# Patient Record
Sex: MALE | Race: ASIAN | NOT HISPANIC OR LATINO | Employment: OTHER | URBAN - METROPOLITAN AREA
[De-identification: names, ages, dates, MRNs, and addresses within clinical notes are randomized per-mention and may not be internally consistent; named-entity substitution may affect disease eponyms.]

---

## 2017-01-26 DIAGNOSIS — E11.319 CONTROLLED TYPE 2 DIABETES MELLITUS WITH RETINOPATHY, WITHOUT LONG-TERM CURRENT USE OF INSULIN, MACULAR EDEMA PRESENCE UNSPECIFIED, UNSPECIFIED RETINOPATHY SEVERITY: Primary | ICD-10-CM

## 2017-01-26 NOTE — TELEPHONE ENCOUNTER
glimepiride (AMARYL) 2 MG tablet         Last Written Prescription Date: 12/22/16  Last Fill Quantity: 30, # refills: 0  Last Office Visit with G, P or [facility] prescribing provider:  10/26/16        BP Readings from Last 3 Encounters:   10/26/16 114/84   07/07/16 114/80   02/11/16 150/84     MICROL       21   10/26/2016  No results found for this basename: microalbumin  CREATININE   Date Value Ref Range Status   07/07/2016 0.78 0.66 - 1.25 mg/dL Final   ]  GFR ESTIMATE   Date Value Ref Range Status   07/07/2016 >90  Non  GFR Calc   >60 mL/min/1.7m2 Final   11/11/2015 >90  Non  GFR Calc   >60 mL/min/1.7m2 Final   05/15/2015 89 >60 mL/min/1.7m2 Final     Comment:     Non  GFR Calc     GFR ESTIMATE IF BLACK   Date Value Ref Range Status   07/07/2016 >90   GFR Calc   >60 mL/min/1.7m2 Final   11/11/2015 >90   GFR Calc   >60 mL/min/1.7m2 Final   05/15/2015 >90   GFR Calc   >60 mL/min/1.7m2 Final     CHOL      200   10/26/2016  HDL       48   10/26/2016  LDL      112   10/26/2016  TRIG      199   10/26/2016  CHOLHDLRATIO      3.6   11/11/2015  AST       35   5/15/2015  ALT       49   11/11/2015  A1C      9.9   10/26/2016  A1C      9.3   7/8/2016  A1C      7.4   2/11/2016  A1C      8.0   11/11/2015  A1C      7.0   5/15/2015  POTASSIUM   Date Value Ref Range Status   07/07/2016 4.3 3.4 - 5.3 mmol/L Final       [facility]y [facility]

## 2017-01-30 RX ORDER — GLIMEPIRIDE 2 MG/1
TABLET ORAL
Qty: 30 TABLET | Refills: 0 | Status: SHIPPED | OUTPATIENT
Start: 2017-01-30 | End: 2017-03-27

## 2017-01-30 NOTE — TELEPHONE ENCOUNTER
FOLLOWING UP ON THIS MEDICATION REQUEST   OUR RECORD'S SHOW WE ARE STILL AWAITING A REPLY ON THIS MEDICATION                                                        THANK YOU

## 2017-01-30 NOTE — TELEPHONE ENCOUNTER
Kimberlyn Dumont MA at 12/22/2016  3:51 PM      Status: Signed         Expand All Collapse All    Patient notified.  Nighat Gao MA, APRN CNP at 12/22/2016  3:49 PM      Status: Signed         Expand All Collapse All    Refilled- patient is due for labs in January- orders placed and he is to follow back after he's had labs done (due 1/26/16).  Nighat WEISS CNP               Medication is being filled for 1 time refill only due to:  Patient due for fasting labs and follow up office visit per above notes.    Chandan Mejia, RN

## 2017-03-16 ENCOUNTER — TELEPHONE (OUTPATIENT)
Dept: FAMILY MEDICINE | Facility: CLINIC | Age: 62
End: 2017-03-16

## 2017-03-16 NOTE — TELEPHONE ENCOUNTER
Panel Management Review      Patient has the following on his problem list:     Diabetes    ASA: Passed    Last A1C  Lab Results   Component Value Date    A1C 9.9 10/26/2016    A1C 9.3 07/08/2016    A1C 7.4 02/11/2016    A1C 8.0 11/11/2015    A1C 7.0 05/15/2015     A1C tested: FAILED    Last LDL:    Lab Results   Component Value Date    CHOL 200 10/26/2016     Lab Results   Component Value Date    HDL 48 10/26/2016     Lab Results   Component Value Date     10/26/2016     Lab Results   Component Value Date    TRIG 199 10/26/2016     Lab Results   Component Value Date    CHOLHDLRATIO 3.6 11/11/2015     Lab Results   Component Value Date    NHDL 152 10/26/2016       Is the patient on a Statin? YES             Is the patient on Aspirin? YES    Medications     HMG CoA Reductase Inhibitors    atorvastatin (LIPITOR) 20 MG tablet    Salicylates    aspirin 81 MG EC tablet          Last three blood pressure readings:  BP Readings from Last 3 Encounters:   10/26/16 114/84   07/07/16 114/80   02/11/16 150/84       Date of last diabetes office visit: 11/29/2016     Tobacco History:     History   Smoking Status     Never Smoker   Smokeless Tobacco     Never Used           Composite cancer screening  Chart review shows that this patient is due/due soon for the following None  Summary:    Patient is due/failing the following:   A1C    Action needed:   Patient needs office visit for DM ck .    Type of outreach:    Sent IngagePatient message.    Questions for provider review:    None                                                                                                                                    Ada Marcos CMA

## 2017-03-16 NOTE — LETTER
March 28, 2017          Kathrine Isaac  [address] [facility] [address]            Dear Kathrine Isaac,      At [facility] we care about your health and are committed to providing quality patient care. Regular appointments are a vital part of the care and management of your health and can help prevent many of the complications that can occur.      It has come to our attention that you are due for Diabetes check.  Please call [facility] at [phone] soon to schedule your follow up appointment.    If you have transferred care to another clinic please call to inform us so that we do not continue to send you reminder letters.      Sincerely,      [facility] Care Team/molly

## 2017-03-27 DIAGNOSIS — E11.319 CONTROLLED TYPE 2 DIABETES MELLITUS WITH RETINOPATHY, WITHOUT LONG-TERM CURRENT USE OF INSULIN, MACULAR EDEMA PRESENCE UNSPECIFIED, UNSPECIFIED RETINOPATHY SEVERITY: ICD-10-CM

## 2017-03-27 NOTE — TELEPHONE ENCOUNTER
glimepiride (AMARYL) 2 MG tablet         Last Written Prescription Date: 01/30/17  Last Fill Quantity: 30, # refills: 0  Last Office Visit with G, P or [facility] prescribing provider:  10/26/16        BP Readings from Last 3 Encounters:   10/26/16 114/84   07/07/16 114/80   02/11/16 150/84     Lab Results   Component Value Date    MICROL 21 10/26/2016     No results found for: MICROALBUMIN  Creatinine   Date Value Ref Range Status   07/07/2016 0.78 0.66 - 1.25 mg/dL Final   ]  GFR Estimate   Date Value Ref Range Status   07/07/2016 >90  Non  GFR Calc   >60 mL/min/1.7m2 Final   11/11/2015 >90  Non  GFR Calc   >60 mL/min/1.7m2 Final   05/15/2015 89 >60 mL/min/1.7m2 Final     Comment:     Non  GFR Calc     GFR Estimate If Black   Date Value Ref Range Status   07/07/2016 >90   GFR Calc   >60 mL/min/1.7m2 Final   11/11/2015 >90   GFR Calc   >60 mL/min/1.7m2 Final   05/15/2015 >90   GFR Calc   >60 mL/min/1.7m2 Final     Lab Results   Component Value Date    CHOL 200 10/26/2016     Lab Results   Component Value Date    HDL 48 10/26/2016     Lab Results   Component Value Date     10/26/2016     Lab Results   Component Value Date    TRIG 199 10/26/2016     Lab Results   Component Value Date    CHOLHDLRATIO 3.6 11/11/2015     Lab Results   Component Value Date    AST 35 05/15/2015     Lab Results   Component Value Date    ALT 49 11/11/2015     Lab Results   Component Value Date    A1C 9.9 10/26/2016    A1C 9.3 07/08/2016    A1C 7.4 02/11/2016    A1C 8.0 11/11/2015    A1C 7.0 05/15/2015     Potassium   Date Value Ref Range Status   07/07/2016 4.3 3.4 - 5.3 mmol/L Final         [facility]

## 2017-03-31 RX ORDER — GLIMEPIRIDE 2 MG/1
TABLET ORAL
Qty: 30 TABLET | Refills: 0 | Status: SHIPPED | OUTPATIENT
Start: 2017-03-31 | End: 2017-04-07

## 2017-03-31 NOTE — TELEPHONE ENCOUNTER
Routing refill request to provider for review/approval because:  Laura given x1 and patient did not follow up, please advise    Nancy Melendrez, RN, BSN

## 2017-04-03 NOTE — TELEPHONE ENCOUNTER
Spoke to patient and gave him Sushma's message.  Scheduled an appt with Sushma on 4/7/17 at 8:00 am.  Maria Alejandra Tam MA/  For Teams Spirit and Laura

## 2017-04-07 ENCOUNTER — OFFICE VISIT (OUTPATIENT)
Dept: FAMILY MEDICINE | Facility: CLINIC | Age: 62
End: 2017-04-07
Payer: COMMERCIAL

## 2017-04-07 VITALS
HEART RATE: 74 BPM | SYSTOLIC BLOOD PRESSURE: 138 MMHG | OXYGEN SATURATION: 96 % | HEIGHT: 67 IN | WEIGHT: 204.6 LBS | TEMPERATURE: 97.2 F | BODY MASS INDEX: 32.11 KG/M2 | DIASTOLIC BLOOD PRESSURE: 86 MMHG

## 2017-04-07 DIAGNOSIS — E11.319 TYPE 2 DIABETES MELLITUS WITH RETINOPATHY, WITHOUT LONG-TERM CURRENT USE OF INSULIN, MACULAR EDEMA PRESENCE UNSPECIFIED, UNSPECIFIED LATERALITY, UNSPECIFIED RETINOPATHY SEVERITY (H): ICD-10-CM

## 2017-04-07 DIAGNOSIS — E66.811 OBESITY, CLASS I, BMI 30.0-34.9 (SEE ACTUAL BMI): ICD-10-CM

## 2017-04-07 DIAGNOSIS — E78.5 HYPERLIPIDEMIA LDL GOAL <100: ICD-10-CM

## 2017-04-07 DIAGNOSIS — Z13.89 SCREENING FOR DIABETIC PERIPHERAL NEUROPATHY: Primary | ICD-10-CM

## 2017-04-07 DIAGNOSIS — G89.29 CHRONIC PAIN OF LEFT KNEE: ICD-10-CM

## 2017-04-07 DIAGNOSIS — I10 ESSENTIAL HYPERTENSION WITH GOAL BLOOD PRESSURE LESS THAN 140/90: ICD-10-CM

## 2017-04-07 DIAGNOSIS — M25.562 CHRONIC PAIN OF LEFT KNEE: ICD-10-CM

## 2017-04-07 LAB — HBA1C MFR BLD: 8.4 % (ref 4.3–6)

## 2017-04-07 PROCEDURE — 83036 HEMOGLOBIN GLYCOSYLATED A1C: CPT | Performed by: NURSE PRACTITIONER

## 2017-04-07 PROCEDURE — 36415 COLL VENOUS BLD VENIPUNCTURE: CPT | Performed by: NURSE PRACTITIONER

## 2017-04-07 PROCEDURE — 99207 C FOOT EXAM  NO CHARGE: CPT | Performed by: NURSE PRACTITIONER

## 2017-04-07 PROCEDURE — 99214 OFFICE O/P EST MOD 30 MIN: CPT | Performed by: NURSE PRACTITIONER

## 2017-04-07 RX ORDER — LISINOPRIL 40 MG/1
40 TABLET ORAL DAILY
Qty: 90 TABLET | Refills: 3 | Status: SHIPPED | OUTPATIENT
Start: 2017-04-07 | End: 2017-11-17

## 2017-04-07 RX ORDER — GLIMEPIRIDE 2 MG/1
TABLET ORAL
Qty: 90 TABLET | Refills: 3 | Status: SHIPPED | OUTPATIENT
Start: 2017-04-07 | End: 2017-11-17

## 2017-04-07 RX ORDER — ATORVASTATIN CALCIUM 20 MG/1
20 TABLET, FILM COATED ORAL DAILY
Qty: 90 TABLET | Refills: 1 | Status: SHIPPED | OUTPATIENT
Start: 2017-04-07 | End: 2017-09-20

## 2017-04-07 RX ORDER — NAPROXEN 500 MG/1
500 TABLET ORAL 2 TIMES DAILY PRN
Qty: 90 TABLET | Refills: 1 | Status: SHIPPED | OUTPATIENT
Start: 2017-04-07 | End: 2017-07-28

## 2017-04-07 RX ORDER — METFORMIN HCL 500 MG
2000 TABLET, EXTENDED RELEASE 24 HR ORAL
Qty: 360 TABLET | Refills: 3 | Status: SHIPPED | OUTPATIENT
Start: 2017-04-07 | End: 2017-11-17

## 2017-04-07 RX ORDER — HYDROCHLOROTHIAZIDE 12.5 MG/1
12.5 TABLET ORAL DAILY
Qty: 90 TABLET | Refills: 3 | Status: SHIPPED | OUTPATIENT
Start: 2017-04-07 | End: 2017-11-17

## 2017-04-07 RX ORDER — GLIMEPIRIDE 4 MG/1
4 TABLET ORAL
Qty: 90 TABLET | Refills: 1 | Status: SHIPPED | OUTPATIENT
Start: 2017-04-07 | End: 2018-03-28

## 2017-04-07 NOTE — MR AVS SNAPSHOT
After Visit Summary   4/7/2017    Kathrine Isaac    MRN: [id number]           Patient Information     Date Of Birth          1955        Visit Information        Provider Department      4/7/2017 8:00 AM Nighat Joe APRN CNP [facility]        Today's Diagnoses     Screening for diabetic peripheral neuropathy    -  1    Type 2 diabetes mellitus with retinopathy, without long-term current use of insulin, macular edema presence unspecified, unspecified laterality, unspecified retinopathy severity (H)        Hyperlipidemia LDL goal <100        Essential hypertension with goal blood pressure less than 140/90        Chronic pain of left knee        Obesity, Class I, BMI 30.0-34.9 (see actual BMI)          Care Instructions    Based on your medical history and these are the current health maintenance or preventive care services that you are due for (some may have been done at this visit)  Health Maintenance Due   Topic Date Due     FOOT EXAM Q1 YEAR( NO INBASKET)  01/11/1956     EYE EXAM Q1 YEAR( NO INBASKET)  05/04/2016     A1C Q6 MO( NO INBASKET)  04/26/2017         At [facility], we strive to deliver an exceptional experience to you, every time we see you.    If you receive a survey in the mail, please send us back your thoughts. We really do value your feedback.    Your care team's suggested websites for health information:  Www.RoleStar.org : Up to date and easily searchable information on multiple topics.  Www.medlineplus.gov : medication info, interactive tutorials, watch real surgeries online  Www.familydoctor.org : good info from the Academy of Family Physicians  Www.cdc.gov : public health info, travel advisories, epidemics (H1N1)  Www.aap.org : children's health info, normal development, vaccinations  Www.health.state.[place].us : [place] dept of health, public health issues in [place], N1N1    How to contact your care team:   Team Laura/Pio [phone]          Pharmacy [phone]    Dr. Villegas, Hue Dowd PA-C, Dr. Fajardo, Conchis WEISS CNP, Cynthia Yanez PA-C, Dr. Toney, and ABHISHEK Pardo CNP    Team RNs: Odilia & Alexa      Clinic hours  M-Th 7 am-7 pm   Fri 7 am-5 pm.   Urgent care M-F 11 am-9 pm,   Sat/Sun 9 am-5 pm.  Pharmacy M-Th 8 am-8 pm Fri 8 am-6 pm  Sat/Sun 9 am-5 pm.     All password changes, disabled accounts, or ID changes in P[employer]/MyHealth will be done by our Access Services Department.    If you need help with your account or password, call: [phone]. Clinic staff no longer has the ability to change passwords.     Your Diabetes Foot Care Program     Every day you depend on your feet to keep you moving. But when you have diabetes, your feet need special care. Even a small foot problem can become very serious. So don t take your feet for granted. By working with your diabetes health care team, you can learn how to protect your feet and keep them healthy.    Evaluating Your Feet  An evaluation helps your health care provider check the condition of your feet. The evaluation includes a review of your diabetes history and overall health. It may also include a foot exam, X-rays, or other tests. These can help show problems beneath the skin that you can t see or feel.  Medical History  You will be asked about your overall health and any history of foot problems. You ll also discuss your diabetes history, such as whether your blood sugar level has changed over time. It also includes questions about sensations of pain, tingling, pins and needles, or numbness. Doctors would also want to know if you have high blood pressure and heart disease, or if you smoke. Be sure to mention any medications, supplements, or herbal remedies you take.  Foot Exam  A foot exam checks the condition of different parts of your foot. First, your skin and nails are examined for any signs of infection. Blood flow is checked by feeling for the  pulses in each foot. You may also have tests to study the nerves in the foot. These include using a small filament (wire) to see how sensitive your feet are. In certain cases, you will be asked to walk a short distance to check for bone, joint, and muscle problems.  Diagnostic Tests  If needed, your health care provider will suggest certain tests to learn more about your feet. These include:    Doppler tests to measure blood flow in the feet and lower leg.    X-rays, which can show bone or joint problems.    Other imaging tests, such as an MRI (magnetic resonance imaging), bone scan, and CT (computed tomography) scan. These can help show bone infections.    Other tests, such as vascular tests, which study the blood flow in your feet and legs. You may also have nerve studies to learn how sensitive your feet are.  Creating a Foot Care Program  Based on the evaluation, your health care provider will create a foot care program for you. Your program may be as simple as starting a daily self-care routine and changing the types of shoes your wear. It may also involve treating minor foot problems, such as a corn or blister. In some cases, surgery will be needed to treat an infection.  Preventing Problems  When you have diabetes, it s easier to prevent problems than to treat them later on. So see your health care team for regular checkups and foot care. Your health care team can also help you learn more about caring for your feet at home. For example, you may be told to avoid walking barefoot. Or you may be told that special footwear is needed to protect your feet.  Have Regular Checkups  Foot problems can develop quickly. So be sure to follow your health care team s schedule for regular checkups. During office visits, take off your shoes and socks as soon as you get in the exam room. Ask your health care provider to examine your feet for problems. This will make it easier to find and treat small skin irritations before they  get worse. Regular checkups can also help keep track of the blood flow and feeling in your feet. If you have neuropathy (lack of feeling in your feet), you will need to have checkups more often.  Learn About Self-Care  The more you know about diabetes and your feet, the easier it will be to prevent problems. Members of your health care team can teach you how to inspect your feet and teach you to look for warning signs. They can also give you other foot care tips. During office visits, be sure to ask any questions you have.    3443-0927 [employer]. [address] [place] [address]. All rights reserved. This information is not intended as a substitute for professional medical care. Always follow your healthcare professional's instructions.        Long-Term Complications of Diabetes  Diabetes can cause health problems over time. These are called complications. They are more likely to occur if your blood sugar is often too high. Over time, high blood sugar can damage blood vessels in your body. It is important to keep your blood sugar in your target range. This can help prevent or delay complications from diabetes.    Possible complications  Complications of diabetes include:    Eye problems, including damage to the blood vessels in the eyes (retinopathy), pressure in the eye (glaucoma), and clouding of the eye s lens (a cataract). Eye problems can eventually lead to irreversible blindness.     Tooth and gum problems (periodontal disease), causing loss of teeth and bone    Blood vessel (vascular) disease leading to circulation problems, heart attack or stroke, or a need for amputation of a limb     Problems with sexual function leading to erectile dysfunction in men and sexual discomfort in women     Kidney disease (nephropathy) can eventually lead to kidney failure, which may require dialysis or kidney transplant     Nerve problems (neuropathy), causing pain or loss of feeling in your feet and  other parts of your body, potentially leading to an amputation of a limb     High blood pressure (hypertension), putting strain on your heart and blood vessels    Serious infections, possibly leading to loss of toes, feet, or limbs  How to avoid complications  The serious consequences of these complications are completely avoidable for most people with diabetes by managing your blood glucose, blood pressure, and cholesterol levels. This can help you feel better and stay healthy. You can manage diabetes by tracking your blood sugar. You can also eat healthy and exercise. And you should take medication if directed by your health care provider.    3976-2585 [employer]. [address] [place] [address]. All rights reserved. This information is not intended as a substitute for professional medical care. Always follow your healthcare professional's instructions.              Follow-ups after your visit        Additional Services     OPTOMETRY REFERRAL       Your provider has referred you to: PATEL: [facility] [phone]   [email or web address]    Please be aware that coverage of these services is subject to the terms and limitations of your health insurance plan.  Call member services at your health plan with any benefit or coverage questions.      Please bring the following with you to your appointment:    (1) Any X-Rays, CTs or MRIs which have been performed.  Contact the facility where they were done to arrange for  prior to your scheduled appointment.    (2) List of current medications  (3) This referral request   (4) Any documents/labs given to you for this referral                  Follow-up notes from your care team     Return in about 3 months (around 7/7/2017), or if symptoms worsen or fail to improve, for Lab Work, 6 months OV.      Who to contact     If you have questions or need follow up information about today's  "clinic visit or your schedule please contact [facility] directly at [phone].  Normal or non-critical lab and imaging results will be communicated to you by MyChart, letter or phone within 4 business days after the clinic has received the results. If you do not hear from us within 7 days, please contact the clinic through [employer] or phone. If you have a critical or abnormal lab result, we will notify you by phone as soon as possible.  Submit refill requests through [employer] or call your pharmacy and they will forward the refill request to us. Please allow 3 business days for your refill to be completed.          Additional Information About Your Visit        [employer] Information     [employer] gives you secure access to your electronic health record. If you see a primary care provider, you can also send messages to your care team and make appointments. If you have questions, please call your primary care clinic.  If you do not have a primary care provider, please call [phone] and they will assist you.        Care EveryWhere ID     This is your Care EveryWhere ID. This could be used by other organizations to access your [place] medical records  [id number]        Your Vitals Were     Pulse Temperature Height Pulse Oximetry BMI (Body Mass Index)       74 97.2  F (36.2  C) (Oral) 5' 6.93\" (1.7 m) 96% 32.11 kg/m2        Blood Pressure from Last 3 Encounters:   04/07/17 138/86   10/26/16 114/84   07/07/16 114/80    Weight from Last 3 Encounters:   04/07/17 204 lb 9.6 oz (92.8 kg)   10/26/16 198 lb (89.8 kg)   07/07/16 202 lb (91.6 kg)              We Performed the Following     FOOT EXAM  NO CHARGE [74584.114]     HEMOGLOBIN A1C     OPTOMETRY REFERRAL          Today's Medication Changes          These changes are accurate as of: 4/7/17  8:32 AM.  If you have any questions, ask your nurse or doctor.               These medicines have changed or have updated prescriptions.        Dose/Directions "    glimepiride 2 MG tablet   Commonly known as:  AMARYL   This may have changed:  See the new instructions.   Used for:  Type 2 diabetes mellitus with retinopathy, without long-term current use of insulin, macular edema presence unspecified, unspecified laterality, unspecified retinopathy severity (H)   Changed by:  Nighat Joe APRN CNP        TAKE ONE TABLET BY MOUTH EVERY MORNING BEFORE BREAKFAST   Quantity:  90 tablet   Refills:  3            Where to get your medicines      These medications were sent to [facility] [place], [place] - [address]  [address], [facility] [address]     Phone:  [phone]     aspirin 81 MG EC tablet    atorvastatin 20 MG tablet    glimepiride 2 MG tablet    hydrochlorothiazide 12.5 MG Tabs tablet    lisinopril 40 MG tablet    metFORMIN 500 MG 24 hr tablet    naproxen 500 MG tablet    sitagliptin 100 MG tablet                Primary Care Provider Office Phone # Fax #    ABHISHEK Beauchamp [phone]       [facility] [address]  [facility] [address]        Thank you!     Thank you for choosing [facility]  for your care. Our goal is always to provide you with excellent care. Hearing back from our patients is one way we can continue to improve our services. Please take a few minutes to complete the written survey that you may receive in the mail after your visit with us. Thank you!             Your Updated Medication List - Protect others around you: Learn how to safely use, store and throw away your medicines at www.disposemymeds.org.          This list is accurate as of: 4/7/17  8:32 AM.  Always use your most recent med list.                   Brand Name Dispense Instructions for use    aspirin 81 MG EC tablet     90 tablet    Take 1 tablet (81 mg) by mouth daily       atorvastatin 20 MG tablet    LIPITOR    90 tablet    Take 1 tablet (20 mg) by mouth daily       blood glucose  monitoring test strip    no brand specified    1 Box    Use to test blood sugars once times daily or as directed       glimepiride 2 MG tablet    AMARYL    90 tablet    TAKE ONE TABLET BY MOUTH EVERY MORNING BEFORE BREAKFAST       hydrochlorothiazide 12.5 MG Tabs tablet     90 tablet    Take 1 tablet (12.5 mg) by mouth daily       lisinopril 40 MG tablet    PRINIVIL/ZESTRIL    90 tablet    Take 1 tablet (40 mg) by mouth daily       metFORMIN 500 MG 24 hr tablet    GLUCOPHAGE-XR    360 tablet    Take 4 tablets (2,000 mg) by mouth daily (with dinner)       naproxen 500 MG tablet    NAPROSYN    90 tablet    Take 1 tablet (500 mg) by mouth 2 times daily as needed for moderate pain       * order for DME     1 each    Equipment being ordered: wrist quick fit [id number]       * order for DME     1 Box    Equipment being ordered: lancets for one touch glucometer for once daily testing       * order for DME     1 Device    Equipment being ordered: Glucometer Accucheck Sulema, lancets, test strips.  Patient to check sugars bid times daily, 90 day supply for test strips and lancets, refill 6 times       * order for DME     1 Device    Equipment being ordered: Glucometer per insurance preference, lancets, test strips.  Patient to check sugars two times daily, 90 day supply for test strips and lancets, refill 3 times       sitagliptin 100 MG tablet    JANUVIA    90 tablet    Take 1 tablet (100 mg) by mouth daily       * Notice:  This list has 4 medication(s) that are the same as other medications prescribed for you. Read the directions carefully, and ask your doctor or other care provider to review them with you.

## 2017-04-07 NOTE — NURSING NOTE
"Chief Complaint   Patient presents with     Hypertension     Diabetes     Fasting     Lipids     Refill Request     Allergies       Initial /86 (BP Location: Left arm, Patient Position: Chair, Cuff Size: Adult Regular)  Pulse 74  Temp 97.2  F (36.2  C) (Oral)  Ht 5' 6.93\" (1.7 m)  Wt 204 lb 9.6 oz (92.8 kg)  SpO2 96%  BMI 32.11 kg/m2 Estimated body mass index is 32.11 kg/(m^2) as calculated from the following:    Height as of this encounter: 5' 6.93\" (1.7 m).    Weight as of this encounter: 204 lb 9.6 oz (92.8 kg).  Medication Reconciliation: complete   Janeen Cordon MA      "

## 2017-04-07 NOTE — PROGRESS NOTES
SUBJECTIVE:                                                    Kathrine Isaac is a 62 year old male who presents to clinic today for the following health issues:        Diabetes Follow-up    Patient is checking blood sugars: twice daily.    Blood sugar testing frequency justification: Patient modifying lifestyle changes (diet, exercise) with blood sugars  Results are as follows:         am - 90         suppertime - 138-140          Bedtime less than 150    Diabetic concerns: None     Symptoms of hypoglycemia (low blood sugar): none     Paresthesias (numbness or burning in feet) or sores: No     Date of last diabetic eye exam: June 2016     Hyperlipidemia Follow-Up      Rate your low fat/cholesterol diet?: good    Taking statin?  Yes, no muscle aches from statin    Other lipid medications/supplements?:  none     Hypertension Follow-up      Outpatient blood pressures are not being checked.    Low Salt Diet: not monitoring salt         Amount of exercise or physical activity: Moderately     Problems taking medications regularly: No    Medication side effects: none    Diet: regular (no restrictions)    ALLERGIES     Onset: Couple of years     Description:   Nasal congestion: no  Sneezing: YES  Red, itchy eyes: no    Progression of Symptoms:  same    Accompanying Signs & Symptoms:  Cough: no  Wheezing: no  Rash: no  Sinus/facial pain: no   History:   Is it seasonal: in the spring and in the summer   History of Asthma: no  Has allergy testing been done: YES- couple of years ago     Precipitating factors:   None    Alleviating factors:  None       Therapies Tried and outcome: Claritin helps      Problem list and histories reviewed & adjusted, as indicated.  Additional history: as documented    Recent Labs   Lab Test  10/26/16   0940  07/08/16   0916  07/07/16   1107  02/11/16   0751  11/11/15   1040  05/15/15   0927   A1C  9.9*  9.3*   --   7.4*  8.0*  7.0*   LDL  112*   --    --    --   61  Cannot estimate LDL when  "triglyceride exceeds 400 mg/dL   HDL  48   --    --    --   46  44   TRIG  199*   --    --    --   291*  428*   ALT   --    --    --    --   49  49   CR   --    --   0.78   --   0.81  0.88   GFRESTIMATED   --    --   >90  Non  GFR Calc     --   >90  Non  GFR Calc    89   GFRESTBLACK   --    --   >90   GFR Calc     --   >90   GFR Calc    >90   GFR Calc     POTASSIUM   --    --   4.3   --   4.6  4.8   TSH   --    --    --    --    --   1.40      BP Readings from Last 3 Encounters:   04/07/17 138/86   10/26/16 114/84   07/07/16 114/80    Wt Readings from Last 3 Encounters:   04/07/17 204 lb 9.6 oz (92.8 kg)   10/26/16 198 lb (89.8 kg)   07/07/16 202 lb (91.6 kg)                  Labs reviewed in EPIC    Reviewed and updated as needed this visit by clinical staff       Reviewed and updated as needed this visit by Provider         ROS:  Constitutional, HEENT, cardiovascular, pulmonary, gi and gu systems are negative, except as otherwise noted.    OBJECTIVE:                                                    /86 (BP Location: Left arm, Patient Position: Chair, Cuff Size: Adult Regular)  Pulse 74  Temp 97.2  F (36.2  C) (Oral)  Ht 5' 6.93\" (1.7 m)  Wt 204 lb 9.6 oz (92.8 kg)  SpO2 96%  BMI 32.11 kg/m2  Body mass index is 32.11 kg/(m^2).  GENERAL: healthy, alert and no distress  EYES: Eyes grossly normal to inspection, PERRL, EOMI, conjunctivae and sclerae normal and fundi benign-no diabetic or hypertensive changes seen  HENT: ear canals and TM's normal, nose and mouth without ulcers or lesions  NECK: no adenopathy, no asymmetry, masses, or scars and thyroid normal to palpation  RESP: lungs clear to auscultation - no rales, rhonchi or wheezes  CV: regular rate and rhythm, normal S1 S2, no S3 or S4, no murmur, click or rub, no peripheral edema and peripheral pulses strong  ABDOMEN: soft, nontender, no hepatosplenomegaly, no masses and " "bowel sounds normal  MS: no gross musculoskeletal defects noted, no edema  SKIN: no suspicious lesions or rashes  NEURO: Normal strength and tone, mentation intact and speech normal  PSYCH: mentation appears normal, affect normal/bright  LYMPH: normal ant/post cervical, supraclavicular nodes  Diabetic foot exam: normal DP and PT pulses, no trophic changes or ulcerative lesions, normal sensory exam, normal monofilament exam and nail exam dystrophic great toenails    Diagnostic Test Results:  No results found for this or any previous visit (from the past 24 hour(s)).     ASSESSMENT/PLAN:                                                          BMI:   Estimated body mass index is 32.11 kg/(m^2) as calculated from the following:    Height as of this encounter: 5' 6.93\" (1.7 m).    Weight as of this encounter: 204 lb 9.6 oz (92.8 kg).   Weight management plan: Discussed healthy diet and exercise guidelines and patient will follow up in 6 months in clinic to re-evaluate.      1. Type 2 diabetes mellitus with retinopathy, without long-term current use of insulin, macular edema presence unspecified, unspecified laterality, unspecified retinopathy severity (H)  Sugars seem well controlled, checking A1c today. Due for eye exam in June.  - HEMOGLOBIN A1C  - glimepiride (AMARYL) 2 MG tablet; TAKE ONE TABLET BY MOUTH EVERY MORNING BEFORE BREAKFAST  Dispense: 90 tablet; Refill: 3  - aspirin 81 MG EC tablet; Take 1 tablet (81 mg) by mouth daily  Dispense: 90 tablet; Refill: 3  - sitagliptin (JANUVIA) 100 MG tablet; Take 1 tablet (100 mg) by mouth daily  Dispense: 90 tablet; Refill: 1  - metFORMIN (GLUCOPHAGE-XR) 500 MG 24 hr tablet; Take 4 tablets (2,000 mg) by mouth daily (with dinner)  Dispense: 360 tablet; Refill: 3  - OPTOMETRY REFERRAL    2. Hyperlipidemia LDL goal <100  Checking lipids today  - atorvastatin (LIPITOR) 20 MG tablet; Take 1 tablet (20 mg) by mouth daily  Dispense: 90 tablet; Refill: 1    3. Essential " hypertension with goal blood pressure less than 140/90  BP controlled, continue to cut back on the salt in the diet, regular exercise, work on weight loss.  - lisinopril (PRINIVIL/ZESTRIL) 40 MG tablet; Take 1 tablet (40 mg) by mouth daily  Dispense: 90 tablet; Refill: 3  - hydrochlorothiazide 12.5 MG TABS tablet; Take 1 tablet (12.5 mg) by mouth daily  Dispense: 90 tablet; Refill: 3    4. Chronic pain of left knee  Refilled naprosyn which works well for him.  - naproxen (NAPROSYN) 500 MG tablet; Take 1 tablet (500 mg) by mouth 2 times daily as needed for moderate pain  Dispense: 90 tablet; Refill: 1    5. Obesity, Class I, BMI 30.0-34.9 (see actual BMI)  Benefits of weight loss reviewed in detail, encouraged him to cut back on the carbohydrates in the diet, consume more fruits and vegetables, drink plenty of water, avoid fruit juices, sodas, get 150 min moderate exercise/week.  Recheck weight in 6 months.      6. Screening for diabetic peripheral neuropathy    - FOOT EXAM  NO CHARGE [39562.114]    See Patient Instructions    ABHISHEK Perry [facility]

## 2017-04-07 NOTE — PATIENT INSTRUCTIONS
Based on your medical history and these are the current health maintenance or preventive care services that you are due for (some may have been done at this visit)  Health Maintenance Due   Topic Date Due     FOOT EXAM Q1 YEAR( NO INBASKET)  01/11/1956     EYE EXAM Q1 YEAR( NO INBASKET)  05/04/2016     A1C Q6 MO( NO INBASKET)  04/26/2017         At [facility], we strive to deliver an exceptional experience to you, every time we see you.    If you receive a survey in the mail, please send us back your thoughts. We really do value your feedback.    Your care team's suggested websites for health information:  [email or web address] : Up to date and easily searchable information on multiple topics.  Www.medlineplus.gov : medication info, interactive tutorials, watch real surgeries online  Www.familydoctor.org : good info from the Academy of Family Physicians  Www.cdc.gov : public health info, travel advisories, epidemics (H1N1)  Www.aap.org : children's health info, normal development, vaccinations  Www.health.[facility].[place].us : [place] dept of health, public health issues in [place], N1N1    How to contact your care team:   Team Laura/Spirit [phone]         Pharmacy [phone]    Dr. Villegas, Hue Dowd PA-C, Dr. Fajardo, Conchis WEISS CNP, Cynthia Yanez PA-C, Dr. Toney, and ABHISHEK Pardo CNP    Team RNs: Odilia Liu      Clinic hours  M-Th 7 am-7 pm   Fri 7 am-5 pm.   Urgent care M-F 11 am-9 pm,   Sat/Sun 9 am-5 pm.  Pharmacy M-Th 8 am-8 pm Fri 8 am-6 pm  Sat/Sun 9 am-5 pm.     All password changes, disabled accounts, or ID changes in We[employer]/MyHealth will be done by our Access Services Department.    If you need help with your account or password, call: [phone]. Clinic staff no longer has the ability to change passwords.     Your Diabetes Foot Care Program     Every day you depend on your feet to keep you moving. But when you have diabetes, your feet need special care.  Even a small foot problem can become very serious. So don t take your feet for granted. By working with your diabetes health care team, you can learn how to protect your feet and keep them healthy.    Evaluating Your Feet  An evaluation helps your health care provider check the condition of your feet. The evaluation includes a review of your diabetes history and overall health. It may also include a foot exam, X-rays, or other tests. These can help show problems beneath the skin that you can t see or feel.  Medical History  You will be asked about your overall health and any history of foot problems. You ll also discuss your diabetes history, such as whether your blood sugar level has changed over time. It also includes questions about sensations of pain, tingling, pins and needles, or numbness. Doctors would also want to know if you have high blood pressure and heart disease, or if you smoke. Be sure to mention any medications, supplements, or herbal remedies you take.  Foot Exam  A foot exam checks the condition of different parts of your foot. First, your skin and nails are examined for any signs of infection. Blood flow is checked by feeling for the pulses in each foot. You may also have tests to study the nerves in the foot. These include using a small filament (wire) to see how sensitive your feet are. In certain cases, you will be asked to walk a short distance to check for bone, joint, and muscle problems.  Diagnostic Tests  If needed, your health care provider will suggest certain tests to learn more about your feet. These include:    Doppler tests to measure blood flow in the feet and lower leg.    X-rays, which can show bone or joint problems.    Other imaging tests, such as an MRI (magnetic resonance imaging), bone scan, and CT (computed tomography) scan. These can help show bone infections.    Other tests, such as vascular tests, which study the blood flow in your feet and legs. You may also have nerve  studies to learn how sensitive your feet are.  Creating a Foot Care Program  Based on the evaluation, your health care provider will create a foot care program for you. Your program may be as simple as starting a daily self-care routine and changing the types of shoes your wear. It may also involve treating minor foot problems, such as a corn or blister. In some cases, surgery will be needed to treat an infection.  Preventing Problems  When you have diabetes, it s easier to prevent problems than to treat them later on. So see your health care team for regular checkups and foot care. Your health care team can also help you learn more about caring for your feet at home. For example, you may be told to avoid walking barefoot. Or you may be told that special footwear is needed to protect your feet.  Have Regular Checkups  Foot problems can develop quickly. So be sure to follow your health care team s schedule for regular checkups. During office visits, take off your shoes and socks as soon as you get in the exam room. Ask your health care provider to examine your feet for problems. This will make it easier to find and treat small skin irritations before they get worse. Regular checkups can also help keep track of the blood flow and feeling in your feet. If you have neuropathy (lack of feeling in your feet), you will need to have checkups more often.  Learn About Self-Care  The more you know about diabetes and your feet, the easier it will be to prevent problems. Members of your health care team can teach you how to inspect your feet and teach you to look for warning signs. They can also give you other foot care tips. During office visits, be sure to ask any questions you have.    7684-6554 The [employer]. [address] [place] [address], [place] [address]. All rights reserved. This information is not intended as a substitute for professional medical care. Always follow your healthcare professional's  instructions.        Long-Term Complications of Diabetes  Diabetes can cause health problems over time. These are called complications. They are more likely to occur if your blood sugar is often too high. Over time, high blood sugar can damage blood vessels in your body. It is important to keep your blood sugar in your target range. This can help prevent or delay complications from diabetes.    Possible complications  Complications of diabetes include:    Eye problems, including damage to the blood vessels in the eyes (retinopathy), pressure in the eye (glaucoma), and clouding of the eye s lens (a cataract). Eye problems can eventually lead to irreversible blindness.     Tooth and gum problems (periodontal disease), causing loss of teeth and bone    Blood vessel (vascular) disease leading to circulation problems, heart attack or stroke, or a need for amputation of a limb     Problems with sexual function leading to erectile dysfunction in men and sexual discomfort in women     Kidney disease (nephropathy) can eventually lead to kidney failure, which may require dialysis or kidney transplant     Nerve problems (neuropathy), causing pain or loss of feeling in your feet and other parts of your body, potentially leading to an amputation of a limb     High blood pressure (hypertension), putting strain on your heart and blood vessels    Serious infections, possibly leading to loss of toes, feet, or limbs  How to avoid complications  The serious consequences of these complications are completely avoidable for most people with diabetes by managing your blood glucose, blood pressure, and cholesterol levels. This can help you feel better and stay healthy. You can manage diabetes by tracking your blood sugar. You can also eat healthy and exercise. And you should take medication if directed by your health care provider.    3240-5270 [employer]. [address] [place] [address], [place] [address]. All rights reserved. This  information is not intended as a substitute for professional medical care. Always follow your healthcare professional's instructions.

## 2017-07-28 DIAGNOSIS — G89.29 CHRONIC PAIN OF LEFT KNEE: ICD-10-CM

## 2017-07-28 DIAGNOSIS — M25.562 CHRONIC PAIN OF LEFT KNEE: ICD-10-CM

## 2017-07-28 NOTE — TELEPHONE ENCOUNTER
naproxen (NAPROSYN) 500 MG tablet      Last Written Prescription Date: 04/07/17  Last Quantity: 90, # refills: 1  Last Office Visit with G, [facility] or [facility] prescribing provider: 04/07/17       Creatinine   Date Value Ref Range Status   07/07/2016 0.78 0.66 - 1.25 mg/dL Final     Lab Results   Component Value Date    AST 35 05/15/2015     Lab Results   Component Value Date    ALT 49 11/11/2015     BP Readings from Last 3 Encounters:   04/07/17 138/86   10/26/16 114/84   07/07/16 114/80         Christy Linda  [facility]

## 2017-08-01 NOTE — TELEPHONE ENCOUNTER
Routing refill request to provider for review/approval because:  Labs not current:  Creatinine  Ann Marie Washington RN

## 2017-08-02 RX ORDER — NAPROXEN 500 MG/1
TABLET ORAL
Qty: 60 TABLET | Refills: 0 | Status: SHIPPED | OUTPATIENT
Start: 2017-08-02 | End: 2018-03-28

## 2017-08-02 NOTE — TELEPHONE ENCOUNTER
This writer 1st attempt to contact [facility] on 08/02/17      Reason for call Patient given a 30 day supply on medication and needs an office visit before next refill and left message to return call.      When patient calls back, please schedule Office Visit appointment within 2 weeks with PCP, document that pt called and close encounter .      Postponing message.    Maria Alejandra Tam MA

## 2017-08-03 NOTE — TELEPHONE ENCOUNTER
Spoke to patient and scheduled an appointment with Sushma Joe  On 8/9/17.  Maria Alejandra Tam MA/  For Teams Spirit and Laura

## 2017-08-24 DIAGNOSIS — G89.29 CHRONIC PAIN OF LEFT KNEE: ICD-10-CM

## 2017-08-24 DIAGNOSIS — E11.9 TYPE 2 DIABETES MELLITUS WITH HEMOGLOBIN A1C GOAL OF 7.0%-8.0% (H): Primary | ICD-10-CM

## 2017-08-24 DIAGNOSIS — E78.5 HYPERLIPIDEMIA LDL GOAL <100: ICD-10-CM

## 2017-08-24 DIAGNOSIS — M25.562 CHRONIC PAIN OF LEFT KNEE: ICD-10-CM

## 2017-08-24 NOTE — TELEPHONE ENCOUNTER
naproxen (NAPROSYN) 500 MG tablet      Last Written Prescription Date: 08/02/17  Last Quantity: 60, # refills: 0  Last Office Visit with G, P or [facility] prescribing provider: 04/07/17       Creatinine   Date Value Ref Range Status   07/07/2016 0.78 0.66 - 1.25 mg/dL Final     Lab Results   Component Value Date    AST 35 05/15/2015     Lab Results   Component Value Date    ALT 49 11/11/2015     BP Readings from Last 3 Encounters:   04/07/17 138/86   10/26/16 114/84   07/07/16 114/80         Christy Linda  [facility]

## 2017-08-28 NOTE — TELEPHONE ENCOUNTER
Routing refill request to provider for review/approval because:  Labs not current:  AST/ALT (5/2015)  Patt Cain RN.

## 2017-08-30 RX ORDER — NAPROXEN 500 MG/1
TABLET ORAL
Qty: 90 TABLET | Refills: 1 | Status: SHIPPED | OUTPATIENT
Start: 2017-08-30 | End: 2017-11-17

## 2017-08-30 NOTE — TELEPHONE ENCOUNTER
Patient needs labs prior to picking up his prescription.  Pls have him schedule fasting lab only appt for this. ( He is also due to follow up on his diabetes so we'll get all of his labs on the same stick).  Nighat WEISS, CNP

## 2017-08-30 NOTE — TELEPHONE ENCOUNTER
This writer attempted to contact [facility] on 08/30/17      Reason for call refill and left detailed message to schedule fasting lab appointment before refill is addressed.        Kimberlyn Dumont MA

## 2017-09-20 DIAGNOSIS — E78.5 HYPERLIPIDEMIA LDL GOAL <100: ICD-10-CM

## 2017-09-20 NOTE — TELEPHONE ENCOUNTER
atorvastatin (LIPITOR) 20 MG tablet         Last Written Prescription Date: 4/7/17  Last Fill Quantity: 90, # refills: 1    Last Office Visit with G, P or [facility] prescribing provider:  8/18/17   Future Office Visit:      BP Readings from Last 3 Encounters:   04/07/17 138/86   10/26/16 114/84   07/07/16 114/80     Lab Results   Component Value Date    ALT 49 11/11/2015     Lab Results   Component Value Date    CHOL 200 10/26/2016     Lab Results   Component Value Date    HDL 48 10/26/2016     Lab Results   Component Value Date     10/26/2016     Lab Results   Component Value Date    TRIG 199 10/26/2016     Lab Results   Component Value Date    CHOLHDLRATIO 3.6 11/11/2015

## 2017-09-22 RX ORDER — ATORVASTATIN CALCIUM 20 MG/1
TABLET, FILM COATED ORAL
Qty: 30 TABLET | Refills: 0 | Status: SHIPPED | OUTPATIENT
Start: 2017-09-22 | End: 2017-11-17

## 2017-09-22 NOTE — TELEPHONE ENCOUNTER
Medication is being filled for 1 time refill only due to:  Patient needs labs cholesterol recheck.     Patt Clinton RN

## 2017-11-15 DIAGNOSIS — E11.319 TYPE 2 DIABETES MELLITUS WITH RETINOPATHY, WITHOUT LONG-TERM CURRENT USE OF INSULIN, MACULAR EDEMA PRESENCE UNSPECIFIED, UNSPECIFIED LATERALITY, UNSPECIFIED RETINOPATHY SEVERITY (H): ICD-10-CM

## 2017-11-15 DIAGNOSIS — E78.5 HYPERLIPIDEMIA LDL GOAL <100: ICD-10-CM

## 2017-11-16 RX ORDER — ATORVASTATIN CALCIUM 20 MG/1
TABLET, FILM COATED ORAL
Qty: 30 TABLET | Refills: 0 | OUTPATIENT
Start: 2017-11-16

## 2017-11-16 RX ORDER — SITAGLIPTIN 100 MG/1
TABLET, FILM COATED ORAL
Qty: 30 TABLET | Refills: 0 | OUTPATIENT
Start: 2017-11-16

## 2017-11-16 RX ORDER — GLIMEPIRIDE 4 MG/1
TABLET ORAL
Qty: 90 TABLET | Refills: 1 | OUTPATIENT
Start: 2017-11-16

## 2017-11-16 NOTE — TELEPHONE ENCOUNTER
Patient is due for OV- I can see him tomorrow so he will not be out of his medication.  Nighat WEISS, CNP

## 2017-11-16 NOTE — TELEPHONE ENCOUNTER
Routing refill request to provider for review/approval because:  Laura given x1 and patient did not follow up, please advise  Cristina Dangelo RN

## 2017-11-16 NOTE — TELEPHONE ENCOUNTER
Scheduled patient for an office visit with Sushma Joe for tomorrow 11/17/17.  Maria Alejandra Tam MA/  For Teams Spirit and Laura

## 2017-11-17 ENCOUNTER — OFFICE VISIT (OUTPATIENT)
Dept: FAMILY MEDICINE | Facility: CLINIC | Age: 62
End: 2017-11-17
Payer: COMMERCIAL

## 2017-11-17 VITALS
WEIGHT: 208.2 LBS | SYSTOLIC BLOOD PRESSURE: 120 MMHG | DIASTOLIC BLOOD PRESSURE: 78 MMHG | OXYGEN SATURATION: 97 % | HEART RATE: 79 BPM | TEMPERATURE: 97 F | BODY MASS INDEX: 32.68 KG/M2 | HEIGHT: 67 IN

## 2017-11-17 DIAGNOSIS — E66.811 OBESITY, CLASS I, BMI 30.0-34.9 (SEE ACTUAL BMI): ICD-10-CM

## 2017-11-17 DIAGNOSIS — G89.29 CHRONIC PAIN OF LEFT KNEE: ICD-10-CM

## 2017-11-17 DIAGNOSIS — E78.5 HYPERLIPIDEMIA LDL GOAL <100: ICD-10-CM

## 2017-11-17 DIAGNOSIS — E11.319 TYPE 2 DIABETES MELLITUS WITH RETINOPATHY, WITHOUT LONG-TERM CURRENT USE OF INSULIN, MACULAR EDEMA PRESENCE UNSPECIFIED, UNSPECIFIED LATERALITY, UNSPECIFIED RETINOPATHY SEVERITY (H): ICD-10-CM

## 2017-11-17 DIAGNOSIS — M25.562 CHRONIC PAIN OF LEFT KNEE: ICD-10-CM

## 2017-11-17 DIAGNOSIS — E11.9 TYPE 2 DIABETES MELLITUS WITH HEMOGLOBIN A1C GOAL OF 7.0%-8.0% (H): Primary | ICD-10-CM

## 2017-11-17 DIAGNOSIS — I10 ESSENTIAL HYPERTENSION: ICD-10-CM

## 2017-11-17 DIAGNOSIS — H40.003 GLAUCOMA SUSPECT OF BOTH EYES: ICD-10-CM

## 2017-11-17 LAB
ALBUMIN SERPL-MCNC: 4.1 G/DL (ref 3.4–5)
ALP SERPL-CCNC: 69 U/L (ref 40–150)
ALT SERPL W P-5'-P-CCNC: 42 U/L (ref 0–70)
ANION GAP SERPL CALCULATED.3IONS-SCNC: 7 MMOL/L (ref 3–14)
AST SERPL W P-5'-P-CCNC: 19 U/L (ref 0–45)
BILIRUB SERPL-MCNC: 0.5 MG/DL (ref 0.2–1.3)
BUN SERPL-MCNC: 11 MG/DL (ref 7–30)
CALCIUM SERPL-MCNC: 8.6 MG/DL (ref 8.5–10.1)
CHLORIDE SERPL-SCNC: 104 MMOL/L (ref 94–109)
CHOLEST SERPL-MCNC: 98 MG/DL
CO2 SERPL-SCNC: 26 MMOL/L (ref 20–32)
CREAT SERPL-MCNC: 0.92 MG/DL (ref 0.66–1.25)
CREAT UR-MCNC: 32 MG/DL
GFR SERPL CREATININE-BSD FRML MDRD: 83 ML/MIN/1.7M2
GLUCOSE SERPL-MCNC: 94 MG/DL (ref 70–99)
HBA1C MFR BLD: 6.4 % (ref 4.3–6)
HDLC SERPL-MCNC: 44 MG/DL
LDLC SERPL CALC-MCNC: 21 MG/DL
MICROALBUMIN UR-MCNC: <5 MG/L
MICROALBUMIN/CREAT UR: NORMAL MG/G CR (ref 0–17)
NONHDLC SERPL-MCNC: 54 MG/DL
POTASSIUM SERPL-SCNC: 4.1 MMOL/L (ref 3.4–5.3)
PROT SERPL-MCNC: 7.7 G/DL (ref 6.8–8.8)
SODIUM SERPL-SCNC: 137 MMOL/L (ref 133–144)
TRIGL SERPL-MCNC: 166 MG/DL
TSH SERPL DL<=0.005 MIU/L-ACNC: 1.68 MU/L (ref 0.4–4)

## 2017-11-17 PROCEDURE — 80061 LIPID PANEL: CPT | Performed by: NURSE PRACTITIONER

## 2017-11-17 PROCEDURE — 84443 ASSAY THYROID STIM HORMONE: CPT | Performed by: NURSE PRACTITIONER

## 2017-11-17 PROCEDURE — 36415 COLL VENOUS BLD VENIPUNCTURE: CPT | Performed by: NURSE PRACTITIONER

## 2017-11-17 PROCEDURE — 80053 COMPREHEN METABOLIC PANEL: CPT | Performed by: NURSE PRACTITIONER

## 2017-11-17 PROCEDURE — 83036 HEMOGLOBIN GLYCOSYLATED A1C: CPT | Performed by: NURSE PRACTITIONER

## 2017-11-17 PROCEDURE — 82043 UR ALBUMIN QUANTITATIVE: CPT | Performed by: NURSE PRACTITIONER

## 2017-11-17 PROCEDURE — 99214 OFFICE O/P EST MOD 30 MIN: CPT | Performed by: NURSE PRACTITIONER

## 2017-11-17 RX ORDER — NAPROXEN 500 MG/1
TABLET ORAL
Qty: 90 TABLET | Refills: 1 | Status: SHIPPED | OUTPATIENT
Start: 2017-11-17 | End: 2018-03-23

## 2017-11-17 RX ORDER — HYDROCHLOROTHIAZIDE 12.5 MG/1
12.5 TABLET ORAL DAILY
Qty: 90 TABLET | Refills: 3 | Status: SHIPPED | OUTPATIENT
Start: 2017-11-17 | End: 2018-11-07

## 2017-11-17 RX ORDER — ATORVASTATIN CALCIUM 20 MG/1
20 TABLET, FILM COATED ORAL DAILY
Qty: 90 TABLET | Refills: 1 | Status: SHIPPED | OUTPATIENT
Start: 2017-11-17 | End: 2018-03-28

## 2017-11-17 RX ORDER — METFORMIN HCL 500 MG
2000 TABLET, EXTENDED RELEASE 24 HR ORAL
Qty: 360 TABLET | Refills: 3 | Status: SHIPPED | OUTPATIENT
Start: 2017-11-17 | End: 2018-11-07

## 2017-11-17 RX ORDER — LISINOPRIL 40 MG/1
40 TABLET ORAL DAILY
Qty: 90 TABLET | Refills: 3 | Status: SHIPPED | OUTPATIENT
Start: 2017-11-17 | End: 2018-11-07

## 2017-11-17 RX ORDER — GLIMEPIRIDE 2 MG/1
TABLET ORAL
Qty: 90 TABLET | Refills: 3 | Status: SHIPPED | OUTPATIENT
Start: 2017-11-17 | End: 2018-03-28 | Stop reason: DRUGHIGH

## 2017-11-17 ASSESSMENT — ANXIETY QUESTIONNAIRES
7. FEELING AFRAID AS IF SOMETHING AWFUL MIGHT HAPPEN: NOT AT ALL
IF YOU CHECKED OFF ANY PROBLEMS ON THIS QUESTIONNAIRE, HOW DIFFICULT HAVE THESE PROBLEMS MADE IT FOR YOU TO DO YOUR WORK, TAKE CARE OF THINGS AT HOME, OR GET ALONG WITH OTHER PEOPLE: NOT DIFFICULT AT ALL
5. BEING SO RESTLESS THAT IT IS HARD TO SIT STILL: NOT AT ALL
2. NOT BEING ABLE TO STOP OR CONTROL WORRYING: NOT AT ALL
1. FEELING NERVOUS, ANXIOUS, OR ON EDGE: NOT AT ALL
GAD7 TOTAL SCORE: 0
6. BECOMING EASILY ANNOYED OR IRRITABLE: NOT AT ALL
3. WORRYING TOO MUCH ABOUT DIFFERENT THINGS: NOT AT ALL

## 2017-11-17 ASSESSMENT — PATIENT HEALTH QUESTIONNAIRE - PHQ9
SUM OF ALL RESPONSES TO PHQ QUESTIONS 1-9: 3
5. POOR APPETITE OR OVEREATING: NOT AT ALL

## 2017-11-17 NOTE — PROGRESS NOTES
SUBJECTIVE:   Kathrine Isaac is a 62 year old male who presents to clinic today for the following health issues:      Diabetes Follow-up      Patient is checking blood sugars: rarely.  AM Results range from 100 to 120    Diabetic concerns: None     Symptoms of hypoglycemia (low blood sugar): none     Paresthesias (numbness or burning in feet) or sores: No     Date of last diabetic eye exam: 1 year     Hyperlipidemia Follow-Up      Rate your low fat/cholesterol diet?: good    Taking statin?  Yes, no muscle aches from statin    Other lipid medications/supplements?:  none    Hypertension Follow-up      Outpatient blood pressures are not being checked.    Low Salt Diet: Sea salt, low salt         Amount of exercise or physical activity: 4-5 days/week for an average of 45-60 minutes    Problems taking medications regularly: No    Medication side effects: none    Diet: regular (no restrictions)          Problem list and histories reviewed & adjusted, as indicated.  Additional history: as documented    Patient Active Problem List   Diagnosis     Glaucoma suspect     Type 2 diabetes mellitus with hemoglobin A1c goal of 7.0%-8.0% (H)     Hyperlipidemia LDL goal <100     Essential hypertension     Advance care planning     Diverticulosis of large intestine without hemorrhage     Obesity, Class I, BMI 30.0-34.9 (see actual BMI)     Past Surgical History:   Procedure Laterality Date     COLONOSCOPY N/A 1/14/2016    Procedure: COLONOSCOPY;  Surgeon: Ventura Monge MD;  Location: [facility]     LASIK BILATERAL       ROTATOR CUFF REPAIR RT/LT  1992     SURGICAL HISTORY OF -       uvula remove        Social History   Substance Use Topics     Smoking status: Never Smoker     Smokeless tobacco: Never Used     Alcohol use Yes     Family History   Problem Relation Age of Onset     DIABETES Mother      CANCER No family hx of      Hypertension No family hx of      CEREBROVASCULAR DISEASE No family hx of      Thyroid Disease No  family hx of      Glaucoma No family hx of      Macular Degeneration No family hx of          Current Outpatient Prescriptions   Medication Sig Dispense Refill     atorvastatin (LIPITOR) 20 MG tablet Take 1 tablet (20 mg) by mouth daily 90 tablet 1     naproxen (NAPROSYN) 500 MG tablet TAKE ONE TABLET BY MOUTH TWICE A DAY AS NEEDED FOR MODERATE PAIN 90 tablet 1     glimepiride (AMARYL) 2 MG tablet TAKE ONE TABLET BY MOUTH EVERY MORNING BEFORE BREAKFAST 90 tablet 3     sitagliptin (JANUVIA) 100 MG tablet Take 1 tablet (100 mg) by mouth daily 90 tablet 1     lisinopril (PRINIVIL/ZESTRIL) 40 MG tablet Take 1 tablet (40 mg) by mouth daily 90 tablet 3     metFORMIN (GLUCOPHAGE-XR) 500 MG 24 hr tablet Take 4 tablets (2,000 mg) by mouth daily (with dinner) 360 tablet 3     hydrochlorothiazide 12.5 MG TABS tablet Take 1 tablet (12.5 mg) by mouth daily 90 tablet 3     naproxen (NAPROSYN) 500 MG tablet TAKE ONE TABLET BY MOUTH TWICE A DAY AS NEEDED FOR MODERATE PAIN 60 tablet 0     aspirin 81 MG EC tablet Take 1 tablet (81 mg) by mouth daily 90 tablet 3     glimepiride (AMARYL) 4 MG tablet Take 1 tablet (4 mg) by mouth every morning (before breakfast) 90 tablet 1     order for DME Equipment being ordered: Glucometer per insurance preference, lancets, test strips.  Patient to check sugars two times daily, 90 day supply for test strips and lancets, refill 3 times 1 Device 11     blood glucose monitoring (NO BRAND SPECIFIED) test strip Use to test blood sugars once times daily or as directed 1 Box 11     order for DME Equipment being ordered: Glucometer Accucheck Sulema, lancets, test strips.  Patient to check sugars bid times daily, 90 day supply for test strips and lancets, refill 6 times 1 Device 0     order for DME Equipment being ordered: lancets for one touch glucometer for once daily testing 1 Box 6     ORDER FOR DME, SET TO LOCAL PRINT, Equipment being ordered: wrist quick fit [id number] 1 each 0     [DISCONTINUED]  atorvastatin (LIPITOR) 20 MG tablet TAKE ONE TABLET BY MOUTH EVERY DAY 30 tablet 0     [DISCONTINUED] glimepiride (AMARYL) 2 MG tablet TAKE ONE TABLET BY MOUTH EVERY MORNING BEFORE BREAKFAST 90 tablet 3     [DISCONTINUED] sitagliptin (JANUVIA) 100 MG tablet Take 1 tablet (100 mg) by mouth daily 90 tablet 1     [DISCONTINUED] lisinopril (PRINIVIL/ZESTRIL) 40 MG tablet Take 1 tablet (40 mg) by mouth daily 90 tablet 3     [DISCONTINUED] metFORMIN (GLUCOPHAGE-XR) 500 MG 24 hr tablet Take 4 tablets (2,000 mg) by mouth daily (with dinner) 360 tablet 3     [DISCONTINUED] hydrochlorothiazide 12.5 MG TABS tablet Take 1 tablet (12.5 mg) by mouth daily 90 tablet 3     Recent Labs   Lab Test  11/17/17   0944  04/07/17   0832  10/26/16   0940   07/07/16   1107   11/11/15   1040  05/15/15   0927   A1C  6.4*  8.4*  9.9*   < >   --    < >  8.0*  7.0*   LDL   --    --   112*   --    --    --   61  Cannot estimate LDL when triglyceride exceeds 400 mg/dL   HDL   --    --   48   --    --    --   46  44   TRIG   --    --   199*   --    --    --   291*  428*   ALT   --    --    --    --    --    --   49  49   CR   --    --    --    --   0.78   --   0.81  0.88   GFRESTIMATED   --    --    --    --   >90  Non  GFR Calc     --   >90  Non  GFR Calc    89   GFRESTBLACK   --    --    --    --   >90   GFR Calc     --   >90   GFR Calc    >90   GFR Calc     POTASSIUM   --    --    --    --   4.3   --   4.6  4.8   TSH   --    --    --    --    --    --    --   1.40    < > = values in this interval not displayed.      BP Readings from Last 3 Encounters:   11/17/17 120/78   04/07/17 138/86   10/26/16 114/84    Wt Readings from Last 3 Encounters:   11/17/17 208 lb 3.2 oz (94.4 kg)   04/07/17 204 lb 9.6 oz (92.8 kg)   10/26/16 198 lb (89.8 kg)                          Reviewed and updated as needed this visit by clinical staff       Reviewed and updated as needed this  "visit by Provider     ROS:  Constitutional, HEENT, cardiovascular, pulmonary, gi and gu systems are negative, except as otherwise noted.      OBJECTIVE:   /78 (BP Location: Left arm, Patient Position: Sitting, Cuff Size: Adult Regular)  Pulse 79  Temp 97  F (36.1  C) (Oral)  Ht 5' 6.93\" (1.7 m)  Wt 208 lb 3.2 oz (94.4 kg)  SpO2 97%  BMI 32.68 kg/m2  Body mass index is 32.68 kg/(m^2).  GENERAL: healthy, alert and no distress  EYES: Eyes grossly normal to inspection and fundi benign-no diabetic or hypertensive changes seen  HENT: ear canals and TM's normal, nose and mouth without ulcers or lesions  NECK: no adenopathy, no asymmetry, masses, or scars and thyroid normal to palpation  RESP: lungs clear to auscultation - no rales, rhonchi or wheezes  CV: regular rate and rhythm, normal S1 S2, no S3 or S4, no murmur, click or rub, no peripheral edema and peripheral pulses strong  ABDOMEN: soft, nontender, no hepatosplenomegaly, no masses and bowel sounds normal  MS: no gross musculoskeletal defects noted, no edema  SKIN: no suspicious lesions or rashes  NEURO: Normal strength and tone, mentation intact and speech normal  BACK: no CVA tenderness, no paralumbar tenderness  PSYCH: mentation appears normal, affect normal/bright  LYMPH: normal ant/post cervical, supraclavicular nodes    Diagnostic Test Results:  Results for orders placed or performed in visit on 11/17/17 (from the past 24 hour(s))   Hemoglobin A1c   Result Value Ref Range    Hemoglobin A1C 6.4 (H) 4.3 - 6.0 %       ASSESSMENT/PLAN:         BMI:   Estimated body mass index is 32.68 kg/(m^2) as calculated from the following:    Height as of this encounter: 5' 6.93\" (1.7 m).    Weight as of this encounter: 208 lb 3.2 oz (94.4 kg).   Weight management plan: Discussed healthy diet and exercise guidelines and patient will follow up in 6 months in clinic to re-evaluate.        1. Type 2 diabetes mellitus with hemoglobin A1c goal of 7.0%-8.0% (H)  A1c now " at goal- patient has been exercising and watching his diet more closely, continue present management, praised him for his efforts.  Due for eye exam.    - Comprehensive metabolic panel (BMP + Alb, Alk Phos, ALT, AST, Total. Bili, TP)  - Hemoglobin A1c  - TSH with free T4 reflex  - Albumin Random Urine Quantitative with Creat Ratio  - glimepiride (AMARYL) 2 MG tablet; TAKE ONE TABLET BY MOUTH EVERY MORNING BEFORE BREAKFAST  Dispense: 90 tablet; Refill: 3  - sitagliptin (JANUVIA) 100 MG tablet; Take 1 tablet (100 mg) by mouth daily  Dispense: 90 tablet; Refill: 1  - metFORMIN (GLUCOPHAGE-XR) 500 MG 24 hr tablet; Take 4 tablets (2,000 mg) by mouth daily (with dinner)  Dispense: 360 tablet; Refill: 3    2. Type 2 diabetes mellitus with retinopathy, without long-term current use of insulin, macular edema presence unspecified, unspecified laterality, unspecified retinopathy severity (H)  - OPHTHALMOLOGY ADULT REFERRAL    3. Hyperlipidemia LDL goal <100  Low chol diet reviewed, continue with regular exercise, weight loss efforts.  - Comprehensive metabolic panel (BMP + Alb, Alk Phos, ALT, AST, Total. Bili, TP)  - Lipid Profile (Chol, Trig, HDL, LDL calc)  - atorvastatin (LIPITOR) 20 MG tablet; Take 1 tablet (20 mg) by mouth daily  Dispense: 90 tablet; Refill: 1    4. Essential hypertension  BP well controlled, continue present management.  - lisinopril (PRINIVIL/ZESTRIL) 40 MG tablet; Take 1 tablet (40 mg) by mouth daily  Dispense: 90 tablet; Refill: 3  - hydrochlorothiazide 12.5 MG TABS tablet; Take 1 tablet (12.5 mg) by mouth daily  Dispense: 90 tablet; Refill: 3    5. Glaucoma suspect of both eyes  Due for eye exam- referral placed.    6. Obesity, Class I, BMI 30.0-34.9 (see actual BMI)  Benefits of weight loss reviewed in detail, encouraged him to cut back on the carbohydrates in the diet, consume more fruits and vegetables, drink plenty of water, avoid fruit juices, sodas, get 150 min moderate exercise/week.  Recheck  weight in 6 months.      7. Chronic pain of left knee  Ok to take prn for knee OA.  If pain persists/worsens, would refer back to sports medication for evaluation for injection.  - naproxen (NAPROSYN) 500 MG tablet; TAKE ONE TABLET BY MOUTH TWICE A DAY AS NEEDED FOR MODERATE PAIN  Dispense: 90 tablet; Refill: 1    See Patient Instructions    ABHISHEK Perry [facility]

## 2017-11-17 NOTE — NURSING NOTE
"Chief Complaint   Patient presents with     Lipids     Diabetes     Hypertension     Refill Request       Initial /78 (BP Location: Left arm, Patient Position: Sitting, Cuff Size: Adult Regular)  Pulse 79  Temp 97  F (36.1  C) (Oral)  Ht 5' 6.93\" (1.7 m)  Wt 208 lb 3.2 oz (94.4 kg)  SpO2 97%  BMI 32.68 kg/m2 Estimated body mass index is 32.68 kg/(m^2) as calculated from the following:    Height as of this encounter: 5' 6.93\" (1.7 m).    Weight as of this encounter: 208 lb 3.2 oz (94.4 kg).  Medication Reconciliation: complete   Janeen Cordon MA      "

## 2017-11-17 NOTE — Clinical Note
Please abstract the following data from this visit with this patient into the appropriate field in Epic:  Influenza vaccine at [facility] on 10/3/17

## 2017-11-17 NOTE — PATIENT INSTRUCTIONS
At [facility], we strive to deliver an exceptional experience to you, every time we see you.  If you receive a survey in the mail, please send us back your thoughts. We really do value your feedback.    Based on your medical history, these are the current health maintenance/preventive care services that you are due for (some may have been done at this visit.)  Health Maintenance Due   Topic Date Due     EYE EXAM Q1 YEAR  05/04/2016     TSH W/ FREE T4 REFLEX Q2 YEAR  05/15/2017     CREATININE Q1 YEAR  07/07/2017     BETSY QUESTIONNAIRE 1 YEAR  07/07/2017     PHQ-9 Q1YR  07/07/2017     INFLUENZA VACCINE (SYSTEM ASSIGNED)  09/01/2017     A1C Q6 MO  10/07/2017     LIPID MONITORING Q1 YEAR  10/26/2017     MICROALBUMIN Q1 YEAR  10/26/2017         Suggested websites for health information:  Www."[employer].easyfolio : Up to date and easily searchable information on multiple topics.  Www.CIDCO.gov : medication info, interactive tutorials, watch real surgeries online  Www.familydoctor.org : good info from the Academy of Family Physicians  Www.cdc.gov : public health info, travel advisories, epidemics (H1N1)  Www.aap.org : children's health info, normal development, vaccinations  Www.health.Replaced by [facility].[place].us : [place] dept of health, public health issues in [place], N1N1    Your care team:                            Family Medicine Internal Medicine   MD Tobias Lu MD Shantel Branch-Fleming, MD Katya Georgiev PA-C Nam Ho, MD Pediatrics   LAN Guillermo, NIGEL WEISS CNP   MD Casi Laird MD Deborah Mielke, MD Kim Thein, APRN CNP      Clinic hours: Monday - Thursday 7 am-7 pm; Fridays 7 am-5 pm.   Urgent care: Monday - Friday 11 am-9 pm; Saturday and Sunday 9 am-5 pm.  Pharmacy : Monday -Thursday 8 am-8 pm; Friday 8 am-6 pm; Saturday and Sunday 9 am-5 pm.     Clinic: [phone]   Pharmacy: [phone]    Managing Diabetes: The A1C  Test       Healthy red blood cells have some glucose stuck to them. A high A1C means that unhealthy amounts of glucose are stuck to the cells.   What is the A1C test?  Using your meter helps you track your blood sugar every day. But your glucose meter tells you the value at the time of testing only. You also need to know if your treatment plan is keeping you healthy over time. The hemoglobin A1C (or glycated hemoglobin) test can help. This test measures your average blood sugar level over a few months. A higher A1C result means that you have a higher risk of developing complications.  The A1C test  The A1C is a blood test done by your healthcare provider. You will likely have an A1C test every 3 to 6 months.  Your blood glucose goal  A1C has been shown as a percentage. But it can also be shown as a number representing the estimated Average Glucose (eAG). Unlike the A1C percentage, eAG is a number similar to the numbers listed on your daily glucose monitor. Both A1C and eAG measure the amount of glucose stuck to a protein called hemoglobin in red blood cells. Your healthcare provider will help you figure out what your ideal A1C or eAG should be. Your target number will depend on your age, general health, and other factors. If your current number is too high, your treatment plan may need changes, such as different medicines.  Sample results  Most people aim for an A1c lower than 7%. That s an eAG less than 154 mg/dL. Or, your healthcare provider may want you to aim for an A1C of 6%. That s an eAG of 126 mg/dL.     Glucose calculator  Visit http://professional.diabetes.org/diapro/glucose_calc for a chart that helps convert your A1C percentages into eAG numbers.   Date Last Reviewed: 6/1/2016 2000-2017 [employer]. [address] [place] [address], [place] [address]. All rights reserved. This information is not intended as a substitute for professional medical care. Always follow your healthcare professional's  instructions.        Diabetes: Sick-Day Plan  Infections, the flu, and even a cold, can cause your blood sugar to rise. And, eating less, nausea, and vomiting may cause your blood glucose to fall (hypoglycemia). Ask your healthcare provider to help you develop a sick-day plan. The following information can help.    Don ts  Don'ts include the following:    Diabetes medicines. Don t stop taking your diabetes medicine.    Other medicines. Don t take other medicines, such as those for colds or the flu, without checking with your healthcare provider.  Do s  Do's include the following:    Eating. Stick to your meal plan. If you can t eat, try fruit juice, regular gelatin, or frozen juice bars as directed by your healthcare provider.    Drinking. Drink at least 1 glass of liquid every hour. If you re eating, these liquids should be sugar-free.    Blood glucose. Check your blood sugar as often as directed by your healthcare provider. You may need to check it more often than usual.    Ketones. Check your blood or urine for ketones. Ketones are the waste from burning fat instead of glucose for energy. Ketones are a warning sign of ketoacidosis. Ketoacidosis is a medical emergency. Ketoacidosis can happen to anyone with diabetes, but it's very rare in type 2 diabetes. It is usually only an issue if you have type 1 diabetes.    Diabetes medicines.    Adjust your insulin according to your sick-day plan. Don't skip insulin. You need insulin even if you can't eat your normal meals.    If you take pills for diabetes (oral medicines), take your normal dose unless your healthcare provider tells you something different.    Sugar-free medicines. Look for sugar-free cough drops and other medicines. Ask your healthcare provider if it s OK for you to take these.    Getting help. If you're alone, ask someone to check on you several times a day.  Try to get all these supplies together before you need them.  Call your healthcare  provider  Call your healthcare provider if you have any of the following:    You vomit or have diarrhea for more than 6 hours.    Your blood glucose level is higher than usual or more than 250 mg/dL after you have taken extra insulin (if recommended in your sick-day plan).    You take oral medicine for diabetes, and your blood sugar is higher than usual or over 250 mg/dL, before a meal and stays that high for more than 24 hours.    Your blood glucose is lower than usual or less than 70 mg/dL    You have moderate to large amounts of ketones in your blood or urine.    You aren t better after 2 days.  Date Last Reviewed: 6/1/2016 2000-2017 [employer]. [address] [place] [address], [place] [address]. All rights reserved. This information is not intended as a substitute for professional medical care. Always follow your healthcare professional's instructions.

## 2017-11-17 NOTE — MR AVS SNAPSHOT
After Visit Summary   11/17/2017    Kathrine Isaac    MRN: [id number]           Patient Information     Date Of Birth          1955        Visit Information        Provider Department      11/17/2017 9:40 AM Nighat Joe APRN CNP [facility]        Today's Diagnoses     Type 2 diabetes mellitus with hemoglobin A1c goal of 7.0%-8.0% (H)    -  1    Type 2 diabetes mellitus with retinopathy, without long-term current use of insulin, macular edema presence unspecified, unspecified laterality, unspecified retinopathy severity (H)        Hyperlipidemia LDL goal <100        Essential hypertension        Glaucoma suspect of both eyes        Obesity, Class I, BMI 30.0-34.9 (see actual BMI)        Need for prophylactic vaccination and inoculation against influenza        Chronic pain of left knee          Care Instructions    At [facility], we strive to deliver an exceptional experience to you, every time we see you.  If you receive a survey in the mail, please send us back your thoughts. We really do value your feedback.    Based on your medical history, these are the current health maintenance/preventive care services that you are due for (some may have been done at this visit.)  Health Maintenance Due   Topic Date Due     EYE EXAM Q1 YEAR  05/04/2016     TSH W/ FREE T4 REFLEX Q2 YEAR  05/15/2017     CREATININE Q1 YEAR  07/07/2017     BETSY QUESTIONNAIRE 1 YEAR  07/07/2017     PHQ-9 Q1YR  07/07/2017     INFLUENZA VACCINE (SYSTEM ASSIGNED)  09/01/2017     A1C Q6 MO  10/07/2017     LIPID MONITORING Q1 YEAR  10/26/2017     MICROALBUMIN Q1 YEAR  10/26/2017         Suggested websites for health information:  Www.[employer]org : Up to date and easily searchable information on multiple topics.  Www.medlineplus.gov : medication info, interactive tutorials, watch real surgeries online  Www.familydoctor.org : good info from the Academy of Family Physicians  Www.cdc.gov :  public health info, travel advisories, epidemics (H1N1)  Www.aap.org : children's health info, normal development, vaccinations  Www.health.[facility].[place].us : [place] dept of health, public health issues in [place], N1N1    Your care team:                            Family Medicine Internal Medicine   MD Tobias Lu MD Shantel Branch-Fleming, MD Katya Georgiev PA-C Nam Ho, MD Pediatrics   LAN Guillermo, NIGEL Carbajal APRN MD Casi Ibarra MD Deborah Mielke, MD Kim Thein, APRN CNP      Clinic hours: Monday - Thursday 7 am-7 pm; Fridays 7 am-5 pm.   Urgent care: Monday - Friday 11 am-9 pm; Saturday and Sunday 9 am-5 pm.  Pharmacy : Monday -Thursday 8 am-8 pm; Friday 8 am-6 pm; Saturday and Sunday 9 am-5 pm.     Clinic: [phone]   Pharmacy: [phone]    Managing Diabetes: The A1C Test       Healthy red blood cells have some glucose stuck to them. A high A1C means that unhealthy amounts of glucose are stuck to the cells.   What is the A1C test?  Using your meter helps you track your blood sugar every day. But your glucose meter tells you the value at the time of testing only. You also need to know if your treatment plan is keeping you healthy over time. The hemoglobin A1C (or glycated hemoglobin) test can help. This test measures your average blood sugar level over a few months. A higher A1C result means that you have a higher risk of developing complications.  The A1C test  The A1C is a blood test done by your healthcare provider. You will likely have an A1C test every 3 to 6 months.  Your blood glucose goal  A1C has been shown as a percentage. But it can also be shown as a number representing the estimated Average Glucose (eAG). Unlike the A1C percentage, eAG is a number similar to the numbers listed on your daily glucose monitor. Both A1C and eAG measure the amount of glucose stuck to a protein called hemoglobin in red blood cells. Your  healthcare provider will help you figure out what your ideal A1C or eAG should be. Your target number will depend on your age, general health, and other factors. If your current number is too high, your treatment plan may need changes, such as different medicines.  Sample results  Most people aim for an A1c lower than 7%. That s an eAG less than 154 mg/dL. Or, your healthcare provider may want you to aim for an A1C of 6%. That s an eAG of 126 mg/dL.     Glucose calculator  Visit http://professional.diabetes.org/diapro/glucose_calc for a chart that helps convert your A1C percentages into eAG numbers.   Date Last Reviewed: 6/1/2016 2000-2017 [employer]. [address] [place] [address], [place] [address]. All rights reserved. This information is not intended as a substitute for professional medical care. Always follow your healthcare professional's instructions.        Diabetes: Sick-Day Plan  Infections, the flu, and even a cold, can cause your blood sugar to rise. And, eating less, nausea, and vomiting may cause your blood glucose to fall (hypoglycemia). Ask your healthcare provider to help you develop a sick-day plan. The following information can help.    Don ts  Don'ts include the following:    Diabetes medicines. Don t stop taking your diabetes medicine.    Other medicines. Don t take other medicines, such as those for colds or the flu, without checking with your healthcare provider.  Do s  Do's include the following:    Eating. Stick to your meal plan. If you can t eat, try fruit juice, regular gelatin, or frozen juice bars as directed by your healthcare provider.    Drinking. Drink at least 1 glass of liquid every hour. If you re eating, these liquids should be sugar-free.    Blood glucose. Check your blood sugar as often as directed by your healthcare provider. You may need to check it more often than usual.    Ketones. Check your blood or urine for ketones. Ketones are the waste from burning fat  instead of glucose for energy. Ketones are a warning sign of ketoacidosis. Ketoacidosis is a medical emergency. Ketoacidosis can happen to anyone with diabetes, but it's very rare in type 2 diabetes. It is usually only an issue if you have type 1 diabetes.    Diabetes medicines.    Adjust your insulin according to your sick-day plan. Don't skip insulin. You need insulin even if you can't eat your normal meals.    If you take pills for diabetes (oral medicines), take your normal dose unless your healthcare provider tells you something different.    Sugar-free medicines. Look for sugar-free cough drops and other medicines. Ask your healthcare provider if it s OK for you to take these.    Getting help. If you're alone, ask someone to check on you several times a day.  Try to get all these supplies together before you need them.  Call your healthcare provider  Call your healthcare provider if you have any of the following:    You vomit or have diarrhea for more than 6 hours.    Your blood glucose level is higher than usual or more than 250 mg/dL after you have taken extra insulin (if recommended in your sick-day plan).    You take oral medicine for diabetes, and your blood sugar is higher than usual or over 250 mg/dL, before a meal and stays that high for more than 24 hours.    Your blood glucose is lower than usual or less than 70 mg/dL    You have moderate to large amounts of ketones in your blood or urine.    You aren t better after 2 days.  Date Last Reviewed: 6/1/2016 2000-2017 [employer]. [address] [place] [address], [place] [address]. All rights reserved. This information is not intended as a substitute for professional medical care. Always follow your healthcare professional's instructions.                Follow-ups after your visit        Additional Services     OPHTHALMOLOGY ADULT REFERRAL       Your provider [place] you to: [facility] [facility] [phone]    [email or web address]    Please be aware that coverage of these services is subject to the terms and limitations of your health insurance plan.  Call member services at your health plan with any benefit or coverage questions.      Please bring the following with you to your appointment:    (1) Any X-Rays, CTs or MRIs which have been performed.  Contact the facility where they were done to arrange for  prior to your scheduled appointment.    (2) List of current medications  (3) This referral request   (4) Any documents/labs given to you for this referral                  Who to contact     If you have questions or need follow up information about today's clinic visit or your schedule please contact [facility] directly at [phone].  Normal or non-critical lab and imaging results will be communicated to you by MyChart, letter or phone within 4 business days after the clinic has received the results. If you do not hear from us within 7 days, please contact the clinic through MyChart or phone. If you have a critical or abnormal lab result, we will notify you by phone as soon as possible.  Submit refill requests through [employer] or call your pharmacy and they will forward the refill request to us. Please allow 3 business days for your refill to be completed.          Additional Information About Your Visit        [employer] Information     [employer] gives you secure access to your electronic health record. If you see a primary care provider, you can also send messages to your care team and make appointments. If you have questions, please call your primary care clinic.  If you do not have a primary care provider, please call [phone] and they will assist you.        Care EveryWhere ID     This is your Care EveryWhere ID. This could be used by other organizations to access your [place] medical records  [id number]        Your Vitals Were     Pulse Temperature Height Pulse  "Oximetry BMI (Body Mass Index)       79 97  F (36.1  C) (Oral) 5' 6.93\" (1.7 m) 97% 32.68 kg/m2        Blood Pressure from Last 3 Encounters:   11/17/17 120/78   04/07/17 138/86   10/26/16 114/84    Weight from Last 3 Encounters:   11/17/17 208 lb 3.2 oz (94.4 kg)   04/07/17 204 lb 9.6 oz (92.8 kg)   10/26/16 198 lb (89.8 kg)              We Performed the Following     Albumin Random Urine Quantitative with Creat Ratio     Comprehensive metabolic panel (BMP + Alb, Alk Phos, ALT, AST, Total. Bili, TP)     Hemoglobin A1c     Hemoglobin A1c     Lipid Profile (Chol, Trig, HDL, LDL calc)     Lipid Profile (Chol, Trig, HDL, LDL calc)     OPHTHALMOLOGY ADULT REFERRAL     TSH with free T4 reflex          Today's Medication Changes          These changes are accurate as of: 11/17/17 10:30 AM.  If you have any questions, ask your nurse or doctor.               These medicines have changed or have updated prescriptions.        Dose/Directions    atorvastatin 20 MG tablet   Commonly known as:  LIPITOR   This may have changed:  See the new instructions.   Used for:  Hyperlipidemia LDL goal <100   Changed by:  Nighat Joe APRN CNP        Dose:  20 mg   Take 1 tablet (20 mg) by mouth daily   Quantity:  90 tablet   Refills:  1       * naproxen 500 MG tablet   Commonly known as:  NAPROSYN   This may have changed:  Another medication with the same name was changed. Make sure you understand how and when to take each.   Used for:  Chronic pain of left knee   Changed by:  Nighat Joe APRN CNP        TAKE ONE TABLET BY MOUTH TWICE A DAY AS NEEDED FOR MODERATE PAIN   Quantity:  60 tablet   Refills:  0       * naproxen 500 MG tablet   Commonly known as:  NAPROSYN   This may have changed:  See the new instructions.   Used for:  Chronic pain of left knee   Changed by:  Nighat Joe APRN CNP        TAKE ONE TABLET BY MOUTH TWICE A DAY AS NEEDED FOR MODERATE PAIN   Quantity:  90 tablet   Refills:  1       * Notice:  This " list has 2 medication(s) that are the same as other medications prescribed for you. Read the directions carefully, and ask your doctor or other care provider to review them with you.         Where to get your medicines      These medications were sent to [facility] [place], [place] - [address]  [address], [place] [address]     Phone:  [phone]     atorvastatin 20 MG tablet    glimepiride 2 MG tablet    hydrochlorothiazide 12.5 MG Tabs tablet    lisinopril 40 MG tablet    metFORMIN 500 MG 24 hr tablet    naproxen 500 MG tablet         Call your pharmacy to confirm that your medication is ready for pickup. It may take up to 24 hours for them to receive the prescription. If the prescription is not ready within 3 business days, please contact your clinic or your provider.     We will let you know when these medications are ready. If you don't hear back within 3 business days, please contact us.     sitagliptin 100 MG tablet                Primary Care Provider Office Phone # Fax #    ABHISHEK Beauchamp [phone]       [facility] [address]  [place] [facility] [address]        Equal Access to Services     [facility]: Hadii aad ku hadasho Soomaali, waaxda luqadaha, qaybta kaalmada adeegyada, waxay idiin haynarnen arabella villarrealaradonaldo ritchie . So [facility] [phone].    ATENCIÓN: Si habla español, tiene a dan disposición servicios gratuitos de asistencia lingüística. Llame al [phone].    We comply with applicable federal civil rights laws and [place] laws. We do not discriminate on the basis of race, color, national origin, age, disability, sex, sexual orientation, or gender identity.            Thank you!     Thank you for choosing [facility]  for your care. Our goal is always to provide you with excellent care. Hearing back from our patients is one way we can continue to improve our services. Please take a few minutes to  complete the written survey that you may receive in the mail after your visit with us. Thank you!             Your Updated Medication List - Protect others around you: Learn how to safely use, store and throw away your medicines at www.disposemymeds.org.          This list is accurate as of: 11/17/17 10:30 AM.  Always use your most recent med list.                   Brand Name Dispense Instructions for use Diagnosis    aspirin 81 MG EC tablet     90 tablet    Take 1 tablet (81 mg) by mouth daily    Type 2 diabetes mellitus with retinopathy, without long-term current use of insulin, macular edema presence unspecified, unspecified laterality, unspecified retinopathy severity (H)       atorvastatin 20 MG tablet    LIPITOR    90 tablet    Take 1 tablet (20 mg) by mouth daily    Hyperlipidemia LDL goal <100       blood glucose monitoring test strip    no brand specified    1 Box    Use to test blood sugars once times daily or as directed    Type 2 diabetes mellitus without complication (H)       * glimepiride 4 MG tablet    AMARYL    90 tablet    Take 1 tablet (4 mg) by mouth every morning (before breakfast)    Type 2 diabetes mellitus with retinopathy, without long-term current use of insulin, macular edema presence unspecified, unspecified laterality, unspecified retinopathy severity (H)       * glimepiride 2 MG tablet    AMARYL    90 tablet    TAKE ONE TABLET BY MOUTH EVERY MORNING BEFORE BREAKFAST    Type 2 diabetes mellitus with retinopathy, without long-term current use of insulin, macular edema presence unspecified, unspecified laterality, unspecified retinopathy severity (H)       hydrochlorothiazide 12.5 MG Tabs tablet     90 tablet    Take 1 tablet (12.5 mg) by mouth daily    Essential hypertension       lisinopril 40 MG tablet    PRINIVIL/ZESTRIL    90 tablet    Take 1 tablet (40 mg) by mouth daily    Essential hypertension       metFORMIN 500 MG 24 hr tablet    GLUCOPHAGE-XR    360 tablet    Take 4 tablets  (2,000 mg) by mouth daily (with dinner)    Type 2 diabetes mellitus with retinopathy, without long-term current use of insulin, macular edema presence unspecified, unspecified laterality, unspecified retinopathy severity (H)       * naproxen 500 MG tablet    NAPROSYN    60 tablet    TAKE ONE TABLET BY MOUTH TWICE A DAY AS NEEDED FOR MODERATE PAIN    Chronic pain of left knee       * naproxen 500 MG tablet    NAPROSYN    90 tablet    TAKE ONE TABLET BY MOUTH TWICE A DAY AS NEEDED FOR MODERATE PAIN    Chronic pain of left knee       * order for DME     1 each    Equipment being ordered: wrist quick fit [id number]    Pain in right elbow       * order for DME     1 Box    Equipment being ordered: lancets for one touch glucometer for once daily testing    Type 2 diabetes mellitus without complication (H)       * order for DME     1 Device    Equipment being ordered: Glucometer Accucheck Sulema, lancets, test strips.  Patient to check sugars bid times daily, 90 day supply for test strips and lancets, refill 6 times    Type 2 diabetes mellitus without complication (H)       * order for DME     1 Device    Equipment being ordered: Glucometer per insurance preference, lancets, test strips.  Patient to check sugars two times daily, 90 day supply for test strips and lancets, refill 3 times    Type 2 diabetes mellitus with retinopathy, without long-term current use of insulin, macular edema presence unspecified, unspecified laterality, unspecified retinopathy severity (H)       sitagliptin 100 MG tablet    JANUVIA    90 tablet    Take 1 tablet (100 mg) by mouth daily    Type 2 diabetes mellitus with retinopathy, without long-term current use of insulin, macular edema presence unspecified, unspecified laterality, unspecified retinopathy severity (H)       * Notice:  This list has 8 medication(s) that are the same as other medications prescribed for you. Read the directions carefully, and ask your doctor or other care provider to  review them with you.

## 2017-11-18 ASSESSMENT — ANXIETY QUESTIONNAIRES: GAD7 TOTAL SCORE: 0

## 2017-12-27 ENCOUNTER — RADIANT APPOINTMENT (OUTPATIENT)
Dept: GENERAL RADIOLOGY | Facility: CLINIC | Age: 62
End: 2017-12-27
Payer: COMMERCIAL

## 2017-12-27 ENCOUNTER — OFFICE VISIT (OUTPATIENT)
Dept: FAMILY MEDICINE | Facility: CLINIC | Age: 62
End: 2017-12-27
Payer: COMMERCIAL

## 2017-12-27 VITALS
SYSTOLIC BLOOD PRESSURE: 132 MMHG | HEART RATE: 79 BPM | BODY MASS INDEX: 32.39 KG/M2 | WEIGHT: 206.4 LBS | HEIGHT: 67 IN | TEMPERATURE: 97 F | DIASTOLIC BLOOD PRESSURE: 85 MMHG | OXYGEN SATURATION: 94 %

## 2017-12-27 DIAGNOSIS — M62.830 BACK MUSCLE SPASM: ICD-10-CM

## 2017-12-27 DIAGNOSIS — E11.9 TYPE 2 DIABETES MELLITUS WITH HEMOGLOBIN A1C GOAL OF 7.0%-8.0% (H): ICD-10-CM

## 2017-12-27 DIAGNOSIS — I10 ESSENTIAL HYPERTENSION: ICD-10-CM

## 2017-12-27 DIAGNOSIS — M54.59 MECHANICAL LOW BACK PAIN: Primary | ICD-10-CM

## 2017-12-27 DIAGNOSIS — M54.59 MECHANICAL LOW BACK PAIN: ICD-10-CM

## 2017-12-27 PROCEDURE — 72100 X-RAY EXAM L-S SPINE 2/3 VWS: CPT

## 2017-12-27 PROCEDURE — 99214 OFFICE O/P EST MOD 30 MIN: CPT | Performed by: PREVENTIVE MEDICINE

## 2017-12-27 RX ORDER — METHOCARBAMOL 500 MG/1
1000 TABLET, FILM COATED ORAL 3 TIMES DAILY PRN
Qty: 30 TABLET | Refills: 1 | Status: SHIPPED | OUTPATIENT
Start: 2017-12-27 | End: 2018-11-07

## 2017-12-27 ASSESSMENT — PAIN SCALES - GENERAL: PAINLEVEL: EXTREME PAIN (8)

## 2017-12-27 NOTE — MR AVS SNAPSHOT
After Visit Summary   12/27/2017    Kathrine Isaac    MRN: [id number]           Patient Information     Date Of Birth          1955        Visit Information        Provider Department      12/27/2017 2:40 PM Jud Fajardo MD [facility]        Today's Diagnoses     Mechanical low back pain    -  1    Type 2 diabetes mellitus with hemoglobin A1c goal of 7.0%-8.0% (H)        Essential hypertension        Back muscle spasm          Care Instructions    At [facility], we strive to deliver an exceptional experience to you, every time we see you.  If you receive a survey in the mail, please send us back your thoughts. We really do value your feedback.    Based on your medical history, these are the current health maintenance/preventive care services that you are due for (some may have been done at this visit.)  Health Maintenance Due   Topic Date Due     EYE EXAM Q1 YEAR  05/04/2016         Suggested websites for health information:  Www.[facility] : Up to date and easily searchable information on multiple topics.  Www.medlineplus.gov : medication info, interactive tutorials, watch real surgeries online  Www.familydoctor.org : good info from the Academy of Family Physicians  Www.cdc.gov : public health info, travel advisories, epidemics (H1N1)  Www.aap.org : children's health info, normal development, vaccinations  Www.health.state[place]us : [place] dept of health, public health issues in [place], N1N1    Your care team:                            Family Medicine Internal Medicine   MD Tobias Lu MD Shantel Branch-Fleming, MD Katya Georgiev PA-C Nam Ho, MD Pediatrics   LAN Guillermo, NIGEL Carbajal APRN MD Casi Ibarra MD Deborah Mielke, MD Kim Thein, APRN CNP      Clinic hours: Monday - Thursday 7 am-7 pm; Fridays 7 am-5 pm.   Urgent care: Monday - Friday 11 am-9 pm; Saturday and Sunday  9 am-5 pm.  Pharmacy : Monday -Thursday 8 am-8 pm; Friday 8 am-6 pm; Saturday and Sunday 9 am-5 pm.     Clinic: [phone]   Pharmacy: [phone]            Follow-ups after your visit        Additional Services     [facility] PT, HAND, AND CHIROPRACTIC REFERRAL       **This order will print in the [facility] Scheduling Office**    Physical Therapy, Hand Therapy and Chiropractic Care are available through:    *[facility]  *[facility]  *[facility]    Call one number to schedule at any of the above locations: [phone].    Your provider has referred you to: As Indicated:     Indication/Reason for Referral: Low Back Pain  Onset of Illness: 12/23/17, has had self limiting episodes in the past  Therapy Orders: Evaluate and Treat  Special Programs: None  Special Request: None    Davion Ricardo      Additional Comments for the Therapist or Chiropractor:     Please be aware that coverage of these services is subject to the terms and limitations of your health insurance plan.  Call member services at your health plan with any benefit or coverage questions.      Please bring the following to your appointment:    *Your personal calendar for scheduling future appointments  *Comfortable clothing                  Future tests that were ordered for you today     Open Future Orders        Priority Expected Expires Ordered    XR Lumbar Spine 2/3 Views Routine 12/27/2017 12/27/2018 12/27/2017            Who to contact     If you have questions or need follow up information about today's clinic visit or your schedule please contact [facility] [place] directly at [phone].  Normal or non-critical lab and imaging results will be communicated to you by MyChart, letter or phone within 4 business days after the clinic has received the results. If you do not hear from us within 7 days, please contact the clinic through MyChart or phone. If you have a critical or abnormal  "lab result, we will notify you by phone as soon as possible.  Submit refill requests through [employer] or call your pharmacy and they will forward the refill request to us. Please allow 3 business days for your refill to be completed.          Additional Information About Your Visit        [employer] Information     [employer] gives you secure access to your electronic health record. If you see a primary care provider, you can also send messages to your care team and make appointments. If you have questions, please call your primary care clinic.  If you do not have a primary care provider, please call [phone] and they will assist you.        Care EveryWhere ID     This is your Care EveryWhere ID. This could be used by other organizations to access your [facility] medical records  [id number]        Your Vitals Were     Pulse Temperature Height Pulse Oximetry BMI (Body Mass Index)       79 97  F (36.1  C) (Oral) 5' 6.93\" (1.7 m) 94% 32.4 kg/m2        Blood Pressure from Last 3 Encounters:   12/27/17 132/85   11/17/17 120/78   04/07/17 138/86    Weight from Last 3 Encounters:   12/27/17 206 lb 6.4 oz (93.6 kg)   11/17/17 208 lb 3.2 oz (94.4 kg)   04/07/17 204 lb 9.6 oz (92.8 kg)              We Performed the Following     [facility] PT, HAND, AND CHIROPRACTIC REFERRAL          Today's Medication Changes          These changes are accurate as of: 12/27/17  2:50 PM.  If you have any questions, ask your nurse or doctor.               Start taking these medicines.        Dose/Directions    methocarbamol 500 MG tablet   Commonly known as:  ROBAXIN   Used for:  Mechanical low back pain, Back muscle spasm   Started by:  Jud Fajardo MD        Dose:  1000 mg   Take 2 tablets (1,000 mg) by mouth 3 times daily as needed for muscle spasms   Quantity:  30 tablet   Refills:  1            Where to get your medicines      These medications were sent to [facility] - [place], [place] - [address]  [address], " [facility] [address]     Phone:  [phone]     methocarbamol 500 MG tablet                Primary Care Provider Office Phone # Fax #    ABHISHEK Beauchamp [phone]       [facility] [address]  [facility] [address]        Equal Access to Services     MICHAEL BENNETT : Hadii aad ku hadasho Soomaali, waaxda luqadaha, qaybta kaalmada adeegyada, waxay idiin hayaan adeeg kharash la'aan . So [facility] [phone].    ATENCIÓN: Si habla español, tiene a dan disposición servicios gratuitos de asistencia lingüística. Llame al [phone].    We comply with applicable federal civil rights laws and [place] laws. We do not discriminate on the basis of race, color, national origin, age, disability, sex, sexual orientation, or gender identity.            Thank you!     Thank you for choosing [facility]  for your care. Our goal is always to provide you with excellent care. Hearing back from our patients is one way we can continue to improve our services. Please take a few minutes to complete the written survey that you may receive in the mail after your visit with us. Thank you!             Your Updated Medication List - Protect others around you: Learn how to safely use, store and throw away your medicines at www.disposemymeds.org.          This list is accurate as of: 12/27/17  2:50 PM.  Always use your most recent med list.                   Brand Name Dispense Instructions for use Diagnosis    aspirin 81 MG EC tablet     90 tablet    Take 1 tablet (81 mg) by mouth daily    Type 2 diabetes mellitus with retinopathy, without long-term current use of insulin, macular edema presence unspecified, unspecified laterality, unspecified retinopathy severity (H)       atorvastatin 20 MG tablet    LIPITOR    90 tablet    Take 1 tablet (20 mg) by mouth daily    Hyperlipidemia LDL goal <100       blood glucose monitoring test strip    no brand specified    1 Box    Use to test blood  sugars once times daily or as directed    Type 2 diabetes mellitus without complication (H)       * glimepiride 4 MG tablet    AMARYL    90 tablet    Take 1 tablet (4 mg) by mouth every morning (before breakfast)    Type 2 diabetes mellitus with retinopathy, without long-term current use of insulin, macular edema presence unspecified, unspecified laterality, unspecified retinopathy severity (H)       * glimepiride 2 MG tablet    AMARYL    90 tablet    TAKE ONE TABLET BY MOUTH EVERY MORNING BEFORE BREAKFAST    Type 2 diabetes mellitus with retinopathy, without long-term current use of insulin, macular edema presence unspecified, unspecified laterality, unspecified retinopathy severity (H)       hydrochlorothiazide 12.5 MG Tabs tablet     90 tablet    Take 1 tablet (12.5 mg) by mouth daily    Essential hypertension       lisinopril 40 MG tablet    PRINIVIL/ZESTRIL    90 tablet    Take 1 tablet (40 mg) by mouth daily    Essential hypertension       metFORMIN 500 MG 24 hr tablet    GLUCOPHAGE-XR    360 tablet    Take 4 tablets (2,000 mg) by mouth daily (with dinner)    Type 2 diabetes mellitus with retinopathy, without long-term current use of insulin, macular edema presence unspecified, unspecified laterality, unspecified retinopathy severity (H)       methocarbamol 500 MG tablet    ROBAXIN    30 tablet    Take 2 tablets (1,000 mg) by mouth 3 times daily as needed for muscle spasms    Mechanical low back pain, Back muscle spasm       * naproxen 500 MG tablet    NAPROSYN    60 tablet    TAKE ONE TABLET BY MOUTH TWICE A DAY AS NEEDED FOR MODERATE PAIN    Chronic pain of left knee       * naproxen 500 MG tablet    NAPROSYN    90 tablet    TAKE ONE TABLET BY MOUTH TWICE A DAY AS NEEDED FOR MODERATE PAIN    Chronic pain of left knee       * order for DME     1 each    Equipment being ordered: wrist quick fit [id number]    Pain in right elbow       * order for DME     1 Box    Equipment being ordered: lancets for one touch  glucometer for once daily testing    Type 2 diabetes mellitus without complication (H)       * order for DME     1 Device    Equipment being ordered: Glucometer Accucheck Sulema, lancets, test strips.  Patient to check sugars bid times daily, 90 day supply for test strips and lancets, refill 6 times    Type 2 diabetes mellitus without complication (H)       * order for DME     1 Device    Equipment being ordered: Glucometer per insurance preference, lancets, test strips.  Patient to check sugars two times daily, 90 day supply for test strips and lancets, refill 3 times    Type 2 diabetes mellitus with retinopathy, without long-term current use of insulin, macular edema presence unspecified, unspecified laterality, unspecified retinopathy severity (H)       sitagliptin 100 MG tablet    JANUVIA    90 tablet    Take 1 tablet (100 mg) by mouth daily    Type 2 diabetes mellitus with retinopathy, without long-term current use of insulin, macular edema presence unspecified, unspecified laterality, unspecified retinopathy severity (H)       * Notice:  This list has 8 medication(s) that are the same as other medications prescribed for you. Read the directions carefully, and ask your doctor or other care provider to review them with you.

## 2017-12-27 NOTE — PATIENT INSTRUCTIONS
At [facility], we strive to deliver an exceptional experience to you, every time we see you.  If you receive a survey in the mail, please send us back your thoughts. We really do value your feedback.    Based on your medical history, these are the current health maintenance/preventive care services that you are due for (some may have been done at this visit.)  Health Maintenance Due   Topic Date Due     EYE EXAM Q1 YEAR  05/04/2016         Suggested websites for health information:  Www.[facility].org : Up to date and easily searchable information on multiple topics.  Www.medlineplus.gov : medication info, interactive tutorials, watch real surgeries online  Www.familydoctor.org : good info from the Academy of Family Physicians  Www.cdc.gov : public health info, travel advisories, epidemics (H1N1)  Www.aap.org : children's health info, normal development, vaccinations  Www.health.[facility].[place].us : [place] dept of health, public health issues in [place], N1N1    Your care team:                            Family Medicine Internal Medicine   MD Tobias Lu MD Shantel Branch-Fleming, MD Katya Georgiev PA-C Nam Ho, MD Pediatrics   LAN Guillermo, CNP Conchis Carbajal APRN CNP   MD Casi Laird MD Deborah Mielke, MD Kim Thein, APRN CNP      Clinic hours: Monday - Thursday 7 am-7 pm; Fridays 7 am-5 pm.   Urgent care: Monday - Friday 11 am-9 pm; Saturday and Sunday 9 am-5 pm.  Pharmacy : Monday -Thursday 8 am-8 pm; Friday 8 am-6 pm; Saturday and Sunday 9 am-5 pm.     Clinic: [phone]   Pharmacy: [phone]

## 2017-12-27 NOTE — PROGRESS NOTES
SUBJECTIVE:   Kathrine Isaac is a 62 year old male who presents to clinic today for the following health issues:    Back Pain       Duration: 12/23/2017        Specific cause: turning/bending    Description:   Location of pain: low back both  Character of pain: sharp  Pain radiation:none  New numbness or weakness in legs, not attributed to pain no    Intensity: Currently 8/10    History:   Pain interferes with job: No,   History of back problems: Has happened four times in the past year  Any previous MRI or X-rays: None  Sees a specialist for back pain:  No  Therapies tried without relief: OTC muscle rub    Alleviating factors:   Improved by: none      Precipitating factors:  Worsened by: After sitting and then standing up    Functional and Psychosocial Screen (Davion STarT Back):      Not performed today    **Also says if he rubs his left calf it shoots pain to his back.      Accompanying Signs & Symptoms:  Risk of Fracture:  None  Risk of Cauda Equina:  None  Risk of Infection:  None  Risk of Cancer:  None  Risk of Ankylosing Spondylitis:  Onset at age <35, male, AND morning back stiffness. no       Started after bending down to  a pizza box  No falls  No fevers or chills, no rash  The patient does not have any focal weakness or numbness, no urine or stool incontinence, no hematuria, no saddle anesthesia and no gait abnormalities.   Self limiting after a few weeks   More with getting up from a sitting position  Some morning stiffness  No past surgeries   No past physical therapy     Hypertension Follow-up      Outpatient blood pressures are not being checked.    Low Salt Diet: low salt      Diabetes:  -Glucose readings are in the normal range, fasting 120 mg/dl    Problem list and histories reviewed & adjusted, as indicated.  Additional history: as documented    Patient Active Problem List   Diagnosis     Glaucoma suspect     Type 2 diabetes mellitus with hemoglobin A1c goal of 7.0%-8.0% (H)      Hyperlipidemia LDL goal <100     Essential hypertension     Advance care planning     Diverticulosis of large intestine without hemorrhage     Obesity, Class I, BMI 30.0-34.9 (see actual BMI)     Past Surgical History:   Procedure Laterality Date     COLONOSCOPY N/A 1/14/2016    Procedure: COLONOSCOPY;  Surgeon: Ventura Monge MD;  Location: [facility] OR     LASIK BILATERAL       ROTATOR CUFF REPAIR RT/LT  1992     SURGICAL HISTORY OF -       uvula remove        Social History   Substance Use Topics     Smoking status: Never Smoker     Smokeless tobacco: Never Used     Alcohol use Yes     Family History   Problem Relation Age of Onset     DIABETES Mother      CANCER No family hx of      Hypertension No family hx of      CEREBROVASCULAR DISEASE No family hx of      Thyroid Disease No family hx of      Glaucoma No family hx of      Macular Degeneration No family hx of          Current Outpatient Prescriptions   Medication Sig Dispense Refill     methocarbamol (ROBAXIN) 500 MG tablet Take 2 tablets (1,000 mg) by mouth 3 times daily as needed for muscle spasms 30 tablet 1     atorvastatin (LIPITOR) 20 MG tablet Take 1 tablet (20 mg) by mouth daily 90 tablet 1     naproxen (NAPROSYN) 500 MG tablet TAKE ONE TABLET BY MOUTH TWICE A DAY AS NEEDED FOR MODERATE PAIN 90 tablet 1     glimepiride (AMARYL) 2 MG tablet TAKE ONE TABLET BY MOUTH EVERY MORNING BEFORE BREAKFAST 90 tablet 3     sitagliptin (JANUVIA) 100 MG tablet Take 1 tablet (100 mg) by mouth daily 90 tablet 1     lisinopril (PRINIVIL/ZESTRIL) 40 MG tablet Take 1 tablet (40 mg) by mouth daily 90 tablet 3     metFORMIN (GLUCOPHAGE-XR) 500 MG 24 hr tablet Take 4 tablets (2,000 mg) by mouth daily (with dinner) 360 tablet 3     hydrochlorothiazide 12.5 MG TABS tablet Take 1 tablet (12.5 mg) by mouth daily 90 tablet 3     naproxen (NAPROSYN) 500 MG tablet TAKE ONE TABLET BY MOUTH TWICE A DAY AS NEEDED FOR MODERATE PAIN 60 tablet 0     aspirin 81 MG EC tablet Take 1  tablet (81 mg) by mouth daily 90 tablet 3     glimepiride (AMARYL) 4 MG tablet Take 1 tablet (4 mg) by mouth every morning (before breakfast) 90 tablet 1     order for DME Equipment being ordered: Glucometer per insurance preference, lancets, test strips.  Patient to check sugars two times daily, 90 day supply for test strips and lancets, refill 3 times 1 Device 11     blood glucose monitoring (NO BRAND SPECIFIED) test strip Use to test blood sugars once times daily or as directed 1 Box 11     order for DME Equipment being ordered: Glucometer Accucheck Sulema, lancets, test strips.  Patient to check sugars bid times daily, 90 day supply for test strips and lancets, refill 6 times 1 Device 0     order for DME Equipment being ordered: lancets for one touch glucometer for once daily testing 1 Box 6     ORDER FOR DME, SET TO LOCAL PRINT, Equipment being ordered: wrist quick fit [id number] 1 each 0     No Known Allergies  Recent Labs   Lab Test  11/17/17   0944  04/07/17   0832  10/26/16   0940   07/07/16   1107   11/11/15   1040  05/15/15   0927   A1C  6.4*  8.4*  9.9*   < >   --    < >  8.0*  7.0*   LDL  21   --   112*   --    --    --   61  Cannot estimate LDL when triglyceride exceeds 400 mg/dL   HDL  44   --   48   --    --    --   46  44   TRIG  166*   --   199*   --    --    --   291*  428*   ALT  42   --    --    --    --    --   49  49   CR  0.92   --    --    --   0.78   --   0.81  0.88   GFRESTIMATED  83   --    --    --   >90  Non  GFR Calc     --   >90  Non  GFR Calc    89   GFRESTBLACK  >90   --    --    --   >90   GFR Calc     --   >90   GFR Calc    >90   GFR Calc     POTASSIUM  4.1   --    --    --   4.3   --   4.6  4.8   TSH  1.68   --    --    --    --    --    --   1.40    < > = values in this interval not displayed.      BP Readings from Last 3 Encounters:   12/27/17 132/85   11/17/17 120/78   04/07/17 138/86    Wt Readings  "from Last 3 Encounters:   12/27/17 206 lb 6.4 oz (93.6 kg)   11/17/17 208 lb 3.2 oz (94.4 kg)   04/07/17 204 lb 9.6 oz (92.8 kg)                  Labs reviewed in EPIC          Reviewed and updated as needed this visit by clinical staffTobacco  Allergies  Meds  Med Hx  Surg Hx  Fam Hx  Soc Hx      Reviewed and updated as needed this visit by Provider         ROS:  Constitutional, HEENT, cardiovascular, pulmonary, gi and gu systems are negative, except as otherwise noted.      OBJECTIVE:                                                    /85 (BP Location: Left arm, Patient Position: Chair, Cuff Size: Adult Large)  Pulse 79  Temp 97  F (36.1  C) (Oral)  Ht 5' 6.93\" (1.7 m)  Wt 206 lb 6.4 oz (93.6 kg)  SpO2 94%  BMI 32.4 kg/m2  Body mass index is 32.4 kg/(m^2).  GENERAL APPEARANCE: healthy, alert and no distress  EYES: Eyes grossly normal to inspection and conjunctivae and sclerae normal  NECK: trachea midline and normal to palpation  RESP: lungs clear to auscultation - no rales, rhonchi or wheezes  CV: regular rates and rhythm, normal S1 S2, no S3 or S4, no murmur, click or rub, no irregular beats and peripheral pulses strong  ABDOMEN: soft, non-tender  MS: extremities normal- no gross deformities noted and peripheral pulses normal  SKIN: no suspicious lesions or rashes  NEURO: Normal strength and tone, mentation intact, speech normal, DTR symmetrically normal in lower extremities, gait normal including heel/toe/tandem walking and normal strength throughout  PSYCH: mentation appears normal    Lumbar spine: No swelling, bruising, erythema atrophy or deformity.  No tenderness noted on palpation of spinous processes.  Range of motion of the lumbar spine is decreased in flexion without focal deficit.  Strength is full.  SLR negative bilaterally.  Distal motor and sensory exam is intact.  Reflexes are 2+ and symmetrical.  Distal pulses intact. No sacroiliac tenderness bilaterally.  Great toe extension " normal.       Diagnostic test results:  Diagnostic Test Results:  No results found for this or any previous visit (from the past 24 hour(s)).       ASSESSMENT/PLAN:                                                    1. Mechanical low back pain  -Alternating heat and ice  -Continue with Naprosyn 500 mg up to twice a day   - XR Lumbar Spine 2/3 Views; Future  - [facility] PT, HAND, AND CHIROPRACTIC REFERRAL  - methocarbamol (ROBAXIN) 500 MG tablet; Take 2 tablets (1,000 mg) by mouth 3 times daily as needed for muscle spasms  Dispense: 30 tablet; Refill: 1  -Sedation side effect reviewed of muscle relaxer   -Await results of imaging     2. Type 2 diabetes mellitus with hemoglobin A1c goal of 7.0%-8.0% (H)  -Stable  -last HbA1C was 6.4  -Follow up with PCP as scheduled     3. Essential hypertension  -At goal     4. Back muscle spasm  - XR Lumbar Spine 2/3 Views; Future  - [facility] PT, HAND, AND CHIROPRACTIC REFERRAL  - methocarbamol (ROBAXIN) 500 MG tablet; Take 2 tablets (1,000 mg) by mouth 3 times daily as needed for muscle spasms  Dispense: 30 tablet; Refill: 1      Follow up with Provider - If not improving in 4-6 weeks then consider Spine Referral otherwise follow up with PCP as scheduled      Jud Fajardo MD MPH    [facility]

## 2017-12-28 NOTE — PROGRESS NOTES
Lochan,     X rays of the back did not show any acute fractures or bony abnormalities. Moderate arthritis was seen, this is likely causing some of the morning stiffness. Plan of care and follow up as discussed in clinic.     Please do not hesitate to call us at [phone] if you have any questions or concerns.    Thank you,    Jud Fajardo MD MPH

## 2018-01-09 ENCOUNTER — THERAPY VISIT (OUTPATIENT)
Dept: PHYSICAL THERAPY | Facility: CLINIC | Age: 63
End: 2018-01-09
Payer: COMMERCIAL

## 2018-01-09 DIAGNOSIS — M62.830 BACK MUSCLE SPASM: ICD-10-CM

## 2018-01-09 DIAGNOSIS — M54.59 MECHANICAL LOW BACK PAIN: Primary | ICD-10-CM

## 2018-01-09 PROCEDURE — 97112 NEUROMUSCULAR REEDUCATION: CPT | Mod: GP | Performed by: PHYSICAL THERAPIST

## 2018-01-09 PROCEDURE — 97161 PT EVAL LOW COMPLEX 20 MIN: CPT | Mod: GP | Performed by: PHYSICAL THERAPIST

## 2018-01-09 PROCEDURE — 97110 THERAPEUTIC EXERCISES: CPT | Mod: GP | Performed by: PHYSICAL THERAPIST

## 2018-01-09 NOTE — MR AVS SNAPSHOT
After Visit Summary   1/9/2018    Kathrine Isaac    MRN: [id number]           Patient Information     Date Of Birth          1955        Visit Information        Provider Department      1/9/2018 8:50 AM Liborio Chanel, PT [facility]        Today's Diagnoses     Mechanical low back pain    -  1    Back muscle spasm           Follow-ups after your visit        Your next 10 appointments already scheduled     Jan 16, 2018  7:30 AM CST   VLAD Spine with Liborio Chanel PT   [facility] (VLAD [place]  )    [address]  [facility] [address]   [phone]              Who to contact     If you have questions or need follow up information about today's clinic visit or your schedule please contact [facility] directly at [phone].  Normal or non-critical lab and imaging results will be communicated to you by ciValuehart, letter or phone within 4 business days after the clinic has received the results. If you do not hear from us within 7 days, please contact the clinic through ciValuehart or phone. If you have a critical or abnormal lab result, we will notify you by phone as soon as possible.  Submit refill requests through [employer] or call your pharmacy and they will forward the refill request to us. Please allow 3 business days for your refill to be completed.          Additional Information About Your Visit        MyChart Information     [employer] gives you secure access to your electronic health record. If you see a primary care provider, you can also send messages to your care team and make appointments. If you have questions, please call your primary care clinic.  If you do not have a primary care provider, please call [phone] and they will assist you.        Care EveryWhere ID     This is your Care EveryWhere ID. This could be used by other organizations to access your [place]  medical records  [id number]         Blood Pressure from Last 3 Encounters:   12/27/17 132/85   11/17/17 120/78   04/07/17 138/86    Weight from Last 3 Encounters:   12/27/17 93.6 kg (206 lb 6.4 oz)   11/17/17 94.4 kg (208 lb 3.2 oz)   04/07/17 92.8 kg (204 lb 9.6 oz)              We Performed the Following     VLAD Inital Eval Report     Neuromuscular Re-Education     PT Mignon, Low Complexity ([id number])     Therapeutic Exercises        Primary Care Provider Office Phone # Fax #    Nighat ABHISHEK Fong [phone]       [facility] [address]  [facility] [address]        Equal Access to Services     MICHAEL BENNETT : Hadii aad ku hadasho Soomaali, waaxda luqadaha, qaybta kaalmada adeegyada, waxay idiin haymadan ritchie . So [facility] [phone].    ATENCIÓN: Si habla español, tiene a dan disposición servicios gratuitos de asistencia lingüística. Llame al [phone].    We comply with applicable federal civil rights laws and [place] laws. We do not discriminate on the basis of race, color, national origin, age, disability, sex, sexual orientation, or gender identity.            Thank you!     Thank you for choosing [facility]  for your care. Our goal is always to provide you with excellent care. Hearing back from our patients is one way we can continue to improve our services. Please take a few minutes to complete the written survey that you may receive in the mail after your visit with us. Thank you!             Your Updated Medication List - Protect others around you: Learn how to safely use, store and throw away your medicines at www.disposemymeds.org.          This list is accurate as of: 1/9/18  6:48 PM.  Always use your most recent med list.                   Brand Name Dispense Instructions for use Diagnosis    aspirin 81 MG EC tablet     90 tablet    Take 1 tablet (81 mg) by mouth daily    Type 2 diabetes mellitus with retinopathy,  without long-term current use of insulin, macular edema presence unspecified, unspecified laterality, unspecified retinopathy severity (H)       atorvastatin 20 MG tablet    LIPITOR    90 tablet    Take 1 tablet (20 mg) by mouth daily    Hyperlipidemia LDL goal <100       blood glucose monitoring test strip    no brand specified    1 Box    Use to test blood sugars once times daily or as directed    Type 2 diabetes mellitus without complication (H)       * glimepiride 4 MG tablet    AMARYL    90 tablet    Take 1 tablet (4 mg) by mouth every morning (before breakfast)    Type 2 diabetes mellitus with retinopathy, without long-term current use of insulin, macular edema presence unspecified, unspecified laterality, unspecified retinopathy severity (H)       * glimepiride 2 MG tablet    AMARYL    90 tablet    TAKE ONE TABLET BY MOUTH EVERY MORNING BEFORE BREAKFAST    Type 2 diabetes mellitus with retinopathy, without long-term current use of insulin, macular edema presence unspecified, unspecified laterality, unspecified retinopathy severity (H)       hydrochlorothiazide 12.5 MG Tabs tablet     90 tablet    Take 1 tablet (12.5 mg) by mouth daily    Essential hypertension       lisinopril 40 MG tablet    PRINIVIL/ZESTRIL    90 tablet    Take 1 tablet (40 mg) by mouth daily    Essential hypertension       metFORMIN 500 MG 24 hr tablet    GLUCOPHAGE-XR    360 tablet    Take 4 tablets (2,000 mg) by mouth daily (with dinner)    Type 2 diabetes mellitus with retinopathy, without long-term current use of insulin, macular edema presence unspecified, unspecified laterality, unspecified retinopathy severity (H)       methocarbamol 500 MG tablet    ROBAXIN    30 tablet    Take 2 tablets (1,000 mg) by mouth 3 times daily as needed for muscle spasms    Mechanical low back pain, Back muscle spasm       * naproxen 500 MG tablet    NAPROSYN    60 tablet    TAKE ONE TABLET BY MOUTH TWICE A DAY AS NEEDED FOR MODERATE PAIN    Chronic pain  of left knee       * naproxen 500 MG tablet    NAPROSYN    90 tablet    TAKE ONE TABLET BY MOUTH TWICE A DAY AS NEEDED FOR MODERATE PAIN    Chronic pain of left knee       * order for DME     1 each    Equipment being ordered: wrist quick fit [id number]    Pain in right elbow       * order for DME     1 Box    Equipment being ordered: lancets for one touch glucometer for once daily testing    Type 2 diabetes mellitus without complication (H)       * order for DME     1 Device    Equipment being ordered: Glucometer Accucheck Sulema, lancets, test strips.  Patient to check sugars bid times daily, 90 day supply for test strips and lancets, refill 6 times    Type 2 diabetes mellitus without complication (H)       * order for DME     1 Device    Equipment being ordered: Glucometer per insurance preference, lancets, test strips.  Patient to check sugars two times daily, 90 day supply for test strips and lancets, refill 3 times    Type 2 diabetes mellitus with retinopathy, without long-term current use of insulin, macular edema presence unspecified, unspecified laterality, unspecified retinopathy severity (H)       sitagliptin 100 MG tablet    JANUVIA    90 tablet    Take 1 tablet (100 mg) by mouth daily    Type 2 diabetes mellitus with retinopathy, without long-term current use of insulin, macular edema presence unspecified, unspecified laterality, unspecified retinopathy severity (H)       * Notice:  This list has 8 medication(s) that are the same as other medications prescribed for you. Read the directions carefully, and ask your doctor or other care provider to review them with you.

## 2018-01-09 NOTE — PROGRESS NOTES
[place] [facility] Initial Evaluation  Subjective:  Patient is a 62 year old male presenting with rehab back hpi.   Kathrine Isaac is a 62 year old male with a lumbar condition.  Condition occurred with:  Bending.  Condition occurred: at home.  This is a recurrent condition  12/23/2017.    Patient reports pain:  Lumbar spine right, lumbar spine left and central lumbar spine.    Pain is described as sharp and is intermittent and reported as 9/10.  Associated symptoms:  Loss of motion. Pain is worse in the A.M..  Symptoms are exacerbated by bending (TRANSFERS. ) and relieved by analgesics.  Pain course: VARIES.  Special tests:  X-ray.  Previous treatment: NONE.      Pertinent medical history includes:  High blood pressure and diabetes.  Medical allergies: no.  Other surgeries include:  Orthopedic surgery.  Current medications:  Muscle relaxants and high blood pressure medication.  Current occupation is STUDENT.    Primary job tasks include:  Prolonged sitting.    Barriers include:  None as reported by the patient.    Red flags:  None as reported by the patient.                        Objective:  System    Physical Exam      [place] Lumbar Evaluation    Posture:  Sitting: fair        Correction of Posture: better    Movement Loss:  Flexion (Flex): major and pain    Side [place] R (SG R): nil and pain  Side [place] L (SG L): nil and pain  Test Movements:  FIS: During: increases      EIS: During: centralizing    Repeat EIS: During: centralizing      EIL: During: centralizing    Repeat EIL: During: decreases        Static Tests:          Lying Prone in Extension: BETTER WITH PA PRESSURE.     Conclusion: derangement  Principle of Treatment:  Posture Correction: IN SITTING WITH TOWEL ROLL    Extension: EIS/EIL TOMMY    Other: NEUTRAL SPINE, THER EX, THER ACT, NMR, MANUAL THERAPY                                       ROS    Assessment/Plan:    Patient is a 62 year old male with lumbar complaints.    Patient has the  following significant findings with corresponding treatment plan.                Diagnosis 1:  MECHANICAL LOW BACK PAIN, BACK MM SPASM.   Pain -  hot/cold therapy, US, electric stimulation, manual therapy, splint/taping/bracing/orthotics, self management, education, directional preference exercise and home program  Decreased ROM/flexibility - manual therapy, therapeutic exercise, therapeutic activity and home program  Decreased function - therapeutic activities and home program  Impaired posture - neuro re-education, therapeutic activities and home program    Therapy Evaluation Codes:   1) History comprised of:   Personal factors that impact the plan of care:      Past/current experiences.    Comorbidity factors that impact the plan of care are:      Diabetes.     Medications impacting care: None.  2) Examination of Body Systems comprised of:   Body structures and functions that impact the plan of care:      Lumbar spine.   Activity limitations that impact the plan of care are:      Bathing, Bending, Driving, Dressing, Lifting, Reading/Computer work, Sitting, Standing and Working.  3) Clinical presentation characteristics are:   Stable/Uncomplicated.  4) Decision-Making    Low complexity using standardized patient assessment instrument and/or measureable assessment of functional outcome.  Cumulative Therapy Evaluation is: Low complexity.    Previous and current functional limitations:  (See Goal Flow Sheet for this information)    Short term and Long term goals: (See Goal Flow Sheet for this information)     Communication ability:  Patient appears to be able to clearly communicate and understand verbal and written communication and follow directions correctly.  Treatment Explanation - The following has been discussed with the patient:   RX ordered/plan of care  Anticipated outcomes  Possible risks and side effects  This patient would benefit from PT intervention to resume normal activities.   Rehab potential is  good.    Frequency:  1 X week, once daily  Duration:  for 6 weeks  Discharge Plan:  Achieve all LTG.  Independent in home treatment program.  Reach maximal therapeutic benefit.    Please refer to the daily flowsheet for treatment today, total treatment time and time spent performing 1:1 timed codes.

## 2018-01-16 ENCOUNTER — THERAPY VISIT (OUTPATIENT)
Dept: PHYSICAL THERAPY | Facility: CLINIC | Age: 63
End: 2018-01-16
Payer: COMMERCIAL

## 2018-01-16 DIAGNOSIS — M54.59 MECHANICAL LOW BACK PAIN: ICD-10-CM

## 2018-01-16 DIAGNOSIS — M62.830 BACK MUSCLE SPASM: ICD-10-CM

## 2018-01-16 PROCEDURE — 97112 NEUROMUSCULAR REEDUCATION: CPT | Mod: GP | Performed by: PHYSICAL THERAPIST

## 2018-01-16 PROCEDURE — 97110 THERAPEUTIC EXERCISES: CPT | Mod: GP | Performed by: PHYSICAL THERAPIST

## 2018-01-23 ENCOUNTER — THERAPY VISIT (OUTPATIENT)
Dept: PHYSICAL THERAPY | Facility: CLINIC | Age: 63
End: 2018-01-23
Payer: COMMERCIAL

## 2018-01-23 DIAGNOSIS — M62.830 BACK MUSCLE SPASM: ICD-10-CM

## 2018-01-23 DIAGNOSIS — M54.59 MECHANICAL LOW BACK PAIN: ICD-10-CM

## 2018-01-23 PROCEDURE — 97112 NEUROMUSCULAR REEDUCATION: CPT | Mod: GP | Performed by: PHYSICAL THERAPIST

## 2018-01-23 PROCEDURE — 97140 MANUAL THERAPY 1/> REGIONS: CPT | Mod: GP | Performed by: PHYSICAL THERAPIST

## 2018-01-23 PROCEDURE — 97110 THERAPEUTIC EXERCISES: CPT | Mod: GP | Performed by: PHYSICAL THERAPIST

## 2018-01-30 ENCOUNTER — THERAPY VISIT (OUTPATIENT)
Dept: PHYSICAL THERAPY | Facility: CLINIC | Age: 63
End: 2018-01-30
Payer: COMMERCIAL

## 2018-01-30 DIAGNOSIS — M62.830 BACK MUSCLE SPASM: ICD-10-CM

## 2018-01-30 DIAGNOSIS — M54.59 MECHANICAL LOW BACK PAIN: ICD-10-CM

## 2018-01-30 PROCEDURE — 97110 THERAPEUTIC EXERCISES: CPT | Mod: GP | Performed by: PHYSICAL THERAPIST

## 2018-01-30 PROCEDURE — 97530 THERAPEUTIC ACTIVITIES: CPT | Mod: GP | Performed by: PHYSICAL THERAPIST

## 2018-01-30 PROCEDURE — 97140 MANUAL THERAPY 1/> REGIONS: CPT | Mod: GP | Performed by: PHYSICAL THERAPIST

## 2018-02-06 ENCOUNTER — THERAPY VISIT (OUTPATIENT)
Dept: PHYSICAL THERAPY | Facility: CLINIC | Age: 63
End: 2018-02-06
Payer: COMMERCIAL

## 2018-02-06 DIAGNOSIS — M62.830 BACK MUSCLE SPASM: ICD-10-CM

## 2018-02-06 DIAGNOSIS — M54.59 MECHANICAL LOW BACK PAIN: ICD-10-CM

## 2018-02-06 PROCEDURE — 97110 THERAPEUTIC EXERCISES: CPT | Mod: GP | Performed by: PHYSICAL THERAPIST

## 2018-02-06 PROCEDURE — 97140 MANUAL THERAPY 1/> REGIONS: CPT | Mod: GP | Performed by: PHYSICAL THERAPIST

## 2018-02-06 PROCEDURE — 97112 NEUROMUSCULAR REEDUCATION: CPT | Mod: GP | Performed by: PHYSICAL THERAPIST

## 2018-02-06 NOTE — MR AVS SNAPSHOT
After Visit Summary   2/6/2018    Kathrine Isaac    MRN: [id number]           Patient Information     Date Of Birth          1955        Visit Information        Provider Department      2/6/2018 7:30 AM Liborio Chanel PT [place] [facility]        Today's Diagnoses     Mechanical low back pain        Back muscle spasm           Follow-ups after your visit        Who to contact     If you have questions or need follow up information about today's clinic visit or your schedule please contact [facility] directly at [phone].  Normal or non-critical lab and imaging results will be communicated to you by MorphoSyshart, letter or phone within 4 business days after the clinic has received the results. If you do not hear from us within 7 days, please contact the clinic through MorphoSyshart or phone. If you have a critical or abnormal lab result, we will notify you by phone as soon as possible.  Submit refill requests through [employer] or call your pharmacy and they will forward the refill request to us. Please allow 3 business days for your refill to be completed.          Additional Information About Your Visit        MyChart Information     [employer] gives you secure access to your electronic health record. If you see a primary care provider, you can also send messages to your care team and make appointments. If you have questions, please call your primary care clinic.  If you do not have a primary care provider, please call [phone] and they will assist you.        Care EveryWhere ID     This is your Care EveryWhere ID. This could be used by other organizations to access your [place] medical records  [id number]         Blood Pressure from Last 3 Encounters:   12/27/17 132/85   11/17/17 120/78   04/07/17 138/86    Weight from Last 3 Encounters:   12/27/17 93.6 kg (206 lb 6.4 oz)   11/17/17 94.4 kg (208 lb 3.2 oz)   04/07/17 92.8 kg (204 lb  9.6 oz)              We Performed the Following     VLAD Progress Notes Report     Manual Ther Tech, 1+Regions, EA 15 min     Neuromuscular Re-Education     Therapeutic Exercises        Primary Care Provider Office Phone # Fax #    ABHISHEK Beauchamp [phone]       [facility] [address]  [facility] [address]        Equal Access to Services     [facility]: Hadii aad ku hadasho Soomaali, waaxda luqadaha, qaybta kaalmada adeegyada, waxay idiin hayaan adeeg kharash la'aan . So [facility] [phone].    ATENCIÓN: Si habla español, tiene a dan disposición servicios gratuitos de asistencia lingüística. Llame al [phone].    We comply with applicable federal civil rights laws and [place] laws. We do not discriminate on the basis of race, color, national origin, age, disability, sex, sexual orientation, or gender identity.            Thank you!     Thank you for choosing [place] [facility]  for your care. Our goal is always to provide you with excellent care. Hearing back from our patients is one way we can continue to improve our services. Please take a few minutes to complete the written survey that you may receive in the mail after your visit with us. Thank you!             Your Updated Medication List - Protect others around you: Learn how to safely use, store and throw away your medicines at www.disposemymeds.org.          This list is accurate as of 2/6/18  6:53 PM.  Always use your most recent med list.                   Brand Name Dispense Instructions for use Diagnosis    aspirin 81 MG EC tablet     90 tablet    Take 1 tablet (81 mg) by mouth daily    Type 2 diabetes mellitus with retinopathy, without long-term current use of insulin, macular edema presence unspecified, unspecified laterality, unspecified retinopathy severity (H)       atorvastatin 20 MG tablet    LIPITOR    90 tablet    Take 1 tablet (20 mg) by mouth daily    Hyperlipidemia LDL  goal <100       blood glucose monitoring test strip    no brand specified    1 Box    Use to test blood sugars once times daily or as directed    Type 2 diabetes mellitus without complication (H)       * glimepiride 4 MG tablet    AMARYL    90 tablet    Take 1 tablet (4 mg) by mouth every morning (before breakfast)    Type 2 diabetes mellitus with retinopathy, without long-term current use of insulin, macular edema presence unspecified, unspecified laterality, unspecified retinopathy severity (H)       * glimepiride 2 MG tablet    AMARYL    90 tablet    TAKE ONE TABLET BY MOUTH EVERY MORNING BEFORE BREAKFAST    Type 2 diabetes mellitus with retinopathy, without long-term current use of insulin, macular edema presence unspecified, unspecified laterality, unspecified retinopathy severity (H)       hydrochlorothiazide 12.5 MG Tabs tablet     90 tablet    Take 1 tablet (12.5 mg) by mouth daily    Essential hypertension       lisinopril 40 MG tablet    PRINIVIL/ZESTRIL    90 tablet    Take 1 tablet (40 mg) by mouth daily    Essential hypertension       metFORMIN 500 MG 24 hr tablet    GLUCOPHAGE-XR    360 tablet    Take 4 tablets (2,000 mg) by mouth daily (with dinner)    Type 2 diabetes mellitus with retinopathy, without long-term current use of insulin, macular edema presence unspecified, unspecified laterality, unspecified retinopathy severity (H)       methocarbamol 500 MG tablet    ROBAXIN    30 tablet    Take 2 tablets (1,000 mg) by mouth 3 times daily as needed for muscle spasms    Mechanical low back pain, Back muscle spasm       * naproxen 500 MG tablet    NAPROSYN    60 tablet    TAKE ONE TABLET BY MOUTH TWICE A DAY AS NEEDED FOR MODERATE PAIN    Chronic pain of left knee       * naproxen 500 MG tablet    NAPROSYN    90 tablet    TAKE ONE TABLET BY MOUTH TWICE A DAY AS NEEDED FOR MODERATE PAIN    Chronic pain of left knee       * order for DME     1 each    Equipment being ordered: wrist quick fit [id number]     Pain in right elbow       * order for DME     1 Box    Equipment being ordered: lancets for one touch glucometer for once daily testing    Type 2 diabetes mellitus without complication (H)       * order for DME     1 Device    Equipment being ordered: Glucometer Accucheck Sulema, lancets, test strips.  Patient to check sugars bid times daily, 90 day supply for test strips and lancets, refill 6 times    Type 2 diabetes mellitus without complication (H)       * order for DME     1 Device    Equipment being ordered: Glucometer per insurance preference, lancets, test strips.  Patient to check sugars two times daily, 90 day supply for test strips and lancets, refill 3 times    Type 2 diabetes mellitus with retinopathy, without long-term current use of insulin, macular edema presence unspecified, unspecified laterality, unspecified retinopathy severity (H)       sitagliptin 100 MG tablet    JANUVIA    90 tablet    Take 1 tablet (100 mg) by mouth daily    Type 2 diabetes mellitus with retinopathy, without long-term current use of insulin, macular edema presence unspecified, unspecified laterality, unspecified retinopathy severity (H)       * Notice:  This list has 8 medication(s) that are the same as other medications prescribed for you. Read the directions carefully, and ask your doctor or other care provider to review them with you.

## 2018-02-07 NOTE — PROGRESS NOTES
Subjective:  HPI  Oswestry Score: 0 %                 Objective:  System    Physical Exam    General     ROS    Assessment/Plan:    DISCHARGE REPORT    Progress reporting period is from 1/9/2018 to 2/6/2018.     SUBJECTIVE  Subjective: DOING GOOD. EASIER TO DO BASICALY EVERYTHING.  EASIER TO TRANSFER IN/OUT OF CAR.    Current Pain level: 0/10   Initial Pain level: 9/10   Changes in function: Yes, see goal flow sheet for change in function    ;   ,     The subjective and objective information are from the last SOAP note on this patient.    OBJECTIVE  Objective: FIS: TO TOES.  NO PAIN.        ASSESSMENT/PLAN  Updated problem list and treatment plan: Diagnosis 1:  MECHANICAL LOW BACK PAIN, BACK MUSCLE SPASM.   PROGRESSING WELL.   STG/LTGs have been met or progress has been made towards goals:  Yes (See Goal flow sheet completed today.)  Assessment of Progress: The patient's condition is improving.  Self Management Plans:  Patient has been instructed in a home treatment program.  I have re-evaluated this patient and find that the nature, scope, duration and intensity of the therapy is appropriate for the medical condition of the patient.  Kathrine continues to require the following intervention to meet STG and LTG's: PT intervention is no longer required to meet STG/LTG.  We will discharge this patient from PT.    Recommendations:  This patient is ready to be discharged from therapy and continue their home treatment program.    Please refer to the daily flowsheet for treatment today, total treatment time and time spent performing 1:1 timed codes.

## 2018-03-23 DIAGNOSIS — G89.29 CHRONIC PAIN OF LEFT KNEE: ICD-10-CM

## 2018-03-23 DIAGNOSIS — M25.562 CHRONIC PAIN OF LEFT KNEE: ICD-10-CM

## 2018-03-23 DIAGNOSIS — E11.9 TYPE 2 DIABETES MELLITUS WITH HEMOGLOBIN A1C GOAL OF 7.0%-8.0% (H): Primary | ICD-10-CM

## 2018-03-23 RX ORDER — NAPROXEN 500 MG/1
TABLET ORAL
Qty: 90 TABLET | Refills: 1 | Status: SHIPPED | OUTPATIENT
Start: 2018-03-23 | End: 2018-03-28

## 2018-03-23 NOTE — TELEPHONE ENCOUNTER
"Requested Prescriptions   Pending Prescriptions Disp Refills     naproxen (NAPROSYN) 500 MG tablet [Pharmacy Med Name: NAPROXEN 500MG TABS] 90 tablet 1     Sig: TAKE ONE TABLET BY MOUTH TWICE A DAY AS NEEDED FOR MODERATE PAIN    NSAID Medications Failed    3/23/2018 10:20 AM       Failed - Normal CBC on file in past 12 months    No lab results found.         Passed - Blood pressure under 140/90 in past 12 months    BP Readings from Last 3 Encounters:   12/27/17 132/85   11/17/17 120/78   04/07/17 138/86                Passed - Normal ALT on file in past 12 months    Recent Labs   Lab Test  11/17/17   0944   ALT  42            Passed - Normal AST on file in past 12 months    Recent Labs   Lab Test  11/17/17   0944   AST  19            Passed - Recent (12 mo) or future (30 days) visit within the authorizing provider's specialty    Patient had office visit in the last 12 months or has a visit in the next 30 days with authorizing provider or within the authorizing provider's specialty.  See \"Patient Info\" tab in inbasket, or \"Choose Columns\" in Meds & Orders section of the refill encounter.           Passed - Patient is age 6-64 years       Passed - Normal serum creatinine on file in past 12 months    Recent Labs   Lab Test  11/17/17   0944   CR  0.92             Routing refill request to provider for review/approval because:  Labs not current:  CBC    Josh Liu RN, BSN          "

## 2018-03-23 NOTE — LETTER
Kathrine Isaac  [address] [facility] [address]          03/26/18      Dear Kathrine Isaac        At [facility] we care about your health and are committed to providing quality patient care. Regular appointments are a vital part of the care and management of your health and can help prevent many of the complications that can occur.      It has come to our attention that you are due for an office visit. Please call [facility] at [phone] soon to schedule your appointment.    If you have transferred care to another clinic please call to inform us so that we do not continue to send you reminder letters.      Sincerely,      [facility] Care Team

## 2018-03-26 NOTE — TELEPHONE ENCOUNTER
Patient  returned call    Best number to reach caller: Home number on file [phone] (home)    Is it ok to leave a detailed message: Not Applicable as Patient is scheduled for this Wed 3-

## 2018-03-26 NOTE — TELEPHONE ENCOUNTER
This writer attempted to contact sejal on 03/26/18      Reason for call Patient to return call and schedule an office visit and left message and sent letter.    If patient calls back:   Schedule Office Visit appointment within 2 weeks with PCP, document that pt called and close encounter         Maria Alejandra Tam MA

## 2018-03-28 ENCOUNTER — OFFICE VISIT (OUTPATIENT)
Dept: FAMILY MEDICINE | Facility: CLINIC | Age: 63
End: 2018-03-28
Payer: COMMERCIAL

## 2018-03-28 VITALS
BODY MASS INDEX: 32.43 KG/M2 | TEMPERATURE: 97.4 F | OXYGEN SATURATION: 99 % | DIASTOLIC BLOOD PRESSURE: 80 MMHG | HEIGHT: 67 IN | SYSTOLIC BLOOD PRESSURE: 126 MMHG | WEIGHT: 206.6 LBS | HEART RATE: 79 BPM

## 2018-03-28 DIAGNOSIS — I10 ESSENTIAL HYPERTENSION: ICD-10-CM

## 2018-03-28 DIAGNOSIS — M25.562 CHRONIC PAIN OF LEFT KNEE: ICD-10-CM

## 2018-03-28 DIAGNOSIS — E66.811 OBESITY, CLASS I, BMI 30.0-34.9 (SEE ACTUAL BMI): ICD-10-CM

## 2018-03-28 DIAGNOSIS — Z13.89 SCREENING FOR DIABETIC PERIPHERAL NEUROPATHY: ICD-10-CM

## 2018-03-28 DIAGNOSIS — Z23 NEED FOR SHINGLES VACCINE: ICD-10-CM

## 2018-03-28 DIAGNOSIS — E78.5 HYPERLIPIDEMIA LDL GOAL <100: ICD-10-CM

## 2018-03-28 DIAGNOSIS — E11.9 TYPE 2 DIABETES MELLITUS WITH HEMOGLOBIN A1C GOAL OF 7.0%-8.0% (H): Primary | ICD-10-CM

## 2018-03-28 DIAGNOSIS — E11.319 TYPE 2 DIABETES MELLITUS WITH RETINOPATHY, WITHOUT LONG-TERM CURRENT USE OF INSULIN, MACULAR EDEMA PRESENCE UNSPECIFIED, UNSPECIFIED LATERALITY, UNSPECIFIED RETINOPATHY SEVERITY (H): ICD-10-CM

## 2018-03-28 DIAGNOSIS — G89.29 CHRONIC PAIN OF LEFT KNEE: ICD-10-CM

## 2018-03-28 LAB
ERYTHROCYTE [DISTWIDTH] IN BLOOD BY AUTOMATED COUNT: 13.2 % (ref 10–15)
HBA1C MFR BLD: 7.6 % (ref 0–5.7)
HCT VFR BLD AUTO: 42.3 % (ref 40–53)
HGB BLD-MCNC: 14.7 G/DL (ref 13.3–17.7)
MCH RBC QN AUTO: 29.8 PG (ref 26.5–33)
MCHC RBC AUTO-ENTMCNC: 34.8 G/DL (ref 31.5–36.5)
MCV RBC AUTO: 86 FL (ref 78–100)
PLATELET # BLD AUTO: 211 10E9/L (ref 150–450)
RBC # BLD AUTO: 4.93 10E12/L (ref 4.4–5.9)
WBC # BLD AUTO: 10.2 10E9/L (ref 4–11)

## 2018-03-28 PROCEDURE — 99207 C FOOT EXAM  NO CHARGE: CPT | Performed by: NURSE PRACTITIONER

## 2018-03-28 PROCEDURE — 90750 HZV VACC RECOMBINANT IM: CPT | Performed by: NURSE PRACTITIONER

## 2018-03-28 PROCEDURE — 85027 COMPLETE CBC AUTOMATED: CPT | Performed by: NURSE PRACTITIONER

## 2018-03-28 PROCEDURE — 99214 OFFICE O/P EST MOD 30 MIN: CPT | Mod: 25 | Performed by: NURSE PRACTITIONER

## 2018-03-28 PROCEDURE — 90471 IMMUNIZATION ADMIN: CPT | Performed by: NURSE PRACTITIONER

## 2018-03-28 PROCEDURE — 83036 HEMOGLOBIN GLYCOSYLATED A1C: CPT | Performed by: NURSE PRACTITIONER

## 2018-03-28 PROCEDURE — 36415 COLL VENOUS BLD VENIPUNCTURE: CPT | Performed by: NURSE PRACTITIONER

## 2018-03-28 RX ORDER — GLIMEPIRIDE 4 MG/1
4 TABLET ORAL
Qty: 90 TABLET | Refills: 3 | Status: SHIPPED | OUTPATIENT
Start: 2018-03-28 | End: 2019-04-06

## 2018-03-28 RX ORDER — ATORVASTATIN CALCIUM 20 MG/1
20 TABLET, FILM COATED ORAL DAILY
Qty: 90 TABLET | Refills: 1 | Status: SHIPPED | OUTPATIENT
Start: 2018-03-28 | End: 2018-12-31

## 2018-03-28 NOTE — PROGRESS NOTES
SUBJECTIVE:   Kathrine Isaac is a 63 year old male who presents to clinic today for the following health issues:    Medication Followup of All    Taking Medication as prescribed: yes    Side Effects:  None    Medication Helping Symptoms:  yes     Diabetes Follow-up      Patient is checking blood sugars: 2-3  times a week, usually under 100, checking at varied times    Diabetic concerns: None     Symptoms of hypoglycemia (low blood sugar): none     Paresthesias (numbness or burning in feet) or sores: No     Date of last diabetic eye exam: 2015    Hyperlipidemia Follow-Up      Rate your low fat/cholesterol diet?: good    Taking statin?  Yes, no muscle aches from statin    Other lipid medications/supplements?:  none    Hypertension Follow-up      Outpatient blood pressures are being checked at home.  Results are 120's/80-90.    Low Salt Diet: low salt    HPI: Kathrine presents today for follow-up of his diabetes, high cholesterol & hypertension.     He states he has been taking his metformin, glimepiride and sitagliptin as prescribed with no symptoms of hypoglycemia. He checks his sugars 2-3 times a week, with results <100. Denies any numbness or tingling in hands or feet.    Kathrine is also taking his lisinopril and hydrochlorithiazide as prescribed for hypertension - he does monitor the amount of salt in his diet and attempts to restrict it as much as possible. No symptoms of hypotension (i.e. Dizziness with position changes, feeling of heart racing) and denies chest pain, radiating arm pain or shortness of breath.    Kathrine is also taking his lipitor as prescribed for high cholesterol - attempting to eat a heart healthy diet and states he has lost some weight (still 206 lbs per EMR - same as when last seen in December).    BP Readings from Last 2 Encounters:   03/28/18 126/80   12/27/17 132/85     Hemoglobin A1C (%)   Date Value   03/28/2018 7.6   11/17/2017 6.4 (H)     LDL Cholesterol Calculated (mg/dL)   Date  Value   11/17/2017 21   10/26/2016 112 (H)       Problem list and histories reviewed & adjusted, as indicated.  Additional history: as documented    Patient Active Problem List   Diagnosis     Glaucoma suspect     Type 2 diabetes mellitus with hemoglobin A1c goal of 7.0%-8.0% (H)     Hyperlipidemia LDL goal <100     Essential hypertension     Advance care planning     Diverticulosis of large intestine without hemorrhage     Obesity, Class I, BMI 30.0-34.9 (see actual BMI)     Past Surgical History:   Procedure Laterality Date     COLONOSCOPY N/A 1/14/2016    Procedure: COLONOSCOPY;  Surgeon: Ventura Monge MD;  Location: [facility] OR     LASIK BILATERAL       ROTATOR CUFF REPAIR RT/LT  1992     SURGICAL HISTORY OF -       uvula remove        Social History   Substance Use Topics     Smoking status: Never Smoker     Smokeless tobacco: Never Used     Alcohol use Yes     Family History   Problem Relation Age of Onset     DIABETES Mother      CANCER No family hx of      Hypertension No family hx of      CEREBROVASCULAR DISEASE No family hx of      Thyroid Disease No family hx of      Glaucoma No family hx of      Macular Degeneration No family hx of          Current Outpatient Prescriptions   Medication Sig Dispense Refill     sitagliptin (JANUVIA) 100 MG tablet Take 1 tablet (100 mg) by mouth daily 90 tablet 1     atorvastatin (LIPITOR) 20 MG tablet Take 1 tablet (20 mg) by mouth daily 90 tablet 1     order for DME Equipment being ordered: One touch lancets, test strips.  Patient to check sugars once times daily, 90 day supply for test strips and lancets, refill 3 times 1 Device 0     methocarbamol (ROBAXIN) 500 MG tablet Take 2 tablets (1,000 mg) by mouth 3 times daily as needed for muscle spasms 30 tablet 1     glimepiride (AMARYL) 2 MG tablet TAKE ONE TABLET BY MOUTH EVERY MORNING BEFORE BREAKFAST 90 tablet 3     lisinopril (PRINIVIL/ZESTRIL) 40 MG tablet Take 1 tablet (40 mg) by mouth daily 90 tablet 3      metFORMIN (GLUCOPHAGE-XR) 500 MG 24 hr tablet Take 4 tablets (2,000 mg) by mouth daily (with dinner) 360 tablet 3     hydrochlorothiazide 12.5 MG TABS tablet Take 1 tablet (12.5 mg) by mouth daily 90 tablet 3     aspirin 81 MG EC tablet Take 1 tablet (81 mg) by mouth daily 90 tablet 3     order for DME Equipment being ordered: Glucometer per insurance preference, lancets, test strips.  Patient to check sugars two times daily, 90 day supply for test strips and lancets, refill 3 times 1 Device 11     blood glucose monitoring (NO BRAND SPECIFIED) test strip Use to test blood sugars once times daily or as directed 1 Box 11     order for DME Equipment being ordered: Glucometer Accucheck Sulema, lancets, test strips.  Patient to check sugars bid times daily, 90 day supply for test strips and lancets, refill 6 times 1 Device 0     order for DME Equipment being ordered: lancets for one touch glucometer for once daily testing 1 Box 6     ORDER FOR DME, SET TO LOCAL PRINT, Equipment being ordered: wrist quick fit [id number] 1 each 0     [DISCONTINUED] atorvastatin (LIPITOR) 20 MG tablet Take 1 tablet (20 mg) by mouth daily 90 tablet 1     [DISCONTINUED] sitagliptin (JANUVIA) 100 MG tablet Take 1 tablet (100 mg) by mouth daily 90 tablet 1     [DISCONTINUED] glimepiride (AMARYL) 4 MG tablet Take 1 tablet (4 mg) by mouth every morning (before breakfast) 90 tablet 1     BP Readings from Last 3 Encounters:   03/28/18 126/80   12/27/17 132/85   11/17/17 120/78    Wt Readings from Last 3 Encounters:   03/28/18 206 lb 9.6 oz (93.7 kg)   12/27/17 206 lb 6.4 oz (93.6 kg)   11/17/17 208 lb 3.2 oz (94.4 kg)                    Reviewed and updated as needed this visit by clinical staff  Tobacco  Allergies  Meds  Problems  Med Hx  Surg Hx  Fam Hx  Soc Hx        Reviewed and updated as needed this visit by Provider  Tobacco  Allergies  Meds  Problems  Med Hx  Surg Hx  Fam Hx  Soc Hx          ROS:  Constitutional, HEENT,  "cardiovascular, pulmonary, gi and gu systems are negative, except as otherwise noted.    OBJECTIVE:     /80 (BP Location: Left arm, Patient Position: Chair, Cuff Size: Adult Large)  Pulse 79  Temp 97.4  F (36.3  C) (Tympanic)  Ht 5' 6.83\" (1.698 m)  Wt 206 lb 9.6 oz (93.7 kg)  SpO2 99%  BMI 32.52 kg/m2  Body mass index is 32.52 kg/(m^2).  GENERAL: healthy, alert and no distress  NECK: no adenopathy, no asymmetry, masses, or scars and thyroid normal to palpation  RESP: lungs clear to auscultation - no rales, rhonchi or wheezes  CV: regular rate and rhythm, normal S1 S2, no S3 or S4, no murmur, click or rub, no peripheral edema and peripheral pulses strong, no JVD distention, no carotid bruits  MS: no gross musculoskeletal defects noted, no edema  NEURO: Normal strength and tone, mentation intact and speech normal  PSYCH: mentation appears normal, affect normal/bright  FOOT EXAM: negative for peripheral neuropathy- normal monofilament exam, some dry skin, no cracking or sores.     Diagnostic Test Results:  Results for orders placed or performed in visit on 03/28/18 (from the past 24 hour(s))   CBC with platelets   Result Value Ref Range    WBC 10.2 4.0 - 11.0 10e9/L    RBC Count 4.93 4.4 - 5.9 10e12/L    Hemoglobin 14.7 13.3 - 17.7 g/dL    Hematocrit 42.3 40.0 - 53.0 %    MCV 86 78 - 100 fl    MCH 29.8 26.5 - 33.0 pg    MCHC 34.8 31.5 - 36.5 g/dL    RDW 13.2 10.0 - 15.0 %    Platelet Count 211 150 - 450 10e9/L   **A1C FUTURE anytime   Result Value Ref Range    Hemoglobin A1C 7.6 %       ASSESSMENT/PLAN:     Diabetes Type II, A1c controlled, non-insulin dependent   Associated with the following complications    Renal Complications:  None    Ophthalmologic Complications: suspect glaucoma when seen in 2015    Neurologic Complications: None    Peripheral Vascular Complications:  None    Other: None   Plan:  Labs:  A1C      Hyperlipidemia; controlled   Plan:  No changes in the patient's current treatment " "plan    Hypertension; controlled   Associated with the following complications:    Heart Disease and Diabetes   Plan:  No changes in the patient's current treatment plan        BMI:   Estimated body mass index is 32.52 kg/(m^2) as calculated from the following:    Height as of this encounter: 5' 6.83\" (1.698 m).    Weight as of this encounter: 206 lb 9.6 oz (93.7 kg).   Weight management plan: Discussed healthy diet and exercise guidelines and patient will follow up in 6 months in clinic to re-evaluate.      1. Type 2 diabetes mellitus with hemoglobin A1c goal of 7.0%-8.0% (H) - chronic, stable  A1c today 7.6 - up from  6.4 in November despite patient report of good glucose control when checking at home. A1C elevated today- increased Amaryl to 4 mg daily, plan to re-check in 3 months and review education on diet and exercise recommendations for better control.     Optometry referral provided and strongly encouraged patient to follow-up at his earliest convenience considering his history of suspected glaucoma with concurrent diabetes diagnosis (last seen in 2015). Patient verbalized agreement with this plan.     - OPTOMETRY REFERRAL  - order for DME; Equipment being ordered: One touch lancets, test strips.  Patient to check sugars once times daily, 90 day supply for test strips and lancets, refill 3 times  Dispense: 1 Device; Refill: 0  - **A1C FUTURE anytime    2. Type 2 diabetes mellitus with retinopathy, without long-term current use of insulin, macular edema presence unspecified, unspecified laterality, unspecified retinopathy severity (H) - chronic, stable  Optometry referral provided - see above. No over acute vision changes - patient denies overt changes in vision.      - sitagliptin (JANUVIA) 100 MG tablet; Take 1 tablet (100 mg) by mouth daily  Dispense: 90 tablet; Refill: 1    3. Hyperlipidemia LDL goal <100 - chronic, stable  Continue current therapy as patient denies symptoms with current dosing and lipid " profile well-controlled in November; re-check in six months. Educated on the importance of continuing with a heart health diet.     - atorvastatin (LIPITOR) 20 MG tablet; Take 1 tablet (20 mg) by mouth daily  Dispense: 90 tablet; Refill: 1    4. Essential hypertension - chronic, stable  Well-controlled on Lisinopril & Hydrochlorothiazide; continue current regimen and heart healthy diet as above.     5. Obesity, Class I, BMI 30.0-34.9 (see actual BMI) - chronic, stable  Weight stable from visit in November despite patient report that he thinks he lost weight; educated on the importance of diet, exercise and maintaining good diabetic control to target weight loss. Re-check in 6 months.    6. Screening for diabetic peripheral neuropathy  Benign foot exam, educated on the importance of foot hygiene with concurrent diabetes, ensuring clean and moisturized to prevent cracking, etc.     - FOOT EXAM  NO CHARGE [36501.114]    7. Need for shingles vaccine  Administered due to patient history of chicken pox and shingles vaccination not yet received.     - ZOSTER VACCINE RECOMBINANT ADJUVANTED IM NJX    8. Chronic pain of left knee - chronic, stable  CBC WNL - advised to continue with activity as tolerated and weight loss.     - CBC with platelets    See Patient Instructions    The above evaluation was completed by ABHISHEK Morales CNP and transcribed by [employer] student.    ABHISHEK Perry CNP  [facility]

## 2018-03-28 NOTE — MR AVS SNAPSHOT
After Visit Summary   3/28/2018    Kathrine Isaac    MRN: [id number]           Patient Information     Date Of Birth          1955        Visit Information        Provider Department      3/28/2018 7:40 AM Nighat Joe APRN CNP [facility]        Today's Diagnoses     Type 2 diabetes mellitus with hemoglobin A1c goal of 7.0%-8.0% (H)    -  1    Type 2 diabetes mellitus with retinopathy, without long-term current use of insulin, macular edema presence unspecified, unspecified laterality, unspecified retinopathy severity (H)        Hyperlipidemia LDL goal <100        Essential hypertension        Obesity, Class I, BMI 30.0-34.9 (see actual BMI)        Screening for diabetic peripheral neuropathy        Need for shingles vaccine          Care Instructions    At [facility], we strive to deliver an exceptional experience to you, every time we see you.  If you receive a survey in the mail, please send us back your thoughts. We really do value your feedback.    Based on your medical history, these are the current health maintenance/preventive care services that you are due for (some may have been done at this visit.)  Health Maintenance Due   Topic Date Due     EYE EXAM Q1 YEAR  05/04/2016     FOOT EXAM Q1 YEAR  04/07/2018         Suggested websites for health information:  Www.[employer].org : Up to date and easily searchable information on multiple topics.  Www.medlineplus.gov : medication info, interactive tutorials, watch real surgeries online  Www.familydoctor.org : good info from the Academy of Family Physicians  Www.cdc.gov : public health info, travel advisories, epidemics (H1N1)  Www.aap.org : children's health info, normal development, vaccinations  Www.health.state[place]us : [place] dept of health, public health issues in [place], N1N1    Your care team:                            Family Medicine Internal Medicine   MD Tobias Lu MD Shantel  MD Hue Villegas PA-C, MD Pediatrics   LAN Guillermo, MD Casi Rutherford CNP, MD Deborah Mielke, MD Kim Thein, APRN [facility]      Clinic hours: Monday - Thursday 7 am-7 pm; Fridays 7 am-5 pm.   Urgent care: Monday - Friday 11 am-9 pm; Saturday and Sunday 9 am-5 pm.  Pharmacy : Monday -Thursday 8 am-8 pm; Friday 8 am-6 pm; Saturday and Sunday 9 am-5 pm.     Clinic: [phone]   Pharmacy: [phone]    Diabetes: Keeping Feet Healthy     Have your feet checked every time you see your healthcare provider, and at least once a year.     Diabetes can damage nerves in your feet and cause neuropathy. This condition makes it hard for you to feel injuries or sore spots. Diabetes can also change blood flow, making it harder for small problems, like a blister, to heal properly. In fact, minor injuries can quickly become serious infections that send you to the hospital. Practice self-care to protect your feet and keep them healthy.   Take special care  Here are tips for taking special care of your feet:    Inspect your feet daily for problems such as redness, blisters, cracks, dry skin, or numbness. Use a mirror to see the bottoms of your feet. Or, ask for help.    Manage your diabetes. Monitor and control your blood sugar. Take all your medicines as prescribed.    Avoid walking barefoot, even indoors. Always wear socks inside your shoes.     Wash your feet with warm water and mild soap. Dry well, especially between toes.    Don t treat corns or calluses yourself. Talk to your healthcare provider or podiatrist (a healthcare provider who specializes in foot care) if you need assistance trimming your toenails.    Use moisturizing cream or lotion if you have dry skin, but don t use it between toes.    Don t use heating pads on your feet. If you have neuropathy, you could get a burn and not feel it.    Stop smoking. Smoking restricts  blood flow and can make it harder for wounds to heal.    Don't use sharp blades to trim your nails. Use a nail clipper and file instead.   Have regular checkups  Foot problems can develop quickly. So be sure to follow your healthcare team s schedule for regular checkups. During office visits, take off your shoes and socks as soon as you get in the exam room. Ask your healthcare provider to examine your feet for problems. This will make it easier to find and treat small skin irritations before they get worse. Regular checkups can also help keep track of the blood flow and feeling in your feet. If you have neuropathy, you may need to have checkups more often.  Wear proper footwear  Wearing proper footwear is very important. If areas of your feet have been damaged by too much pressure, your healthcare provider may recommend changing your footwear. In some cases, avoiding high heels or tight work boots may be all that s needed. Or, your healthcare provider may recommend special shoes or custom inserts. These help protect your feet and keep existing irritations from getting worse. If you need special footwear, ask your healthcare provider if you qualify for Medicare's custom-molded and extra-depth diabetic shoe and insert program.   Make sure shoes and socks fit  Any pair of shoes--new or old--should feel comfortable as soon as you put them on. There shouldn t be any rubbing when you walk. Wear the right shoe for any activity. For instance, a running shoe is designed to keep your feet injury-free while jogging. Buy shoes at the end of the day, when your feet are larger. Make sure they provide support without feeling too loose. Make sure your socks fit, too. Wear soft, seamless, well-padded socks for activity. Cotton or microfiber socks are best to help to absorb sweat. To protect your feet, avoid shoes that are open-toed or open-heeled. If you have questions about what kinds of shoes and socks are best, talk to your  healthcare team.  Get regular exercise  Regular exercise improves blood flow in your feet. It also increases foot strength and flexibility. Gentle exercises, like walking or riding a stationary bicycle, are best. You can also do special foot exercises. Just be sure to talk with your healthcare provider before starting any exercise program. Also mention if any exercise causes pain, redness, or other signs of foot problems.     Note: If you have any kind of break in the skin of your foot or ankle, keep the area clean. Then call your healthcare provider--especially if the area doesn t appear to be healing.   Date Last Reviewed: 6/1/2016 2000-2017 [employer]. [address] [place] [address]. All rights reserved. This information is not intended as a substitute for professional medical care. Always follow your healthcare professional's instructions.                Follow-ups after your visit        Additional Services     OPTOMETRY REFERRAL       Your provider has referred you to: [facility]: [facility] [phone]    [email or web address]    Please be aware that coverage of these services is subject to the terms and limitations of your health insurance plan.  Call member services at your health plan with any benefit or coverage questions.      Please bring the following with you to your appointment:    (1) Any X-Rays, CTs or MRIs which have been performed.  Contact the facility where they were done to arrange for  prior to your scheduled appointment.    (2) List of current medications  (3) This referral request   (4) Any documents/labs given to you for this referral                  Who to contact     If you have questions or need follow up information about today's clinic visit or your schedule please contact [facility] directly at [phone].  Normal or non-critical lab and imaging results will be  "communicated to you by [employer], letter or phone within 4 business days after the clinic has received the results. If you do not hear from us within 7 days, please contact the clinic through [employer] or phone. If you have a critical or abnormal lab result, we will notify you by phone as soon as possible.  Submit refill requests through [employer] or call your pharmacy and they will forward the refill request to us. Please allow 3 business days for your refill to be completed.          Additional Information About Your Visit        [employer] Information     [employer] gives you secure access to your electronic health record. If you see a primary care provider, you can also send messages to your care team and make appointments. If you have questions, please call your primary care clinic.  If you do not have a primary care provider, please call [phone] and they will assist you.        Care EveryWhere ID     This is your Care EveryWhere ID. This could be used by other organizations to access your [facility] medical records  [id number]        Your Vitals Were     Pulse Temperature Height Pulse Oximetry BMI (Body Mass Index)       79 97.4  F (36.3  C) (Tympanic) 5' 6.83\" (1.698 m) 99% 32.52 kg/m2        Blood Pressure from Last 3 Encounters:   03/28/18 126/80   12/27/17 132/85   11/17/17 120/78    Weight from Last 3 Encounters:   03/28/18 206 lb 9.6 oz (93.7 kg)   12/27/17 206 lb 6.4 oz (93.6 kg)   11/17/17 208 lb 3.2 oz (94.4 kg)              We Performed the Following     FOOT EXAM  NO CHARGE [02497.114]     OPTOMETRY REFERRAL          Today's Medication Changes          These changes are accurate as of 3/28/18  8:05 AM.  If you have any questions, ask your nurse or doctor.               Start taking these medicines.        Dose/Directions    order for DME   Used for:  Type 2 diabetes mellitus with hemoglobin A1c goal of 7.0%-8.0% (H)   Started by:  Nighat Joe APRN CNP        Equipment being ordered: One touch " lancets, test strips.  Patient to check sugars once times daily, 90 day supply for test strips and lancets, refill 3 times   Quantity:  1 Device   Refills:  0         These medicines have changed or have updated prescriptions.        Dose/Directions    glimepiride 2 MG tablet   Commonly known as:  AMARYL   This may have changed:  Another medication with the same name was removed. Continue taking this medication, and follow the directions you see here.   Used for:  Type 2 diabetes mellitus with retinopathy, without long-term current use of insulin, macular edema presence unspecified, unspecified laterality, unspecified retinopathy severity (H)   Changed by:  Nighat Joe APRN CNP        TAKE ONE TABLET BY MOUTH EVERY MORNING BEFORE BREAKFAST   Quantity:  90 tablet   Refills:  3            Where to get your medicines      These medications were sent to [facility] - [place], [place] - [address]  [address], [facility] [address]     Phone:  [phone]     atorvastatin 20 MG tablet    sitagliptin 100 MG tablet         Some of these will need a paper prescription and others can be bought over the counter.  Ask your nurse if you have questions.     Bring a paper prescription for each of these medications     order for DME                Primary Care Provider Office Phone # Fax #    ABHISHEK Beauchamp [phone]       [facility] [address]  [facility] [address]        Equal Access to Services     MICHAEL BENNETT : Hadii cristian vo hadasho Soomaali, waaxda luqadaha, qaybta kaalmada adeegyada, waxloly miranda. So [facility] [phone].    ATENCIÓN: Si habla español, tiene a dan disposición servicios gratuitos de asistencia lingüística. David al [phone].    We comply with applicable federal civil rights laws and [place] laws. We do not discriminate on the basis of race, color, national origin, age, disability, sex, sexual  orientation, or gender identity.            Thank you!     Thank you for choosing [facility]  for your care. Our goal is always to provide you with excellent care. Hearing back from our patients is one way we can continue to improve our services. Please take a few minutes to complete the written survey that you may receive in the mail after your visit with us. Thank you!             Your Updated Medication List - Protect others around you: Learn how to safely use, store and throw away your medicines at www.disposemymeds.org.          This list is accurate as of 3/28/18  8:05 AM.  Always use your most recent med list.                   Brand Name Dispense Instructions for use Diagnosis    aspirin 81 MG EC tablet     90 tablet    Take 1 tablet (81 mg) by mouth daily    Type 2 diabetes mellitus with retinopathy, without long-term current use of insulin, macular edema presence unspecified, unspecified laterality, unspecified retinopathy severity (H)       atorvastatin 20 MG tablet    LIPITOR    90 tablet    Take 1 tablet (20 mg) by mouth daily    Hyperlipidemia LDL goal <100       blood glucose monitoring test strip    no brand specified    1 Box    Use to test blood sugars once times daily or as directed    Type 2 diabetes mellitus without complication (H)       glimepiride 2 MG tablet    AMARYL    90 tablet    TAKE ONE TABLET BY MOUTH EVERY MORNING BEFORE BREAKFAST    Type 2 diabetes mellitus with retinopathy, without long-term current use of insulin, macular edema presence unspecified, unspecified laterality, unspecified retinopathy severity (H)       hydrochlorothiazide 12.5 MG Tabs tablet     90 tablet    Take 1 tablet (12.5 mg) by mouth daily    Essential hypertension       lisinopril 40 MG tablet    PRINIVIL/ZESTRIL    90 tablet    Take 1 tablet (40 mg) by mouth daily    Essential hypertension       metFORMIN 500 MG 24 hr tablet    GLUCOPHAGE-XR    360 tablet    Take 4 tablets (2,000 mg) by  mouth daily (with dinner)    Type 2 diabetes mellitus with retinopathy, without long-term current use of insulin, macular edema presence unspecified, unspecified laterality, unspecified retinopathy severity (H)       methocarbamol 500 MG tablet    ROBAXIN    30 tablet    Take 2 tablets (1,000 mg) by mouth 3 times daily as needed for muscle spasms    Mechanical low back pain, Back muscle spasm       order for DME     1 each    Equipment being ordered: wrist quick fit [id number]    Pain in right elbow       order for DME     1 Box    Equipment being ordered: lancets for one touch glucometer for once daily testing    Type 2 diabetes mellitus without complication (H)       * order for DME     1 Device    Equipment being ordered: Glucometer Accucheck Sulema, lancets, test strips.  Patient to check sugars bid times daily, 90 day supply for test strips and lancets, refill 6 times    Type 2 diabetes mellitus without complication (H)       * order for DME     1 Device    Equipment being ordered: Glucometer per insurance preference, lancets, test strips.  Patient to check sugars two times daily, 90 day supply for test strips and lancets, refill 3 times    Type 2 diabetes mellitus with retinopathy, without long-term current use of insulin, macular edema presence unspecified, unspecified laterality, unspecified retinopathy severity (H)       order for DME     1 Device    Equipment being ordered: One touch lancets, test strips.  Patient to check sugars once times daily, 90 day supply for test strips and lancets, refill 3 times    Type 2 diabetes mellitus with hemoglobin A1c goal of 7.0%-8.0% (H)       sitagliptin 100 MG tablet    JANUVIA    90 tablet    Take 1 tablet (100 mg) by mouth daily    Type 2 diabetes mellitus with retinopathy, without long-term current use of insulin, macular edema presence unspecified, unspecified laterality, unspecified retinopathy severity (H)       * Notice:  This list has 2 medication(s) that are  the same as other medications prescribed for you. Read the directions carefully, and ask your doctor or other care provider to review them with you.

## 2018-03-28 NOTE — PATIENT INSTRUCTIONS
At [facility], we strive to deliver an exceptional experience to you, every time we see you.  If you receive a survey in the mail, please send us back your thoughts. We really do value your feedback.    Based on your medical history, these are the current health maintenance/preventive care services that you are due for (some may have been done at this visit.)  Health Maintenance Due   Topic Date Due     EYE EXAM Q1 YEAR  05/04/2016     FOOT EXAM Q1 YEAR  04/07/2018         Suggested websites for health information:  Www.[employer].org : Up to date and easily searchable information on multiple topics.  Www.[employer].gov : medication info, interactive tutorials, watch real surgeries online  Www.familydoctor.org : good info from the Academy of Family Physicians  Www.cdc.gov : public health info, travel advisories, epidemics (H1N1)  Www.aap.org : children's health info, normal development, vaccinations  Www.health.[facility].[place].us : [place] dept of health, public health issues in [place], N1N1    Your care team:                            Family Medicine Internal Medicine   MD Tobias Lu MD Shantel Branch-Fleming, MD Katya Georgiev PA-C Nam Ho, MD Pediatrics   LAN Guillermo, NIGEL WEISS CNP   MD Casi Laird MD Deborah Mielke, MD Kim Thein, APRN CNP      Clinic hours: Monday - Thursday 7 am-7 pm; Fridays 7 am-5 pm.   Urgent care: Monday - Friday 11 am-9 pm; Saturday and Sunday 9 am-5 pm.  Pharmacy : Monday -Thursday 8 am-8 pm; Friday 8 am-6 pm; Saturday and Sunday 9 am-5 pm.     Clinic: [phone]   Pharmacy: [phone]    Diabetes: Keeping Feet Healthy     Have your feet checked every time you see your healthcare provider, and at least once a year.     Diabetes can damage nerves in your feet and cause neuropathy. This condition makes it hard for you to feel injuries or sore spots. Diabetes can also change blood flow,  making it harder for small problems, like a blister, to heal properly. In fact, minor injuries can quickly become serious infections that send you to the hospital. Practice self-care to protect your feet and keep them healthy.   Take special care  Here are tips for taking special care of your feet:    Inspect your feet daily for problems such as redness, blisters, cracks, dry skin, or numbness. Use a mirror to see the bottoms of your feet. Or, ask for help.    Manage your diabetes. Monitor and control your blood sugar. Take all your medicines as prescribed.    Avoid walking barefoot, even indoors. Always wear socks inside your shoes.     Wash your feet with warm water and mild soap. Dry well, especially between toes.    Don t treat corns or calluses yourself. Talk to your healthcare provider or podiatrist (a healthcare provider who specializes in foot care) if you need assistance trimming your toenails.    Use moisturizing cream or lotion if you have dry skin, but don t use it between toes.    Don t use heating pads on your feet. If you have neuropathy, you could get a burn and not feel it.    Stop smoking. Smoking restricts blood flow and can make it harder for wounds to heal.    Don't use sharp blades to trim your nails. Use a nail clipper and file instead.   Have regular checkups  Foot problems can develop quickly. So be sure to follow your healthcare team s schedule for regular checkups. During office visits, take off your shoes and socks as soon as you get in the exam room. Ask your healthcare provider to examine your feet for problems. This will make it easier to find and treat small skin irritations before they get worse. Regular checkups can also help keep track of the blood flow and feeling in your feet. If you have neuropathy, you may need to have checkups more often.  Wear proper footwear  Wearing proper footwear is very important. If areas of your feet have been damaged by too much pressure, your  healthcare provider may recommend changing your footwear. In some cases, avoiding high heels or tight work boots may be all that s needed. Or, your healthcare provider may recommend special shoes or custom inserts. These help protect your feet and keep existing irritations from getting worse. If you need special footwear, ask your healthcare provider if you qualify for Medicare's custom-molded and extra-depth diabetic shoe and insert program.   Make sure shoes and socks fit  Any pair of shoes new or old should feel comfortable as soon as you put them on. There shouldn t be any rubbing when you walk. Wear the right shoe for any activity. For instance, a running shoe is designed to keep your feet injury-free while jogging. Buy shoes at the end of the day, when your feet are larger. Make sure they provide support without feeling too loose. Make sure your socks fit, too. Wear soft, seamless, well-padded socks for activity. Cotton or microfiber socks are best to help to absorb sweat. To protect your feet, avoid shoes that are open-toed or open-heeled. If you have questions about what kinds of shoes and socks are best, talk to your healthcare team.  Get regular exercise  Regular exercise improves blood flow in your feet. It also increases foot strength and flexibility. Gentle exercises, like walking or riding a stationary bicycle, are best. You can also do special foot exercises. Just be sure to talk with your healthcare provider before starting any exercise program. Also mention if any exercise causes pain, redness, or other signs of foot problems.     Note: If you have any kind of break in the skin of your foot or ankle, keep the area clean. Then call your healthcare provider especially if the area doesn t appear to be healing.   Date Last Reviewed: 6/1/2016 2000-2017 [employer]. [address] [place] [address], [place] [address]. All rights reserved. This information is not intended as a substitute for  professional medical care. Always follow your healthcare professional's instructions.

## 2018-03-28 NOTE — NURSING NOTE
Screening Questionnaire for Adult Immunization    Are you sick today?   No   Do you have allergies to medications, food, a vaccine component or latex?   No   Have you ever had a serious reaction after receiving a vaccination?   No   Do you have a long-term health problem with heart disease, lung disease, asthma, kidney disease, metabolic disease (e.g. diabetes), anemia, or other blood disorder?   Yes   Do you have cancer, leukemia, HIV/AIDS, or any other immune system problem?   No   In the past 3 months, have you taken medications that affect  your immune system, such as prednisone, other steroids, or anticancer drugs; drugs for the treatment of rheumatoid arthritis, Crohn s disease, or psoriasis; or have you had radiation treatments?   No   Have you had a seizure, or a brain or other nervous system problem?   No   During the past year, have you received a transfusion of blood or blood     products, or been given immune (gamma) globulin or antiviral drug?   No   For women: Are you pregnant or is there a chance you could become        pregnant during the next month?   No   Have you received any vaccinations in the past 4 weeks?   No     Immunization questionnaire was positive for at least one answer.  Notified Dr. Joe.        Per orders of Dr. Joe, injection of Shingrix given by Annabelle Horta. Patient instructed to remain in clinic for 15 minutes afterwards, and to report any adverse reaction to me immediately.       Screening performed by Annabelle Horta on 3/28/2018 at 8:20 AM.

## 2018-04-02 ENCOUNTER — TELEPHONE (OUTPATIENT)
Dept: FAMILY MEDICINE | Facility: CLINIC | Age: 63
End: 2018-04-02

## 2018-04-02 NOTE — TELEPHONE ENCOUNTER
I called and explained that a letter went out to the patient   That he was due for an a appointment and in the mean time of the  letter getting to the patient that he made an appointment and was seen  By the provider on the 28th,.  Maria Alejandra Tam MA/  For [employer] [facility]

## 2018-04-02 NOTE — TELEPHONE ENCOUNTER
.Reason for Call:  Other call back    Detailed comments: Patient received a letter from [place] stating that he missed his apt and patient is wondering which apt he missed becuase does not remember missing one. Please call to clarified     Phone Number Patient can be reached at: Cell number on file:    Telephone Information:   Mobile [phone]       Best Time: any    Can we leave a detailed message on this number? YES    Call taken on 4/2/2018 at 8:37 AM by Cecil Evans

## 2018-04-17 ENCOUNTER — TELEPHONE (OUTPATIENT)
Dept: OPHTHALMOLOGY | Facility: CLINIC | Age: 63
End: 2018-04-17

## 2018-04-17 ENCOUNTER — OFFICE VISIT (OUTPATIENT)
Dept: OPTOMETRY | Facility: CLINIC | Age: 63
End: 2018-04-17
Payer: COMMERCIAL

## 2018-04-17 ENCOUNTER — APPOINTMENT (OUTPATIENT)
Dept: OPTOMETRY | Facility: CLINIC | Age: 63
End: 2018-04-17
Payer: COMMERCIAL

## 2018-04-17 DIAGNOSIS — Z98.890 HX OF LASIK: ICD-10-CM

## 2018-04-17 DIAGNOSIS — E11.9 TYPE 2 DIABETES MELLITUS WITH HEMOGLOBIN A1C GOAL OF 7.0%-8.0% (H): Primary | ICD-10-CM

## 2018-04-17 DIAGNOSIS — H52.4 PRESBYOPIA: ICD-10-CM

## 2018-04-17 DIAGNOSIS — H52.03 HYPERMETROPIA OF BOTH EYES: ICD-10-CM

## 2018-04-17 DIAGNOSIS — H52.223 REGULAR ASTIGMATISM OF BOTH EYES: ICD-10-CM

## 2018-04-17 DIAGNOSIS — H40.003 GLAUCOMA SUSPECT OF BOTH EYES: ICD-10-CM

## 2018-04-17 PROCEDURE — 92004 COMPRE OPH EXAM NEW PT 1/>: CPT | Performed by: OPTOMETRIST

## 2018-04-17 PROCEDURE — 92340 FIT SPECTACLES MONOFOCAL: CPT | Performed by: OPTOMETRIST

## 2018-04-17 PROCEDURE — 92015 DETERMINE REFRACTIVE STATE: CPT | Performed by: OPTOMETRIST

## 2018-04-17 ASSESSMENT — VISUAL ACUITY
OD_CC: 20/80-1
OD_SC: 20/50
OS_SC+: -1
OS_SC: 20/40
OS_CC: 20/30
CORRECTION_TYPE: GLASSES
OD_CC: 20/40
OD_CC+: -1
OS_CC: 20/80-1
OD_SC+: -1
METHOD: SNELLEN - LINEAR

## 2018-04-17 ASSESSMENT — TONOMETRY
IOP_METHOD: TONOPEN
OD_IOP_MMHG: 23
OD_IOP_MMHG: 26
IOP_METHOD: TONOPEN
OS_IOP_MMHG: 17

## 2018-04-17 ASSESSMENT — REFRACTION_MANIFEST
OS_CYLINDER: +1.25
OS_AXIS: 135
OS_SPHERE: -0.25
OD_SPHERE: -0.50
OD_AXIS: 070
OD_CYLINDER: +1.00
OD_ADD: +2.25
OS_ADD: +2.25

## 2018-04-17 ASSESSMENT — CONF VISUAL FIELD
OD_NORMAL: 1
OS_NORMAL: 1

## 2018-04-17 ASSESSMENT — SLIT LAMP EXAM - LIDS
COMMENTS: UPPER LID DERMATOCHALASIS
COMMENTS: UPPER LID DERMATOCHALASIS

## 2018-04-17 ASSESSMENT — REFRACTION_WEARINGRX
OS_ADD: +2.00
OS_CYLINDER: +1.25
SPECS_TYPE: PAL
OD_ADD: +2.00
OS_AXIS: 147
OD_SPHERE: +1.00
OS_SPHERE: -0.50

## 2018-04-17 ASSESSMENT — CUP TO DISC RATIO
OS_RATIO: 0.6
OD_RATIO: 0.7

## 2018-04-17 ASSESSMENT — EXTERNAL EXAM - LEFT EYE: OS_EXAM: NORMAL

## 2018-04-17 ASSESSMENT — EXTERNAL EXAM - RIGHT EYE: OD_EXAM: NORMAL

## 2018-04-17 NOTE — TELEPHONE ENCOUNTER
Henrique Schwab,  Could you please call patient to schedule OCT/VF-IOP check with Al.  He was last seen there in 2015.  Thanks!  Dante

## 2018-04-17 NOTE — MR AVS SNAPSHOT
After Visit Summary   4/17/2018    Kathrine Isaac    MRN: [id number]           Patient Information     Date Of Birth          1955        Visit Information        Provider Department      4/17/2018 2:00 PM Dante Noriega, ALCIRA [facility]        Today's Diagnoses     Type 2 diabetes mellitus with hemoglobin A1c goal of 7.0%-8.0% (H)    -  1    Glaucoma suspect of both eyes        Hx of LASIK        Hypermetropia of both eyes        Regular astigmatism of both eyes        Presbyopia          Care Instructions    There are not any signs of the diabetes affecting the eyes today.  It is important that you get your eyes dilated once yearly and keep good control of your diabetes.    Diabetes weakens the blood vessels all over the body, including the eyes. Damage to the blood vessels in the eyes can cause swelling or bleeding into part of the eye (called the retina). This is called diabetic retinopathy (BERNARDO-tin-AH-pu-thee). If not treated, this disease can cause vision loss or blindness.   Symptoms may include blurred or distorted vision, but many people have no symptoms. It's important to see your eye doctor regularly to check for problems.   Early treatment and good control can help protect your vision. Here are the things you can do to help prevent vision loss:      1. Keep your blood sugar levels under tight control.      2. Bring high blood pressure under control.      3. No smoking.      4. Have yearly dilated eye exams.    Referral to Dr. Brunner at [facility] for glaucoma testing.    Recommend new glasses.    Return in 1 year for a complete eye exam or sooner if needed.    Dante Noriega, ALCIRA              Follow-ups after your visit        Additional Services     OPHTHALMOLOGY ADULT REFERRAL       Your provider has referred you to:  UM: [facility] [phone]    [email or web address]      Please be aware that coverage of these services is subject to the terms and limitations of your health insurance plan.  Call member services at your health plan with any benefit or coverage questions.      Please bring the following to your appointment:  >>   Any x-rays, CTs or MRIs which have been performed.  Contact the facility where they were done to arrange for  prior to your scheduled appointment.  Any new CT, MRI or other procedures ordered by your specialist must be performed at a [facility] or coordinated by your clinic's referral office.    >>   List of current medications   >>   This referral request   >>   Any documents/labs given to you for this referral                  Follow-up notes from your care team     Return in about 1 year (around 4/17/2019) for Annual Visit.      Who to contact     If you have questions or need follow up information about today's clinic visit or your schedule please contact [facility] directly at [phone].  Normal or non-critical lab and imaging results will be communicated to you by MyChart, letter or phone within 4 business days after the clinic has received the results. If you do not hear from us within 7 days, please contact the clinic through [employer] or phone. If you have a critical or abnormal lab result, we will notify you by phone as soon as possible.  Submit refill requests through [employer] or call your pharmacy and they will forward the refill request to us. Please allow 3 business days for your refill to be completed.          Additional Information About Your Visit        [employer] Information     [employer] gives you secure access to your electronic health record. If you see a primary care provider, you can also send messages to your care team and make appointments. If you have questions, please call your primary care clinic.  If you do not have a primary care  provider, please call [phone] and they will assist you.        Care EveryWhere ID     This is your Care EveryWhere ID. This could be used by other organizations to access your [facility] medical records  [id number]         Blood Pressure from Last 3 Encounters:   03/28/18 126/80   12/27/17 132/85   11/17/17 120/78    Weight from Last 3 Encounters:   03/28/18 93.7 kg (206 lb 9.6 oz)   12/27/17 93.6 kg (206 lb 6.4 oz)   11/17/17 94.4 kg (208 lb 3.2 oz)              We Performed the Following     EYE EXAM (SIMPLE-NONBILLABLE)     OPHTHALMOLOGY ADULT REFERRAL     REFRACTION        Primary Care Provider Office Phone # Fax #    ABHISHEK Beauchamp [phone]       [facility] [address]  [facility] [address]        Equal Access to Services     MICHAEL BENNETT : Hadii aad ku hadasho Soomaali, waaxda luqadaha, qaybta kaalmada adeegyada, waxay idiin hayaan arabella kharash alman . So [facility] [phone].    ATENCIÓN: Si habla español, tiene a dan disposición servicios gratuitos de asistencia lingüística. David al [phone].    We comply with applicable federal civil rights laws and [place] laws. We do not discriminate on the basis of race, color, national origin, age, disability, sex, sexual orientation, or gender identity.            Thank you!     Thank you for choosing [facility]  for your care. Our goal is always to provide you with excellent care. Hearing back from our patients is one way we can continue to improve our services. Please take a few minutes to complete the written survey that you may receive in the mail after your visit with us. Thank you!             Your Updated Medication List - Protect others around you: Learn how to safely use, store and throw away your medicines at www.disposemymeds.org.          This list is accurate as of 4/17/18  3:04 PM.  Always use your most recent med list.                   Brand Name Dispense Instructions for use  Diagnosis    aspirin 81 MG EC tablet     90 tablet    Take 1 tablet (81 mg) by mouth daily    Type 2 diabetes mellitus with retinopathy, without long-term current use of insulin, macular edema presence unspecified, unspecified laterality, unspecified retinopathy severity (H)       atorvastatin 20 MG tablet    LIPITOR    90 tablet    Take 1 tablet (20 mg) by mouth daily    Hyperlipidemia LDL goal <100       blood glucose monitoring test strip    no brand specified    1 Box    Use to test blood sugars once times daily or as directed    Type 2 diabetes mellitus without complication (H)       glimepiride 4 MG tablet    AMARYL    90 tablet    Take 1 tablet (4 mg) by mouth every morning (before breakfast)    Type 2 diabetes mellitus with retinopathy, without long-term current use of insulin, macular edema presence unspecified, unspecified laterality, unspecified retinopathy severity (H)       hydrochlorothiazide 12.5 MG Tabs tablet     90 tablet    Take 1 tablet (12.5 mg) by mouth daily    Essential hypertension       lisinopril 40 MG tablet    PRINIVIL/ZESTRIL    90 tablet    Take 1 tablet (40 mg) by mouth daily    Essential hypertension       metFORMIN 500 MG 24 hr tablet    GLUCOPHAGE-XR    360 tablet    Take 4 tablets (2,000 mg) by mouth daily (with dinner)    Type 2 diabetes mellitus with retinopathy, without long-term current use of insulin, macular edema presence unspecified, unspecified laterality, unspecified retinopathy severity (H)       methocarbamol 500 MG tablet    ROBAXIN    30 tablet    Take 2 tablets (1,000 mg) by mouth 3 times daily as needed for muscle spasms    Mechanical low back pain, Back muscle spasm       order for DME     1 each    Equipment being ordered: wrist quick fit [id number]    Pain in right elbow       order for DME     1 Box    Equipment being ordered: lancets for one touch glucometer for once daily testing    Type 2 diabetes mellitus without complication (H)       * order for DME     1  Device    Equipment being ordered: Glucometer Accucheck Sulema, lancets, test strips.  Patient to check sugars bid times daily, 90 day supply for test strips and lancets, refill 6 times    Type 2 diabetes mellitus without complication (H)       * order for DME     1 Device    Equipment being ordered: Glucometer per insurance preference, lancets, test strips.  Patient to check sugars two times daily, 90 day supply for test strips and lancets, refill 3 times    Type 2 diabetes mellitus with retinopathy, without long-term current use of insulin, macular edema presence unspecified, unspecified laterality, unspecified retinopathy severity (H)       order for DME     1 Device    Equipment being ordered: One touch lancets, test strips.  Patient to check sugars once times daily, 90 day supply for test strips and lancets, refill 3 times    Type 2 diabetes mellitus with hemoglobin A1c goal of 7.0%-8.0% (H)       sitagliptin 100 MG tablet    JANUVIA    90 tablet    Take 1 tablet (100 mg) by mouth daily    Type 2 diabetes mellitus with retinopathy, without long-term current use of insulin, macular edema presence unspecified, unspecified laterality, unspecified retinopathy severity (H)       * Notice:  This list has 2 medication(s) that are the same as other medications prescribed for you. Read the directions carefully, and ask your doctor or other care provider to review them with you.

## 2018-04-17 NOTE — PROGRESS NOTES
Chief Complaint   Patient presents with     Diabetic Eye Exam        Lab Results   Component Value Date    A1C 7.6 03/28/2018    A1C 6.4 11/17/2017    A1C 8.4 04/07/2017    A1C 9.9 10/26/2016    A1C 9.3 07/08/2016       Last Eye Exam: 5-4-2015  Dilated Previously: Yes    What are you currently using to see?  glasses    Distance Vision Acuity: Satisfied with vision    Near Vision Acuity: Satisfied with vision while reading  with glasses    Eye Comfort: good  Do you use eye drops? : No  Occupation or Hobbies: [employer]ilang[employer]e [employer], [employer]     Medical, surgical and family histories reviewed and updated 4/17/2018.       OBJECTIVE: See Ophthalmology exam    ASSESSMENT:    ICD-10-CM    1. Type 2 diabetes mellitus with hemoglobin A1c goal of 7.0%-8.0% (H) E11.9 EYE EXAM (SIMPLE-NONBILLABLE)    Negative diabetic retinopathy   2. Glaucoma suspect of both eyes H40.003 EYE EXAM (SIMPLE-NONBILLABLE)     OPHTHALMOLOGY ADULT REFERRAL   3. Hx of LASIK Z98.890 EYE EXAM (SIMPLE-NONBILLABLE)   4. Hypermetropia of both eyes H52.03 REFRACTION   5. Regular astigmatism of both eyes H52.223 REFRACTION   6. Presbyopia H52.4 REFRACTION      PLAN:    Kathrine medrano  eye exam results will be sent to [facility]  Patient Instructions   There are not any signs of the diabetes affecting the eyes today.  It is important that you get your eyes dilated once yearly and keep good control of your diabetes.    Diabetes weakens the blood vessels all over the body, including the eyes. Damage to the blood vessels in the eyes can cause swelling or bleeding into part of the eye (called the retina). This is called diabetic retinopathy (BERNARDO-tin-AH-puh-thee). If not treated, this disease can cause vision loss or blindness.   Symptoms may include blurred or distorted vision, but many people have no symptoms. It's important to see your eye doctor regularly to check for problems.   Early treatment and good control can  help protect your vision. Here are the things you can do to help prevent vision loss:      1. Keep your blood sugar levels under tight control.      2. Bring high blood pressure under control.      3. No smoking.      4. Have yearly dilated eye exams.    Referral to Dr. Brunner at [facility] for glaucoma testing.    Recommend new glasses.    Return in 1 year for a complete eye exam or sooner if needed.    Dante Noriega, OD

## 2018-04-17 NOTE — Clinical Note
Henrique Schwab,  Could you please call patient to schedule OCT/VF-IOP check with Al.  He was last seen there in 2015.  Thanks!

## 2018-04-17 NOTE — PATIENT INSTRUCTIONS
There are not any signs of the diabetes affecting the eyes today.  It is important that you get your eyes dilated once yearly and keep good control of your diabetes.    Diabetes weakens the blood vessels all over the body, including the eyes. Damage to the blood vessels in the eyes can cause swelling or bleeding into part of the eye (called the retina). This is called diabetic retinopathy (BERNARDO-tin-AH-puh-thee). If not treated, this disease can cause vision loss or blindness.   Symptoms may include blurred or distorted vision, but many people have no symptoms. It's important to see your eye doctor regularly to check for problems.   Early treatment and good control can help protect your vision. Here are the things you can do to help prevent vision loss:      1. Keep your blood sugar levels under tight control.      2. Bring high blood pressure under control.      3. No smoking.      4. Have yearly dilated eye exams.    Referral to Dr. Brunner at [facility] for glaucoma testing.    Recommend new glasses.    Return in 1 year for a complete eye exam or sooner if needed.    Dante Noriega, OD

## 2018-04-17 NOTE — LETTER
4/17/2018         RE: Kathrine Isaac  [address] [facility] [address]        Dear Colleague,    Thank you for referring your patient, Kathrine Isaac, to the [facility]. Please see a copy of my visit note below.    Chief Complaint   Patient presents with     Diabetic Eye Exam        Lab Results   Component Value Date    A1C 7.6 03/28/2018    A1C 6.4 11/17/2017    A1C 8.4 04/07/2017    A1C 9.9 10/26/2016    A1C 9.3 07/08/2016       Last Eye Exam: 5-4-2015  Dilated Previously: Yes    What are you currently using to see?  glasses    Distance Vision Acuity: Satisfied with vision    Near Vision Acuity: Satisfied with vision while reading  with glasses    Eye Comfort: good  Do you use eye drops? : No  Occupation or Hobbies: willian [employer], [employer]     Medical, surgical and family histories reviewed and updated 4/17/2018.       OBJECTIVE: See Ophthalmology exam    ASSESSMENT:    ICD-10-CM    1. Type 2 diabetes mellitus with hemoglobin A1c goal of 7.0%-8.0% (H) E11.9 EYE EXAM (SIMPLE-NONBILLABLE)    Negative diabetic retinopathy   2. Glaucoma suspect of both eyes H40.003 EYE EXAM (SIMPLE-NONBILLABLE)     OPHTHALMOLOGY ADULT REFERRAL   3. Hx of LASIK Z98.890 EYE EXAM (SIMPLE-NONBILLABLE)   4. Hypermetropia of both eyes H52.03 REFRACTION   5. Regular astigmatism of both eyes H52.223 REFRACTION   6. Presbyopia H52.4 REFRACTION      PLAN:    Kathrine Isaac aware  eye exam results will be sent to [facility]  Patient Instructions   There are not any signs of the diabetes affecting the eyes today.  It is important that you get your eyes dilated once yearly and keep good control of your diabetes.    Diabetes weakens the blood vessels all over the body, including the eyes. Damage to the blood vessels in the eyes can cause swelling or bleeding into part of the eye (called the retina). This is called diabetic retinopathy (BERNARDO-tin-AH-puh-thee). If not  treated, this disease can cause vision loss or blindness.   Symptoms may include blurred or distorted vision, but many people have no symptoms. It's important to see your eye doctor regularly to check for problems.   Early treatment and good control can help protect your vision. Here are the things you can do to help prevent vision loss:      1. Keep your blood sugar levels under tight control.      2. Bring high blood pressure under control.      3. No smoking.      4. Have yearly dilated eye exams.    Referral to Dr. Brunner at [facility] for glaucoma testing.    Recommend new glasses.    Return in 1 year for a complete eye exam or sooner if needed.    Dante Noriega, OD               Again, thank you for allowing me to participate in the care of your patient.        Sincerely,        Dante Noriega, OD

## 2018-04-17 NOTE — TELEPHONE ENCOUNTER
I have attempted to contact this patient by phone with the following results:   Chief Complaint   Patient presents with     Call To Schedule Appointment     glaucoma yessy COOPER. COA. [facility]

## 2018-04-18 DIAGNOSIS — E11.319 TYPE 2 DIABETES MELLITUS WITH RETINOPATHY, WITHOUT LONG-TERM CURRENT USE OF INSULIN, MACULAR EDEMA PRESENCE UNSPECIFIED, UNSPECIFIED LATERALITY, UNSPECIFIED RETINOPATHY SEVERITY (H): ICD-10-CM

## 2018-04-18 NOTE — TELEPHONE ENCOUNTER
"Requested Prescriptions   Pending Prescriptions Disp Refills     aspirin 81 MG EC tablet [Pharmacy Med Name: ASPIRIN 81MG TBEC]    Last Written Prescription Date:  4/7/17  Last Fill Quantity: 90,  # refills: 3   Last Office Visit with [facility], [facility] or [facility] prescribing provider:  3/28/18   Future Office Visit:      90 tablet 3     Sig: TAKE ONE TABLET BY MOUTH EVERY DAY    Analgesics (Non-Narcotic Tylenol and ASA Only) Passed    4/18/2018  9:02 AM       Passed - Recent (12 mo) or future (30 days) visit within the authorizing provider's specialty    Patient had office visit in the last 12 months or has a visit in the next 30 days with authorizing provider or within the authorizing provider's specialty.  See \"Patient Info\" tab in inbasket, or \"Choose Columns\" in Meds & Orders section of the refill encounter.           Passed - Patient is age 20 years or older    If ASA is flagged for ages under 20 years old. Forward to provider for confirmation Ryes Syndrome is not a concern.                    Noe Faarax  [facility]  "

## 2018-05-23 DIAGNOSIS — E78.5 HYPERLIPIDEMIA LDL GOAL <100: ICD-10-CM

## 2018-05-23 NOTE — TELEPHONE ENCOUNTER
"Requested Prescriptions   Pending Prescriptions Disp Refills     atorvastatin (LIPITOR) 20 MG tablet [Pharmacy Med Name: ATORVASTATIN CALCIUM 20MG TABS]    Last Written Prescription Date:  3/28/18  Last Fill Quantity: 90,  # refills: 1   Last Office Visit with [facility], [facility] or [facility] prescribing provider:  3/28/18   Future Office Visit:      90 tablet 1     Sig: TAKE ONE TABLET BY MOUTH EVERY DAY    Statins Protocol Passed    5/23/2018  9:30 AM       Passed - LDL on file in past 12 months    Recent Labs   Lab Test  11/17/17   0944   LDL  21            Passed - No abnormal creatine kinase in past 12 months    No lab results found.            Passed - Recent (12 mo) or future (30 days) visit within the authorizing provider's specialty    Patient had office visit in the last 12 months or has a visit in the next 30 days with authorizing provider or within the authorizing provider's specialty.  See \"Patient Info\" tab in inbasket, or \"Choose Columns\" in Meds & Orders section of the refill encounter.           Passed - Patient is age 18 or older              Noe Faarax  [facility]  "

## 2018-05-24 RX ORDER — ATORVASTATIN CALCIUM 20 MG/1
TABLET, FILM COATED ORAL
Qty: 90 TABLET | Refills: 1 | OUTPATIENT
Start: 2018-05-24

## 2018-05-24 NOTE — TELEPHONE ENCOUNTER
90 day supply with 1 refills sent on 3/28/18. Should have refills on file at pharmacy.    Josh Liu RN, BSN

## 2018-06-20 DIAGNOSIS — G89.29 CHRONIC PAIN OF LEFT KNEE: ICD-10-CM

## 2018-06-20 DIAGNOSIS — E78.5 HYPERLIPIDEMIA LDL GOAL <100: ICD-10-CM

## 2018-06-20 DIAGNOSIS — M25.562 CHRONIC PAIN OF LEFT KNEE: ICD-10-CM

## 2018-06-21 NOTE — TELEPHONE ENCOUNTER
"Requested Prescriptions   Pending Prescriptions Disp Refills     naproxen (NAPROSYN) 500 MG tablet [Pharmacy Med Name: NAPROXEN 500MG TABS]    Last Written Prescription Date:  3/23/18  Last Fill Quantity: 90,  # refills: 1   Last Office Visit with [facility] or [facility] prescribing provider:  3/28/18   Future Office Visit:      90 tablet 1     Sig: TAKE ONE TABLET BY MOUTH TWICE A DAY AS NEEDED FOR MODERATE PAIN    NSAID Medications Passed    6/20/2018  8:41 PM       Passed - Blood pressure under 140/90 in past 12 months    BP Readings from Last 3 Encounters:   03/28/18 126/80   12/27/17 132/85   11/17/17 120/78                Passed - Normal ALT on file in past 12 months    Recent Labs   Lab Test  11/17/17   0944   ALT  42            Passed - Normal AST on file in past 12 months    Recent Labs   Lab Test  11/17/17   0944   AST  19            Passed - Recent (12 mo) or future (30 days) visit within the authorizing provider's specialty    Patient had office visit in the last 12 months or has a visit in the next 30 days with authorizing provider or within the authorizing provider's specialty.  See \"Patient Info\" tab in inbasket, or \"Choose Columns\" in Meds & Orders section of the refill encounter.           Passed - Patient is age 6-64 years       Passed - Normal CBC on file in past 12 months    Recent Labs   Lab Test  03/28/18   0826   WBC  10.2   RBC  4.93   HGB  14.7   HCT  42.3   PLT  211       For [facility] ONLY: OEDS823 = WBC, QIEC600 = RBC         Passed - Normal serum creatinine on file in past 12 months    Recent Labs   Lab Test  11/17/17   0944   CR  0.92             atorvastatin (LIPITOR) 20 MG tablet [Pharmacy Med Name: ATORVASTATIN CALCIUM 20MG TABS]    Last Written Prescription Date:  3/28/18  Last Fill Quantity: 90,  # refills: 1   Last Office Visit with [facility] or [facility] prescribing provider:  3/28/18   Future Office Visit:      90 tablet 1     Sig: TAKE ONE TABLET BY MOUTH EVERY DAY    Statins Protocol " "Passed    6/20/2018  8:41 PM       Passed - LDL on file in past 12 months    Recent Labs   Lab Test  11/17/17   0944   LDL  21            Passed - No abnormal creatine kinase in past 12 months    No lab results found.            Passed - Recent (12 mo) or future (30 days) visit within the authorizing provider's specialty    Patient had office visit in the last 12 months or has a visit in the next 30 days with authorizing provider or within the authorizing provider's specialty.  See \"Patient Info\" tab in inbasket, or \"Choose Columns\" in Meds & Orders section of the refill encounter.           Passed - Patient is age 18 or older              Noe Faarax  [facility]  "

## 2018-06-22 RX ORDER — ATORVASTATIN CALCIUM 20 MG/1
TABLET, FILM COATED ORAL
Qty: 90 TABLET | Refills: 1 | OUTPATIENT
Start: 2018-06-22

## 2018-06-22 NOTE — TELEPHONE ENCOUNTER
Routing refill request to provider for review/approval because:  Drug not active on patient's medication list.    Alexa Neumann RN, [facility]

## 2018-06-25 RX ORDER — NAPROXEN 500 MG/1
TABLET ORAL
Qty: 90 TABLET | Refills: 1 | Status: SHIPPED | OUTPATIENT
Start: 2018-06-25 | End: 2018-12-31

## 2018-08-02 ENCOUNTER — OFFICE VISIT (OUTPATIENT)
Dept: FAMILY MEDICINE | Facility: CLINIC | Age: 63
End: 2018-08-02
Payer: COMMERCIAL

## 2018-08-02 VITALS
TEMPERATURE: 98.2 F | BODY MASS INDEX: 32.62 KG/M2 | SYSTOLIC BLOOD PRESSURE: 139 MMHG | DIASTOLIC BLOOD PRESSURE: 89 MMHG | OXYGEN SATURATION: 96 % | WEIGHT: 207.8 LBS | HEIGHT: 67 IN | HEART RATE: 96 BPM

## 2018-08-02 DIAGNOSIS — E66.811 OBESITY, CLASS I, BMI 30.0-34.9 (SEE ACTUAL BMI): ICD-10-CM

## 2018-08-02 DIAGNOSIS — E11.9 TYPE 2 DIABETES MELLITUS WITH HEMOGLOBIN A1C GOAL OF 7.0%-8.0% (H): ICD-10-CM

## 2018-08-02 DIAGNOSIS — I10 ESSENTIAL HYPERTENSION: ICD-10-CM

## 2018-08-02 DIAGNOSIS — M54.50 ACUTE BILATERAL LOW BACK PAIN WITHOUT SCIATICA: Primary | ICD-10-CM

## 2018-08-02 PROCEDURE — 99214 OFFICE O/P EST MOD 30 MIN: CPT | Performed by: NURSE PRACTITIONER

## 2018-08-02 RX ORDER — METHOCARBAMOL 500 MG/1
1000 TABLET, FILM COATED ORAL 3 TIMES DAILY PRN
Qty: 30 TABLET | Refills: 1 | Status: SHIPPED | OUTPATIENT
Start: 2018-08-02 | End: 2018-11-07

## 2018-08-02 NOTE — PROGRESS NOTES
SUBJECTIVE:   Kathrine Isaac is a 63 year old male who presents to clinic today for the following health issues:    Back Pain   Patient was getting out of his vehicle (Jeep)  and noted acute onset of bilateral low back pain. He has history of back pain and has seen hysical therapy which resolved his symptoms.    Duration: started4-5 days ago, worse in the last 2 days.        Specific cause: none    Description:   Location of pain: low back both  Character of pain: sharp and constant  Pain radiation:both legs  New numbness or weakness in legs, not attributed to pain:  Numbness in right thigh extending to calves bilaterally    Intensity: Currently 9/10    History:   Pain interferes with job: YES, cant sit for too long  History of back problems: no prior back problems  Any previous MRI or X-rays: None  Sees a specialist for back pain:  No  Therapies tried without relief: lidocaine patch, iodex, biofreeze, muscle rub, ibuprofen    Alleviating factors:   Improved by: Lidocaine patch 5% that he borrowed from a friend- pain goes away for a couple of hours      Precipitating factors:  Worsened by: Standing and Sitting    Functional and Psychosocial Screen (Dav[facility] STarT Back):      Not performed today  Patients pain is actually improved since yesterday,  He works in Real Estate/mortgages, does a lot of sitting.        Accompanying Signs & Symptoms:  Risk of Fracture:  None  Risk of Cauda Equina:  None  Risk of Infection:  None  Risk of Cancer:  None  Risk of Ankylosing Spondylitis:  Onset at age <35, male, AND morning back stiffness. no     Order   XR Lumbar Spine 2/3 Views [[id number]] (Order [id number])   Exam Information   Exam Date Exam Time Accession # Performing Department Results    12/27/17  3:07 PM [id number] [facility]    [employer] Interactive Report and InfoRx   View the interactive report   PACS Images   Show images for XR Lumbar Spine 2/3 Views   Study Result   XR LUMBAR SPINE 2-3 VIEWS  12/27/2017 3:07 PM     HISTORY: Low back pain.     COMPARISON: None.     FINDINGS: Lumbar alignment is anatomic. Vertebral body heights and  disc spaces are preserved. Small marginal osteophytes and facet joint  sclerosis in the low lumbar spine reflect moderate degenerative  change.         IMPRESSION: Moderate low lumbar degenerative change.               Diabetes Follow-up    Patient is checking blood sugars: once daily, checking 2-3 times/week.  Results are as follows:         checks at variable timesthroughout the day- sugars are in the 100-140 range.    Diabetic concerns: None     Symptoms of hypoglycemia (low blood sugar): none     Paresthesias (numbness or burning in feet) or sores: No     Date of last diabetic eye exam: 4/2018    BP Readings from Last 2 Encounters:   08/02/18 139/89   03/28/18 126/80     Hemoglobin A1C (%)   Date Value   03/28/2018 7.6   11/17/2017 6.4 (H)     LDL Cholesterol Calculated (mg/dL)   Date Value   11/17/2017 21   10/26/2016 112 (H)       Diabetes Management Resources    Problem list and histories reviewed & adjusted, as indicated.  Additional history: as documented    Patient Active Problem List   Diagnosis     Glaucoma suspect     Type 2 diabetes mellitus with hemoglobin A1c goal of 7.0%-8.0% (H)     Hyperlipidemia LDL goal <100     Essential hypertension     Advance care planning     Diverticulosis of large intestine without hemorrhage     Obesity, Class I, BMI 30.0-34.9 (see actual BMI)     Hx of LASIK     Past Surgical History:   Procedure Laterality Date     COLONOSCOPY N/A 1/14/2016    Procedure: COLONOSCOPY;  Surgeon: Ventura Monge MD;  Location: [facility] OR     LASIK BILATERAL       ROTATOR CUFF REPAIR RT/LT  1992     SURGICAL HISTORY OF -       uvula remove        Social History   Substance Use Topics     Smoking status: Never Smoker     Smokeless tobacco: Never Used     Alcohol use Yes     Family History   Problem Relation Age of Onset     Diabetes Mother           Current Outpatient Prescriptions   Medication Sig Dispense Refill     aspirin 81 MG EC tablet TAKE ONE TABLET BY MOUTH EVERY DAY 90 tablet 3     atorvastatin (LIPITOR) 20 MG tablet Take 1 tablet (20 mg) by mouth daily 90 tablet 1     blood glucose monitoring (NO BRAND SPECIFIED) test strip Use to test blood sugars once times daily or as directed 1 Box 11     glimepiride (AMARYL) 4 MG tablet Take 1 tablet (4 mg) by mouth every morning (before breakfast) 90 tablet 3     hydrochlorothiazide 12.5 MG TABS tablet Take 1 tablet (12.5 mg) by mouth daily 90 tablet 3     lisinopril (PRINIVIL/ZESTRIL) 40 MG tablet Take 1 tablet (40 mg) by mouth daily 90 tablet 3     metFORMIN (GLUCOPHAGE-XR) 500 MG 24 hr tablet Take 4 tablets (2,000 mg) by mouth daily (with dinner) 360 tablet 3     methocarbamol (ROBAXIN) 500 MG tablet Take 2 tablets (1,000 mg) by mouth 3 times daily as needed for muscle spasms 30 tablet 1     methocarbamol (ROBAXIN) 500 MG tablet Take 2 tablets (1,000 mg) by mouth 3 times daily as needed for muscle spasms 30 tablet 1     naproxen (NAPROSYN) 500 MG tablet TAKE ONE TABLET BY MOUTH TWICE A DAY AS NEEDED FOR MODERATE PAIN 90 tablet 1     ORDER FOR DME, SET TO LOCAL PRINT, Equipment being ordered: wrist quick fit [id number] 1 each 0     order for DME Equipment being ordered: One touch lancets, test strips.  Patient to check sugars once times daily, 90 day supply for test strips and lancets, refill 3 times 1 Device 0     order for DME Equipment being ordered: Glucometer per insurance preference, lancets, test strips.  Patient to check sugars two times daily, 90 day supply for test strips and lancets, refill 3 times 1 Device 11     order for DME Equipment being ordered: Glucometer Accucheck Sulema, lancets, test strips.  Patient to check sugars bid times daily, 90 day supply for test strips and lancets, refill 6 times 1 Device 0     order for DME Equipment being ordered: lancets for one touch glucometer for once  "daily testing 1 Box 6     sitagliptin (JANUVIA) 100 MG tablet Take 1 tablet (100 mg) by mouth daily 90 tablet 1     BP Readings from Last 3 Encounters:   08/02/18 139/89   03/28/18 126/80   12/27/17 132/85    Wt Readings from Last 3 Encounters:   08/02/18 207 lb 12.8 oz (94.3 kg)   03/28/18 206 lb 9.6 oz (93.7 kg)   12/27/17 206 lb 6.4 oz (93.6 kg)                    Reviewed and updated as needed this visit by clinical staff  Tobacco  Allergies  Meds  Med Hx  Surg Hx  Fam Hx  Soc Hx      Reviewed and updated as needed this visit by Provider         ROS:  Constitutional, HEENT, cardiovascular, pulmonary, gi and gu systems are negative, except as otherwise noted.    OBJECTIVE:     /89 (BP Location: Left arm, Patient Position: Chair, Cuff Size: Adult Large)  Pulse 96  Temp 98.2  F (36.8  C) (Oral)  Ht 5' 6.8\" (1.697 m)  Wt 207 lb 12.8 oz (94.3 kg)  SpO2 96%  BMI 32.74 kg/m2  Body mass index is 32.74 kg/(m^2).  GENERAL: healthy, alert and no distress  EYES: Eyes grossly normal to inspection, PERRL and conjunctivae and sclerae normal  HENT: ear canals and TM's normal, nose and mouth without ulcers or lesions  NECK: no adenopathy, no asymmetry, masses, or scars and thyroid normal to palpation  RESP: lungs clear to auscultation - no rales, rhonchi or wheezes  CV: regular rate and rhythm, normal S1 S2, no S3 or S4, no murmur, click or rub, no peripheral edema and peripheral pulses strong  ABDOMEN: soft, nontender, no hepatosplenomegaly, no masses and bowel sounds normal  MS: no gross musculoskeletal defects noted, no edema  SKIN: no suspicious lesions or rashes  Comprehensive back pain exam:  Tenderness of bilaeral SI region, no cnetral bony tenderness, Pain limits the following motions: fleion and extension, Lower extremity strength functional and equal on both sides, Lower extremity reflexes within normal limits bilaterally, Lower extremity sensation normal and equal on both sides and Straight leg raise " "negative bilaterally  PSYCH: mentation appears normal, affect normal/bright    Diagnostic Test Results:  No results found for this or any previous visit (from the past 24 hour(s)).    ASSESSMENT/PLAN:         BMI:   Estimated body mass index is 32.74 kg/(m^2) as calculated from the following:    Height as of this encounter: 5' 6.8\" (1.697 m).    Weight as of this encounter: 207 lb 12.8 oz (94.3 kg).   Weight management plan: Discussed healthy diet and exercise guidelines and patient will follow up in 4 months in clinic to re-evaluate.      1. Acute bilateral low back pain without sciatica    The patient was advised to apply heat intermittently (avoid sleeping on heating pad).  Lifting mechanics discussed  Analgesics such as Tylenol and/or ibuprofen, see epic for orders.  Back exercises, instruction sheet given  Aerobic exercise program, this was strongly encouraged as one of the best ways to manage chronic back pain  Physical therapy/imaging if symptoms not improving    Patient was instructed to f/u if symptoms worsen or fail to improve as anticipated.    - methocarbamol (ROBAXIN) 500 MG tablet; Take 2 tablets (1,000 mg) by mouth 3 times daily as needed for muscle spasms  Dispense: 30 tablet; Refill: 1    2. Type 2 diabetes mellitus with hemoglobin A1c goal of 7.0%-8.0% (H)  A1c due.  Discussed ow carb diet, benefits of regular exercise, weight loss in controlling sugars.  - Hemoglobin A1c; Future    3. Essential hypertension  Well controlled, continue current management.    4. Obesity, Class I, BMI 30.0-34.9 (see actual BMI)  Benefits of weight loss reviewed in detail, encouraged him to cut back on the carbohydrates in the diet, consume more fruits and vegetables, drink plenty of water, avoid fruit juices, sodas, get 150 min moderate exercise/week.  He declines Nutrition/CDE referral at this timeRecheck weight in 6 months.        See Patient Instructions    ABHISHEK [facility] " [place]

## 2018-08-02 NOTE — PATIENT INSTRUCTIONS
At [facility], we strive to deliver an exceptional experience to you, every time we see you.  If you receive a survey in the mail, please send us back your thoughts. We really do value your feedback.    Based on your medical history, these are the current health maintenance/preventive care services that you are due for (some may have been done at this visit.)  Health Maintenance Due   Topic Date Due     HIV SCREEN (SYSTEM ASSIGNED)  01[birth year]11[birth year]1975       Suggested websites for health information:  Www.BuzzVote.org : Up to date and easily searchable information on multiple topics.  Www.medlineplus.gov : medication info, interactive tutorials, watch real surgeries online  Www.familydoctor.org : good info from the Academy of Family Physicians  Www.cdc.gov : public health info, travel advisories, epidemics (H1N1)  Www.aap.org : children's health info, normal development, vaccinations  Www.health.[facility].[place].us : [place] dept of health, public health issues in [place], N1N1    Your care team:                            Family Medicine Internal Medicine   MD Tobias Lu MD Shantel Branch-Fleming, MD Katya Georgiev PA-C Megan Hill, APRN CNP    Tan Ghosh MD Pediatrics   Michi Bowman, PAAlvaroC  Dianna Polk, CNP MD Conchis Deras APRN CNP   MD Casi Laird MD Deborah Mielke, MD Kim Thein, APRN [facility]      Clinic hours: Monday - Thursday 7 am-7 pm; Fridays 7 am-5 pm.   Urgent care: Monday - Friday 11 am-9 pm; Saturday and Sunday 9 am-5 pm.  Pharmacy : Monday -Thursday 8 am-8 pm; Friday 8 am-6 pm; Saturday and Sunday 9 am-5 pm.     Clinic: [phone]   Pharmacy: [phone]      Back Pain (Acute or Chronic)    Back pain is one of the most common problems. The good news is that most people feel better in 1 to 2 weeks, and most of the rest in 1 to 2 months. Most people can remain active.  People experience and describe pain differently; not everyone is the  same.    The pain can be sharp, stabbing, shooting, aching, cramping or burning.    Movement, standing, bending, lifting, sitting, or walking may worsen pain.    It can be localized to one spot or area, or it can be more generalized.    It can spread or radiate upwards, to the front, or go down your arms or legs (sciatica).    It can cause muscle spasm.  Most of the time, mechanical problems with the muscles or spine cause the pain. Mechanical problems are usually caused by an injury to the muscles or ligaments. While illness can cause back pain, it is usually not caused by a serious illness. Mechanical problems include:     Physical activity such as sports, exercise, work, or normal activity    Overexertion, lifting, pushing, pulling incorrectly or too aggressively    Sudden twisting, bending, or stretching from an accident, or accidental movement    Poor posture    Stretching or moving wrong, without noticing pain at the time    Poor coordination, lack of regular exercise (check with your doctor about this)    Spinal disc disease or arthritis    Stress  Pain can also be related to pregnancy, or illness like appendicitis, bladder or kidney infections, pelvic infections, and many other things.  Acute back pain usually gets better in 1 to 2 weeks. Back pain related to disk disease, arthritis in the spinal joints or spinal stenosis (narrowing of the spinal canal) can become chronic and last for months or years.  Unless you had a physical injury (for example, a car accident or fall) X-rays are usually not needed for the initial evaluation of back pain. If pain continues and does not respond to medical treatment, X-rays and other tests may be needed.  Home care  Try these home care recommendations:    When in bed, try to find a position of comfort. A firm mattress is best. Try lying flat on your back with pillows under your knees. You can also try lying on your side with your knees bent up towards your chest and a  pillow between your knees.    At first, do not try to stretch out the sore spots. If there is a strain, it is not like the good soreness you get after exercising without an injury. In this case, stretching may make it worse.    Avoid prolong sitting, long car rides, or travel. This puts more stress on the lower back than standing or walking.    During the first 24 to 72 hours after an acute injury or flare up of chronic back pain, apply an ice pack to the painful area for 20 minutes and then remove it for 20 minutes. Do this over a period of 60 to 90 minutes or several times a day. This will reduce swelling and pain. Wrap the ice pack in a thin towel or plastic to protect your skin.    You can start with ice, then switch to heat. Heat (hot shower, hot bath, or heating pad) reduces pain and works well for muscle spasms. Heat can be applied to the painful area for 20 minutes then remove it for 20 minutes. Do this over a period of 60 to 90 minutes or several times a day. Do not sleep on a heating pad. It can lead to skin burns or tissue damage.    You can alternate ice and heat therapy. Talk with your doctor about the best treatment for your back pain.    Therapeutic massage can help relax the back muscles without stretching them.    Be aware of safe lifting methods and do not lift anything without stretching first.  Medicines  Talk to your doctor before using medicine, especially if you have other medical problems or are taking other medicines.    You may use over-the-counter medicine as directed on the bottle to control pain, unless another pain medicine was prescribed. If you have chronic conditions like diabetes, liver or kidney disease, stomach ulcers, or gastrointestinal bleeding, or are taking blood thinners, talk to your doctor before taking any medicine.    Be careful if you are given a prescription medicines, narcotics, or medicine for muscle spasms. They can cause drowsiness, affect your coordination,  reflexes, and judgement. Do not drive or operate heavy machinery.  Follow-up care  Follow up with your healthcare provider, or as advised.   A radiologist will review any X-rays that were taken. Your provide will notify you of any new findings that may affect your care.  Call 911  Call emergency services if any of the following occur:    Trouble breathing    Confusion    Very drowsy or trouble awakening    Fainting or loss of consciousness    Rapid or very slow heart rate    Loss of bowel or bladder control  When to seek medical advice  Call your healthcare provider right away if any of these occur:     Pain becomes worse or spreads to your legs    Weakness or numbness in one or both legs    Numbness in the groin or genital area  Date Last Reviewed: 7/1/2016 2000-2017 [employer]. [address] [place] [address], [place] [address]. All rights reserved. This information is not intended as a substitute for professional medical care. Always follow your healthcare professional's instructions.        Back Care Tips    Caring for your back  These are things you can do to prevent a recurrence of acute back pain and to reduce symptoms from chronic back pain:    Maintain a healthy weight. If you are overweight, losing weight will help most types of back pain.    Exercise is an important part of recovery from most types of back pain. The muscles behind and in front of the spine support the back. This means strengthening both the back muscles and the abdominal muscles will provide better support for your spine.     Swimming and brisk walking are good overall exercises to improve your fitness level.    Practice safe lifting methods (below).    Practice good posture when sitting, standing and walking. Avoid prolonged sitting. This puts more stress on the lower back than standing or walking.    Wear quality shoes with sufficient arch support. Foot and ankle alignment can affect back symptoms. Women should avoid wearing  high heels.    Therapeutic massage can help relax the back muscles without stretching them.    During the first 24 to 72 hours after an acute injury or flare-up of chronic back pain, apply an ice pack to the painful area for 20 minutes and then remove it for 20 minutes, over a period of 60 to 90 minutes, or several times a day. As a safety precaution, do not use a heating pad at bedtime. Sleeping on a heating pad can lead to skin burns or tissue damage.    You can alternate ice and heat therapies.  Medicines  Talk to your healthcare provider before using medicines, especially if you have other medical problems or are taking other medicines.    You may use acetaminophen or ibuprofen to control pain, unless your healthcare provider prescribed other pain medicine. If you have chronic conditions like diabetes, liver or kidney disease, stomach ulcers, or gastrointestinal bleeding, or are taking blood thinners, talk with your healthcare provider before taking any medicines.    Be careful if you are given prescription pain medicines, narcotics, or medicine for muscle spasm. They can cause drowsiness, affect your coordination, reflexes, and judgment. Do not drive or operate heavy machinery while taking these types of medicines. Take prescription pain medicine only as prescribed by your healthcare provider.  Lumbar stretch  Here is a simple stretching exercise that will help relax muscle spasm and keep your back more limber. If exercise makes your back pain worse, don t do it.    Lie on your back with your knees bent and both feet on the ground.    Slowly raise your left knee to your chest as you flatten your lower back against the floor. Hold for 5 seconds.    Relax and repeat the exercise with your right knee.    Do 10 of these exercises for each leg.  Safe lifting method    Don t bend over at the waist to lift an object off the floor.  Instead, bend your knees and hips in a squat.     Keep your back and head  upright    Hold the object close to your body, directly in front of you.    Straighten your legs to lift the object.     Lower the object to the floor in the reverse fashion.    If you must slide something across the floor, push it.  Posture tips  Sitting  Sit in chairs with straight backs or low-back support. Keep your knees lower than your hips, with your feet flat on the floor.  When driving, sit up straight. Adjust the seat forward so you are not leaning toward the steering wheel.  A small pillow or rolled towel behind your lower back may help if you are driving long distances.   Standing  When standing for long periods, shift most of your weight to one leg at a time. Alternate legs every few minutes.   Sleeping  The best way to sleep is on your side with your knees bent. Put a low pillow under your head to support your neck in a neutral spine position. Avoid thick pillows that bend your neck to one side. Put a pillow between your legs to further relax your lower back. If you sleep on your back, put pillows under your knees to support your legs in a slightly flexed position. Use a firm mattress. If your mattress sags, replace it, or use a 1/2-inch plywood board under the mattress to add support.  Follow-up care  Follow up with your healthcare provider, or as advised.  If X-rays, a CT scan or an MRI scan were taken, they will be reviewed by a radiologist. You will be notified of any new findings that may affect your care.  Call 911  Call 911 if any of the following occur:    Trouble breathing    Confusion    Very drowsy    Fainting or loss of consciousness    Rapid or very slow heart rate    Loss of  bowel or bladder control  When to seek medical advice  Call your healthcare provider right away if any of the following occur:    Pain becomes worse or spreads to your arms or legs    Weakness or numbness in one or both arms or legs    Numbness in the groin area  Date Last Reviewed: 6/1/2016 2000-2017 [employer]. [address] [place] [address], [place] [address]. All rights reserved. This information is not intended as a substitute for professional medical care. Always follow your healthcare professional's instructions.

## 2018-08-02 NOTE — PROGRESS NOTES
"  SUBJECTIVE:   Kathrine Isaac is a 63 year old male who presents to clinic today for the following health issues:    Musculoskeletal problem/pain      Duration: ***    Description  Location: ***    Intensity:  {mild,moderate,severe:690602}    Accompanying signs and symptoms: {OTHER MS SYMPTOMS:329745::\"none\"}    History  Previous similar problem: { :408385}  Previous evaluation:  {PREVIOUS ms evaluation:049513::\"none\"}    Precipitating or alleviating factors:  Trauma or overuse: { :266106}  Aggravating factors include: {AGGRAVATING MS FACTORS CHRONIC PROB:455078::\"none\"}    Therapies tried and outcome: {MS RELIEF ITEMS:015733::\"nothing\"}      {additional problems for provider to add:511184}    Problem list and histories reviewed & adjusted, as indicated.  Additional history: {NONE - AS DOCUMENTED:683556::\"as documented\"}    {HIST REVIEW/ LINKS 2:780458}    Reviewed and updated as needed this visit by clinical staff       Reviewed and updated as needed this visit by Provider         {PROVIDER CHARTING PREFERENCE:688172}  "

## 2018-08-02 NOTE — MR AVS SNAPSHOT
After Visit Summary   8/2/2018    Kathrine Isaac    MRN: [id number]           Patient Information     Date Of Birth          1955        Visit Information        Provider Department      8/2/2018 9:40 AM Nighat Joe, APRN CNP [facility]        Today's Diagnoses     Acute bilateral low back pain without sciatica    -  1    Type 2 diabetes mellitus with hemoglobin A1c goal of 7.0%-8.0% (H)        Essential hypertension        Obesity, Class I, BMI 30.0-34.9 (see actual BMI)          Care Instructions    At [facility], we strive to deliver an exceptional experience to you, every time we see you.  If you receive a survey in the mail, please send us back your thoughts. We really do value your feedback.    Based on your medical history, these are the current health maintenance/preventive care services that you are due for (some may have been done at this visit.)  Health Maintenance Due   Topic Date Due     HIV SCREEN (SYSTEM ASSIGNED)  01/11/1975       Suggested websites for health information:  Www.AbleSky.Mintera : Up to date and easily searchable information on multiple topics.  Www.medlineplus.gov : medication info, interactive tutorials, watch real surgeries online  Www.familydoctor.org : good info from the Academy of Family Physicians  Www.cdc.gov : public health info, travel advisories, epidemics (H1N1)  Www.aap.org : children's health info, normal development, vaccinations  Www.health.state.[place].us : [place] dept of health, public health issues in [place], N1N1    Your care team:                            Family Medicine Internal Medicine   MD Tobias Lu MD Shantel Branch-Fleming, MD Katya Georgiev PA-C Megan Hill, APRN CNP Nam Ho, MD Pediatrics   LAN Guillermo, MD Conchis Hansen CNP, MD Bethany Templen, MD Deborah Mielke, MD Kim Thein, APRN CNP      Clinic hours:  Monday - Thursday 7 am-7 pm; Fridays 7 am-5 pm.   Urgent care: Monday - Friday 11 am-9 pm; Saturday and Sunday 9 am-5 pm.  Pharmacy : Monday -Thursday 8 am-8 pm; Friday 8 am-6 pm; Saturday and Sunday 9 am-5 pm.     Clinic: [phone]   Pharmacy: [phone]      Back Pain (Acute or Chronic)    Back pain is one of the most common problems. The good news is that most people feel better in 1 to 2 weeks, and most of the rest in 1 to 2 months. Most people can remain active.  People experience and describe pain differently; not everyone is the same.    The pain can be sharp, stabbing, shooting, aching, cramping or burning.    Movement, standing, bending, lifting, sitting, or walking may worsen pain.    It can be localized to one spot or area, or it can be more generalized.    It can spread or radiate upwards, to the front, or go down your arms or legs (sciatica).    It can cause muscle spasm.  Most of the time, mechanical problems with the muscles or spine cause the pain. Mechanical problems are usually caused by an injury to the muscles or ligaments. While illness can cause back pain, it is usually not caused by a serious illness. Mechanical problems include:     Physical activity such as sports, exercise, work, or normal activity    Overexertion, lifting, pushing, pulling incorrectly or too aggressively    Sudden twisting, bending, or stretching from an accident, or accidental movement    Poor posture    Stretching or moving wrong, without noticing pain at the time    Poor coordination, lack of regular exercise (check with your doctor about this)    Spinal disc disease or arthritis    Stress  Pain can also be related to pregnancy, or illness like appendicitis, bladder or kidney infections, pelvic infections, and many other things.  Acute back pain usually gets better in 1 to 2 weeks. Back pain related to disk disease, arthritis in the spinal joints or spinal stenosis (narrowing of the spinal canal) can become  chronic and last for months or years.  Unless you had a physical injury (for example, a car accident or fall) X-rays are usually not needed for the initial evaluation of back pain. If pain continues and does not respond to medical treatment, X-rays and other tests may be needed.  Home care  Try these home care recommendations:    When in bed, try to find a position of comfort. A firm mattress is best. Try lying flat on your back with pillows under your knees. You can also try lying on your side with your knees bent up towards your chest and a pillow between your knees.    At first, do not try to stretch out the sore spots. If there is a strain, it is not like the good soreness you get after exercising without an injury. In this case, stretching may make it worse.    Avoid prolong sitting, long car rides, or travel. This puts more stress on the lower back than standing or walking.    During the first 24 to 72 hours after an acute injury or flare up of chronic back pain, apply an ice pack to the painful area for 20 minutes and then remove it for 20 minutes. Do this over a period of 60 to 90 minutes or several times a day. This will reduce swelling and pain. Wrap the ice pack in a thin towel or plastic to protect your skin.    You can start with ice, then switch to heat. Heat (hot shower, hot bath, or heating pad) reduces pain and works well for muscle spasms. Heat can be applied to the painful area for 20 minutes then remove it for 20 minutes. Do this over a period of 60 to 90 minutes or several times a day. Do not sleep on a heating pad. It can lead to skin burns or tissue damage.    You can alternate ice and heat therapy. Talk with your doctor about the best treatment for your back pain.    Therapeutic massage can help relax the back muscles without stretching them.    Be aware of safe lifting methods and do not lift anything without stretching first.  Medicines  Talk to your doctor before using medicine, especially  if you have other medical problems or are taking other medicines.    You may use over-the-counter medicine as directed on the bottle to control pain, unless another pain medicine was prescribed. If you have chronic conditions like diabetes, liver or kidney disease, stomach ulcers, or gastrointestinal bleeding, or are taking blood thinners, talk to your doctor before taking any medicine.    Be careful if you are given a prescription medicines, narcotics, or medicine for muscle spasms. They can cause drowsiness, affect your coordination, reflexes, and judgement. Do not drive or operate heavy machinery.  Follow-up care  Follow up with your healthcare provider, or as advised.   A radiologist will review any X-rays that were taken. Your provide will notify you of any new findings that may affect your care.  Call 911  Call emergency services if any of the following occur:    Trouble breathing    Confusion    Very drowsy or trouble awakening    Fainting or loss of consciousness    Rapid or very slow heart rate    Loss of bowel or bladder control  When to seek medical advice  Call your healthcare provider right away if any of these occur:     Pain becomes worse or spreads to your legs    Weakness or numbness in one or both legs    Numbness in the groin or genital area  Date Last Reviewed: 7/1/2016 2000-2017 [employer].. [address] [place] [address]. All rights reserved. This information is not intended as a substitute for professional medical care. Always follow your healthcare professional's instructions.        Back Care Tips    Caring for your back  These are things you can do to prevent a recurrence of acute back pain and to reduce symptoms from chronic back pain:    Maintain a healthy weight. If you are overweight, losing weight will help most types of back pain.    Exercise is an important part of recovery from most types of back pain. The muscles behind and in front of the spine support  the back. This means strengthening both the back muscles and the abdominal muscles will provide better support for your spine.     Swimming and brisk walking are good overall exercises to improve your fitness level.    Practice safe lifting methods (below).    Practice good posture when sitting, standing and walking. Avoid prolonged sitting. This puts more stress on the lower back than standing or walking.    Wear quality shoes with sufficient arch support. Foot and ankle alignment can affect back symptoms. Women should avoid wearing high heels.    Therapeutic massage can help relax the back muscles without stretching them.    During the first 24 to 72 hours after an acute injury or flare-up of chronic back pain, apply an ice pack to the painful area for 20 minutes and then remove it for 20 minutes, over a period of 60 to 90 minutes, or several times a day. As a safety precaution, do not use a heating pad at bedtime. Sleeping on a heating pad can lead to skin burns or tissue damage.    You can alternate ice and heat therapies.  Medicines  Talk to your healthcare provider before using medicines, especially if you have other medical problems or are taking other medicines.    You may use acetaminophen or ibuprofen to control pain, unless your healthcare provider prescribed other pain medicine. If you have chronic conditions like diabetes, liver or kidney disease, stomach ulcers, or gastrointestinal bleeding, or are taking blood thinners, talk with your healthcare provider before taking any medicines.    Be careful if you are given prescription pain medicines, narcotics, or medicine for muscle spasm. They can cause drowsiness, affect your coordination, reflexes, and judgment. Do not drive or operate heavy machinery while taking these types of medicines. Take prescription pain medicine only as prescribed by your healthcare provider.  Lumbar stretch  Here is a simple stretching exercise that will help relax muscle spasm  and keep your back more limber. If exercise makes your back pain worse, don t do it.    Lie on your back with your knees bent and both feet on the ground.    Slowly raise your left knee to your chest as you flatten your lower back against the floor. Hold for 5 seconds.    Relax and repeat the exercise with your right knee.    Do 10 of these exercises for each leg.  Safe lifting method    Don t bend over at the waist to lift an object off the floor.  Instead, bend your knees and hips in a squat.     Keep your back and head upright    Hold the object close to your body, directly in front of you.    Straighten your legs to lift the object.     Lower the object to the floor in the reverse fashion.    If you must slide something across the floor, push it.  Posture tips  Sitting  Sit in chairs with straight backs or low-back support. Keep your knees lower than your hips, with your feet flat on the floor.  When driving, sit up straight. Adjust the seat forward so you are not leaning toward the steering wheel.  A small pillow or rolled towel behind your lower back may help if you are driving long distances.   Standing  When standing for long periods, shift most of your weight to one leg at a time. Alternate legs every few minutes.   Sleeping  The best way to sleep is on your side with your knees bent. Put a low pillow under your head to support your neck in a neutral spine position. Avoid thick pillows that bend your neck to one side. Put a pillow between your legs to further relax your lower back. If you sleep on your back, put pillows under your knees to support your legs in a slightly flexed position. Use a firm mattress. If your mattress sags, replace it, or use a 1/2-inch plywood board under the mattress to add support.  Follow-up care  Follow up with your healthcare provider, or as advised.  If X-rays, a CT scan or an MRI scan were taken, they will be reviewed by a radiologist. You will be notified of any new findings  that may affect your care.  Call 911  Call 911 if any of the following occur:    Trouble breathing    Confusion    Very drowsy    Fainting or loss of consciousness    Rapid or very slow heart rate    Loss of  bowel or bladder control  When to seek medical advice  Call your healthcare provider right away if any of the following occur:    Pain becomes worse or spreads to your arms or legs    Weakness or numbness in one or both arms or legs    Numbness in the groin area  Date Last Reviewed: 6/1/2016 2000-2017 [employer]. [address] [place] [address]. All rights reserved. This information is not intended as a substitute for professional medical care. Always follow your healthcare professional's instructions.                Follow-ups after your visit        Future tests that were ordered for you today     Open Future Orders        Priority Expected Expires Ordered    Hemoglobin A1c Routine  8/2/2019 8/2/2018            Who to contact     If you have questions or need follow up information about today's clinic visit or your schedule please contact [facility] directly at [phone].  Normal or non-critical lab and imaging results will be communicated to you by mPortalhart, letter or phone within 4 business days after the clinic has received the results. If you do not hear from us within 7 days, please contact the clinic through mPortalhart or phone. If you have a critical or abnormal lab result, we will notify you by phone as soon as possible.  Submit refill requests through [email or web address] or call your pharmacy and they will forward the refill request to us. Please allow 3 business days for your refill to be completed.          Additional Information About Your Visit        [email or web address] Information     [email or web address] gives you secure access to your electronic health record. If you see a primary care provider, you can also send messages to your care team and make appointments. If you have  "questions, please call your primary care clinic.  If you do not have a primary care provider, please call [phone] and they will assist you.        Care EveryWhere ID     This is your Care EveryWhere ID. This could be used by other organizations to access your [facility] medical records  [id number]        Your Vitals Were     Pulse Temperature Height Pulse Oximetry BMI (Body Mass Index)       96 98.2  F (36.8  C) (Oral) 5' 6.8\" (1.697 m) 96% 32.74 kg/m2        Blood Pressure from Last 3 Encounters:   08/02/18 139/89   03/28/18 126/80   12/27/17 132/85    Weight from Last 3 Encounters:   08/02/18 207 lb 12.8 oz (94.3 kg)   03/28/18 206 lb 9.6 oz (93.7 kg)   12/27/17 206 lb 6.4 oz (93.6 kg)                 Today's Medication Changes          These changes are accurate as of 8/2/18 10:19 AM.  If you have any questions, ask your nurse or doctor.               These medicines have changed or have updated prescriptions.        Dose/Directions    * methocarbamol 500 MG tablet   Commonly known as:  ROBAXIN   This may have changed:  Another medication with the same name was added. Make sure you understand how and when to take each.   Used for:  Mechanical low back pain, Back muscle spasm   Changed by:  Nighat Joe APRN CNP        Dose:  1000 mg   Take 2 tablets (1,000 mg) by mouth 3 times daily as needed for muscle spasms   Quantity:  30 tablet   Refills:  1       * methocarbamol 500 MG tablet   Commonly known as:  ROBAXIN   This may have changed:  You were already taking a medication with the same name, and this prescription was added. Make sure you understand how and when to take each.   Used for:  Acute bilateral low back pain without sciatica   Changed by:  Nighat Joe APRN CNP        Dose:  1000 mg   Take 2 tablets (1,000 mg) by mouth 3 times daily as needed for muscle spasms   Quantity:  30 tablet   Refills:  1       * Notice:  This list has 2 medication(s) that are the same as other medications " prescribed for you. Read the directions carefully, and ask your doctor or other care provider to review them with you.         Where to get your medicines      These medications were sent to [facility] - [place], [place] - [address]  [address] [facility] [address]     Phone:  [phone]     methocarbamol 500 MG tablet                Primary Care Provider Office Phone # Fax #    ABHISHEK Beauchamp [phone]       [address] [facility] [address]        Equal Access to Services     [facility]: Hadii aad ku hadasho Soomaali, waaxda luqadaha, qaybta kaalmada adeegyada, waxay idiin haymadan adegary khleanna ritchie . So [facility] [phone].    ATENCIÓN: Si habla español, tiene a dan disposición servicios gratuitos de asistencia lingüística. [facility] [phone].    We comply with applicable federal civil rights laws and [place] laws. We do not discriminate on the basis of race, color, national origin, age, disability, sex, sexual orientation, or gender identity.            Thank you!     Thank you for choosing [facility]  for your care. Our goal is always to provide you with excellent care. Hearing back from our patients is one way we can continue to improve our services. Please take a few minutes to complete the written survey that you may receive in the mail after your visit with us. Thank you!             Your Updated Medication List - Protect others around you: Learn how to safely use, store and throw away your medicines at www.disposemymeds.org.          This list is accurate as of 8/2/18 10:19 AM.  Always use your most recent med list.                   Brand Name Dispense Instructions for use Diagnosis    aspirin 81 MG EC tablet     90 tablet    TAKE ONE TABLET BY MOUTH EVERY DAY    Type 2 diabetes mellitus with retinopathy, without long-term current use of insulin, macular edema presence unspecified, unspecified  laterality, unspecified retinopathy severity (H)       atorvastatin 20 MG tablet    LIPITOR    90 tablet    Take 1 tablet (20 mg) by mouth daily    Hyperlipidemia LDL goal <100       blood glucose monitoring test strip    no brand specified    1 Box    Use to test blood sugars once times daily or as directed    Type 2 diabetes mellitus without complication (H)       glimepiride 4 MG tablet    AMARYL    90 tablet    Take 1 tablet (4 mg) by mouth every morning (before breakfast)    Type 2 diabetes mellitus with retinopathy, without long-term current use of insulin, macular edema presence unspecified, unspecified laterality, unspecified retinopathy severity (H)       hydrochlorothiazide 12.5 MG Tabs tablet     90 tablet    Take 1 tablet (12.5 mg) by mouth daily    Essential hypertension       lisinopril 40 MG tablet    PRINIVIL/ZESTRIL    90 tablet    Take 1 tablet (40 mg) by mouth daily    Essential hypertension       metFORMIN 500 MG 24 hr tablet    GLUCOPHAGE-XR    360 tablet    Take 4 tablets (2,000 mg) by mouth daily (with dinner)    Type 2 diabetes mellitus with retinopathy, without long-term current use of insulin, macular edema presence unspecified, unspecified laterality, unspecified retinopathy severity (H)       * methocarbamol 500 MG tablet    ROBAXIN    30 tablet    Take 2 tablets (1,000 mg) by mouth 3 times daily as needed for muscle spasms    Mechanical low back pain, Back muscle spasm       * methocarbamol 500 MG tablet    ROBAXIN    30 tablet    Take 2 tablets (1,000 mg) by mouth 3 times daily as needed for muscle spasms    Acute bilateral low back pain without sciatica       naproxen 500 MG tablet    NAPROSYN    90 tablet    TAKE ONE TABLET BY MOUTH TWICE A DAY AS NEEDED FOR MODERATE PAIN    Chronic pain of left knee       order for DME     1 each    Equipment being ordered: wrist quick fit [id number]    Pain in right elbow       order for DME     1 Box    Equipment being ordered: lancets for one  touch glucometer for once daily testing    Type 2 diabetes mellitus without complication (H)       * order for DME     1 Device    Equipment being ordered: Glucometer Accucheck Sulema, lancets, test strips.  Patient to check sugars bid times daily, 90 day supply for test strips and lancets, refill 6 times    Type 2 diabetes mellitus without complication (H)       * order for DME     1 Device    Equipment being ordered: Glucometer per insurance preference, lancets, test strips.  Patient to check sugars two times daily, 90 day supply for test strips and lancets, refill 3 times    Type 2 diabetes mellitus with retinopathy, without long-term current use of insulin, macular edema presence unspecified, unspecified laterality, unspecified retinopathy severity (H)       order for DME     1 Device    Equipment being ordered: One touch lancets, test strips.  Patient to check sugars once times daily, 90 day supply for test strips and lancets, refill 3 times    Type 2 diabetes mellitus with hemoglobin A1c goal of 7.0%-8.0% (H)       sitagliptin 100 MG tablet    JANUVIA    90 tablet    Take 1 tablet (100 mg) by mouth daily    Type 2 diabetes mellitus with retinopathy, without long-term current use of insulin, macular edema presence unspecified, unspecified laterality, unspecified retinopathy severity (H)       * Notice:  This list has 4 medication(s) that are the same as other medications prescribed for you. Read the directions carefully, and ask your doctor or other care provider to review them with you.

## 2018-11-07 ENCOUNTER — OFFICE VISIT (OUTPATIENT)
Dept: FAMILY MEDICINE | Facility: CLINIC | Age: 63
End: 2018-11-07
Payer: COMMERCIAL

## 2018-11-07 VITALS
BODY MASS INDEX: 32.24 KG/M2 | OXYGEN SATURATION: 98 % | TEMPERATURE: 97.7 F | HEIGHT: 67 IN | DIASTOLIC BLOOD PRESSURE: 89 MMHG | WEIGHT: 205.4 LBS | HEART RATE: 78 BPM | SYSTOLIC BLOOD PRESSURE: 136 MMHG

## 2018-11-07 DIAGNOSIS — Z23 NEED FOR PROPHYLACTIC VACCINATION AND INOCULATION AGAINST INFLUENZA: ICD-10-CM

## 2018-11-07 DIAGNOSIS — M54.41 ACUTE BILATERAL LOW BACK PAIN WITH RIGHT-SIDED SCIATICA: Primary | ICD-10-CM

## 2018-11-07 DIAGNOSIS — E78.5 HYPERLIPIDEMIA LDL GOAL <100: ICD-10-CM

## 2018-11-07 DIAGNOSIS — E11.9 TYPE 2 DIABETES MELLITUS WITH HEMOGLOBIN A1C GOAL OF 7.0%-8.0% (H): ICD-10-CM

## 2018-11-07 DIAGNOSIS — Z11.4 SCREENING FOR HIV (HUMAN IMMUNODEFICIENCY VIRUS): ICD-10-CM

## 2018-11-07 DIAGNOSIS — I10 ESSENTIAL HYPERTENSION: ICD-10-CM

## 2018-11-07 LAB
ALT SERPL W P-5'-P-CCNC: 46 U/L (ref 0–70)
ANION GAP SERPL CALCULATED.3IONS-SCNC: 6 MMOL/L (ref 3–14)
AST SERPL W P-5'-P-CCNC: 24 U/L (ref 0–45)
BUN SERPL-MCNC: 11 MG/DL (ref 7–30)
CALCIUM SERPL-MCNC: 8.8 MG/DL (ref 8.5–10.1)
CHLORIDE SERPL-SCNC: 105 MMOL/L (ref 94–109)
CHOLEST SERPL-MCNC: 105 MG/DL
CO2 SERPL-SCNC: 27 MMOL/L (ref 20–32)
CREAT SERPL-MCNC: 0.82 MG/DL (ref 0.66–1.25)
CREAT UR-MCNC: 153 MG/DL
GFR SERPL CREATININE-BSD FRML MDRD: >90 ML/MIN/1.7M2
GLUCOSE SERPL-MCNC: 192 MG/DL (ref 70–99)
HBA1C MFR BLD: 8 % (ref 0–5.6)
HDLC SERPL-MCNC: 41 MG/DL
LDLC SERPL CALC-MCNC: 32 MG/DL
MICROALBUMIN UR-MCNC: 16 MG/L
MICROALBUMIN/CREAT UR: 10.26 MG/G CR (ref 0–17)
NONHDLC SERPL-MCNC: 64 MG/DL
POTASSIUM SERPL-SCNC: 4.3 MMOL/L (ref 3.4–5.3)
SODIUM SERPL-SCNC: 138 MMOL/L (ref 133–144)
TRIGL SERPL-MCNC: 160 MG/DL

## 2018-11-07 PROCEDURE — 36415 COLL VENOUS BLD VENIPUNCTURE: CPT | Performed by: NURSE PRACTITIONER

## 2018-11-07 PROCEDURE — 90686 IIV4 VACC NO PRSV 0.5 ML IM: CPT | Performed by: NURSE PRACTITIONER

## 2018-11-07 PROCEDURE — 84450 TRANSFERASE (AST) (SGOT): CPT | Performed by: NURSE PRACTITIONER

## 2018-11-07 PROCEDURE — 80061 LIPID PANEL: CPT | Performed by: NURSE PRACTITIONER

## 2018-11-07 PROCEDURE — 83036 HEMOGLOBIN GLYCOSYLATED A1C: CPT | Performed by: NURSE PRACTITIONER

## 2018-11-07 PROCEDURE — 99214 OFFICE O/P EST MOD 30 MIN: CPT | Mod: 25 | Performed by: NURSE PRACTITIONER

## 2018-11-07 PROCEDURE — 90471 IMMUNIZATION ADMIN: CPT | Performed by: NURSE PRACTITIONER

## 2018-11-07 PROCEDURE — 80048 BASIC METABOLIC PNL TOTAL CA: CPT | Performed by: NURSE PRACTITIONER

## 2018-11-07 PROCEDURE — 84460 ALANINE AMINO (ALT) (SGPT): CPT | Performed by: NURSE PRACTITIONER

## 2018-11-07 PROCEDURE — 82043 UR ALBUMIN QUANTITATIVE: CPT | Performed by: NURSE PRACTITIONER

## 2018-11-07 RX ORDER — GLIMEPIRIDE 2 MG/1
2 TABLET ORAL
Qty: 15 TABLET | Refills: 0 | Status: SHIPPED | OUTPATIENT
Start: 2018-11-07 | End: 2019-06-14

## 2018-11-07 RX ORDER — METHOCARBAMOL 500 MG/1
1000 TABLET, FILM COATED ORAL 3 TIMES DAILY PRN
Qty: 30 TABLET | Refills: 1 | Status: SHIPPED | OUTPATIENT
Start: 2018-11-07 | End: 2019-06-14

## 2018-11-07 RX ORDER — HYDROCHLOROTHIAZIDE 12.5 MG/1
12.5 TABLET ORAL DAILY
Qty: 90 TABLET | Refills: 3 | Status: SHIPPED | OUTPATIENT
Start: 2018-11-07 | End: 2019-06-14

## 2018-11-07 RX ORDER — METFORMIN HCL 500 MG
2000 TABLET, EXTENDED RELEASE 24 HR ORAL
Qty: 360 TABLET | Refills: 3 | Status: SHIPPED | OUTPATIENT
Start: 2018-11-07 | End: 2019-06-14

## 2018-11-07 RX ORDER — LISINOPRIL 40 MG/1
40 TABLET ORAL DAILY
Qty: 90 TABLET | Refills: 3 | Status: SHIPPED | OUTPATIENT
Start: 2018-11-07 | End: 2019-06-14

## 2018-11-07 ASSESSMENT — ANXIETY QUESTIONNAIRES
1. FEELING NERVOUS, ANXIOUS, OR ON EDGE: NOT AT ALL
6. BECOMING EASILY ANNOYED OR IRRITABLE: NOT AT ALL
IF YOU CHECKED OFF ANY PROBLEMS ON THIS QUESTIONNAIRE, HOW DIFFICULT HAVE THESE PROBLEMS MADE IT FOR YOU TO DO YOUR WORK, TAKE CARE OF THINGS AT HOME, OR GET ALONG WITH OTHER PEOPLE: NOT DIFFICULT AT ALL
7. FEELING AFRAID AS IF SOMETHING AWFUL MIGHT HAPPEN: NOT AT ALL
5. BEING SO RESTLESS THAT IT IS HARD TO SIT STILL: NOT AT ALL
3. WORRYING TOO MUCH ABOUT DIFFERENT THINGS: NOT AT ALL
2. NOT BEING ABLE TO STOP OR CONTROL WORRYING: NOT AT ALL
GAD7 TOTAL SCORE: 0

## 2018-11-07 ASSESSMENT — PATIENT HEALTH QUESTIONNAIRE - PHQ9
5. POOR APPETITE OR OVEREATING: NOT AT ALL
SUM OF ALL RESPONSES TO PHQ QUESTIONS 1-9: 0

## 2018-11-07 NOTE — MR AVS SNAPSHOT
After Visit Summary   11/7/2018    Kathrine Isaac    MRN: [id number]           Patient Information     Date Of Birth          1955        Visit Information        Provider Department      11/7/2018 7:40 AM Nighat Joe, APRN CNP [facility]        Today's Diagnoses     Acute bilateral low back pain with right-sided sciatica    -  1    Type 2 diabetes mellitus with hemoglobin A1c goal of 7.0%-8.0% (H)        Essential hypertension        Hyperlipidemia LDL goal <100        Screening for HIV (human immunodeficiency virus)        Need for prophylactic vaccination and inoculation against influenza          Care Instructions    At [facility], we strive to deliver an exceptional experience to you, every time we see you.  If you receive a survey in the mail, please send us back your thoughts. We really do value your feedback.    Your care team:                            Family Medicine Internal Medicine   MD Tobias Lu MD Shantel Branch-Fleming, MD Katya Georgiev PA-C Megan Hill, ABHISHEK Ghosh MD Pediatrics   Michi Bowman, LAN Polk, MD Conchis Hansen APRN CNP   MD Casi Laird MD Deborah Mielke, MD Kim Thein, ABHISHEK [facility]      Clinic hours: Monday - Thursday 7 am-7 pm; Fridays 7 am-5 pm.   Urgent care: Monday - Friday 11 am-9 pm; Saturday and Sunday 9 am-5 pm.  Pharmacy : Monday -Thursday 8 am-8 pm; Friday 8 am-6 pm; Saturday and Sunday 9 am-5 pm.     Clinic: [phone]   Pharmacy: [phone]        Relieving Back Pain  Back pain is a common problem. You can strain back muscles by lifting too much weight or just by moving the wrong way. Back strain can be uncomfortable, even painful. And it can take weeks or months to improve. To help yourself feel better and prevent future back strains, try these tips.  Important: Don't give aspirin to children or teens without first  discussing it with your child's healthcare provider.   Ice    Ice reduces muscle pain and swelling. It helps most during the first 24 to 48 hours after an injury.    Wrap an ice pack or a bag of frozen peas in a thin towel. Never put ice directly on your skin.    Place the ice where your back hurts the most.    Don t ice for more than 20 minutes at a time.    You can use ice several times a day.  Medicines  Over-the-counter pain relievers include acetaminophen and anti-inflammatory medicines, which includes aspirin, naproxen, or ibuprofen. They can help ease discomfort. Some also reduce swelling.    Tell your healthcare provider about any medicines you are already taking.    Take medicines only as directed.  Manipulation and massage  Having manipulation by an osteopathic doctor or chiropractor may be helpful. Getting a massage also may help.   Heat  After the first 48 hours, heat can relax sore muscles and improve blood flow.    Try a warm bath or shower. Or use a heating pad set on low. To prevent a burn, keep a cloth between you and the heating pad.    Don t use a heating pad for more than 15 minutes at a time. Never sleep on a heating pad.  Date Last Reviewed: 6/1/2018 2000-2018 [employer]. [address] [place] [address]. All rights reserved. This information is not intended as a substitute for professional medical care. Always follow your healthcare professional's instructions.        Exercises to Strengthen Your Lower Back  Strong lower back and abdominal muscles work together to support your spine. The exercises below will help strengthen the lower back. It is important that you begin exercising slowly and increase levels gradually.  Always begin any exercise program with stretching. If you feel pain while doing any of these exercises, stop and talk to your doctor about a more specific exercise program that better suits your condition.   Low back stretch  The point of stretching is  to make you more flexible and increase your range of motion. Stretch only as much as you are able. Stretch slowly. Do not push your stretch to the limit. If at any point you feel pain while stretching, this is your (temporary) limit.    Lie on your back with your knees bent and both feet on the ground.    Slowly raise your left knee to your chest as you flatten your lower back against the floor. Hold for 5 seconds.    Relax and repeat the exercise with your right knee.    Do 10 of these exercises for each leg.    Repeat hugging both knees to your chest at the same time.  Building lower back strength  Start your exercise routine with 10 to 30 minutes a day, 1 to 3 times a day.  Initial exercises  Lying on your back:  1. Ankle pumps: Move your foot up and down, towards your head, and then away. Repeat 10 times with each foot.  2. Heel slides: Slowly bend your knee, drawing the heel of your foot towards you. Then slide your heel/foot from you, straightening your knee. Do not lift your foot off the floor (this is not a leg lift).  3. Abdominal contraction: Bend your knees and put your hands on your stomach. Tighten your stomach muscles. Hold for 5 seconds, then relax. Repeat 10 times.  4. Straight leg raise: Bend one leg at the knee and keep the other leg straight. Tighten your stomach muscles. Slowly lift your straight leg 6 to 12 inches off the floor and hold for up to 5 seconds. Repeat 10 times on each side.  Standin. Wall squats: Stand with your back against the wall. Move your feet about 12 inches away from the wall. Tighten your stomach muscles, and slowly bend your knees until they are at about a 45 degree angle. Do not go down too far. Hold about 5 seconds. Then slowly return to your starting position. Repeat 10 times.  2. Heel raises: Stand facing the wall. Slowly raise the heels of your feet up and down, while keeping your toes on the floor. If you have trouble balancing, you can touch the wall with your  hands. Repeat 10 times.  More advanced exercises  When you feel comfortable enough, try these exercises.  1. Kneeling lumbar extension: Begin on your hands and knees. At the same time, raise and straighten your right arm and left leg until they are parallel to the ground. Hold for 2 seconds and come back slowly to a starting position. Repeat with left arm and right leg, alternating 10 times.  2. Prone lumbar extension: Lie face down, arms extended overhead, palms on the floor. At the same time, raise your right arm and left leg as high as comfortably possible. Hold for 10 seconds and slowly return to start. Repeat with left arm and right leg, alternating 10 times. Gradually build up to 20 times. (Advanced: Repeat this exercise raising both arms and both legs a few inches off the floor at the same time. Hold for 5 seconds and release.)  3. Pelvic tilt: Lie on the floor on your back with your knees bent at 90 degrees. Your feet should be flat on the floor. Inhale, exhale, then slowly contract your abdominal muscles bringing your navel toward your spine. Let your pelvis rock back until your lower back is flat on the floor. Hold for 10 seconds while breathing smoothly.  4. Abdominal crunch: Perform a pelvic tilt (above) flattening your lower back against the floor. Holding the tension in your abdominal muscles, take another breath and raise your shoulder blades off the ground (this is not a full sit-up). Keep your head in line with your body (don t bend your neck forward). Hold for 2 seconds, then slowly lower.  Date Last Reviewed: 6/1/2016 2000-2018 [employer]. [address] [place] [address]. All rights reserved. This information is not intended as a substitute for professional medical care. Always follow your healthcare professional's instructions.                Follow-ups after your visit        Additional Services     SPORTS MEDICINE REFERRAL       Your provider has referred you to:  TIMOTHY  Health: [facility] [place] [phone]   [email or web address]    Please be aware that coverage of these services is subject to the terms and limitations of your health insurance plan.  Call member services at your health plan with any benefit or coverage questions.      Please bring the following to your appointment:    >>   Any x-rays, CTs or MRIs which have been performed.  Contact the facility where they were done to arrange for  prior to your scheduled appointment.    >>   List of current medications   >>   This referral request   >>   Any documents/labs given to you for this referral                  Follow-up notes from your care team     Return in about 3 months (around 2/7/2019), or if symptoms worsen or fail to improve.      Future tests that were ordered for you today     Open Future Orders        Priority Expected Expires Ordered    ALT Routine 12/7/2018 12/12/2018 11/7/2018            Who to contact     If you have questions or need follow up information about today's clinic visit or your schedule please contact [facility] directly at [phone].  Normal or non-critical lab and imaging results will be communicated to you by MyChart, letter or phone within 4 business days after the clinic has received the results. If you do not hear from us within 7 days, please contact the clinic through [email or web address] or phone. If you have a critical or abnormal lab result, we will notify you by phone as soon as possible.  Submit refill requests through "[employer] or call your pharmacy and they will forward the refill request to us. Please allow 3 business days for your refill to be completed.          Additional Information About Your Visit        [email or web address] Information     "[employer] gives you secure access to your electronic health record. If you see a primary care provider, you can also send messages to your care team and make appointments. If you  "have questions, please call your primary care clinic.  If you do not have a primary care provider, please call [phone] and they will assist you.        Care EveryWhere ID     This is your Care EveryWhere ID. This could be used by other organizations to access your [facility] medical records  [id number]        Your Vitals Were     Pulse Temperature Height Pulse Oximetry BMI (Body Mass Index)       78 97.7  F (36.5  C) (Oral) 5' 6.8\" (1.697 m) 98% 32.36 kg/m2        Blood Pressure from Last 3 Encounters:   11/07/18 136/89   08/02/18 139/89   03/28/18 126/80    Weight from Last 3 Encounters:   11/07/18 205 lb 6.4 oz (93.2 kg)   08/02/18 207 lb 12.8 oz (94.3 kg)   03/28/18 206 lb 9.6 oz (93.7 kg)              We Performed the Following          ADMIN VACCINE, FIRST [11355]     Albumin Random Urine Quantitative with Creat Ratio     AST     Basic metabolic panel  (Ca, Cl, CO2, Creat, Gluc, K, Na, BUN)     HC FLU VAC PRESRV FREE QUAD SPLIT VIR 3+YRS IM  [13189]     Hemoglobin A1c     Lipid panel reflex to direct LDL Fasting     SPORTS MEDICINE REFERRAL          Today's Medication Changes          These changes are accurate as of 11/7/18  8:30 AM.  If you have any questions, ask your nurse or doctor.               These medicines have changed or have updated prescriptions.        Dose/Directions    methocarbamol 500 MG tablet   Commonly known as:  ROBAXIN   This may have changed:  Another medication with the same name was removed. Continue taking this medication, and follow the directions you see here.   Changed by:  Nighat Joe, APRN CNP        Dose:  1000 mg   Take 2 tablets (1,000 mg) by mouth 3 times daily as needed for muscle spasms   Quantity:  30 tablet   Refills:  1            Where to get your medicines      These medications were sent to [facility] - [place], [place] - [address]  [address], [place] [address]     Phone:  [phone]     hydrochlorothiazide 12.5 " MG Tabs tablet    lisinopril 40 MG tablet    metFORMIN 500 MG 24 hr tablet    methocarbamol 500 MG tablet    sitagliptin 100 MG tablet                Primary Care Provider Office Phone # Fax #    ABHISHEK Beauchamp [phone] [address] [facility] [address]        Equal Access to Services     MICHAEL BENNETT : Hadii aad ku hadasho Soomaali, waaxda luqadaha, qaybta kaalmada adeegyada, waxay idiin hayaan adeeg kharash lanasirn . So [facility] [phone].    ATENCIÓN: Si habla español, tiene a dan disposición servicios gratuitos de asistencia lingüística. David al [phone].    We comply with applicable federal civil rights laws and [place] laws. We do not discriminate on the basis of race, color, national origin, age, disability, sex, sexual orientation, or gender identity.            Thank you!     Thank you for choosing [facility]  for your care. Our goal is always to provide you with excellent care. Hearing back from our patients is one way we can continue to improve our services. Please take a few minutes to complete the written survey that you may receive in the mail after your visit with us. Thank you!             Your Updated Medication List - Protect others around you: Learn how to safely use, store and throw away your medicines at www.disposemymeds.org.          This list is accurate as of 11/7/18  8:30 AM.  Always use your most recent med list.                   Brand Name Dispense Instructions for use Diagnosis    aspirin 81 MG EC tablet     90 tablet    TAKE ONE TABLET BY MOUTH EVERY DAY    Type 2 diabetes mellitus with retinopathy, without long-term current use of insulin, macular edema presence unspecified, unspecified laterality, unspecified retinopathy severity (H)       atorvastatin 20 MG tablet    LIPITOR    90 tablet    Take 1 tablet (20 mg) by mouth daily    Hyperlipidemia LDL goal <100       blood glucose monitoring test strip    no  brand specified    1 Box    Use to test blood sugars once times daily or as directed    Type 2 diabetes mellitus without complication (H)       glimepiride 4 MG tablet    AMARYL    90 tablet    Take 1 tablet (4 mg) by mouth every morning (before breakfast)    Type 2 diabetes mellitus with retinopathy, without long-term current use of insulin, macular edema presence unspecified, unspecified laterality, unspecified retinopathy severity (H)       hydrochlorothiazide 12.5 MG Tabs tablet     90 tablet    Take 1 tablet (12.5 mg) by mouth daily    Essential hypertension       lisinopril 40 MG tablet    PRINIVIL/ZESTRIL    90 tablet    Take 1 tablet (40 mg) by mouth daily    Essential hypertension       metFORMIN 500 MG 24 hr tablet    GLUCOPHAGE-XR    360 tablet    Take 4 tablets (2,000 mg) by mouth daily (with dinner)    Type 2 diabetes mellitus with hemoglobin A1c goal of 7.0%-8.0% (H)       methocarbamol 500 MG tablet    ROBAXIN    30 tablet    Take 2 tablets (1,000 mg) by mouth 3 times daily as needed for muscle spasms        naproxen 500 MG tablet    NAPROSYN    90 tablet    TAKE ONE TABLET BY MOUTH TWICE A DAY AS NEEDED FOR MODERATE PAIN    Chronic pain of left knee       order for DME     1 each    Equipment being ordered: wrist quick fit [id number]    Pain in right elbow       order for DME     1 Box    Equipment being ordered: lancets for one touch glucometer for once daily testing    Type 2 diabetes mellitus without complication (H)       * order for DME     1 Device    Equipment being ordered: Glucometer Accucheck Sulema, lancets, test strips.  Patient to check sugars bid times daily, 90 day supply for test strips and lancets, refill 6 times    Type 2 diabetes mellitus without complication (H)       * order for DME     1 Device    Equipment being ordered: Glucometer per insurance preference, lancets, test strips.  Patient to check sugars two times daily, 90 day supply for test strips and lancets, refill 3 times     Type 2 diabetes mellitus with retinopathy, without long-term current use of insulin, macular edema presence unspecified, unspecified laterality, unspecified retinopathy severity (H)       order for DME     1 Device    Equipment being ordered: One touch lancets, test strips.  Patient to check sugars once times daily, 90 day supply for test strips and lancets, refill 3 times    Type 2 diabetes mellitus with hemoglobin A1c goal of 7.0%-8.0% (H)       sitagliptin 100 MG tablet    JANUVIA    90 tablet    Take 1 tablet (100 mg) by mouth daily    Type 2 diabetes mellitus with hemoglobin A1c goal of 7.0%-8.0% (H)       * Notice:  This list has 2 medication(s) that are the same as other medications prescribed for you. Read the directions carefully, and ask your doctor or other care provider to review them with you.

## 2018-11-07 NOTE — PATIENT INSTRUCTIONS
At [facility], we strive to deliver an exceptional experience to you, every time we see you.  If you receive a survey in the mail, please send us back your thoughts. We really do value your feedback.    Your care team:                            Family Medicine Internal Medicine   MD Tobias Lu MD Shantel Branch-Fleming, MD Katya Georgiev PA-C Megan Hill, APRN CNP    Tan Ghosh, MD Pediatrics   Michi Bowman, LAN Polk, MD Conchis Hansen APRN CNP   MD Casi Laird MD Deborah Mielke, MD Sushma Joe, APRN [facility]      Clinic hours: Monday - Thursday 7 am-7 pm; Fridays 7 am-5 pm.   Urgent care: Monday - Friday 11 am-9 pm; Saturday and Sunday 9 am-5 pm.  Pharmacy : Monday -Thursday 8 am-8 pm; Friday 8 am-6 pm; Saturday and Sunday 9 am-5 pm.     Clinic: [phone]   Pharmacy: [phone]        Relieving Back Pain  Back pain is a common problem. You can strain back muscles by lifting too much weight or just by moving the wrong way. Back strain can be uncomfortable, even painful. And it can take weeks or months to improve. To help yourself feel better and prevent future back strains, try these tips.  Important: Don't give aspirin to children or teens without first discussing it with your child's healthcare provider.   Ice    Ice reduces muscle pain and swelling. It helps most during the first 24 to 48 hours after an injury.    Wrap an ice pack or a bag of frozen peas in a thin towel. Never put ice directly on your skin.    Place the ice where your back hurts the most.    Don t ice for more than 20 minutes at a time.    You can use ice several times a day.  Medicines  Over-the-counter pain relievers include acetaminophen and anti-inflammatory medicines, which includes aspirin, naproxen, or ibuprofen. They can help ease discomfort. Some also reduce swelling.    Tell your healthcare provider about any medicines you are already  taking.    Take medicines only as directed.  Manipulation and massage  Having manipulation by an osteopathic doctor or chiropractor may be helpful. Getting a massage also may help.   Heat  After the first 48 hours, heat can relax sore muscles and improve blood flow.    Try a warm bath or shower. Or use a heating pad set on low. To prevent a burn, keep a cloth between you and the heating pad.    Don t use a heating pad for more than 15 minutes at a time. Never sleep on a heating pad.  Date Last Reviewed: 6/1/2018 2000-2018 [employer]. [address] [place] [address]. All rights reserved. This information is not intended as a substitute for professional medical care. Always follow your healthcare professional's instructions.        Exercises to Strengthen Your Lower Back  Strong lower back and abdominal muscles work together to support your spine. The exercises below will help strengthen the lower back. It is important that you begin exercising slowly and increase levels gradually.  Always begin any exercise program with stretching. If you feel pain while doing any of these exercises, stop and talk to your doctor about a more specific exercise program that better suits your condition.   Low back stretch  The point of stretching is to make you more flexible and increase your range of motion. Stretch only as much as you are able. Stretch slowly. Do not push your stretch to the limit. If at any point you feel pain while stretching, this is your (temporary) limit.    Lie on your back with your knees bent and both feet on the ground.    Slowly raise your left knee to your chest as you flatten your lower back against the floor. Hold for 5 seconds.    Relax and repeat the exercise with your right knee.    Do 10 of these exercises for each leg.    Repeat hugging both knees to your chest at the same time.  Building lower back strength  Start your exercise routine with 10 to 30 minutes a day, 1 to 3  times a day.  Initial exercises  Lying on your back:  1. Ankle pumps: Move your foot up and down, towards your head, and then away. Repeat 10 times with each foot.  2. Heel slides: Slowly bend your knee, drawing the heel of your foot towards you. Then slide your heel/foot from you, straightening your knee. Do not lift your foot off the floor (this is not a leg lift).  3. Abdominal contraction: Bend your knees and put your hands on your stomach. Tighten your stomach muscles. Hold for 5 seconds, then relax. Repeat 10 times.  4. Straight leg raise: Bend one leg at the knee and keep the other leg straight. Tighten your stomach muscles. Slowly lift your straight leg 6 to 12 inches off the floor and hold for up to 5 seconds. Repeat 10 times on each side.  Standin. Wall squats: Stand with your back against the wall. Move your feet about 12 inches away from the wall. Tighten your stomach muscles, and slowly bend your knees until they are at about a 45 degree angle. Do not go down too far. Hold about 5 seconds. Then slowly return to your starting position. Repeat 10 times.  2. Heel raises: Stand facing the wall. Slowly raise the heels of your feet up and down, while keeping your toes on the floor. If you have trouble balancing, you can touch the wall with your hands. Repeat 10 times.  More advanced exercises  When you feel comfortable enough, try these exercises.  1. Kneeling lumbar extension: Begin on your hands and knees. At the same time, raise and straighten your right arm and left leg until they are parallel to the ground. Hold for 2 seconds and come back slowly to a starting position. Repeat with left arm and right leg, alternating 10 times.  2. Prone lumbar extension: Lie face down, arms extended overhead, palms on the floor. At the same time, raise your right arm and left leg as high as comfortably possible. Hold for 10 seconds and slowly return to start. Repeat with left arm and right leg, alternating 10 times.  Gradually build up to 20 times. (Advanced: Repeat this exercise raising both arms and both legs a few inches off the floor at the same time. Hold for 5 seconds and release.)  3. Pelvic tilt: Lie on the floor on your back with your knees bent at 90 degrees. Your feet should be flat on the floor. Inhale, exhale, then slowly contract your abdominal muscles bringing your navel toward your spine. Let your pelvis rock back until your lower back is flat on the floor. Hold for 10 seconds while breathing smoothly.  4. Abdominal crunch: Perform a pelvic tilt (above) flattening your lower back against the floor. Holding the tension in your abdominal muscles, take another breath and raise your shoulder blades off the ground (this is not a full sit-up). Keep your head in line with your body (don t bend your neck forward). Hold for 2 seconds, then slowly lower.  Date Last Reviewed: 6/1/2016 2000-2018 [employer]. [address] [place] [address], [place] [address]. All rights reserved. This information is not intended as a substitute for professional medical care. Always follow your healthcare professional's instructions.

## 2018-11-07 NOTE — PROGRESS NOTES
SUBJECTIVE:   Kathrine Isaac is a 63 year old male who presents to clinic today for the following health issues:    Musculoskeletal problem/pain      Duration: three weeks- bilateral SI region, radiating to lateral thigh, great toe is numb.    Description  Location: right leg and both hips    Intensity:  severe    Accompanying signs and symptoms: numbness    History  Previous similar problem: YES- left leg  Previous evaluation:  none    Precipitating or alleviating factors:  Trauma or overuse: no   Aggravating factors include: sitting, standing, walking and climbing stairs    Therapies tried and outcome: Ibuprofen and acupuncture salonpas OTC pain therapies  Patient has long history of intermittent low back pain-treated with aleve and physical therapy which helps some but pain continues to return.  He denies change in activity, no triggering event.  Pain started abruptly in the middle of the night 3 weeks ago.  He's also been using Salonpas, acupuncture, theraworks (spray analgesic), Biofreeze without improvement in symptoms.    Exam Information   Exam Date Exam Time Accession # Performing Department Results    12/27/17  3:07 PM [id number] [facility]    [employer] Interactive Report and InfoRx   View the interactive report   PACS Images   Show images for XR Lumbar Spine 2/3 Views   Study Result   XR LUMBAR SPINE 2-3 VIEWS 12/27/2017 3:07 PM     HISTORY: Low back pain.     COMPARISON: None.     FINDINGS: Lumbar alignment is anatomic. Vertebral body heights and  disc spaces are preserved. Small marginal osteophytes and facet joint  sclerosis in the low lumbar spine reflect moderate degenerative  change.         IMPRESSION: Moderate low lumbar degenerative change.     GENIA MACIAS MD         Diabetes Follow-up    Patient is checking blood sugars: once daily.  Results are as follows:         am - less than 130    Diabetic concerns: None     Symptoms of hypoglycemia (low blood sugar):  none     Paresthesias (numbness or burning in feet) or sores: No     Date of last diabetic eye exam: 4/17/18    BP Readings from Last 2 Encounters:   11/07/18 136/89   08/02/18 139/89     Hemoglobin A1C (%)   Date Value   03/28/2018 7.6   11/17/2017 6.4 (H)     LDL Cholesterol Calculated (mg/dL)   Date Value   11/17/2017 21   10/26/2016 112 (H)       Diabetes Management Resources  Hypertension Follow-up      Outpatient blood pressures are not being checked.    Low Salt Diet: low salt      Problem list and histories reviewed & adjusted, as indicated.  Additional history: as documented    Patient Active Problem List   Diagnosis     Glaucoma suspect     Type 2 diabetes mellitus with hemoglobin A1c goal of 7.0%-8.0% (H)     Hyperlipidemia LDL goal <100     Essential hypertension     Advance care planning     Diverticulosis of large intestine without hemorrhage     Obesity, Class I, BMI 30.0-34.9 (see actual BMI)     Hx of LASIK     Past Surgical History:   Procedure Laterality Date     COLONOSCOPY N/A 1/14/2016    Procedure: COLONOSCOPY;  Surgeon: Ventura Monge MD;  Location: [facility] OR     LASIK BILATERAL       ROTATOR CUFF REPAIR RT/LT  1992     SURGICAL HISTORY OF -       uvula remove        Social History   Substance Use Topics     Smoking status: Never Smoker     Smokeless tobacco: Never Used     Alcohol use Yes     Family History   Problem Relation Age of Onset     Diabetes Mother          Current Outpatient Prescriptions   Medication Sig Dispense Refill     aspirin 81 MG EC tablet TAKE ONE TABLET BY MOUTH EVERY DAY 90 tablet 3     atorvastatin (LIPITOR) 20 MG tablet Take 1 tablet (20 mg) by mouth daily 90 tablet 1     blood glucose monitoring (NO BRAND SPECIFIED) test strip Use to test blood sugars once times daily or as directed 1 Box 11     glimepiride (AMARYL) 4 MG tablet Take 1 tablet (4 mg) by mouth every morning (before breakfast) 90 tablet 3     hydrochlorothiazide 12.5 MG TABS tablet Take 1 tablet  (12.5 mg) by mouth daily 90 tablet 3     lisinopril (PRINIVIL/ZESTRIL) 40 MG tablet Take 1 tablet (40 mg) by mouth daily 90 tablet 3     metFORMIN (GLUCOPHAGE-XR) 500 MG 24 hr tablet Take 4 tablets (2,000 mg) by mouth daily (with dinner) 360 tablet 3     methocarbamol (ROBAXIN) 500 MG tablet Take 2 tablets (1,000 mg) by mouth 3 times daily as needed for muscle spasms 30 tablet 1     methocarbamol (ROBAXIN) 500 MG tablet Take 2 tablets (1,000 mg) by mouth 3 times daily as needed for muscle spasms 30 tablet 1     naproxen (NAPROSYN) 500 MG tablet TAKE ONE TABLET BY MOUTH TWICE A DAY AS NEEDED FOR MODERATE PAIN 90 tablet 1     ORDER FOR DME, SET TO LOCAL PRINT, Equipment being ordered: wrist quick fit [id number] 1 each 0     order for DME Equipment being ordered: One touch lancets, test strips.  Patient to check sugars once times daily, 90 day supply for test strips and lancets, refill 3 times 1 Device 0     order for DME Equipment being ordered: Glucometer per insurance preference, lancets, test strips.  Patient to check sugars two times daily, 90 day supply for test strips and lancets, refill 3 times 1 Device 11     order for DME Equipment being ordered: Glucometer Accucheck Sulema, lancets, test strips.  Patient to check sugars bid times daily, 90 day supply for test strips and lancets, refill 6 times 1 Device 0     order for DME Equipment being ordered: lancets for one touch glucometer for once daily testing 1 Box 6     sitagliptin (JANUVIA) 100 MG tablet Take 1 tablet (100 mg) by mouth daily 90 tablet 1     Recent Labs   Lab Test  03/28/18   0826  11/17/17   0944  04/07/17   0832  10/26/16   0940   07/07/16   1107   11/11/15   1040  05/15/15   0927   A1C  7.6  6.4*  8.4*  9.9*   < >   --    < >  8.0*  7.0*   LDL   --   21   --   112*   --    --    --   61  Cannot estimate LDL when triglyceride exceeds 400 mg/dL   HDL   --   44   --   48   --    --    --   46  44   TRIG   --   166*   --   199*   --    --    --    "291*  428*   ALT   --   42   --    --    --    --    --   49  49   CR   --   0.92   --    --    --   0.78   --   0.81  0.88   GFRESTIMATED   --   83   --    --    --   >90  Non  GFR Calc     --   >90  Non  GFR Calc    89   GFRESTBLACK   --   >90   --    --    --   >90   GFR Calc     --   >90   GFR Calc    >90   GFR Calc     POTASSIUM   --   4.1   --    --    --   4.3   --   4.6  4.8   TSH   --   1.68   --    --    --    --    --    --   1.40    < > = values in this interval not displayed.      BP Readings from Last 3 Encounters:   11/07/18 136/89   08/02/18 139/89   03/28/18 126/80    Wt Readings from Last 3 Encounters:   11/07/18 205 lb 6.4 oz (93.2 kg)   08/02/18 207 lb 12.8 oz (94.3 kg)   03/28/18 206 lb 9.6 oz (93.7 kg)                    Reviewed and updated as needed this visit by clinical staff  Tobacco  Allergies  Meds  Med Hx  Surg Hx  Fam Hx  Soc Hx      Reviewed and updated as needed this visit by Provider         ROS:  Constitutional, HEENT, cardiovascular, pulmonary, gi and gu systems are negative, except as otherwise noted.    OBJECTIVE:     /89  Pulse 78  Temp 97.7  F (36.5  C) (Oral)  Ht 5' 6.8\" (1.697 m)  Wt 205 lb 6.4 oz (93.2 kg)  SpO2 98%  BMI 32.36 kg/m2  Body mass index is 32.36 kg/(m^2).  GENERAL: healthy, alert and no distress  EYES: Eyes grossly normal to inspection, PERRL and conjunctivae and sclerae normal  HENT: ear canals and TM's normal, nose and mouth without ulcers or lesions  NECK: no adenopathy, no asymmetry, masses, or scars and thyroid normal to palpation  RESP: lungs clear to auscultation - no rales, rhonchi or wheezes  CV: regular rate and rhythm, normal S1 S2, no S3 or S4, no murmur, click or rub, no peripheral edema and peripheral pulses strong  ABDOMEN: soft, nontender, no hepatosplenomegaly, no masses and bowel sounds normal  MS: no gross musculoskeletal defects noted, no " "edema  SKIN: no suspicious lesions or rashes  NEURO: Normal strength and tone, mentation intact and speech normal  Comprehensive back pain exam:  No tenderness, Pain limits the following motions: forward flexion, Lower extremity strength functional and equal on both sides, Lower extremity reflexes within normal limits bilaterally, Lower extremity sensation normal and equal on both sides and Straight leg positive on  right, indicating possible ipsilateral radiculopathy  PSYCH: mentation appears normal, affect normal/bright  LYMPH: normal ant/post cervical, supraclavicular nodes    Diagnostic Test Results:  No results found for this or any previous visit (from the past 24 hour(s)).    ASSESSMENT/PLAN:         BMI:   Estimated body mass index is 32.36 kg/(m^2) as calculated from the following:    Height as of this encounter: 5' 6.8\" (1.697 m).    Weight as of this encounter: 205 lb 6.4 oz (93.2 kg).   Weight management plan: Discussed healthy diet and exercise guidelines and patient will follow up in 6 months in clinic to re-evaluate.      1. Acute bilateral low back pain with right-sided sciatica  Referring to Sports Medication for possible steroid injection.  He has done physical therapy, doesn't feel that it helps much.    - SPORTS MEDICINE REFERRAL  - methocarbamol (ROBAXIN) 500 MG tablet; Take 2 tablets (1,000 mg) by mouth 3 times daily as needed for muscle spasms  Dispense: 30 tablet; Refill: 1  2. Type 2 diabetes mellitus with hemoglobin A1c goal of 7.0%-8.0% (H)  Increase Glimepiride to 6 mg daily for 1-2 weeks then increase to 8 mg (2 of the 4 mg tabs daily).  Repeat A1c in 2 months.  Referring to CDE for update.  - Albumin Random Urine Quantitative with Creat Ratio  - Hemoglobin A1c  - Basic metabolic panel  (Ca, Cl, CO2, Creat, Gluc, K, Na, BUN)  - metFORMIN (GLUCOPHAGE-XR) 500 MG 24 hr tablet; Take 4 tablets (2,000 mg) by mouth daily (with dinner)  Dispense: 360 tablet; Refill: 3  - sitagliptin (JANUVIA) " 100 MG tablet; Take 1 tablet (100 mg) by mouth daily  Dispense: 90 tablet; Refill: 1    3. Essential hypertension  Well controlled, continue present management.  - hydrochlorothiazide 12.5 MG TABS tablet; Take 1 tablet (12.5 mg) by mouth daily  Dispense: 90 tablet; Refill: 3  - lisinopril (PRINIVIL/ZESTRIL) 40 MG tablet; Take 1 tablet (40 mg) by mouth daily  Dispense: 90 tablet; Refill: 3    4. Hyperlipidemia LDL goal <100    - Lipid panel reflex to direct LDL Fasting  - AST  - ALT    5. Screening for HIV (human immunodeficiency virus)  Patient was scerened in the [past, declines screening.    6. Need for prophylactic vaccination and inoculation against influenza          Work on weight loss  Regular exercise  See Patient Instructions    ABHISHEK [facility]    Injectable Influenza Immunization Documentation    1.  Is the person to be vaccinated sick today?   No    2. Does the person to be vaccinated have an allergy to a component   of the vaccine?   No  Egg Allergy Algorithm Link    3. Has the person to be vaccinated ever had a serious reaction   to influenza vaccine in the past?   No    4. Has the person to be vaccinated ever had Guillain-Barré syndrome?   No    Form completed by Annabelle Horta MA

## 2018-11-08 ENCOUNTER — OFFICE VISIT (OUTPATIENT)
Dept: ORTHOPEDICS | Facility: CLINIC | Age: 63
End: 2018-11-08
Payer: COMMERCIAL

## 2018-11-08 VITALS
DIASTOLIC BLOOD PRESSURE: 84 MMHG | HEIGHT: 67 IN | BODY MASS INDEX: 32.18 KG/M2 | SYSTOLIC BLOOD PRESSURE: 136 MMHG | WEIGHT: 205 LBS

## 2018-11-08 DIAGNOSIS — M54.41 ACUTE RIGHT-SIDED LOW BACK PAIN WITH RIGHT-SIDED SCIATICA: Primary | ICD-10-CM

## 2018-11-08 PROCEDURE — 99243 OFF/OP CNSLTJ NEW/EST LOW 30: CPT | Performed by: FAMILY MEDICINE

## 2018-11-08 ASSESSMENT — ANXIETY QUESTIONNAIRES: GAD7 TOTAL SCORE: 0

## 2018-11-08 NOTE — LETTER
11/8/2018         RE: Kathrine Isaac  [id number] [facility] [id number]        Dear Colleague,    Thank you for referring your patient, Kathrine Isaac, to the [facility] [place]. Please see a copy of my visit note below.    ASSESSMENT & PLAN  Kathrine was seen today for pain.    Diagnoses and all orders for this visit:    Acute right-sided low back pain with right-sided sciatica  -     PAIN MANAGEMENT REFERRAL  -     [facility] PT, HAND, AND CHIROPRACTIC REFERRAL; Future  -     MRI Lumbar spine w/o contrast; Future    Patient is a 63-year-old male with no sig past medical history presenting for 3-week history of bilateral low back pain with symptoms radiating to his legs and toes.  Patient is notable to have positive slump and straight leg raise on the right side concerning for right-sided sciatica.  No red flag signs or symptoms on examination/history today.  Given this is likely having a flare of acute sciatica.  He has had some back pain in the past but nothing this severe.  Given this we will order MRI, referral to pain for possible steroid injection and refer to physical therapy.  Patient to follow-up if symptoms not improve or worsen over the next 2-3 months patient understands and agrees to plan.    -----    SUBJECTIVE  Kathrine Isaac is a/an 63 year old male who is seen in consultation at the request of  Nighat Joe C.N.P. for evaluation of low back pain. The patient is seen by themselves.    Onset: 3 week(s) ago. Reports insidious onset without acute precipitating event.  Pt has had episodes in the past, nothing this significant.  Pt does work, desk job, no heavy lifting.  Pain worse with bending.   Location of Pain: bilateral low back pain, pain radiating down right leg to toes  Rating of Pain at worst: 10/10  Rating of Pain Currently: 7/10  Worsened by: sleeping around 3am, prolonged sitting, hip flexion  Better with: hot tub  Treatments tried: heat, ibuprofen  "and physical therapy   Quality: sharp, shooting pricking  Red flags: Weakness: Yes, bowel/bladder loss: Yes, foot drop: No  Associated symptoms: numbness  Orthopedic history: YES - self limiting back pain  Relevant surgical history: NO  Past Medical History:   Diagnosis Date     Arthritis      Diabetes (H)      Essential hypertension 10/28/2015     Social History     Social History     Marital status: Single     Spouse name: N/A     Number of children: N/A     Years of education: N/A     Social History Main Topics     Smoking status: Never Smoker     Smokeless tobacco: Never Used     Alcohol use Yes     Drug use: No     Sexual activity: Not on file     Other Topics Concern     Not on file     Social History Narrative         Patient's past medical, surgical, social, and family histories were reviewed today and no changes are noted.    REVIEW OF SYSTEMS:  10 point ROS is negative other than symptoms noted above in HPI, Past Medical History or as stated below  Constitutional: NEGATIVE for fever, chills, change in weight  Skin: NEGATIVE for worrisome rashes, moles or lesions  GI/: NEGATIVE for bowel or bladder changes  Neuro: NEGATIVE for weakness, dizziness or paresthesias    OBJECTIVE:  /84  Ht 5' 6.8\" (1.697 m)  Wt 205 lb (93 kg)  BMI 32.3 kg/m2   General: healthy, alert and in no distress  HEENT: no scleral icterus or conjunctival erythema  Skin: no suspicious lesions or rash. No jaundice.  CV: no pedal edema  Resp: normal respiratory effort without conversational dyspnea   Psych: normal mood and affect  Gait: normal steady gait with appropriate coordination and balance  Neuro: normal light touch sensory exam of the bilateral lower extremities.  DTR's 2+ patella and achilles bilaterally.  MSK:  THORACIC/LUMBAR SPINE  Inspection:    No gross deformity/asymmetry  Palpation:    Tender about the right parathoracic muscles and right sciatic notch. Otherwise remainder of landmarks are nontender.  Range of " Motion:     Lumbar flexion limited slightly by pain    Lumbar extension show full range  Strength:    5/5 - quadriceps, hamstrings, tibialis anterior, gastrocsoleus, and extensor hallicus longus  Special Tests:    Positive: straight leg raise (right), slump test (right)    Negative: DAVION (bilateral), FADIR (bilateral)    BILATERAL HIP  Inspection:    No obvious deformity or asymmetry, pelvis level  Palpation:    Nontender.  Active Range of Motion:     Flexion show full range, IR show full range, ER  show full range  Strength:    Flexion 5/5, adduction 5/5, abduction 5/5  Special Tests:    Positive: None    Negative: Logroll, DAVION, anterior impingement (FADIR)    Independent visualization of the below image:  No results found for this or any previous visit (from the past 24 hour(s)).  XR LUMBAR SPINE 2-3 VIEWS 12/27/2017 3:07 PM     HISTORY: Low back pain.     COMPARISON: None.     FINDINGS: Lumbar alignment is anatomic. Vertebral body heights and  disc spaces are preserved. Small marginal osteophytes and facet joint  sclerosis in the low lumbar spine reflect moderate degenerative  change.         IMPRESSION: Moderate low lumbar degenerative change.     GENIA MACIAS MD    Patient's conditions were thoroughly discussed during today's visit with greater than 50% of the visit spent counseling the patient with total time spent face-to-face with the patient being 30 minutes.    Alexandr Ibarra MD [facility]      Again, thank you for allowing me to participate in the care of your patient.        Sincerely,        Alexandr Ibarra MD

## 2018-11-08 NOTE — MR AVS SNAPSHOT
"              After Visit Summary   11/8/2018    Kathrine Isaac    MRN: [id number]           Patient Information     Date Of Birth          1955        Visit Information        Provider Department      11/8/2018 5:20 PM Alexandr Ibarra MD [facility]        Today's Diagnoses     Acute right-sided low back pain with right-sided sciatica    -  1      Care Instructions    Yoga Strap hamstring and quadriceps stretching 3 sets each side for 30 sec holds then 10 sec further    * Sciatica    Sciatica (\"Lumbar Radiculopathy\") causes a pain that spreads from the lower back down into the buttock, hip and leg. Sometimes leg pain can occur without any back pain. Sciatica is due to irritation or pressure on a spinal nerve as it comes out of the spinal canal. This is most often due to pressure on the nerve from a nearby spinal disk (the cartilage cushion between each spinal bone). Other causes include spinal stenosis (narrowing of the spinal canal) and spasm of the piriformis muscle (a muscle in the buttocks that the sciatic nerve passes through).  Sciatica may begin after a sudden twisting/bending force (such as in a car accident), or sometimes after a simple awkward movement. In either case, muscle spasm is commonly present and can add to the pain.  The diagnosis of sciatica is made from the symptoms and physical exam. Unless you had a physical injury (such as a car accident or fall), X-rays are usually not ordered for the initial evaluation of sciatica because the nerves and disks cannot be seen on an X-ray. Most sciatica (80-90%) gets better with time.  What can I do about my low back pain?  There are three main things you can do to ease low back pain and help it go away.    Stay active! Use positions, movements and exercises. Too much rest can make your symptoms worse.    Use heat or cold packs.    Take medicine as directed.  Using positions, movements and exercises  Research tells us " that moving your joints and muscles can help you recover from back pain. Such activity should be simple and gentle.  Use walking to help relieve your discomfort. Try taking a short walk every 3 to 4 hours during the day. Walk for a few minutes inside your home or take longer walks outside, on a treadmill or at a mall. Slowly increase the amount of time you walk. Expect discomfort when you begin, but it should lessen as your back starts to recover.  Finding a position that is comfortable  When your back pain is new, you may find that certain positions will ease your pain. Gently try each of the following positions until you find one that eases your pain. Once you find a position of comfort, use it as often as you like while you recover. Return to your daily routine as soon as possible.     Lie on your back with your legs bent. You can do this by placing a pillow under your knees or lie on the floor and rest your lower legs on the seat of a chair.    Lie on your side with your knees bent and place a pillow between your knees.    Lie on your stomach over pillows.  Using heat or cold packs  Try cold packs or gentle heat to ease your pain. Use whichever gives you the most relief. Apply the cold pack or heat for 15 minutes at a time, as often as needed.  Taking medicine  If taking over-the-counter medicine:    Take ibuprofen (Advil, Motrin) 600 mg. three times a day as needed for pain.  OR    Take Aleve (naproxen sodium) 220 to 440 mg. two times a day as needed for pain.  If your doctor prescribed medicine, follow the instructions. Stop taking the medicine as soon as you can.  When should I call my doctor?  Your back pain should improve over the first couple of weeks. As it improves, you should be able to return to your normal activities. But call your doctor if:    You have a sudden change in your ability to control? your bladder or bowels.    You begin to feel tingling in your groin or legs.    The pain spreads down  your leg and into your foot.    Your toes, feet or leg muscles begin to feel weak.    You feel generally unwell or sick.    Your pain gets worse.    3705-2857 [employer]. [address] [place] [address], [place] [address]. All rights reserved. This information is not intended as a substitute for professional medical care. Always follow your healthcare professional's instructions.  This information has been modified by your health care provider with permission from the publisher.                Follow-ups after your visit        Additional Services     [facility] PT, HAND, AND CHIROPRACTIC REFERRAL       Physical Therapy, Hand Therapy and Chiropractic Care are available through:  *[facility]  *Hand Therapy (Occupational Therapy or Physical Therapy)  *[facility]    Call one number to schedule at any of the above locations: [phone].    Physical therapy, Hand therapy and/or Chiropractic care has been recommended by your physician as an excellent treatment option to reduce pain and help people return to normal activities, including sports.  Therapy and/or chiropractic care services are a great complement or alternative to expensive and invasive surgery, injections, or long-term use of prescription medications. The primary goal is to identify the underlying problem and provide you the tools to manage your condition on your own.     Please be aware that coverage of these services is subject to the terms and limitations of your health insurance plan.  Call member services at your health plan with any benefit or coverage questions.      Please bring the following to your appointment:  *Your personal calendar for scheduling future appointments  *Comfortable clothing            PAIN MANAGEMENT REFERRAL       Your provider has referred you to: [facility] [place]: [facility] -    Reason for Referral: Procedure Order Epidural:  Lumbar (Advanced imaging required in the last  3 years)      What is your diagnosis for the patient's pain? Right sided sciatica      For any questions, contact the [facility] at [phone].     **ANY DIAGNOSTIC TESTS THAT ARE NOT IN EPIC SHOULD BE SENT TO THE PAIN CENTER**    REGARDING OPIOID MEDICATIONS:  The discussion of opioids management, appropriateness of therapy, and dosing will be discussed in patients being seen for evaluation.  The pain management clinics are not long-term prescribing clinics, with transition of prescribing of medications ultimately going back to the referring provider/PCP.  If prescribing is taken over at the pain clinic, it is in actively involved patients whom are appropriate for opioids, urine drug screening is completed, and long-term prescribing plan has been determined.  Therefore, we will not be automatically taking over prescribing at the patient's first visit.  Is this agreeable to you? agrees.     Please be aware that coverage of these services is subject to the terms and limitations of your health insurance plan.  Call member services at your health plan with any benefit or coverage questions.      Please bring the following with you to your appointment:    (1) Any X-Rays, CTs or MRIs which have been performed.  Contact the facility where they were done to arrange for  prior to your scheduled appointment.    (2) List of current medications   (3) This referral request   (4) Any documents/labs given to you for this referral                  Follow-up notes from your care team     Return in about 1 month (around 12/8/2018).      Future tests that were ordered for you today     Open Future Orders        Priority Expected Expires Ordered    [facility] PT, HAND, AND CHIROPRACTIC REFERRAL Routine  11/8/2019 11/8/2018            Who to contact     If you have questions or need follow up information about today's clinic visit or your schedule please contact [facility]  "directly at [phone].  Normal or non-critical lab and imaging results will be communicated to you by MyChart, letter or phone within 4 business days after the clinic has received the results. If you do not hear from us within 7 days, please contact the clinic through [employer] or phone. If you have a critical or abnormal lab result, we will notify you by phone as soon as possible.  Submit refill requests through [employer] or call your pharmacy and they will forward the refill request to us. Please allow 3 business days for your refill to be completed.          Additional Information About Your Visit        [employer] Information     [employer] gives you secure access to your electronic health record. If you see a primary care provider, you can also send messages to your care team and make appointments. If you have questions, please call your primary care clinic.  If you do not have a primary care provider, please call [phone] and they will assist you.        Care EveryWhere ID     This is your Care EveryWhere ID. This could be used by other organizations to access your [facility] medical records  [id number]        Your Vitals Were     Height BMI (Body Mass Index)                5' 6.8\" (1.697 m) 32.3 kg/m2           Blood Pressure from Last 3 Encounters:   11/08/18 136/84   11/07/18 136/89   08/02/18 139/89    Weight from Last 3 Encounters:   11/08/18 205 lb (93 kg)   11/07/18 205 lb 6.4 oz (93.2 kg)   08/02/18 207 lb 12.8 oz (94.3 kg)              We Performed the Following     PAIN MANAGEMENT REFERRAL        Primary Care Provider Office Phone # Fax #    ABHISHEK Beauchamp [phone]       [address] [place] [address]        Equal Access to Services     MICHAEL BENNETT : Rashid Espinal, wagiuseppe vickers, qasusy kaalmacortney reynolds, alexis miranda. So [facility] [phone].    ATENCIÓN: Si habla español, tiene a dan disposición servicios " steven de asistencia lingüística. David yip [phone].    We comply with applicable federal civil rights laws and [place] laws. We do not discriminate on the basis of race, color, national origin, age, disability, sex, sexual orientation, or gender identity.            Thank you!     Thank you for choosing [facility]  for your care. Our goal is always to provide you with excellent care. Hearing back from our patients is one way we can continue to improve our services. Please take a few minutes to complete the written survey that you may receive in the mail after your visit with us. Thank you!             Your Updated Medication List - Protect others around you: Learn how to safely use, store and throw away your medicines at www.disposemymeds.org.          This list is accurate as of 11/8/18  5:44 PM.  Always use your most recent med list.                   Brand Name Dispense Instructions for use Diagnosis    aspirin 81 MG EC tablet     90 tablet    TAKE ONE TABLET BY MOUTH EVERY DAY    Type 2 diabetes mellitus with retinopathy, without long-term current use of insulin, macular edema presence unspecified, unspecified laterality, unspecified retinopathy severity (H)       atorvastatin 20 MG tablet    LIPITOR    90 tablet    Take 1 tablet (20 mg) by mouth daily    Hyperlipidemia LDL goal <100       blood glucose monitoring test strip    no brand specified    1 Box    Use to test blood sugars once times daily or as directed    Type 2 diabetes mellitus without complication (H)       * glimepiride 4 MG tablet    AMARYL    90 tablet    Take 1 tablet (4 mg) by mouth every morning (before breakfast)    Type 2 diabetes mellitus with retinopathy, without long-term current use of insulin, macular edema presence unspecified, unspecified laterality, unspecified retinopathy severity (H)       * glimepiride 2 MG tablet    AMARYL    15 tablet    Take 1 tablet (2 mg) by mouth every morning  (before breakfast) In addition to 1-4 mg tab for 6 mg daily dose.  Take this for 1-2 weeks.    Type 2 diabetes mellitus with hemoglobin A1c goal of 7.0%-8.0% (H)       hydrochlorothiazide 12.5 MG Tabs tablet     90 tablet    Take 1 tablet (12.5 mg) by mouth daily    Essential hypertension       lisinopril 40 MG tablet    PRINIVIL/ZESTRIL    90 tablet    Take 1 tablet (40 mg) by mouth daily    Essential hypertension       metFORMIN 500 MG 24 hr tablet    GLUCOPHAGE-XR    360 tablet    Take 4 tablets (2,000 mg) by mouth daily (with dinner)    Type 2 diabetes mellitus with hemoglobin A1c goal of 7.0%-8.0% (H)       methocarbamol 500 MG tablet    ROBAXIN    30 tablet    Take 2 tablets (1,000 mg) by mouth 3 times daily as needed for muscle spasms        naproxen 500 MG tablet    NAPROSYN    90 tablet    TAKE ONE TABLET BY MOUTH TWICE A DAY AS NEEDED FOR MODERATE PAIN    Chronic pain of left knee       order for DME     1 each    Equipment being ordered: wrist quick fit [id number]    Pain in right elbow       order for DME     1 Box    Equipment being ordered: lancets for one touch glucometer for once daily testing    Type 2 diabetes mellitus without complication (H)       * order for DME     1 Device    Equipment being ordered: Glucometer Accucheck Sulema, lancets, test strips.  Patient to check sugars bid times daily, 90 day supply for test strips and lancets, refill 6 times    Type 2 diabetes mellitus without complication (H)       * order for DME     1 Device    Equipment being ordered: Glucometer per insurance preference, lancets, test strips.  Patient to check sugars two times daily, 90 day supply for test strips and lancets, refill 3 times    Type 2 diabetes mellitus with retinopathy, without long-term current use of insulin, macular edema presence unspecified, unspecified laterality, unspecified retinopathy severity (H)       order for DME     1 Device    Equipment being ordered: One touch lancets, test strips.   Patient to check sugars once times daily, 90 day supply for test strips and lancets, refill 3 times    Type 2 diabetes mellitus with hemoglobin A1c goal of 7.0%-8.0% (H)       sitagliptin 100 MG tablet    JANUVIA    90 tablet    Take 1 tablet (100 mg) by mouth daily    Type 2 diabetes mellitus with hemoglobin A1c goal of 7.0%-8.0% (H)       * Notice:  This list has 4 medication(s) that are the same as other medications prescribed for you. Read the directions carefully, and ask your doctor or other care provider to review them with you.

## 2018-11-08 NOTE — PATIENT INSTRUCTIONS
"Yoga Strap hamstring and quadriceps stretching 3 sets each side for 30 sec holds then 10 sec further    * Sciatica    Sciatica (\"Lumbar Radiculopathy\") causes a pain that spreads from the lower back down into the buttock, hip and leg. Sometimes leg pain can occur without any back pain. Sciatica is due to irritation or pressure on a spinal nerve as it comes out of the spinal canal. This is most often due to pressure on the nerve from a nearby spinal disk (the cartilage cushion between each spinal bone). Other causes include spinal stenosis (narrowing of the spinal canal) and spasm of the piriformis muscle (a muscle in the buttocks that the sciatic nerve passes through).  Sciatica may begin after a sudden twisting/bending force (such as in a car accident), or sometimes after a simple awkward movement. In either case, muscle spasm is commonly present and can add to the pain.  The diagnosis of sciatica is made from the symptoms and physical exam. Unless you had a physical injury (such as a car accident or fall), X-rays are usually not ordered for the initial evaluation of sciatica because the nerves and disks cannot be seen on an X-ray. Most sciatica (80-90%) gets better with time.  What can I do about my low back pain?  There are three main things you can do to ease low back pain and help it go away.    Stay active! Use positions, movements and exercises. Too much rest can make your symptoms worse.    Use heat or cold packs.    Take medicine as directed.  Using positions, movements and exercises  Research tells us that moving your joints and muscles can help you recover from back pain. Such activity should be simple and gentle.  Use walking to help relieve your discomfort. Try taking a short walk every 3 to 4 hours during the day. Walk for a few minutes inside your home or take longer walks outside, on a treadmill or at a mall. Slowly increase the amount of time you walk. Expect discomfort when you begin, but it should " lessen as your back starts to recover.  Finding a position that is comfortable  When your back pain is new, you may find that certain positions will ease your pain. Gently try each of the following positions until you find one that eases your pain. Once you find a position of comfort, use it as often as you like while you recover. Return to your daily routine as soon as possible.     Lie on your back with your legs bent. You can do this by placing a pillow under your knees or lie on the floor and rest your lower legs on the seat of a chair.    Lie on your side with your knees bent and place a pillow between your knees.    Lie on your stomach over pillows.  Using heat or cold packs  Try cold packs or gentle heat to ease your pain. Use whichever gives you the most relief. Apply the cold pack or heat for 15 minutes at a time, as often as needed.  Taking medicine  If taking over-the-counter medicine:    Take ibuprofen (Advil, Motrin) 600 mg. three times a day as needed for pain.  OR    Take Aleve (naproxen sodium) 220 to 440 mg. two times a day as needed for pain.  If your doctor prescribed medicine, follow the instructions. Stop taking the medicine as soon as you can.  When should I call my doctor?  Your back pain should improve over the first couple of weeks. As it improves, you should be able to return to your normal activities. But call your doctor if:    You have a sudden change in your ability to control? your bladder or bowels.    You begin to feel tingling in your groin or legs.    The pain spreads down your leg and into your foot.    Your toes, feet or leg muscles begin to feel weak.    You feel generally unwell or sick.    Your pain gets worse.    6984-9600 [employer]. [address] [place] [address], [place] [address]. All rights reserved. This information is not intended as a substitute for professional medical care. Always follow your healthcare professional's instructions.  This information has been  modified by your health care provider with permission from the publisher.

## 2018-11-08 NOTE — PROGRESS NOTES
ASSESSMENT & PLAN  Kathrine was seen today for pain.    Diagnoses and all orders for this visit:    Acute right-sided low back pain with right-sided sciatica  -     PAIN MANAGEMENT REFERRAL  -     [facility] PT, HAND, AND CHIROPRACTIC REFERRAL; Future  -     MRI Lumbar spine w/o contrast; Future    Patient is a 63-year-old male with no sig past medical history presenting for 3-week history of bilateral low back pain with symptoms radiating to his legs and toes.  Patient is notable to have positive slump and straight leg raise on the right side concerning for right-sided sciatica.  No red flag signs or symptoms on examination/history today.  Given this is likely having a flare of acute sciatica.  He has had some back pain in the past but nothing this severe.  Given this we will order MRI, referral to pain for possible steroid injection and refer to physical therapy.  Patient to follow-up if symptoms not improve or worsen over the next 2-3 months patient understands and agrees to plan.    -----    SUBJECTIVE  Kathrine Isaac is a/an 63 year old male who is seen in consultation at the request of  Nighat Joe C.N.P. for evaluation of low back pain. The patient is seen by themselves.    Onset: 3 week(s) ago. Reports insidious onset without acute precipitating event.  Pt has had episodes in the past, nothing this significant.  Pt does work, desk job, no heavy lifting.  Pain worse with bending.   Location of Pain: bilateral low back pain, pain radiating down right leg to toes  Rating of Pain at worst: 10/10  Rating of Pain Currently: 7/10  Worsened by: sleeping around 3am, prolonged sitting, hip flexion  Better with: hot tub  Treatments tried: heat, ibuprofen and physical therapy   Quality: sharp, shooting pricking  Red flags: Weakness: Yes, bowel/bladder loss: Yes, foot drop: No  Associated symptoms: numbness  Orthopedic history: YES - self limiting back pain  Relevant surgical history: NO  Past Medical History:   Diagnosis  "Date     Arthritis      Diabetes (H)      Essential hypertension 10/28/2015     Social History     Social History     Marital status: Single     Spouse name: N/A     Number of children: N/A     Years of education: N/A     Social History Main Topics     Smoking status: Never Smoker     Smokeless tobacco: Never Used     Alcohol use Yes     Drug use: No     Sexual activity: Not on file     Other Topics Concern     Not on file     Social History Narrative         Patient's past medical, surgical, social, and family histories were reviewed today and no changes are noted.    REVIEW OF SYSTEMS:  10 point ROS is negative other than symptoms noted above in HPI, Past Medical History or as stated below  Constitutional: NEGATIVE for fever, chills, change in weight  Skin: NEGATIVE for worrisome rashes, moles or lesions  GI/: NEGATIVE for bowel or bladder changes  Neuro: NEGATIVE for weakness, dizziness or paresthesias    OBJECTIVE:  /84  Ht 5' 6.8\" (1.697 m)  Wt 205 lb (93 kg)  BMI 32.3 kg/m2   General: healthy, alert and in no distress  HEENT: no scleral icterus or conjunctival erythema  Skin: no suspicious lesions or rash. No jaundice.  CV: no pedal edema  Resp: normal respiratory effort without conversational dyspnea   Psych: normal mood and affect  Gait: normal steady gait with appropriate coordination and balance  Neuro: normal light touch sensory exam of the bilateral lower extremities.  DTR's 2+ patella and achilles bilaterally.  MSK:  THORACIC/LUMBAR SPINE  Inspection:    No gross deformity/asymmetry  Palpation:    Tender about the right parathoracic muscles and right sciatic notch. Otherwise remainder of landmarks are nontender.  Range of Motion:     Lumbar flexion limited slightly by pain    Lumbar extension show full range  Strength:    5/5 - quadriceps, hamstrings, tibialis anterior, gastrocsoleus, and extensor hallicus longus  Special Tests:    Positive: straight leg raise (right), slump test (right)    " Negative: DAVION (bilateral), FADIR (bilateral)    BILATERAL HIP  Inspection:    No obvious deformity or asymmetry, pelvis level  Palpation:    Nontender.  Active Range of Motion:     Flexion show full range, IR show full range, ER  show full range  Strength:    Flexion 5/5, adduction 5/5, abduction 5/5  Special Tests:    Positive: None    Negative: Logroll, DAVION, anterior impingement (FADIR)    Independent visualization of the below image:  No results found for this or any previous visit (from the past 24 hour(s)).  XR LUMBAR SPINE 2-3 VIEWS 12/27/2017 3:07 PM     HISTORY: Low back pain.     COMPARISON: None.     FINDINGS: Lumbar alignment is anatomic. Vertebral body heights and  disc spaces are preserved. Small marginal osteophytes and facet joint  sclerosis in the low lumbar spine reflect moderate degenerative  change.         IMPRESSION: Moderate low lumbar degenerative change.     GENIA MACIAS MD    Patient's conditions were thoroughly discussed during today's visit with greater than 50% of the visit spent counseling the patient with total time spent face-to-face with the patient being 30 minutes.    Alexandr Ibarra MD [facility]

## 2018-11-09 ENCOUNTER — TELEPHONE (OUTPATIENT)
Dept: ORTHOPEDICS | Facility: CLINIC | Age: 63
End: 2018-11-09

## 2018-11-09 NOTE — TELEPHONE ENCOUNTER
Patient called to schedule his MRI but there was not an order placed. Please call him when this is done.

## 2018-11-09 NOTE — TELEPHONE ENCOUNTER
Called and left message for patient stating that the MRI was now in his chart and signed.  He should now be able to be scheduled for the scan.    Lara Plummer, ATC

## 2018-11-14 ENCOUNTER — RADIANT APPOINTMENT (OUTPATIENT)
Dept: MRI IMAGING | Facility: CLINIC | Age: 63
End: 2018-11-14
Attending: FAMILY MEDICINE
Payer: COMMERCIAL

## 2018-11-14 DIAGNOSIS — M54.41 ACUTE RIGHT-SIDED LOW BACK PAIN WITH RIGHT-SIDED SCIATICA: ICD-10-CM

## 2018-11-14 PROCEDURE — 72148 MRI LUMBAR SPINE W/O DYE: CPT | Mod: TC

## 2018-11-16 ENCOUNTER — TELEPHONE (OUTPATIENT)
Dept: ORTHOPEDICS | Facility: CLINIC | Age: 63
End: 2018-11-16

## 2018-11-20 ENCOUNTER — OFFICE VISIT (OUTPATIENT)
Dept: ORTHOPEDICS | Facility: CLINIC | Age: 63
End: 2018-11-20
Payer: COMMERCIAL

## 2018-11-20 VITALS — SYSTOLIC BLOOD PRESSURE: 144 MMHG | DIASTOLIC BLOOD PRESSURE: 77 MMHG

## 2018-11-20 DIAGNOSIS — M54.41 ACUTE RIGHT-SIDED LOW BACK PAIN WITH RIGHT-SIDED SCIATICA: Primary | ICD-10-CM

## 2018-11-20 PROCEDURE — 99213 OFFICE O/P EST LOW 20 MIN: CPT | Performed by: FAMILY MEDICINE

## 2018-11-20 NOTE — PROGRESS NOTES
ASSESSMENT & PLAN  Kathrine was seen today for follow up for.    Diagnoses and all orders for this visit:    Acute right-sided low back pain with right-sided sciatica    Patient is a 63-year-old male with no sig past medical history presenting for follow-up evaluation of right low back pain with symptoms of sciatica on the right side with notable positive straight leg raise and slump tests.  Pain overall improved at this time with home exercise plan.  MRI showing degenerative changes as well as some circumferential disc bulging as well as neuroforaminal stenosis worse at the L4/L5 level.  This could be contributing to patient's pain but it is reassuring that his symptoms are overall improving at this time.  No red flag signs or symptoms on history or examination today.  Will have patient continue home exercise plan and monitor symptoms.  If symptoms return or worsen can consider an epidural steroid injection at that time.  Patient understands and agrees with plan.    -----    SUBJECTIVE  Kathrine Isaac is a/an 63 year old male who is seen in consultation at the request of  Nighat Joe C.N.P. for evaluation of low back pain. The patient is seen by themselves.    Onset: 3 week(s) ago. Reports insidious onset without acute precipitating event.  Pt has had episodes in the past, nothing this significant.  Pt does work, desk job, no heavy lifting.  Pain worse with bending.   Location of Pain: bilateral low back pain, pain radiating down right leg to toes  Rating of Pain at worst: 10/10  Rating of Pain Currently: 7/10  Worsened by: sleeping around 3am, prolonged sitting, hip flexion  Better with: hot tub  Treatments tried: heat, ibuprofen and physical therapy   Quality: sharp, shooting pricking  Red flags: Weakness: Yes, bowel/bladder loss: Yes, foot drop: No  Associated symptoms: numbness  Orthopedic history: YES - self limiting back pain  Relevant surgical history: NO    Interim History - November 20, 2018  Since  last visit on 11/9/2018 patient has improved low back pain, continues to have tingling in lateral right lower leg.  States that he is feeling 80% better from doing the HEP. No interim injury.  Pt has had sx like this in the past and wanted to make sure nothing concerning could be causing this.  Pt is happy that pain is improving at this time.      Past Medical History:   Diagnosis Date     Arthritis      Diabetes (H)      Essential hypertension 10/28/2015     Social History     Social History     Marital status: Single     Spouse name: N/A     Number of children: N/A     Years of education: N/A     Social History Main Topics     Smoking status: Never Smoker     Smokeless tobacco: Never Used     Alcohol use Yes     Drug use: No     Sexual activity: Not on file     Other Topics Concern     Not on file     Social History Narrative         Patient's past medical, surgical, social, and family histories were reviewed today and no changes are noted.    REVIEW OF SYSTEMS:  10 point ROS is negative other than symptoms noted above in HPI, Past Medical History or as stated below  Constitutional: NEGATIVE for fever, chills, change in weight  Skin: NEGATIVE for worrisome rashes, moles or lesions  GI/: NEGATIVE for bowel or bladder changes  Neuro: NEGATIVE for weakness, dizziness or paresthesias    OBJECTIVE:  /77   General: healthy, alert and in no distress  HEENT: no scleral icterus or conjunctival erythema  Skin: no suspicious lesions or rash. No jaundice.  CV: no pedal edema  Resp: normal respiratory effort without conversational dyspnea   Psych: normal mood and affect  Gait: normal steady gait with appropriate coordination and balance  Neuro: normal light touch sensory exam of the bilateral lower extremities.  DTR's 2+ patella and achilles bilaterally.  MSK:  THORACIC/LUMBAR SPINE  Inspection:    No gross deformity/asymmetry  Palpation:    Tender about the right parathoracic muscles and right sciatic notch. Otherwise  remainder of landmarks are nontender.  Range of Motion:     Lumbar flexion limited slightly by pain    Lumbar extension show full range  Strength:    5/5 - quadriceps, hamstrings, tibialis anterior, gastrocsoleus, and extensor hallicus longus  Special Tests:    Positive: straight leg raise (right), slump test (right)    Negative: DAVION (bilateral), FADIR (bilateral)      Independent visualization of the below image:  No results found for this or any previous visit (from the past 24 hour(s)).  XR LUMBAR SPINE 2-3 VIEWS 12/27/2017 3:07 PM     HISTORY: Low back pain.     COMPARISON: None.     FINDINGS: Lumbar alignment is anatomic. Vertebral body heights and  disc spaces are preserved. Small marginal osteophytes and facet joint  sclerosis in the low lumbar spine reflect moderate degenerative  change.         IMPRESSION: Moderate low lumbar degenerative change.     GENIA MACIAS MD    Patient's conditions were thoroughly discussed during today's visit with greater than 50% of the visit spent counseling the patient with total time spent face-to-face with the patient being 30 minutes.    Alexandr Ibarra MD [facility]

## 2018-11-20 NOTE — MR AVS SNAPSHOT
After Visit Summary   11/20/2018    Kathrine Isaac    MRN: [id number]           Patient Information     Date Of Birth          1955        Visit Information        Provider Department      11/20/2018 11:20 AM Alexandr Ibarra MD [facility]        Today's Diagnoses     Acute right-sided low back pain with right-sided sciatica    -  1      Care Instructions    Patient to follow up with Primary Care provider regarding elevated blood pressure.            Follow-ups after your visit        Who to contact     If you have questions or need follow up information about today's clinic visit or your schedule please contact [facility] directly at [phone].  Normal or non-critical lab and imaging results will be communicated to you by MyChart, letter or phone within 4 business days after the clinic has received the results. If you do not hear from us within 7 days, please contact the clinic through [employer] or phone. If you have a critical or abnormal lab result, we will notify you by phone as soon as possible.  Submit refill requests through [employer] or call your pharmacy and they will forward the refill request to us. Please allow 3 business days for your refill to be completed.          Additional Information About Your Visit        MyChart Information     [employer] gives you secure access to your electronic health record. If you see a primary care provider, you can also send messages to your care team and make appointments. If you have questions, please call your primary care clinic.  If you do not have a primary care provider, please call [phone] and they will assist you.        Care EveryWhere ID     This is your Care EveryWhere ID. This could be used by other organizations to access your [facility] medical records  [id number]         Blood Pressure from Last 3 Encounters:   11/20/18 144/77   11/08/18 136/84   11/07/18 136/89     Weight from Last 3 Encounters:   11/08/18 205 lb (93 kg)   11/07/18 205 lb 6.4 oz (93.2 kg)   08/02/18 207 lb 12.8 oz (94.3 kg)              Today, you had the following     No orders found for display       Primary Care Provider Office Phone # Fax #    ABHISHEK Beauchamp [phone]       [address] [place] [address]        Equal Access to Services     MICHAEL BENNETT : Hadii aad ku hadasho Soomaali, waaxda luqadaha, qaybta kaalmada adeegyada, waxay idiin hayaan adeeg kharash laabdirashid . So [facility] [phone].    ATENCIÓN: Si habla español, tiene a dan disposición servicios gratuitos de asistencia lingüística. Ll[facility] [phone].    We comply with applicable federal civil rights laws and [place] laws. We do not discriminate on the basis of race, color, national origin, age, disability, sex, sexual orientation, or gender identity.            Thank you!     Thank you for choosing [facility]  for your care. Our goal is always to provide you with excellent care. Hearing back from our patients is one way we can continue to improve our services. Please take a few minutes to complete the written survey that you may receive in the mail after your visit with us. Thank you!             Your Updated Medication List - Protect others around you: Learn how to safely use, store and throw away your medicines at www.disposemymeds.org.          This list is accurate as of 11/20/18  2:59 PM.  Always use your most recent med list.                   Brand Name Dispense Instructions for use Diagnosis    aspirin 81 MG EC tablet     90 tablet    TAKE ONE TABLET BY MOUTH EVERY DAY    Type 2 diabetes mellitus with retinopathy, without long-term current use of insulin, macular edema presence unspecified, unspecified laterality, unspecified retinopathy severity (H)       atorvastatin 20 MG tablet    LIPITOR    90 tablet    Take 1 tablet (20 mg) by mouth daily     Hyperlipidemia LDL goal <100       blood glucose monitoring test strip    no brand specified    1 Box    Use to test blood sugars once times daily or as directed    Type 2 diabetes mellitus without complication (H)       * glimepiride 4 MG tablet    AMARYL    90 tablet    Take 1 tablet (4 mg) by mouth every morning (before breakfast)    Type 2 diabetes mellitus with retinopathy, without long-term current use of insulin, macular edema presence unspecified, unspecified laterality, unspecified retinopathy severity (H)       * glimepiride 2 MG tablet    AMARYL    15 tablet    Take 1 tablet (2 mg) by mouth every morning (before breakfast) In addition to 1-4 mg tab for 6 mg daily dose.  Take this for 1-2 weeks.    Type 2 diabetes mellitus with hemoglobin A1c goal of 7.0%-8.0% (H)       hydrochlorothiazide 12.5 MG Tabs tablet     90 tablet    Take 1 tablet (12.5 mg) by mouth daily    Essential hypertension       lisinopril 40 MG tablet    PRINIVIL/ZESTRIL    90 tablet    Take 1 tablet (40 mg) by mouth daily    Essential hypertension       metFORMIN 500 MG 24 hr tablet    GLUCOPHAGE-XR    360 tablet    Take 4 tablets (2,000 mg) by mouth daily (with dinner)    Type 2 diabetes mellitus with hemoglobin A1c goal of 7.0%-8.0% (H)       methocarbamol 500 MG tablet    ROBAXIN    30 tablet    Take 2 tablets (1,000 mg) by mouth 3 times daily as needed for muscle spasms        naproxen 500 MG tablet    NAPROSYN    90 tablet    TAKE ONE TABLET BY MOUTH TWICE A DAY AS NEEDED FOR MODERATE PAIN    Chronic pain of left knee       order for DME     1 each    Equipment being ordered: wrist quick fit [id number]    Pain in right elbow       order for DME     1 Box    Equipment being ordered: lancets for one touch glucometer for once daily testing    Type 2 diabetes mellitus without complication (H)       * order for DME     1 Device    Equipment being ordered: Glucometer Accucheck Sulema, lancets, test strips.  Patient to check sugars bid  times daily, 90 day supply for test strips and lancets, refill 6 times    Type 2 diabetes mellitus without complication (H)       * order for DME     1 Device    Equipment being ordered: Glucometer per insurance preference, lancets, test strips.  Patient to check sugars two times daily, 90 day supply for test strips and lancets, refill 3 times    Type 2 diabetes mellitus with retinopathy, without long-term current use of insulin, macular edema presence unspecified, unspecified laterality, unspecified retinopathy severity (H)       order for DME     1 Device    Equipment being ordered: One touch lancets, test strips.  Patient to check sugars once times daily, 90 day supply for test strips and lancets, refill 3 times    Type 2 diabetes mellitus with hemoglobin A1c goal of 7.0%-8.0% (H)       sitagliptin 100 MG tablet    JANUVIA    90 tablet    Take 1 tablet (100 mg) by mouth daily    Type 2 diabetes mellitus with hemoglobin A1c goal of 7.0%-8.0% (H)       * Notice:  This list has 4 medication(s) that are the same as other medications prescribed for you. Read the directions carefully, and ask your doctor or other care provider to review them with you.

## 2018-11-20 NOTE — LETTER
11/20/2018         RE: Kathrine Isaac  [address] [facility] [address]        Dear Colleague,    Thank you for referring your patient, Kathrine Isaac, to the [facility] [place]. Please see a copy of my visit note below.    ASSESSMENT & PLAN  Kathrine was seen today for follow up for.    Diagnoses and all orders for this visit:    Acute right-sided low back pain with right-sided sciatica    Patient is a 63-year-old male with no sig past medical history presenting for follow-up evaluation of right low back pain with symptoms of sciatica on the right side with notable positive straight leg raise and slump tests.  Pain overall improved at this time with home exercise plan.  MRI showing degenerative changes as well as some circumferential disc bulging as well as neuroforaminal stenosis worse at the L4/L5 level.  This could be contributing to patient's pain but it is reassuring that his symptoms are overall improving at this time.  No red flag signs or symptoms on history or examination today.  Will have patient continue home exercise plan and monitor symptoms.  If symptoms return or worsen can consider an epidural steroid injection at that time.  Patient understands and agrees with plan.    -----    SUBJECTIVE  Kathrine Isaac is a/an 63 year old male who is seen in consultation at the request of  Nighat Joe C.N.P. for evaluation of low back pain. The patient is seen by themselves.    Onset: 3 week(s) ago. Reports insidious onset without acute precipitating event.  Pt has had episodes in the past, nothing this significant.  Pt does work, desk job, no heavy lifting.  Pain worse with bending.   Location of Pain: bilateral low back pain, pain radiating down right leg to toes  Rating of Pain at worst: 10/10  Rating of Pain Currently: 7/10  Worsened by: sleeping around 3am, prolonged sitting, hip flexion  Better with: hot tub  Treatments tried: heat, ibuprofen and physical  therapy   Quality: sharp, shooting pricking  Red flags: Weakness: Yes, bowel/bladder loss: Yes, foot drop: No  Associated symptoms: numbness  Orthopedic history: YES - self limiting back pain  Relevant surgical history: NO    Interim History - November 20, 2018  Since last visit on 11/9/2018 patient has improved low back pain, continues to have tingling in lateral right lower leg.  States that he is feeling 80% better from doing the HEP. No interim injury.  Pt has had sx like this in the past and wanted to make sure nothing concerning could be causing this.  Pt is happy that pain is improving at this time.      Past Medical History:   Diagnosis Date     Arthritis      Diabetes (H)      Essential hypertension 10/28/2015     Social History     Social History     Marital status: Single     Spouse name: N/A     Number of children: N/A     Years of education: N/A     Social History Main Topics     Smoking status: Never Smoker     Smokeless tobacco: Never Used     Alcohol use Yes     Drug use: No     Sexual activity: Not on file     Other Topics Concern     Not on file     Social History Narrative         Patient's past medical, surgical, social, and family histories were reviewed today and no changes are noted.    REVIEW OF SYSTEMS:  10 point ROS is negative other than symptoms noted above in HPI, Past Medical History or as stated below  Constitutional: NEGATIVE for fever, chills, change in weight  Skin: NEGATIVE for worrisome rashes, moles or lesions  GI/: NEGATIVE for bowel or bladder changes  Neuro: NEGATIVE for weakness, dizziness or paresthesias    OBJECTIVE:  /77   General: healthy, alert and in no distress  HEENT: no scleral icterus or conjunctival erythema  Skin: no suspicious lesions or rash. No jaundice.  CV: no pedal edema  Resp: normal respiratory effort without conversational dyspnea   Psych: normal mood and affect  Gait: normal steady gait with appropriate coordination and balance  Neuro: normal  light touch sensory exam of the bilateral lower extremities.  DTR's 2+ patella and achilles bilaterally.  MSK:  THORACIC/LUMBAR SPINE  Inspection:    No gross deformity/asymmetry  Palpation:    Tender about the right parathoracic muscles and right sciatic notch. Otherwise remainder of landmarks are nontender.  Range of Motion:     Lumbar flexion limited slightly by pain    Lumbar extension show full range  Strength:    5/5 - quadriceps, hamstrings, tibialis anterior, gastrocsoleus, and extensor hallicus longus  Special Tests:    Positive: straight leg raise (right), slump test (right)    Negative: DAVION (bilateral), FADIR (bilateral)      Independent visualization of the below image:  No results found for this or any previous visit (from the past 24 hour(s)).  XR LUMBAR SPINE 2-3 VIEWS 12/27/2017 3:07 PM     HISTORY: Low back pain.     COMPARISON: None.     FINDINGS: Lumbar alignment is anatomic. Vertebral body heights and  disc spaces are preserved. Small marginal osteophytes and facet joint  sclerosis in the low lumbar spine reflect moderate degenerative  change.         IMPRESSION: Moderate low lumbar degenerative change.     GENIA MACIAS MD    Patient's conditions were thoroughly discussed during today's visit with greater than 50% of the visit spent counseling the patient with total time spent face-to-face with the patient being 30 minutes.    Alexandr Ibarra MD [facility]      Again, thank you for allowing me to participate in the care of your patient.        Sincerely,        Alexandr Ibarra MD

## 2018-12-31 DIAGNOSIS — E78.5 HYPERLIPIDEMIA LDL GOAL <100: ICD-10-CM

## 2018-12-31 DIAGNOSIS — G89.29 CHRONIC PAIN OF LEFT KNEE: ICD-10-CM

## 2018-12-31 DIAGNOSIS — M25.562 CHRONIC PAIN OF LEFT KNEE: ICD-10-CM

## 2018-12-31 NOTE — TELEPHONE ENCOUNTER
"Requested Prescriptions   Pending Prescriptions Disp Refills     atorvastatin (LIPITOR) 20 MG tablet [Pharmacy Med Name: ATORVASTATIN CALCIUM 20MG TABS] 90 tablet 1    Last Written Prescription Date:  3/28/18  Last Fill Quantity: 90,  # refills: 1   Last Office Visit with [facility], [facility] or [facility] prescribing provider:  11/7/18   Future Office Visit:      Sig: TAKE ONE TABLET BY MOUTH EVERY DAY    Statins Protocol Passed - 12/31/2018 11:58 AM       Passed - LDL on file in past 12 months    Recent Labs   Lab Test 11/07/18  0836   LDL 32            Passed - No abnormal creatine kinase in past 12 months    No lab results found.            Passed - Recent (12 mo) or future (30 days) visit within the authorizing provider's specialty    Patient had office visit in the last 12 months or has a visit in the next 30 days with authorizing provider or within the authorizing provider's specialty.  See \"Patient Info\" tab in inbasket, or \"Choose Columns\" in Meds & Orders section of the refill encounter.             Passed - Patient is age 18 or older        naproxen (NAPROSYN) 500 MG tablet [Pharmacy Med Name: NAPROXEN 500MG TABS] 90 tablet 1    Last Written Prescription Date:  6/25/18  Last Fill Quantity: 90,  # refills: 1   Last Office Visit with [facility] or [facility] prescribing provider:  11/7/18   Future Office Visit:      Sig: TAKE ONE TABLET BY MOUTH TWICE A DAY AS NEEDED FOR MODERATE PAIN    NSAID Medications Failed - 12/31/2018 11:58 AM       Failed - Blood pressure under 140/90 in past 12 months    BP Readings from Last 3 Encounters:   11/20/18 144/77   11/08/18 136/84   11/07/18 136/89                Passed - Normal ALT on file in past 12 months    Recent Labs   Lab Test 11/07/18  0835   ALT 46            Passed - Normal AST on file in past 12 months    Recent Labs   Lab Test 11/07/18  0836   AST 24            Passed - Recent (12 mo) or future (30 days) visit within the authorizing provider's specialty    Patient had office " "visit in the last 12 months or has a visit in the next 30 days with authorizing provider or within the authorizing provider's specialty.  See \"Patient Info\" tab in inbasket, or \"Choose Columns\" in Meds & Orders section of the refill encounter.             Passed - Patient is age 6-64 years       Passed - Normal CBC on file in past 12 months    Recent Labs   Lab Test 03/28/18  0826   WBC 10.2   RBC 4.93   HGB 14.7   HCT 42.3                   Passed - Normal serum creatinine on file in past 12 months    Recent Labs   Lab Test 11/07/18  0836   CR 0.82               "

## 2019-01-03 RX ORDER — NAPROXEN 500 MG/1
TABLET ORAL
Qty: 90 TABLET | Refills: 0 | Status: SHIPPED | OUTPATIENT
Start: 2019-01-03 | End: 2019-05-07

## 2019-01-03 RX ORDER — ATORVASTATIN CALCIUM 20 MG/1
TABLET, FILM COATED ORAL
Qty: 90 TABLET | Refills: 1 | Status: SHIPPED | OUTPATIENT
Start: 2019-01-03 | End: 2019-06-14

## 2019-01-03 NOTE — TELEPHONE ENCOUNTER
Prescription approved per [facility] Refill Protocol.    Alexa Neumann RN, [facility]

## 2019-02-13 ENCOUNTER — OFFICE VISIT (OUTPATIENT)
Dept: FAMILY MEDICINE | Facility: CLINIC | Age: 64
End: 2019-02-13
Payer: COMMERCIAL

## 2019-02-13 VITALS
SYSTOLIC BLOOD PRESSURE: 137 MMHG | HEIGHT: 67 IN | DIASTOLIC BLOOD PRESSURE: 85 MMHG | TEMPERATURE: 97.7 F | BODY MASS INDEX: 32.49 KG/M2 | WEIGHT: 207 LBS | HEART RATE: 86 BPM | OXYGEN SATURATION: 99 %

## 2019-02-13 DIAGNOSIS — R10.13 DYSPEPSIA: ICD-10-CM

## 2019-02-13 DIAGNOSIS — I10 ESSENTIAL HYPERTENSION: ICD-10-CM

## 2019-02-13 DIAGNOSIS — E78.5 HYPERLIPIDEMIA LDL GOAL <100: ICD-10-CM

## 2019-02-13 DIAGNOSIS — E11.319 TYPE 2 DIABETES MELLITUS WITH RETINOPATHY, WITHOUT LONG-TERM CURRENT USE OF INSULIN, MACULAR EDEMA PRESENCE UNSPECIFIED, UNSPECIFIED LATERALITY, UNSPECIFIED RETINOPATHY SEVERITY (H): ICD-10-CM

## 2019-02-13 DIAGNOSIS — E66.811 OBESITY, CLASS I, BMI 30.0-34.9 (SEE ACTUAL BMI): ICD-10-CM

## 2019-02-13 DIAGNOSIS — R07.9 CHEST PAIN, UNSPECIFIED TYPE: Primary | ICD-10-CM

## 2019-02-13 LAB
ANION GAP SERPL CALCULATED.3IONS-SCNC: 5 MMOL/L (ref 3–14)
BUN SERPL-MCNC: 12 MG/DL (ref 7–30)
CALCIUM SERPL-MCNC: 8.7 MG/DL (ref 8.5–10.1)
CHLORIDE SERPL-SCNC: 108 MMOL/L (ref 94–109)
CO2 SERPL-SCNC: 25 MMOL/L (ref 20–32)
CREAT SERPL-MCNC: 0.98 MG/DL (ref 0.66–1.25)
GFR SERPL CREATININE-BSD FRML MDRD: 82 ML/MIN/{1.73_M2}
GLUCOSE SERPL-MCNC: 82 MG/DL (ref 70–99)
HBA1C MFR BLD: 7.4 % (ref 0–5.6)
POTASSIUM SERPL-SCNC: 4.3 MMOL/L (ref 3.4–5.3)
SODIUM SERPL-SCNC: 138 MMOL/L (ref 133–144)

## 2019-02-13 PROCEDURE — 36415 COLL VENOUS BLD VENIPUNCTURE: CPT | Performed by: NURSE PRACTITIONER

## 2019-02-13 PROCEDURE — 80048 BASIC METABOLIC PNL TOTAL CA: CPT | Performed by: NURSE PRACTITIONER

## 2019-02-13 PROCEDURE — 83036 HEMOGLOBIN GLYCOSYLATED A1C: CPT | Performed by: NURSE PRACTITIONER

## 2019-02-13 PROCEDURE — 93000 ELECTROCARDIOGRAM COMPLETE: CPT | Performed by: NURSE PRACTITIONER

## 2019-02-13 PROCEDURE — 99214 OFFICE O/P EST MOD 30 MIN: CPT | Performed by: NURSE PRACTITIONER

## 2019-02-13 ASSESSMENT — MIFFLIN-ST. JEOR: SCORE: 1694.4

## 2019-02-13 NOTE — PATIENT INSTRUCTIONS
At [facility], we strive to deliver an exceptional experience to you, every time we see you.  If you receive a survey in the mail, please send us back your thoughts. We really do value your feedback.    Your care team:                            Family Medicine Internal Medicine   MD Tobias Lu MD Shantel Branch-Fleming, MD Katya Georgiev PA-C Megan Hill, APRN CNP    Tan Ghosh MD Pediatrics   Michi Bowman, LAN Polk, MD Conchis Hansen APRN CNP   MD Casi Laird MD Deborah Mielke, MD Sushma Joe, APRN [facility]      Clinic hours: Monday - Thursday 7 am-7 pm; Fridays 7 am-5 pm.   Urgent care: Monday - Friday 11 am-9 pm; Saturday and Sunday 9 am-5 pm.  Pharmacy : Monday -Thursday 8 am-8 pm; Friday 8 am-6 pm; Saturday and Sunday 9 am-5 pm.     Clinic: [phone]   Pharmacy: [phone]        Patient Education     Uncertain Causes of Chest Pain    Chest pain can happen for a number of reasons. Sometimes the cause can't be determined. If your condition does not seem serious, and your pain does not appear to be coming from your heart, your healthcare provider may recommend watching it closely. Sometimes the signs of a serious problem take more time to appear. Many problems not related to your heart can cause chest pain. These include:    Musculoskeletal. Costochondritis is an inflammation of the tissues around the ribs that can occur from trauma or overuse injuries, or a strain of the muscles of the chest wall    Respiratory. Pneumonia, collapsed lung (pneumothorax), or inflammation of the lining of the chest and lungs (pleurisy)    Gastrointestinal. Esophageal reflux, heartburn, ulcers, or gallbladder disease    Anxiety and panic disorders    Nerve compression and inflammation    Rare miscellaneous problems such as aortic aneurysm (a swelling of the large artery coming out of the heart) or pulmonary embolism (a blood clot in  the lungs)  Home care  After your visit, follow these recommendations:    Rest today and avoid strenuous activity.    Take any prescribed medicine as directed.    Be aware of any recurrent chest pain and notice any changes  Follow-up care  Follow up with your healthcare provider if you do not start to feel better within 24 hours, or as advised.  Call 911  Call 911 if any of these occur:    A change in the type of pain: if it feels different, becomes more severe, lasts longer, or begins to spread into your shoulder, arm, neck, jaw or back    Shortness of breath or increased pain with breathing    Weakness, dizziness, or fainting    Rapid heart beat    Crushing sensation in your chest   When to seek medical advice  Call your healthcare provider right away if any of the following occur:    Cough with dark colored sputum (phlegm) or blood    Fever of 100.4 F (38 C) or higher, or as directed by your healthcare provider    Swelling, pain or redness in one leg  Date Last Reviewed: 5/1/2018 2000-2018 [employer]. [address] [place] [address]. All rights reserved. This information is not intended as a substitute for professional medical care. Always follow your healthcare professional's instructions.

## 2019-02-20 ENCOUNTER — ANCILLARY PROCEDURE (OUTPATIENT)
Dept: CARDIOLOGY | Facility: CLINIC | Age: 64
End: 2019-02-20
Attending: NURSE PRACTITIONER
Payer: COMMERCIAL

## 2019-02-20 DIAGNOSIS — R07.9 CHEST PAIN, UNSPECIFIED TYPE: ICD-10-CM

## 2019-02-20 PROCEDURE — 93018 CV STRESS TEST I&R ONLY: CPT | Performed by: INTERNAL MEDICINE

## 2019-02-20 PROCEDURE — 93321 DOPPLER ECHO F-UP/LMTD STD: CPT | Performed by: INTERNAL MEDICINE

## 2019-02-20 PROCEDURE — 93325 DOPPLER ECHO COLOR FLOW MAPG: CPT | Performed by: INTERNAL MEDICINE

## 2019-02-20 PROCEDURE — 93017 CV STRESS TEST TRACING ONLY: CPT | Performed by: INTERNAL MEDICINE

## 2019-02-20 PROCEDURE — 93350 STRESS TTE ONLY: CPT | Performed by: INTERNAL MEDICINE

## 2019-02-20 PROCEDURE — 93016 CV STRESS TEST SUPVJ ONLY: CPT | Performed by: INTERNAL MEDICINE

## 2019-02-20 PROCEDURE — 93352 ADMIN ECG CONTRAST AGENT: CPT | Performed by: INTERNAL MEDICINE

## 2019-02-20 RX ADMIN — Medication 5 ML: at 08:45

## 2019-05-07 DIAGNOSIS — E11.319 TYPE 2 DIABETES MELLITUS WITH RETINOPATHY, WITHOUT LONG-TERM CURRENT USE OF INSULIN, MACULAR EDEMA PRESENCE UNSPECIFIED, UNSPECIFIED LATERALITY, UNSPECIFIED RETINOPATHY SEVERITY (H): ICD-10-CM

## 2019-05-07 DIAGNOSIS — E11.9 TYPE 2 DIABETES MELLITUS WITH HEMOGLOBIN A1C GOAL OF 7.0%-8.0% (H): ICD-10-CM

## 2019-05-07 DIAGNOSIS — M25.562 CHRONIC PAIN OF LEFT KNEE: ICD-10-CM

## 2019-05-07 DIAGNOSIS — G89.29 CHRONIC PAIN OF LEFT KNEE: ICD-10-CM

## 2019-05-07 NOTE — TELEPHONE ENCOUNTER
"Requested Prescriptions   Pending Prescriptions Disp Refills     ASPIRIN LOW DOSE 81 MG EC tablet [Pharmacy Med Name: ASPIRIN LOW DOSE 81MG TBEC]      Last Written Prescription Date:  4/19/18  Last Fill Quantity: 90,  # refills: 3   Last Office Visit with [facility], [facility] or [facility] prescribing provider:  2/13/19   Future Office Visit:      90 tablet 3     Sig: TAKE ONE TABLET BY MOUTH EVERY DAY       Analgesics (Non-Narcotic Tylenol and ASA Only) Passed - 5/7/2019  8:42 AM        Passed - Recent (12 mo) or future (30 days) visit within the authorizing provider's specialty     Patient had office visit in the last 12 months or has a visit in the next 30 days with authorizing provider or within the authorizing provider's specialty.  See \"Patient Info\" tab in inbasket, or \"Choose Columns\" in Meds & Orders section of the refill encounter.              Passed - Patient is age 20 years or older     If ASA is flagged for ages under 20 years old. Forward to provider for confirmation Ryes Syndrome is not a concern.              Passed - Medication is active on med list        JANUVIA 100 MG tablet [Pharmacy Med Name: JANUVIA 100MG TABS]      Last Written Prescription Date:  11/7/18  Last Fill Quantity: 90,  # refills: 1   Last Office Visit with [facility], [facility] or [facility] prescribing provider:  2/13/19   Future Office Visit:      90 tablet 1     Sig: TAKE ONE TABLET BY MOUTH ONCE DAILY       DPP4 Inhibitors Protocol Passed - 5/7/2019  8:42 AM        Passed - Blood pressure less than 140/90 in past 6 months     BP Readings from Last 3 Encounters:   02/13/19 137/85   11/20/18 144/77   11/08/18 136/84                 Passed - LDL on file in past 12 months     Recent Labs   Lab Test 11/07/18  0836   LDL 32             Passed - Microalbumin on file in past 12 months     Recent Labs   Lab Test 11/07/18  0844   MICROL 16   UMALCR 10.26             Passed - HgbA1C in past 3 or 6 months     If HgbA1C is 8 or greater, it needs to be on file within " "the past 3 months.  If less than 8, must be on file within the past 6 months.     Recent Labs   Lab Test 02/13/19  0943   A1C 7.4*             Passed - Medication is active on med list        Passed - Patient is age 18 or older        Passed - Normal serum creatinine in past 12 months     Recent Labs   Lab Test 02/13/19  0943   CR 0.98             Passed - Recent (6 mo) or future (30 days) visit within the authorizing provider's specialty     Patient had office visit in the last 6 months or has a visit in the next 30 days with authorizing provider.  See \"Patient Info\" tab in inbasket, or \"Choose Columns\" in Meds & Orders section of the refill encounter.            glimepiride (AMARYL) 4 MG tablet [Pharmacy Med Name: GLIMEPIRIDE 4MG TABS]    Last Written Prescription Date:  4/11/19  Last Fill Quantity: 14,  # refills: 0   Last Office Visit with [facility], [facility] or [facility] prescribing provider:  2/13/19   Future Office Visit:      14 tablet     0     Sig: TAKE ONE TABLET BY MOUTH EVERY MORNING BEFORE BREAKFAST       Sulfonylurea Agents Passed - 5/7/2019  8:42 AM        Passed - Blood pressure less than 140/90 in past 6 months     BP Readings from Last 3 Encounters:   02/13/19 137/85   11/20/18 144/77   11/08/18 136/84                 Passed - Patient has documented LDL within the past 12 mos.     Recent Labs   Lab Test 11/07/18  0836   LDL 32             Passed - Patient has had a Microalbumin in the past 12 mos.     Recent Labs   Lab Test 11/07/18  0844   MICROL 16   UMALCR 10.26             Passed - Patient has documented A1c within the specified period of time.     If HgbA1C is 8 or greater, it needs to be on file within the past 3 months.  If less than 8, must be on file within the past 6 months.     Recent Labs   Lab Test 02/13/19  0943   A1C 7.4*             Passed - Medication is active on med list        Passed - Patient is age 18 or older        Passed - Patient has a recent creatinine (normal) within the past 12 " "mos.     Recent Labs   Lab Test 02/13/19  0943   CR 0.98             Passed - Recent (6 mo) or future (30 days) visit within the authorizing provider's specialty     Patient had office visit in the last 6 months or has a visit in the next 30 days with authorizing provider or within the authorizing provider's specialty.  See \"Patient Info\" tab in inbasket, or \"Choose Columns\" in Meds & Orders section of the refill encounter.            naproxen (NAPROSYN) 500 MG tablet [Pharmacy Med Name: NAPROXEN 500MG TABS]      Last Written Prescription Date:  1/3/19  Last Fill Quantity: 90,  # refills: 0   Last Office Visit with [facility], [facility] or [facility] prescribing provider:  2/13/19   Future Office Visit:      90 tablet 0     Sig: TAKE ONE TABLET BY MOUTH TWICE A DAY AS NEEDED FOR MODERATE PAIN       NSAID Medications Failed - 5/7/2019  8:42 AM        Failed - Normal CBC on file in past 12 months     Recent Labs   Lab Test 03/28/18  0826   WBC 10.2   RBC 4.93   HGB 14.7   HCT 42.3                    Passed - Blood pressure under 140/90 in past 12 months     BP Readings from Last 3 Encounters:   02/13/19 137/85   11/20/18 144/77   11/08/18 136/84                 Passed - Normal ALT on file in past 12 months     Recent Labs   Lab Test 11/07/18  0835   ALT 46             Passed - Normal AST on file in past 12 months     Recent Labs   Lab Test 11/07/18  0836   AST 24             Passed - Recent (12 mo) or future (30 days) visit within the authorizing provider's specialty     Patient had office visit in the last 12 months or has a visit in the next 30 days with authorizing provider or within the authorizing provider's specialty.  See \"Patient Info\" tab in inbasket, or \"Choose Columns\" in Meds & Orders section of the refill encounter.              Passed - Patient is age 6-64 years        Passed - Medication is active on med list        Passed - Normal serum creatinine on file in past 12 months     Recent Labs   Lab Test " 02/13/19  0943   CR 0.98                   [facility]

## 2019-05-09 RX ORDER — SITAGLIPTIN 100 MG/1
TABLET, FILM COATED ORAL
Qty: 90 TABLET | Refills: 0 | Status: SHIPPED | OUTPATIENT
Start: 2019-05-09 | End: 2019-06-14

## 2019-05-09 RX ORDER — ASPIRIN 81 MG/1
TABLET, COATED ORAL
Qty: 90 TABLET | Refills: 2 | Status: SHIPPED | OUTPATIENT
Start: 2019-05-09 | End: 2019-06-14

## 2019-05-09 NOTE — TELEPHONE ENCOUNTER
Prescriptions for Aspirin and Januvia approved per [facility] Refill Protocol.    Routing refill request to provider for review/approval because:  Unclear of plan for Glimepiride. Has both 2 mg and 4 mg doses on current med list. Last refill was for 4 mg tabs, but only #14 given per provider-patient was to be seen.  Has not scheduled apt at this time.    Labs not current: CBC not in last 12 months, for Naproxen refill    Dianne Whipple RN

## 2019-05-10 RX ORDER — NAPROXEN 500 MG/1
TABLET ORAL
Qty: 90 TABLET | Refills: 0 | Status: SHIPPED | OUTPATIENT
Start: 2019-05-10 | End: 2019-06-05

## 2019-05-10 RX ORDER — GLIMEPIRIDE 4 MG/1
TABLET ORAL
Qty: 14 TABLET | Refills: 0 | Status: SHIPPED | OUTPATIENT
Start: 2019-05-10 | End: 2019-06-05

## 2019-06-05 DIAGNOSIS — M25.562 CHRONIC PAIN OF LEFT KNEE: ICD-10-CM

## 2019-06-05 DIAGNOSIS — G89.29 CHRONIC PAIN OF LEFT KNEE: ICD-10-CM

## 2019-06-05 DIAGNOSIS — E11.319 TYPE 2 DIABETES MELLITUS WITH RETINOPATHY, WITHOUT LONG-TERM CURRENT USE OF INSULIN, MACULAR EDEMA PRESENCE UNSPECIFIED, UNSPECIFIED LATERALITY, UNSPECIFIED RETINOPATHY SEVERITY (H): ICD-10-CM

## 2019-06-05 NOTE — TELEPHONE ENCOUNTER
"Requested Prescriptions   Pending Prescriptions Disp Refills     glimepiride (AMARYL) 4 MG tablet [Pharmacy Med Name: GLIMEPIRIDE 4MG TABS]  Last Written Prescription Date:  05/10/19  Last Fill Quantity: 14,  # refills: 0   Last Office Visit with G, JOSE MANUEL or [facility] prescribing provider:  02/12/19-Thein   Future Office Visit:    14 tablet 0     Sig: TAKE ONE TABLET BY MOUTH EVERY MORNING BEFORE BREAKFAST       Sulfonylurea Agents Passed - 6/5/2019  1:51 PM        Passed - Blood pressure less than 140/90 in past 6 months     BP Readings from Last 3 Encounters:   02/13/19 137/85   11/20/18 144/77   11/08/18 136/84                 Passed - Patient has documented LDL within the past 12 mos.     Recent Labs   Lab Test 11/07/18  0836   LDL 32             Passed - Patient has had a Microalbumin in the past 15 mos.     Recent Labs   Lab Test 11/07/18  0844   MICROL 16   UMALCR 10.26             Passed - Patient has documented A1c within the specified period of time.     If HgbA1C is 8 or greater, it needs to be on file within the past 3 months.  If less than 8, must be on file within the past 6 months.     Recent Labs   Lab Test 02/13/19  0943   A1C 7.4*             Passed - Medication is active on med list        Passed - Patient is age 18 or older        Passed - Patient has a recent creatinine (normal) within the past 12 mos.     Recent Labs   Lab Test 02/13/19  0943   CR 0.98             Passed - Recent (6 mo) or future (30 days) visit within the authorizing provider's specialty     Patient had office visit in the last 6 months or has a visit in the next 30 days with authorizing provider or within the authorizing provider's specialty.  See \"Patient Info\" tab in inbasket, or \"Choose Columns\" in Meds & Orders section of the refill encounter.            naproxen (NAPROSYN) 500 MG tablet [Pharmacy Med Name: NAPROXEN 500MG TABS]  Last Written Prescription Date:  05/10/19  Last Fill Quantity: 90,  # refills: 0   Last Office " "Visit with [facility], [facility] or [facility] prescribing provider:  02/13/19-Thein   Future Office Visit:    90 tablet 0     Sig: TAKE ONE TABLET BY MOUTH TWICE A DAY AS NEEDED FOR MODERATE PAIN       NSAID Medications Failed - 6/5/2019  1:51 PM        Failed - Normal CBC on file in past 12 months     Recent Labs   Lab Test 03/28/18  0826   WBC 10.2   RBC 4.93   HGB 14.7   HCT 42.3                    Passed - Blood pressure under 140/90 in past 12 months     BP Readings from Last 3 Encounters:   02/13/19 137/85   11/20/18 144/77   11/08/18 136/84                 Passed - Normal ALT on file in past 12 months     Recent Labs   Lab Test 11/07/18  0835   ALT 46             Passed - Normal AST on file in past 12 months     Recent Labs   Lab Test 11/07/18  0836   AST 24             Passed - Recent (12 mo) or future (30 days) visit within the authorizing provider's specialty     Patient had office visit in the last 12 months or has a visit in the next 30 days with authorizing provider or within the authorizing provider's specialty.  See \"Patient Info\" tab in inbasket, or \"Choose Columns\" in Meds & Orders section of the refill encounter.              Passed - Patient is age 6-64 years        Passed - Medication is active on med list        Passed - Normal serum creatinine on file in past 12 months     Recent Labs   Lab Test 02/13/19  0943   CR 0.98               "

## 2019-06-10 RX ORDER — GLIMEPIRIDE 4 MG/1
TABLET ORAL
Qty: 14 TABLET | Refills: 0 | Status: SHIPPED | OUTPATIENT
Start: 2019-06-10 | End: 2019-06-14

## 2019-06-10 RX ORDER — NAPROXEN 500 MG/1
TABLET ORAL
Qty: 90 TABLET | Refills: 0 | Status: SHIPPED | OUTPATIENT
Start: 2019-06-10 | End: 2019-06-14

## 2019-06-10 NOTE — TELEPHONE ENCOUNTER
Routing refill request to provider for review/approval because: glimepiride  Laura given x1 and patient did not follow up, please advise    Routing refill request to provider for review/approval because: Naproxen  Labs not current:  MISTY Pinon RN

## 2019-06-11 NOTE — TELEPHONE ENCOUNTER
Called and spoke to the patient and scheduled an appt for 6/14/19.  Maria Alejandra Tam MA/  For [employer] and Laura

## 2019-06-14 ENCOUNTER — OFFICE VISIT (OUTPATIENT)
Dept: FAMILY MEDICINE | Facility: CLINIC | Age: 64
End: 2019-06-14
Payer: COMMERCIAL

## 2019-06-14 VITALS
OXYGEN SATURATION: 98 % | BODY MASS INDEX: 31.83 KG/M2 | SYSTOLIC BLOOD PRESSURE: 130 MMHG | TEMPERATURE: 96.3 F | DIASTOLIC BLOOD PRESSURE: 81 MMHG | HEART RATE: 77 BPM | WEIGHT: 202.8 LBS | HEIGHT: 67 IN

## 2019-06-14 DIAGNOSIS — I10 ESSENTIAL HYPERTENSION: ICD-10-CM

## 2019-06-14 DIAGNOSIS — E78.5 HYPERLIPIDEMIA LDL GOAL <100: ICD-10-CM

## 2019-06-14 DIAGNOSIS — M25.562 CHRONIC PAIN OF LEFT KNEE: ICD-10-CM

## 2019-06-14 DIAGNOSIS — M54.59 MECHANICAL LOW BACK PAIN: ICD-10-CM

## 2019-06-14 DIAGNOSIS — G89.29 CHRONIC PAIN OF LEFT KNEE: ICD-10-CM

## 2019-06-14 DIAGNOSIS — E11.319 TYPE 2 DIABETES MELLITUS WITH RETINOPATHY, WITHOUT LONG-TERM CURRENT USE OF INSULIN, MACULAR EDEMA PRESENCE UNSPECIFIED, UNSPECIFIED LATERALITY, UNSPECIFIED RETINOPATHY SEVERITY (H): Primary | ICD-10-CM

## 2019-06-14 LAB — HBA1C MFR BLD: 9.2 % (ref 0–5.6)

## 2019-06-14 PROCEDURE — 83036 HEMOGLOBIN GLYCOSYLATED A1C: CPT | Performed by: NURSE PRACTITIONER

## 2019-06-14 PROCEDURE — 99214 OFFICE O/P EST MOD 30 MIN: CPT | Performed by: NURSE PRACTITIONER

## 2019-06-14 PROCEDURE — 36415 COLL VENOUS BLD VENIPUNCTURE: CPT | Performed by: NURSE PRACTITIONER

## 2019-06-14 RX ORDER — HYDROCHLOROTHIAZIDE 12.5 MG/1
12.5 TABLET ORAL DAILY
Qty: 90 TABLET | Refills: 3 | Status: SHIPPED | OUTPATIENT
Start: 2019-06-14 | End: 2019-12-30

## 2019-06-14 RX ORDER — LISINOPRIL 40 MG/1
40 TABLET ORAL DAILY
Qty: 90 TABLET | Refills: 3 | Status: SHIPPED | OUTPATIENT
Start: 2019-06-14 | End: 2019-12-30

## 2019-06-14 RX ORDER — NAPROXEN 500 MG/1
TABLET ORAL
Qty: 90 TABLET | Refills: 1 | Status: SHIPPED | OUTPATIENT
Start: 2019-06-14 | End: 2020-08-03

## 2019-06-14 RX ORDER — HYDROCHLOROTHIAZIDE 12.5 MG/1
TABLET ORAL
COMMUNITY
Start: 2019-06-05 | End: 2019-06-14

## 2019-06-14 RX ORDER — ATORVASTATIN CALCIUM 20 MG/1
20 TABLET, FILM COATED ORAL DAILY
Qty: 90 TABLET | Refills: 1 | Status: SHIPPED | OUTPATIENT
Start: 2019-06-14 | End: 2019-12-30

## 2019-06-14 RX ORDER — GLIMEPIRIDE 4 MG/1
TABLET ORAL
Qty: 90 TABLET | Refills: 3 | Status: SHIPPED | OUTPATIENT
Start: 2019-06-14 | End: 2019-12-30

## 2019-06-14 RX ORDER — METFORMIN HCL 500 MG
2000 TABLET, EXTENDED RELEASE 24 HR ORAL
Qty: 360 TABLET | Refills: 3 | Status: SHIPPED | OUTPATIENT
Start: 2019-06-14 | End: 2019-12-30

## 2019-06-14 RX ORDER — METHOCARBAMOL 500 MG/1
1000 TABLET, FILM COATED ORAL 3 TIMES DAILY PRN
Qty: 30 TABLET | Refills: 1 | Status: SHIPPED | OUTPATIENT
Start: 2019-06-14 | End: 2019-08-06

## 2019-06-14 ASSESSMENT — ANXIETY QUESTIONNAIRES
6. BECOMING EASILY ANNOYED OR IRRITABLE: NOT AT ALL
2. NOT BEING ABLE TO STOP OR CONTROL WORRYING: NOT AT ALL
3. WORRYING TOO MUCH ABOUT DIFFERENT THINGS: NOT AT ALL
1. FEELING NERVOUS, ANXIOUS, OR ON EDGE: NOT AT ALL
GAD7 TOTAL SCORE: 0
IF YOU CHECKED OFF ANY PROBLEMS ON THIS QUESTIONNAIRE, HOW DIFFICULT HAVE THESE PROBLEMS MADE IT FOR YOU TO DO YOUR WORK, TAKE CARE OF THINGS AT HOME, OR GET ALONG WITH OTHER PEOPLE: NOT DIFFICULT AT ALL
5. BEING SO RESTLESS THAT IT IS HARD TO SIT STILL: NOT AT ALL
7. FEELING AFRAID AS IF SOMETHING AWFUL MIGHT HAPPEN: NOT AT ALL

## 2019-06-14 ASSESSMENT — MIFFLIN-ST. JEOR: SCORE: 1675.34

## 2019-06-14 ASSESSMENT — PATIENT HEALTH QUESTIONNAIRE - PHQ9
5. POOR APPETITE OR OVEREATING: NOT AT ALL
SUM OF ALL RESPONSES TO PHQ QUESTIONS 1-9: 0

## 2019-06-14 NOTE — PROGRESS NOTES
Subjective     Kathrine Isaac is a 64 year old male who presents to clinic today for the following health issues:    HPI   Diabetes Follow-up      How often are you checking your blood sugar? A few times a week- 110-130    What time of day are you checking your blood sugars (select all that apply)?  Before and after meals    Have you had any blood sugars above 200?  Yes a few times    Have you had any blood sugars below 70?  No    What symptoms do you notice when your blood sugar is low?  Dizzy    What concerns do you have today about your diabetes? Other: getting A1c numbers lower     Do you have any of these symptoms? (Select all that apply)  No numbness or tingling in feet.  No redness, sores or blisters on feet.  No complaints of excessive thirst.  No reports of blurry vision.  No significant changes to weight.     Have you had a diabetic eye exam in the last 12 months? Yes- Date of last eye exam: has one scheduled for the 24th    Diabetes Management Resources    Hyperlipidemia Follow-Up      Are you having any of the following symptoms? (Select all that apply)  No complaints of shortness of breath, chest pain or pressure.  No increased sweating or nausea with activity.  No left-sided neck or arm pain.  No complaints of pain in calves when walking 1-2 blocks.    Are you regularly taking any medication or supplement to lower your cholesterol?   Yes- lipitor    Are you having muscle aches or other side effects that you think could be caused by your cholesterol lowering medication?  No    Hypertension Follow-up      Do you check your blood pressure regularly outside of the clinic? No     Are you following a low salt diet? Yes    Are your blood pressures ever more than 140 on the top number (systolic) OR more   than 90 on the bottom number (diastolic), for example 140/90? No    BP Readings from Last 2 Encounters:   06/14/19 130/81   02/13/19 137/85     Hemoglobin A1C (%)   Date Value   02/13/2019 7.4 (H)    11/07/2018 8.0 (H)     LDL Cholesterol Calculated (mg/dL)   Date Value   11/07/2018 32   11/17/2017 21       Amount of exercise or physical activity: 4-5 days/week for an average of 45-60 minutes    Problems taking medications regularly: No    Medication side effects: none    Diet: low salt      Left knee pain- chronic.  He was seen by [facility] in 2015 and had cortisone injection which has worked very well but he is again having pain worse in the am and is limiting his ability to be active.  He requests referral back to Dr. Lindquist who performed the knee injection  6/15/15.      Patient Active Problem List   Diagnosis     Glaucoma suspect     Type 2 diabetes mellitus with retinopathy, without long-term current use of insulin, macular edema presence unspecified, unspecified laterality, unspecified retinopathy severity (H)     Hyperlipidemia LDL goal <100     Essential hypertension     Advance care planning     Diverticulosis of large intestine without hemorrhage     Obesity, Class I, BMI 30.0-34.9 (see actual BMI)     Hx of LASIK     Past Surgical History:   Procedure Laterality Date     COLONOSCOPY N/A 1/14/2016    Procedure: COLONOSCOPY;  Surgeon: Ventura Monge MD;  Location: [facility] OR     LASIK BILATERAL       ROTATOR CUFF REPAIR RT/LT  1992     SURGICAL HISTORY OF -       uvula remove        Social History     Tobacco Use     Smoking status: Never Smoker     Smokeless tobacco: Never Used   Substance Use Topics     Alcohol use: Yes     Family History   Problem Relation Age of Onset     Diabetes Mother          Current Outpatient Medications   Medication Sig Dispense Refill     ASPIRIN LOW DOSE 81 MG EC tablet TAKE ONE TABLET BY MOUTH EVERY DAY 90 tablet 2     atorvastatin (LIPITOR) 20 MG tablet TAKE ONE TABLET BY MOUTH EVERY DAY 90 tablet 1     blood glucose monitoring (NO BRAND SPECIFIED) test strip Use to test blood sugars once times daily or as directed 1 Box 11     glimepiride (AMARYL) 4 MG tablet TAKE  ONE TABLET BY MOUTH EVERY MORNING BEFORE BREAKFAST 14 tablet 0     hydrochlorothiazide (HYDRODIURIL) 12.5 MG tablet        JANUVIA 100 MG tablet TAKE ONE TABLET BY MOUTH ONCE DAILY 90 tablet 0     lisinopril (PRINIVIL/ZESTRIL) 40 MG tablet Take 1 tablet (40 mg) by mouth daily 90 tablet 3     metFORMIN (GLUCOPHAGE-XR) 500 MG 24 hr tablet Take 4 tablets (2,000 mg) by mouth daily (with dinner) 360 tablet 3     methocarbamol (ROBAXIN) 500 MG tablet Take 2 tablets (1,000 mg) by mouth 3 times daily as needed for muscle spasms 30 tablet 1     naproxen (NAPROSYN) 500 MG tablet TAKE ONE TABLET BY MOUTH TWICE A DAY AS NEEDED FOR MODERATE PAIN 90 tablet 0     ORDER FOR DME, SET TO LOCAL PRINT, Equipment being ordered: wrist quick fit [id number] 1 each 0     order for DME Equipment being ordered: One touch lancets, test strips.  Patient to check sugars once times daily, 90 day supply for test strips and lancets, refill 3 times 1 Device 0     order for DME Equipment being ordered: Glucometer per insurance preference, lancets, test strips.  Patient to check sugars two times daily, 90 day supply for test strips and lancets, refill 3 times 1 Device 11     order for DME Equipment being ordered: Glucometer Accucheck Sulema, lancets, test strips.  Patient to check sugars bid times daily, 90 day supply for test strips and lancets, refill 6 times 1 Device 0     order for DME Equipment being ordered: lancets for one touch glucometer for once daily testing 1 Box 6     Recent Labs   Lab Test 02/13/19  0943 11/07/18  0836 11/07/18  0835 03/28/18  0826 11/17/17  0944  10/26/16  0940  11/11/15  1040 05/15/15  0927   A1C 7.4* 8.0*  --  7.6 6.4*   < > 9.9*   < > 8.0* 7.0*   LDL  --  32  --   --  21  --  112*  --  61 Cannot estimate LDL when triglyceride exceeds 400 mg/dL   HDL  --  41  --   --  44  --  48  --  46 44   TRIG  --  160*  --   --  166*  --  199*  --  291* 428*   ALT  --   --  46  --  42  --   --   --  49 49   CR 0.98 0.82  --   --   "0.92  --   --    < > 0.81 0.88   GFRESTIMATED 82 >90  --   --  83  --   --    < > >90  Non  GFR Calc   89   GFRESTBLACK >90 >90  --   --  >90  --   --    < > >90   GFR Calc   >90   GFR Calc     POTASSIUM 4.3 4.3  --   --  4.1  --   --    < > 4.6 4.8   TSH  --   --   --   --  1.68  --   --   --   --  1.40    < > = values in this interval not displayed.      BP Readings from Last 3 Encounters:   06/14/19 130/81   02/13/19 137/85   11/20/18 144/77    Wt Readings from Last 3 Encounters:   06/14/19 92 kg (202 lb 12.8 oz)   02/13/19 93.9 kg (207 lb)   11/08/18 93 kg (205 lb)                      Reviewed and updated as needed this visit by Provider         Review of Systems   ROS COMP: Constitutional, HEENT, cardiovascular, pulmonary, gi and gu systems are negative, except as otherwise noted.      Objective    /81   Pulse 77   Temp 96.3  F (35.7  C) (Oral)   Ht 1.697 m (5' 6.8\")   Wt 92 kg (202 lb 12.8 oz)   SpO2 98%   BMI 31.95 kg/m    Body mass index is 31.95 kg/m .  Physical Exam   GENERAL: healthy, alert and no distress  EYES: Eyes grossly normal to inspection, PERRL and conjunctivae and sclerae normal  HENT: ear canals and TM's normal, nose and mouth without ulcers or lesions  NECK: no adenopathy, no asymmetry, masses, or scars and thyroid normal to palpation  RESP: lungs clear to auscultation - no rales, rhonchi or wheezes  CV: regular rate and rhythm, normal S1 S2, no S3 or S4, no murmur, click or rub, no peripheral edema and peripheral pulses strong  ABDOMEN: soft, nontender, no hepatosplenomegaly, no masses and bowel sounds normal  MS:Left knee- TTP  Medial joint line and with compression of patella, otherwise, no swelling, warmth, no ligamental laxity,  gait is normal, no gross musculoskeletal defects noted, no edema  SKIN: no suspicious lesions or rashes  NEURO: Normal strength and tone, mentation intact and speech normal  Comprehensive back pain exam: "  Tenderness of paralumbars, no central bony tenderness, Range of motion not limited by pain, Lower extremity strength functional and equal on both sides, Lower extremity reflexes within normal limits bilaterally, Lower extremity sensation normal and equal on both sides and Straight leg raise negative bilaterally  PSYCH: mentation appears normal, affect normal/bright  LYMPH: no cervical adenopathy  Diabetic foot exam: normal DP and PT pulses, no trophic changes or ulcerative lesions, normal sensory exam, normal monofilament exam and nail exam -great toenails are blackened and thickened.    Diagnostic Test Results:  Labs reviewed in Epic  No results found for this or any previous visit (from the past 24 hour(s)).        Assessment & Plan     1. Type 2 diabetes mellitus with retinopathy, without long-term current use of insulin, macular edema presence unspecified, unspecified laterality, unspecified retinopathy severity (H)  Checking A1c today, continue diabetic diet, regular exercise, weight loss efforts. Adding  10 mg Jardiance and he is to follow back in 2 months for recheck labs, referring to CDE as well.    - blood glucose (NO BRAND SPECIFIED) test strip; Use to test blood sugars once times daily or as directed  Dispense: 1 Box; Refill: 11  - glimepiride (AMARYL) 4 MG tablet; TAKE ONE TABLET BY MOUTH EVERY MORNING BEFORE BREAKFAST  Dispense: 90 tablet; Refill: 3  - sitagliptin (JANUVIA) 100 MG tablet; Take 1 tablet (100 mg) by mouth daily  Dispense: 90 tablet; Refill: 3  - metFORMIN (GLUCOPHAGE-XR) 500 MG 24 hr tablet; Take 4 tablets (2,000 mg) by mouth daily (with dinner)  Dispense: 360 tablet; Refill: 3  - aspirin (ASPIRIN LOW DOSE) 81 MG EC tablet; Take 1 tablet (81 mg) by mouth daily  Dispense: 90 tablet; Refill: 3  - Hemoglobin A1c    2. Hyperlipidemia LDL goal <100  Continue Lipitor, due for LFT's in November, low chol diet encouraged.  - atorvastatin (LIPITOR) 20 MG tablet; Take 1 tablet (20 mg) by mouth  "daily  Dispense: 90 tablet; Refill: 1    3. Essential hypertension  Well controlled, continue current medications  - lisinopril (PRINIVIL/ZESTRIL) 40 MG tablet; Take 1 tablet (40 mg) by mouth daily  Dispense: 90 tablet; Refill: 3  - hydrochlorothiazide (HYDRODIURIL) 12.5 MG tablet; Take 1 tablet (12.5 mg) by mouth daily  Dispense: 90 tablet; Refill: 3    4. Chronic pain of left knee  continue naprosyn prn, referring back to Orthopedics for possible Cortisone injection  - naproxen (NAPROSYN) 500 MG tablet; TAKE ONE TABLET BY MOUTH TWICE A DAY AS NEEDED FOR MODERATE PAIN  Dispense: 90 tablet; Refill: 1  - ORTHO  REFERRAL    5. Mechanical low back pain  Refilled Robaxin fo prn use along with Naprosyn prn. Reviewed lifting mechanics.  - methocarbamol (ROBAXIN) 500 MG tablet; Take 2 tablets (1,000 mg) by mouth 3 times daily as needed for muscle spasms  Dispense: 30 tablet; Refill: 1     BMI:   Estimated body mass index is 31.95 kg/m  as calculated from the following:    Height as of this encounter: 1.697 m (5' 6.8\").    Weight as of this encounter: 92 kg (202 lb 12.8 oz).   Weight management plan: Discussed healthy diet and exercise guidelines        Work on weight loss  Regular exercise  See Patient Instructions    Return in about 6 months (around 12/14/2019), or if symptoms worsen or fail to improve, for Routine Visit.    ABHISHEK [facility]      "

## 2019-06-14 NOTE — PATIENT INSTRUCTIONS
At [facility], we strive to deliver an exceptional experience to you, every time we see you.  If you receive a survey in the mail, please send us back your thoughts. We really do value your feedback.    Your care team:                            Family Medicine Internal Medicine   MD Tobias Lu MD Shantel Branch-Fleming, MD Katya Georgiev PA-C Megan Hill, APRN CNP    Tan Gohsh MD Pediatrics   Michi Bowman, LAN Polk, MD Conchis Hansen APRN CNP   MD Casi Laird MD Deborah Mielke, MD Sushma Joe, APRN [facility]      Clinic hours: Monday - Thursday 7 am-7 pm; Fridays 7 am-5 pm.   Urgent care: Monday - Friday 11 am-9 pm; Saturday and Sunday 9 am-5 pm.  Pharmacy : Monday -Thursday 8 am-8 pm; Friday 8 am-6 pm; Saturday and Sunday 9 am-5 pm.     Clinic: [phone]   Pharmacy: [phone]        Patient Education     Understanding Osteoarthritis of the Knee    A joint is a place where two bones meet. The knee is called a hinge joint. This joint is formed where the thighbone (femur) meets the shinbone (tibia). A healthy knee joint bends freely. Knee osteoarthritis is a condition where parts of the knee joint wear out. This can lead to pain, stiffness, and limited movement.   What is osteoarthritis?  Every joint contains a smooth tissue called cartilage. Cartilage cushions the ends of bones and helps bones in a joint glide smoothly against each other. Knee osteoarthritis occurs when cartilage in the knee joint begins to break down and wear away. Bones may become exposed and rub together. The cartilage may become irritated and rough. This prevents smooth movement of the joint and can lead to pain.  Causes of osteoarthritis of the knee  Causes can include:    Wear and tear from normal use over time    Overuse of the knee during sports or work activities    Being overweight. This increases stress on the knee joint.    Misalignment  of the knee joint    Injury to the knee  Symptoms of osteoarthritis of the knee  Common symptoms include:    Pain and swelling around the joint. The pain and swelling get worse with activity and better with rest.    Grinding sound when moving the knee    Reduced knee movement    Knee stiffness. This is often worse first thing in the morning.  Treating osteoarthritis of the knee  Osteoarthritis is a long-term condition. Treatment usually focuses on managing symptoms. Treatment may include:    Over-the-counter or prescription medicines taken by mouth to help relieve pain and swelling    Injections of medicine into the joint to help relieve symptoms for a time    A weight-loss plan for people who are overweight    A plan of physical therapy and exercises to improve the strength and flexibility of the muscles around the knee    Heat or cold therapy to help relieve pain and stiffness    Assistive devices that help with movement, such as a cane or a walker    Assistive devices that make activities of daily life easier, such as raised toilet seats or shower bars  If other treatments don t do enough to relieve symptoms, you may need surgery to replace the joint. During this surgery, the damaged joint is removed. An artificial knee joint is then put into place. This can help relieve pain and stiffness and restore movement of the knee.     When to call your healthcare provider  Call your healthcare provider right away if you have any of these:    Fever of 100.4 F (38 C) or higher, or as directed    Symptoms that don t get better with prescribed medicines or get worse    New symptoms   Date Last Reviewed: 3/10/2016    3188-9039 The [employer]. [address] [place] [address], [place] [address]. All rights reserved. This information is not intended as a substitute for professional medical care. Always follow your healthcare professional's instructions.           Patient Education     Managing Diabetes: The A1C Test        Healthy red blood cells have some glucose stuck to them. A high A1C means that unhealthy amounts of glucose are stuck to the cells.   What is the A1C test?  Using your meter helps you track your blood sugar every day. But your glucose meter tells you the value at the time of testing only. You also need to know if your treatment plan is keeping you healthy over time. The hemoglobin A1C (or glycated hemoglobin) test can help. This test measures your average blood sugar level over a few months. A higher A1C result means that you have a higher risk of developing complications.  The A1C test  The A1C is a blood test done by your healthcare provider. You will likely have an A1C test every 3 to 6 months.  Your blood glucose goal  A1C has been shown as a percentage. But it can also be shown as a number representing the estimated Average Glucose (eAG). Unlike the A1C percentage, eAG is a number similar to the numbers listed on your daily glucose monitor. Both A1C and eAG measure the amount of glucose stuck to a protein called hemoglobin in red blood cells. Your healthcare provider will help you figure out what your ideal A1C or eAG should be. Your target number will depend on your age, general health, and other factors. If your current number is too high, your treatment plan may need changes, such as different medicines.  Sample results  Most people aim for an A1c lower than 7%. That s an eAG less than 154 mg/dL. Or, your healthcare provider may want you to aim for an A1C of 6%. That s an eAG of 126 mg/dL.     Glucose calculator  Visit http://professional.diabetes.org/diapro/glucose_calc for a chart that helps convert your A1C percentages into eAG numbers.   Date Last Reviewed: 6/1/2016 2000-2018 [employer]. [address] [place] [address], [place] [address]. All rights reserved. This information is not intended as a substitute for professional medical care. Always follow your healthcare professional's  instructions.

## 2019-06-15 ASSESSMENT — ANXIETY QUESTIONNAIRES: GAD7 TOTAL SCORE: 0

## 2019-06-18 ENCOUNTER — TELEPHONE (OUTPATIENT)
Dept: FAMILY MEDICINE | Facility: CLINIC | Age: 64
End: 2019-06-18

## 2019-06-18 NOTE — TELEPHONE ENCOUNTER
Diabetes Education Scheduling Outreach #1:    Call to patient to schedule. Left message with phone number to call to schedule.    Plan for 2nd outreach attempt within 1 week.    Cristina Krishnamurthy  [place] [facility]  Diabetes and Nutrition Scheduling

## 2019-06-24 ENCOUNTER — OFFICE VISIT (OUTPATIENT)
Dept: OPTOMETRY | Facility: CLINIC | Age: 64
End: 2019-06-24
Payer: COMMERCIAL

## 2019-06-24 DIAGNOSIS — H40.003 GLAUCOMA SUSPECT OF BOTH EYES: ICD-10-CM

## 2019-06-24 DIAGNOSIS — H52.223 REGULAR ASTIGMATISM OF BOTH EYES: ICD-10-CM

## 2019-06-24 DIAGNOSIS — H52.03 HYPERMETROPIA, BILATERAL: ICD-10-CM

## 2019-06-24 DIAGNOSIS — H25.813 COMBINED FORMS OF AGE-RELATED CATARACT OF BOTH EYES: ICD-10-CM

## 2019-06-24 DIAGNOSIS — Z98.890 HX OF LASIK: ICD-10-CM

## 2019-06-24 DIAGNOSIS — E11.9 TYPE 2 DIABETES MELLITUS WITHOUT RETINOPATHY (H): ICD-10-CM

## 2019-06-24 DIAGNOSIS — H52.4 PRESBYOPIA: ICD-10-CM

## 2019-06-24 DIAGNOSIS — Z01.01 ENCOUNTER FOR EXAMINATION OF EYES AND VISION WITH ABNORMAL FINDINGS: Primary | ICD-10-CM

## 2019-06-24 PROCEDURE — 92015 DETERMINE REFRACTIVE STATE: CPT | Performed by: OPTOMETRIST

## 2019-06-24 PROCEDURE — 92014 COMPRE OPH EXAM EST PT 1/>: CPT | Performed by: OPTOMETRIST

## 2019-06-24 ASSESSMENT — VISUAL ACUITY
OS_SC: 20/60
OD_SC: 20/200
OD_SC+: -2
OD_SC: 20/25
OD_CC: 20/40
CORRECTION_TYPE: GLASSES
OS_SC: 20/200
OS_CC: 20/20
OS_CC: 20/200
METHOD: SNELLEN - LINEAR
OD_CC: 20/20

## 2019-06-24 ASSESSMENT — REFRACTION_WEARINGRX
OD_SPHERE: +1.00
OS_CYLINDER: +0.50
OD_CYLINDER: +0.50
OD_ADD: +2.00
OS_SPHERE: +0.25
OS_AXIS: 148
SPECS_TYPE: PAL
OD_AXIS: 028
OS_ADD: +2.00

## 2019-06-24 ASSESSMENT — CONF VISUAL FIELD
OD_NORMAL: 1
OS_NORMAL: 1

## 2019-06-24 ASSESSMENT — CUP TO DISC RATIO
OD_RATIO: 0.65
OS_RATIO: 0.55

## 2019-06-24 ASSESSMENT — TONOMETRY
OS_IOP_MMHG: 20
OD_IOP_MMHG: 20
IOP_METHOD: APPLANATION

## 2019-06-24 ASSESSMENT — REFRACTION_MANIFEST
OD_SPHERE: +0.50
OS_CYLINDER: +0.50
OD_ADD: +2.50
OS_ADD: +2.50
OS_SPHERE: +0.75
OS_AXIS: 155
OD_CYLINDER: +0.50
OD_AXIS: 070

## 2019-06-24 ASSESSMENT — SLIT LAMP EXAM - LIDS
COMMENTS: SCALLOPED LID MARGINS
COMMENTS: SCALLOPED LID MARGINS

## 2019-06-24 ASSESSMENT — EXTERNAL EXAM - RIGHT EYE: OD_EXAM: NORMAL

## 2019-06-24 ASSESSMENT — EXTERNAL EXAM - LEFT EYE: OS_EXAM: NORMAL

## 2019-06-24 NOTE — PROGRESS NOTES
Chief Complaint   Patient presents with     Diabetic Eye Exam     Accompanied by self  Lab Results   Component Value Date    A1C 9.2 06/14/2019    A1C 7.4 02/13/2019    A1C 8.0 11/07/2018    A1C 7.6 03/28/2018    A1C 6.4 11/17/2017       Last Eye Exam: 1 year ago  Dilated Previously: Yes    What are you currently using to see?  Glasses-1 year old    Distance Vision Acuity: Noticed gradual change in both eyes    Near Vision Acuity: Satisfied with vision while reading  with glasses    Eye Comfort: good  Do you use eye drops? : No  Occupation or Hobbies:     Jackie Locke, [employer]     Medical, surgical and family histories reviewed and updated 6/24/2019.       OBJECTIVE: See Ophthalmology exam    ASSESSMENT:    ICD-10-CM    1. Encounter for examination of eyes and vision with abnormal findings Z01.01 EYE EXAM (SIMPLE-NONBILLABLE)   2. Type 2 diabetes mellitus without retinopathy (H) E11.9 EYE EXAM (SIMPLE-NONBILLABLE)   3. Combined forms of age-related cataract of both eyes H25.813 EYE EXAM (SIMPLE-NONBILLABLE)   4. Hx of LASIK Z98.890    5. Glaucoma suspect of both eyes H40.003 OPHTHALMOLOGY ADULT REFERRAL   6. Hypermetropia, bilateral H52.03 EYE EXAM (SIMPLE-NONBILLABLE)     REFRACTION   7. Regular astigmatism of both eyes H52.223 EYE EXAM (SIMPLE-NONBILLABLE)     REFRACTION   8. Presbyopia H52.4 EYE EXAM (SIMPLE-NONBILLABLE)     REFRACTION      PLAN:    Kathrine Isaac aware  eye exam results will be sent to Nighat Joe  Patient Instructions   Patient educated on importance of good blood sugar control.  Letter sent to primary care provider with diabetic eye exam report.     You have the start of mild cataracts.  You may notice some blurred vision or glare with night driving.  It is important that you wear good sunglasses to protect your eyes from the ultraviolet light from the sun. I recommend that you return in 1 year for an eye exam unless there are any sudden changes in your  vision.       Kathrine was advised of today's exam findings.  Fill glasses prescription  Allow 2 weeks to adapt to change in glasses  Wear glasses full time  Copy of glasses Rx provided today.    Patient previously evaluated by Dr. Brunner for glaucoma (4 years ago). Recommend re-evaluation with Dr. Brunner for HVF, OCT and IOP check. You will be contacted to set up this appointment.     The effects of the dilating drops last for 4- 6 hours.  You will be more sensitive to light and vision will be blurry up close.  Mydriatic sunglasses were given if needed.      Ronny Hamilton O.D.  [address] [place]  [address]    [phone]

## 2019-06-24 NOTE — LETTER
6/24/2019         RE: Kathrine Isaac  [id number] [facility] [address]        Dear Colleague,    Thank you for referring your patient, Kathrine Isaac, to the [facility]. Please see a copy of my visit note below.    Chief Complaint   Patient presents with     Diabetic Eye Exam     Accompanied by self  Lab Results   Component Value Date    A1C 9.2 06/14/2019    A1C 7.4 02/13/2019    A1C 8.0 11/07/2018    A1C 7.6 03/28/2018    A1C 6.4 11/17/2017       Last Eye Exam: 1 year ago  Dilated Previously: Yes    What are you currently using to see?  Glasses-1 year old    Distance Vision Acuity: Noticed gradual change in both eyes    Near Vision Acuity: Satisfied with vision while reading  with glasses    Eye Comfort: good  Do you use eye drops? : No  Occupation or Hobbies:     Jackie Locke, [employer] Tech     Medical, surgical and family histories reviewed and updated 6/24/2019.       OBJECTIVE: See Ophthalmology exam    ASSESSMENT:    ICD-10-CM    1. Encounter for examination of eyes and vision with abnormal findings Z01.01 EYE EXAM (SIMPLE-NONBILLABLE)   2. Type 2 diabetes mellitus without retinopathy (H) E11.9 EYE EXAM (SIMPLE-NONBILLABLE)   3. Combined forms of age-related cataract of both eyes H25.813 EYE EXAM (SIMPLE-NONBILLABLE)   4. Hx of LASIK Z98.890    5. Glaucoma suspect of both eyes H40.003 OPHTHALMOLOGY ADULT REFERRAL   6. Hypermetropia, bilateral H52.03 EYE EXAM (SIMPLE-NONBILLABLE)     REFRACTION   7. Regular astigmatism of both eyes H52.223 EYE EXAM (SIMPLE-NONBILLABLE)     REFRACTION   8. Presbyopia H52.4 EYE EXAM (SIMPLE-NONBILLABLE)     REFRACTION      PLAN:    Kathrine Isaac aware  eye exam results will be sent to Nighat Joe  Patient Instructions   Patient educated on importance of good blood sugar control.  Letter sent to primary care provider with diabetic eye exam report.     You have the start of mild cataracts.  You may notice some  blurred vision or glare with night driving.  It is important that you wear good sunglasses to protect your eyes from the ultraviolet light from the sun. I recommend that you return in 1 year for an eye exam unless there are any sudden changes in your vision.       Kathrine was advised of today's exam findings.  Fill glasses prescription  Allow 2 weeks to adapt to change in glasses  Wear glasses full time  Copy of glasses Rx provided today.    Patient previously evaluated by Dr. Brunner for glaucoma (4 years ago). Recommend re-evaluation with Dr. Brunner for HVF, OCT and IOP check. You will be contacted to set up this appointment.     The effects of the dilating drops last for 4- 6 hours.  You will be more sensitive to light and vision will be blurry up close.  Mydriatic sunglasses were given if needed.      Ronny Hamilton O.D.  [address] [place]  [address]    [phone]             Again, thank you for allowing me to participate in the care of your patient.        Sincerely,        Ronny Hamilton, OD

## 2019-06-24 NOTE — PATIENT INSTRUCTIONS
Patient educated on importance of good blood sugar control.  Letter sent to primary care provider with diabetic eye exam report.     You have the start of mild cataracts.  You may notice some blurred vision or glare with night driving.  It is important that you wear good sunglasses to protect your eyes from the ultraviolet light from the sun. I recommend that you return in 1 year for an eye exam unless there are any sudden changes in your vision.       Kathrine was advised of today's exam findings.  Fill glasses prescription  Allow 2 weeks to adapt to change in glasses  Wear glasses full time  Copy of glasses Rx provided today.    Patient previously evaluated by Dr. Brunner for glaucoma (4 years ago). Recommend re-evaluation with Dr. Brunner for HVF, OCT and IOP check. You will be contacted to set up this appointment.     The effects of the dilating drops last for 4- 6 hours.  You will be more sensitive to light and vision will be blurry up close.  Mydriatic sunglasses were given if needed.      Ronny Hamilton O.D.  [facility]  [address] [place] [address]. [place]  [place]  [address]    [phone]

## 2019-06-27 NOTE — TELEPHONE ENCOUNTER
Diabetes Education Scheduling Outreach #2:    Call to patient to schedule. Left message with phone number to call to schedule.    Letter sent to patient requesting to call to schedule.    [facility]  Diabetes and Nutrition Scheduling

## 2019-07-09 DIAGNOSIS — M25.562 CHRONIC PAIN OF LEFT KNEE: ICD-10-CM

## 2019-07-09 DIAGNOSIS — G89.29 CHRONIC PAIN OF LEFT KNEE: ICD-10-CM

## 2019-07-10 ENCOUNTER — TELEPHONE (OUTPATIENT)
Dept: FAMILY MEDICINE | Facility: CLINIC | Age: 64
End: 2019-07-10

## 2019-07-10 DIAGNOSIS — E11.319 TYPE 2 DIABETES MELLITUS WITH RETINOPATHY, WITHOUT LONG-TERM CURRENT USE OF INSULIN, MACULAR EDEMA PRESENCE UNSPECIFIED, UNSPECIFIED LATERALITY, UNSPECIFIED RETINOPATHY SEVERITY (H): Primary | ICD-10-CM

## 2019-07-10 RX ORDER — GLUCOSAMINE HCL/CHONDROITIN SU 500-400 MG
CAPSULE ORAL
Qty: 100 EACH | Refills: 3 | Status: SHIPPED | OUTPATIENT
Start: 2019-07-10 | End: 2023-05-03

## 2019-07-10 RX ORDER — LANCETS
EACH MISCELLANEOUS
Qty: 200 EACH | Refills: 6 | Status: SHIPPED | OUTPATIENT
Start: 2019-07-10 | End: 2023-10-12

## 2019-07-10 NOTE — TELEPHONE ENCOUNTER
"Requested Prescriptions   Pending Prescriptions Disp Refills     naproxen (NAPROSYN) 500 MG tablet [Pharmacy Med Name: NAPROXEN 500MG TABS] 90 tablet 0     Sig: TAKE ONE TABLET BY MOUTH TWICE A DAY AS NEEDED FOR MODERATE PAIN         Last Written Prescription Date:  6/14/19  Last Fill Quantity: 90,  # refills: 1   [facility] Office [facility] with [facility] prescribing provider:  6/14/19   Future Office Visit:         NSAID Medications Failed - 7/9/2019  6:47 PM        Failed - Normal CBC on file in past 12 months     Recent Labs   Lab Test 03/28/18  0826   WBC 10.2   RBC 4.93   HGB 14.7   HCT 42.3                    Passed - Blood pressure under 140/90 in past 12 months     BP Readings from Last 3 Encounters:   06/14/19 130/81   02/13/19 137/85   11/20/18 144/77                 Passed - Normal ALT on file in past 12 months     Recent Labs   Lab Test 11/07/18  0835   ALT 46             Passed - Normal AST on file in past 12 months     Recent Labs   Lab Test 11/07/18  0836   AST 24             Passed - Recent (12 mo) or future (30 days) visit within the authorizing provider's specialty     Patient had office visit in the last 12 months or has a visit in the next 30 days with authorizing provider or within the authorizing provider's specialty.  See \"Patient Info\" tab in inbasket, or \"Choose Columns\" in Meds & Orders section of the refill encounter.              Passed - Patient is age 6-64 years        Passed - Medication is active on med list        Passed - Normal serum creatinine on file in past 12 months     Recent Labs   Lab Test 02/13/19  0943   CR 0.98                   Noe Faarax  [facility]  "

## 2019-07-10 NOTE — TELEPHONE ENCOUNTER
Hi,  The patient's insurance has changed their formulary and no longer covers [employer] Diabetic supplies. If approved please send new prescriptions for Meter, test strips, lancets, control soln and any other approved diabetic supplies (alcohol swabs, etc.) to the [facility]. Please do not specify brand, so as to be able to process it for whichever brand is insurance approved.    Thank You    Kurt Hernandez [facility]  Phone: [phone]  Fax: [phone]

## 2019-07-10 NOTE — TELEPHONE ENCOUNTER
Requested Prescriptions   Signed Prescriptions Disp Refills    blood glucose monitoring (NO BRAND SPECIFIED) meter device kit 1 kit 0     Sig: Use to test blood sugar 1 times daily or as directed. Preferred blood glucose meter/supplies to accompany: Monitor Brands: per insurance.       There is no refill protocol information for this order       blood glucose (NO BRAND SPECIFIED) test strip 100 strip 6     Sig: Use to test blood sugar 1 times daily or as directed. To accompany: Blood Glucose Monitor Brands: per insurance.       There is no refill protocol information for this order       blood glucose calibration (NO BRAND SPECIFIED) solution 1 Bottle 3     Sig: To accompany: Blood Glucose Monitor Brands: per insurance.       There is no refill protocol information for this order       thin (NO BRAND SPECIFIED) lancets 200 each 6     Sig: Use with lanceting device. To accompany: Blood Glucose Monitor Brands: per insurance.       There is no refill protocol information for this order       alcohol swab prep pads 100 each 3     Sig: Use to swab area of injection/nkechi as directed.       There is no refill protocol information for this order        Prescription approved per [facility] Refill Protocol.      Josh Liu RN, BSN, PHN

## 2019-07-11 RX ORDER — NAPROXEN 500 MG/1
TABLET ORAL
Qty: 90 TABLET | Refills: 0 | OUTPATIENT
Start: 2019-07-11

## 2019-08-01 ENCOUNTER — TRANSFERRED RECORDS (OUTPATIENT)
Dept: MULTI SPECIALTY CLINIC | Facility: CLINIC | Age: 64
End: 2019-08-01

## 2019-08-01 LAB — RETINOPATHY: NORMAL

## 2019-08-06 ENCOUNTER — OFFICE VISIT (OUTPATIENT)
Dept: OPHTHALMOLOGY | Facility: CLINIC | Age: 64
End: 2019-08-06
Attending: OPTOMETRIST
Payer: COMMERCIAL

## 2019-08-06 DIAGNOSIS — M54.59 MECHANICAL LOW BACK PAIN: ICD-10-CM

## 2019-08-06 DIAGNOSIS — E11.319 TYPE 2 DIABETES MELLITUS WITH RETINOPATHY, WITHOUT LONG-TERM CURRENT USE OF INSULIN, MACULAR EDEMA PRESENCE UNSPECIFIED, UNSPECIFIED LATERALITY, UNSPECIFIED RETINOPATHY SEVERITY (H): ICD-10-CM

## 2019-08-06 DIAGNOSIS — Z98.890 H/O LASER ASSISTED IN SITU KERATOMILEUSIS: ICD-10-CM

## 2019-08-06 DIAGNOSIS — H40.003 GLAUCOMA SUSPECT OF BOTH EYES: Primary | ICD-10-CM

## 2019-08-06 PROCEDURE — 92004 COMPRE OPH EXAM NEW PT 1/>: CPT | Performed by: OPHTHALMOLOGY

## 2019-08-06 PROCEDURE — 92133 CPTRZD OPH DX IMG PST SGM ON: CPT | Performed by: OPHTHALMOLOGY

## 2019-08-06 PROCEDURE — 92083 EXTENDED VISUAL FIELD XM: CPT | Performed by: OPHTHALMOLOGY

## 2019-08-06 RX ORDER — LATANOPROST 50 UG/ML
1 SOLUTION/ DROPS OPHTHALMIC DAILY
Qty: 1 BOTTLE | Refills: 11 | Status: SHIPPED | OUTPATIENT
Start: 2019-08-06 | End: 2020-12-14

## 2019-08-06 ASSESSMENT — VISUAL ACUITY
OS_CC: 20/20
OS_CC+: -1
OD_CC: 20/20
METHOD: SNELLEN - LINEAR
CORRECTION_TYPE: GLASSES
OD_CC+: -1

## 2019-08-06 ASSESSMENT — EXTERNAL EXAM - RIGHT EYE: OD_EXAM: NORMAL

## 2019-08-06 ASSESSMENT — TONOMETRY
OD_IOP_MMHG: 18
OS_IOP_MMHG: 17
IOP_METHOD: TONOPEN

## 2019-08-06 ASSESSMENT — CUP TO DISC RATIO
OS_RATIO: 0.55
OD_RATIO: 0.65

## 2019-08-06 ASSESSMENT — PACHYMETRY
OD_CT(UM): 530
OS_CT(UM): 533

## 2019-08-06 ASSESSMENT — SLIT LAMP EXAM - LIDS
COMMENTS: SCALLOPED LID MARGINS
COMMENTS: SCALLOPED LID MARGINS

## 2019-08-06 ASSESSMENT — EXTERNAL EXAM - LEFT EYE: OS_EXAM: NORMAL

## 2019-08-06 NOTE — TELEPHONE ENCOUNTER
Requested Prescriptions   Pending Prescriptions Disp Refills     methocarbamol (ROBAXIN) 500 MG tablet [Pharmacy Med Name: METHOCARBAMOL 500MG TABS]        Last Written Prescription Date:  06/14/19  Last Fill Quantity: 30,   # refills: 1  Last Office Visit: 06/14/19-[facility]  Future Office visit:    Next 5 appointments (look out 90 days)    Sep 09, 2019  8:00 AM CDT  [facility] with Richard [facility] MD  [facility] [address] [place] [id number] [phone]           Routing refill request to provider for review/approval because:  Drug not on the G, P or [facility] refill protocol or controlled substance 30 tablet 1     Sig: TAKE TWO TABLETS BY MOUTH THREE TIMES A DAY AS NEEDED FOR MUSCLE SPASMS       There is no refill protocol information for this order

## 2019-08-06 NOTE — PROGRESS NOTES
Assessment & Plan   Kathrine Isaac is a 64 year old male who presents with:   Review of systems for the eyes was negative other than the pertinent positives and negatives noted in the HPI.    Glaucoma suspect of both eyes  - Start Xalatan each eye qhs  - OCT Optic Nerve RNFL Optovue OU (both eyes)  - HVF 24-2 OU    Type 2 diabetes mellitus with retinopathy, without long-term current use of insulin, macular edema presence unspecified, unspecified laterality, unspecified retinopathy severity (H)  - Without retinopathy    H/O laser assisted in situ keratomileusis  - Check PACHs next visit    Cataracts  - Discussed possible surgery with [facility]  - Good vision  - Just got new glasses  - Observe for now      Return in 1 month for IOP check after starting Xalatan (ONH photos and PACH).      Attending Physician Attestation:  Complete documentation of historical and exam elements from today's encounter can be found in the full encounter summary report (not reduplicated in this progress note).  I personally obtained the chief complaint(s) and history of present illness.  I confirmed and edited as necessary the review of systems, past medical/surgical history, family history, social history, and examination findings as documented by others; and I examined the patient myself.  I personally reviewed the relevant tests, images, and reports as documented above.  I formulated and edited as necessary the assessment and plan and discussed the findings and management plan with the patient and family. - Richard Brunner MD

## 2019-08-06 NOTE — NURSING NOTE
Patient presents with:  Glaucoma Suspect Follow Up: Here at the request of Dr. Hamilton for glaucoma suspect eval., last seen for eval 2015.  Pt denies any glaucoma gtts.        Referring Provider:  Ronny Hamilton, OD  [address] [place] [address]        Sarika Leone COT

## 2019-08-07 RX ORDER — METHOCARBAMOL 500 MG/1
TABLET, FILM COATED ORAL
Qty: 30 TABLET | Refills: 1 | Status: SHIPPED | OUTPATIENT
Start: 2019-08-07 | End: 2019-08-28

## 2019-08-23 ENCOUNTER — TELEPHONE (OUTPATIENT)
Dept: FAMILY MEDICINE | Facility: CLINIC | Age: 64
End: 2019-08-23

## 2019-08-23 NOTE — TELEPHONE ENCOUNTER
This writer attempted to contact patient on 08/23/19      Reason for call referral and left detailed message.        Kimberlyn Dumont MA

## 2019-08-23 NOTE — TELEPHONE ENCOUNTER
Patient can call to schedule with sports Medicine at [place] and he does not need a referral for this.  Nighat WEISS, CNP

## 2019-08-23 NOTE — TELEPHONE ENCOUNTER
Reason for Call:  Other     Detailed comments: Patient was told he could go to the [facility] for treatment for sciatica and would need orders.    Phone Number Patient can be reached at: Home number on file [phone] (home)    Best Time: any    Can we leave a detailed message on this number? YES    Call taken on 8/23/2019 at 2:15 PM by Renetta George

## 2019-08-26 NOTE — PROGRESS NOTES
Sports Medicine Clinic Visit - Interim History August 26, 2019    Initial Visit Date 1/8/2018    PCP: Nighat Joeelizabeth Isaac is a 64 year old male who is seen in follow up for Chronic bilateral low back pain with left-sided sciatica. The patient had an MRI completed on 11/14/2018 and reviewed the results with Dr. Ibarra in clinic. It was recommended at that time to continue his home exercises and monitor his symptoms. Since last visit on 11/20/2018 with Dr. Ibarra patient has moderate radiating left sided low back pain over the last ~ 4 weeks.  Patient notes that pain has waxed and waned over the past one year, usually experiencing a flare every 3 - 4 months that improves after 1 - 2 weeks after doing her home exercise program.  He rates the pain at a   6/10 currently.  Symptoms are relieved with ibuprofen, rest, walking, and topical analgesic patches.  Symptoms are worsened by lying in bed, lifting, and prolonged sitting.      Review of Systems  Musculoskeletal: as above  Remainder of review of systems is negative including constitutional, eyes, ENT, CV, pulmonary, GI, , endocrine, skin, hematologic, and neurologic except as noted in HPI or medical history.    History reviewed. No pertinent past surgical/medical/family/social history other than as mentioned in HPI.    Patient Active Problem List   Diagnosis     Glaucoma suspect     Type 2 diabetes mellitus with retinopathy, without long-term current use of insulin, macular edema presence unspecified, unspecified laterality, unspecified retinopathy severity (H)     Hyperlipidemia LDL goal <100     Essential hypertension     Advance care planning     Diverticulosis of large intestine without hemorrhage     Obesity, Class I, BMI 30.0-34.9 (see actual BMI)     Mechanical low back pain     Hx of LASIK     Past Medical History:   Diagnosis Date     Arthritis      Diabetes (H)      Essential hypertension 10/28/2015     Past Surgical History:    Procedure Laterality Date     COLONOSCOPY N/A 1/14/2016    Procedure: COLONOSCOPY;  Surgeon: Ventura Monge MD;  Location: [facility] OR     LASIK BILATERAL       ROTATOR CUFF REPAIR RT/LT  1992     SURGICAL HISTORY OF -       uvula remove      Family History   Problem Relation Age of Onset     Diabetes Mother      Glaucoma No family hx of      Macular Degeneration No family hx of      Social History     Socioeconomic History     Marital status: Single     Spouse name: Not on file     Number of children: Not on file     Years of education: Not on file     Highest education level: Not on file   Occupational History     Not on file   Social Needs     Financial resource strain: Not on file     Food insecurity:     Worry: Not on file     Inability: Not on file     Transportation needs:     Medical: Not on file     Non-medical: Not on file   Tobacco Use     Smoking status: Never Smoker     Smokeless tobacco: Never Used   Substance and Sexual Activity     Alcohol use: Yes     Drug use: No     Sexual activity: Not on file   Lifestyle     Physical activity:     Days per week: Not on file     Minutes per session: Not on file     Stress: Not on file   Relationships     Social connections:     Talks on phone: Not on file     Gets together: Not on file     Attends Baptism service: Not on file     Active member of club or organization: Not on file     Attends meetings of clubs or organizations: Not on file     Relationship status: Not on file     Intimate partner violence:     Fear of current or ex partner: Not on file     Emotionally abused: Not on file     Physically abused: Not on file     Forced sexual activity: Not on file   Other Topics Concern     Parent/sibling w/ CABG, MI or angioplasty before 65F 55M? Not Asked   Social History Narrative     Not on file         Current Outpatient Medications   Medication     alcohol swab prep pads     aspirin (ASPIRIN LOW DOSE) 81 MG EC tablet     atorvastatin (LIPITOR) 20 MG  "tablet     blood glucose (NO BRAND SPECIFIED) test strip     blood glucose (NO BRAND SPECIFIED) test strip     blood glucose calibration (NO BRAND SPECIFIED) solution     blood glucose monitoring (NO BRAND SPECIFIED) meter device kit     empagliflozin (JARDIANCE) 10 MG TABS tablet     glimepiride (AMARYL) 4 MG tablet     hydrochlorothiazide (HYDRODIURIL) 12.5 MG tablet     latanoprost (XALATAN) 0.005 % ophthalmic solution     lisinopril (PRINIVIL/ZESTRIL) 40 MG tablet     metFORMIN (GLUCOPHAGE-XR) 500 MG 24 hr tablet     methocarbamol (ROBAXIN) 500 MG tablet     naproxen (NAPROSYN) 500 MG tablet     ORDER FOR DME, SET TO LOCAL PRINT,     order for DME     order for DME     order for DME     order for DME     sitagliptin (JANUVIA) 100 MG tablet     thin (NO BRAND SPECIFIED) lancets     No current facility-administered medications for this visit.      No Known Allergies      Objective:  BP (!) 144/93   Ht 1.702 m (5' 7\")   Wt 88 kg (194 lb)   BMI 30.38 kg/m      General: Alert and in no distress    Head: Normocephalic, atraumatic  Eyes: no scleral icterus or conjunctival erythema   Oropharynx:  Mucous membranes moist  Skin: no erythema, petechiae, or jaundice  CV: regular rhythm by palpation, 2+ distal pulses  Resp: normal respiratory effort without conversational dyspnea   Psych: normal mood and affect    Gait: Non-antalgic, appropriate coordination and balance   Neuro: Motor strength and sensation as noted below    Musculoskeletal:    Low back exam:    Inspection:     no visible deformity in the low back       normal skin       normal vascular       normal lymphatic       no asymmetry    Palpation:  -Tender over the left lumbar paraspinal muscles    ROM: Lumbar flexion is decreased and painful.  Right lumbar lateral flexion is painful.  Left knee extension is decreased and painful.    Strength:  5/5 hip flexion/abduction/adduction, knee flexion/extension, ankle dorsiflexion/plantarflexion, great toe extension, " toe flexion    Sensation:    grossly intact throughout lower extremities      Radiology:  Independent visualization of images performed  MRI OF THE LUMBAR SPINE WITHOUT CONTRAST 11/14/2018 2:24 PM      COMPARISON: None     HISTORY: Acute right-sided low back pain with right-sided sciatica.     TECHNIQUE: Multiplanar, multisequence MRI images of the lumbar spine  were acquired without IV contrast.     FINDINGS: There are five lumbar-type vertebrae for the purposes of  this dictation.      There is normal alignment of the lumbar vertebrae. Vertebral body  heights of the lumbar spine are normal. Marrow signal throughout the  lumbar vertebrae is normal. There is no evidence for fracture or  pathologic bony lesion of the lumbar spine.      There is loss of disc height, disc desiccation and posterior disc  bulging/herniation at the L3-L4 and L4-L5 discs as well as minimal  posterior disc bulging of the L5-S1 disc. The upper two lumbar  intervertebral discs are normal in height, contour and signal  intensity.     The tip of the conus medullaris is at the mid L1 level which is within  normal limits. There is no evidence for intrathecal abnormality.      Level by level:      L2-L3: There is no spinal canal or neural foraminal stenosis at this  level.     L3-L4: There is a circumferential disc bulge and mild facet  arthropathy bilaterally. These findings result in mild-moderate  bilateral neural foraminal narrowing and minimal spinal canal  narrowing.     L4-L5: There is a circumferential disc bulge with a superimposed right  paracentral disc herniation (protrusion) and moderate facet  arthropathy bilaterally. These findings result in moderate left  foraminal stenosis, moderate-severe right foraminal stenosis and  moderate spinal canal narrowing with severe narrowing of the right  lateral recess of the spinal canal.     L5-S1: There is a circumferential disc bulge and moderate facet  arthropathy bilaterally. These findings  result in moderate bilateral  neural foraminal stenosis but no spinal canal stenosis.                                                                      IMPRESSION: Degenerative changes of the lumbar spine as described  above.     DARION RANDALL MD    Assessment:  1. Chronic bilateral low back pain with left-sided sciatica        Plan:  Discussed the assessment with the patient and developed a plan together:  -Epidural steroid injection ordered.    -Robaxin ordered.  Has taken in the past.  Can take up to three times a day for muscle spasms/pain.  Can cause sedation.  Do not take prior to driving.    -Ice or heat 15-20 minutes as needed (Avoid sleeping on a heating pad or ice).  -Patient's preferred over the counter medication as directed on packaging as needed for pain or soreness.      -Follow up with Dr. Ibarra ~2-3 weeks after the injection     -Kathrine to follow up with primary care provider regarding elevated blood pressure      Rhoda Soliman MD, [facility] Sports Medicine  [facility]

## 2019-08-28 ENCOUNTER — OFFICE VISIT (OUTPATIENT)
Dept: ORTHOPEDICS | Facility: CLINIC | Age: 64
End: 2019-08-28
Payer: COMMERCIAL

## 2019-08-28 VITALS
BODY MASS INDEX: 30.45 KG/M2 | DIASTOLIC BLOOD PRESSURE: 93 MMHG | SYSTOLIC BLOOD PRESSURE: 144 MMHG | HEIGHT: 67 IN | WEIGHT: 194 LBS

## 2019-08-28 DIAGNOSIS — M54.59 MECHANICAL LOW BACK PAIN: ICD-10-CM

## 2019-08-28 DIAGNOSIS — G89.29 CHRONIC BILATERAL LOW BACK PAIN WITH LEFT-SIDED SCIATICA: Primary | ICD-10-CM

## 2019-08-28 DIAGNOSIS — M54.42 CHRONIC BILATERAL LOW BACK PAIN WITH LEFT-SIDED SCIATICA: Primary | ICD-10-CM

## 2019-08-28 PROCEDURE — 99214 OFFICE O/P EST MOD 30 MIN: CPT | Performed by: PHYSICAL MEDICINE & REHABILITATION

## 2019-08-28 RX ORDER — METHOCARBAMOL 500 MG/1
500 TABLET, FILM COATED ORAL 3 TIMES DAILY PRN
Qty: 30 TABLET | Refills: 1 | Status: SHIPPED | OUTPATIENT
Start: 2019-08-28 | End: 2019-11-08

## 2019-08-28 ASSESSMENT — MIFFLIN-ST. JEOR: SCORE: 1638.61

## 2019-08-28 NOTE — LETTER
8/28/2019         RE: Kathrine Isaac  [address] [address] [id number]        Dear Colleague,    Thank you for referring your patient, Kathrine Isaac, to the [facility] [place]. Please see a copy of my visit note below.    Sports Medicine Clinic Visit - Interim History August 26, 2019    Initial Visit Date 1/8/2018    PCP: Nighat Joe    Kathrine Isaac is a 64 year old male who is seen in follow up for Chronic bilateral low back pain with left-sided sciatica. The patient had an MRI completed on 11/14/2018 and reviewed the results with Dr. Ibarra in clinic. It was recommended at that time to continue his home exercises and monitor his symptoms. Since last visit on 11/20/2018 with Dr. Ibarra patient has moderate radiating left sided low back pain over the last ~ 4 weeks.  Patient notes that pain has waxed and waned over the past one year, usually experiencing a flare every 3 - 4 months that improves after 1 - 2 weeks after doing her home exercise program.  He rates the pain at a   6/10 currently.  Symptoms are relieved with ibuprofen, rest, walking, and topical analgesic patches.  Symptoms are worsened by lying in bed, lifting, and prolonged sitting.      Review of Systems  Musculoskeletal: as above  Remainder of review of systems is negative including constitutional, eyes, ENT, CV, pulmonary, GI, , endocrine, skin, hematologic, and neurologic except as noted in HPI or medical history.    History reviewed. No pertinent past surgical/medical/family/social history other than as mentioned in HPI.    Patient Active Problem List   Diagnosis     Glaucoma suspect     Type 2 diabetes mellitus with retinopathy, without long-term current use of insulin, macular edema presence unspecified, unspecified laterality, unspecified retinopathy severity (H)     Hyperlipidemia LDL goal <100     Essential hypertension     Advance care planning     Diverticulosis of large intestine  without hemorrhage     Obesity, Class I, BMI 30.0-34.9 (see actual BMI)     Mechanical low back pain     Hx of LASIK     Past Medical History:   Diagnosis Date     Arthritis      Diabetes (H)      Essential hypertension 10/28/2015     Past Surgical History:   Procedure Laterality Date     COLONOSCOPY N/A 1/14/2016    Procedure: COLONOSCOPY;  Surgeon: Ventura Monge MD;  Location: [facility]     LASIK BILATERAL       ROTATOR CUFF REPAIR RT/LT  1992     SURGICAL HISTORY OF -       uvula remove      Family History   Problem Relation Age of Onset     Diabetes Mother      Glaucoma No family hx of      Macular Degeneration No family hx of      Social History     Socioeconomic History     Marital status: Single     Spouse name: Not on file     Number of children: Not on file     Years of education: Not on file     Highest education level: Not on file   Occupational History     Not on file   Social Needs     Financial resource strain: Not on file     Food insecurity:     Worry: Not on file     Inability: Not on file     Transportation needs:     Medical: Not on file     Non-medical: Not on file   Tobacco Use     Smoking status: Never Smoker     Smokeless tobacco: Never Used   Substance and Sexual Activity     Alcohol use: Yes     Drug use: No     Sexual activity: Not on file   Lifestyle     Physical activity:     Days per week: Not on file     Minutes per session: Not on file     Stress: Not on file   Relationships     Social connections:     Talks on phone: Not on file     Gets together: Not on file     Attends [employer] service: Not on file     Active member of club or organization: Not on file     Attends meetings of clubs or organizations: Not on file     Relationship status: Not on file     Intimate partner violence:     Fear of current or ex partner: Not on file     Emotionally abused: Not on file     Physically abused: Not on file     Forced sexual activity: Not on file   Other Topics Concern     Parent/sibling  "w/ CABG, MI or angioplasty before 65F 55M? Not Asked   Social History Narrative     Not on file         Current Outpatient Medications   Medication     alcohol swab prep pads     aspirin (ASPIRIN LOW DOSE) 81 MG EC tablet     atorvastatin (LIPITOR) 20 MG tablet     blood glucose (NO BRAND SPECIFIED) test strip     blood glucose (NO BRAND SPECIFIED) test strip     blood glucose calibration (NO BRAND SPECIFIED) solution     blood glucose monitoring (NO BRAND SPECIFIED) meter device kit     empagliflozin (JARDIANCE) 10 MG TABS tablet     glimepiride (AMARYL) 4 MG tablet     hydrochlorothiazide (HYDRODIURIL) 12.5 MG tablet     latanoprost (XALATAN) 0.005 % ophthalmic solution     lisinopril (PRINIVIL/ZESTRIL) 40 MG tablet     metFORMIN (GLUCOPHAGE-XR) 500 MG 24 hr tablet     methocarbamol (ROBAXIN) 500 MG tablet     naproxen (NAPROSYN) 500 MG tablet     ORDER FOR DME, SET TO LOCAL PRINT,     order for DME     order for DME     order for DME     order for DME     sitagliptin (JANUVIA) 100 MG tablet     thin (NO BRAND SPECIFIED) lancets     No current facility-administered medications for this visit.      No Known Allergies      Objective:  BP (!) 144/93   Ht 1.702 m (5' 7\")   Wt 88 kg (194 lb)   BMI 30.38 kg/m       General: Alert and in no distress    Head: Normocephalic, atraumatic  Eyes: no scleral icterus or conjunctival erythema   Oropharynx:  Mucous membranes moist  Skin: no erythema, petechiae, or jaundice  CV: regular rhythm by palpation, 2+ distal pulses  Resp: normal respiratory effort without conversational dyspnea   Psych: normal mood and affect    Gait: Non-antalgic, appropriate coordination and balance   Neuro: Motor strength and sensation as noted below    Musculoskeletal:    Low back exam:    Inspection:     no visible deformity in the low back       normal skin       normal vascular       normal lymphatic       no asymmetry    Palpation:  -Tender over the left lumbar paraspinal muscles    ROM: Lumbar " flexion is decreased and painful.  Right lumbar lateral flexion is painful.  Left knee extension is decreased and painful.    Strength:  5/5 hip flexion/abduction/adduction, knee flexion/extension, ankle dorsiflexion/plantarflexion, great toe extension, toe flexion    Sensation:    grossly intact throughout lower extremities      Radiology:  Independent visualization of images performed  MRI OF THE LUMBAR SPINE WITHOUT CONTRAST 11/14/2018 2:24 PM      COMPARISON: None     HISTORY: Acute right-sided low back pain with right-sided sciatica.     TECHNIQUE: Multiplanar, multisequence MRI images of the lumbar spine  were acquired without IV contrast.     FINDINGS: There are five lumbar-type vertebrae for the purposes of  this dictation.      There is normal alignment of the lumbar vertebrae. Vertebral body  heights of the lumbar spine are normal. Marrow signal throughout the  lumbar vertebrae is normal. There is no evidence for fracture or  pathologic bony lesion of the lumbar spine.      There is loss of disc height, disc desiccation and posterior disc  bulging/herniation at the L3-L4 and L4-L5 discs as well as minimal  posterior disc bulging of the L5-S1 disc. The upper two lumbar  intervertebral discs are normal in height, contour and signal  intensity.     The tip of the conus medullaris is at the mid L1 level which is within  normal limits. There is no evidence for intrathecal abnormality.      Level by level:      L2-L3: There is no spinal canal or neural foraminal stenosis at this  level.     L3-L4: There is a circumferential disc bulge and mild facet  arthropathy bilaterally. These findings result in mild-moderate  bilateral neural foraminal narrowing and minimal spinal canal  narrowing.     L4-L5: There is a circumferential disc bulge with a superimposed right  paracentral disc herniation (protrusion) and moderate facet  arthropathy bilaterally. These findings result in moderate left  foraminal stenosis,  moderate-severe right foraminal stenosis and  moderate spinal canal narrowing with severe narrowing of the right  lateral recess of the spinal canal.     L5-S1: There is a circumferential disc bulge and moderate facet  arthropathy bilaterally. These findings result in moderate bilateral  neural foraminal stenosis but no spinal canal stenosis.                                                                      IMPRESSION: Degenerative changes of the lumbar spine as described  above.     DARION RANDALL MD    Assessment:  1. Chronic bilateral low back pain with left-sided sciatica        Plan:  Discussed the assessment with the patient and developed a plan together:  -Epidural steroid injection ordered.    -Robaxin ordered.  Has taken in the past.  Can take up to three times a day for muscle spasms/pain.  Can cause sedation.  Do not take prior to driving.    -Ice or heat 15-20 minutes as needed (Avoid sleeping on a heating pad or ice).  -Patient's preferred over the counter medication as directed on packaging as needed for pain or soreness.      -Follow up with Dr. Ibarra ~2-3 weeks after the injection     -[place] to follow up with primary care provider regarding elevated blood pressure      Rhoda Soliman MD, [facility] Sports Medicine  [facility]      Again, thank you for allowing me to participate in the care of your patient.        Sincerely,        Lynda Soliman MD

## 2019-08-28 NOTE — PATIENT INSTRUCTIONS
-Epidural steroid injection ordered.    -Robaxin ordered.  Can take up to three times a day for muscle spasms/pain.  Can cause sedation.  Do not take prior to driving.      -Follow up with Dr. Ibarra ~2-3 weeks after the injection     -[place] to follow up with primary care provider regarding elevated blood pressure.

## 2019-08-29 ENCOUNTER — TELEPHONE (OUTPATIENT)
Dept: PALLIATIVE MEDICINE | Facility: CLINIC | Age: 64
End: 2019-08-29

## 2019-08-29 NOTE — TELEPHONE ENCOUNTER
Pre-screening Questions for Radiology Injections:    Injection to be done at which interventional clinic site? [facility] [place]    Instruct patient to arrive as directed prior to the scheduled appointment time:    [place] minutes before      [place]: 30 minutes before; if IV needed 1 hour before     Procedure ordered by Jourdan    Procedure ordered? LESI        Transforaminal Cervical LADY - Dr. Esther Fischer ONLY    What insurance would patient like us to bill for this procedure? [employer]      Worker's comp or MVA (motor vehicle accident) -Any injection DO NOT SCHEDULE and route to Latoya Farrar.      [employer] insurance - For SI joint injections, DO NOT SCHEDULE and route Latoya Farrar.       [employer] - Any injection besides hip/shoulder/knee joint DO NOT SCHEDULE and route to Latoya Farrar. She will obtain PA and call pt back to schedule procedure or notify pt of denial.       [employer]-Route to Latoya for review      IF SCHEDULING IN [place] AND NEEDS A PA, IT IS OKAY TO SCHEDULE. [place] HANDLES THEIR OWN PA'S AFTER THE PATIENT IS SCHEDULED. PLEASE SCHEDULE AT LEAST 1 WEEK OUT SO A PA CAN BE OBTAINED.      Any chance of pregnancy? NO   If YES, do NOT schedule and route to RN [place]    Is an  needed? No     Patient has a drive home? (mandatory) YES: ok    Is patient taking any blood thinners (plavix, coumadin, jantoven, warfarin, heparin, pradaxa or dabigatran )? No   If hold needed, do NOT schedule, route to RN pool     Is patient taking any aspirin products (includes Excedrin and Fiorinal)? Yes - Pt takes 81mg daily; instructed to hold 0 day(s) prior to procedure.      If more than 325mg/day do NOT schedule; route to RN pool     For CERVICAL procedures, hold all aspirin products for 6 days.     Tell pt that if aspirin product is not held for 6 days, the procedure WILL BE cancelled.      Does the patient have a bleeding or clotting disorder? No     If YES, okay to schedule AND route  to RN nurse pool    For any patients with platelet count <100, must be forwarded to provider    Is patient diabetic?  Yes  If YES, have them bring their glucometer.    Does patient have an active infection or treated for one within the past week? No     Is patient currently taking any antibiotics?  No     For patients on chronic, preventative, or prophylactic antibiotics, procedures may be scheduled.     For patients on antibiotics for active or recent infection:antibiotic course must have been completed for 4 days    Is patient currently taking any steroid medications? (i.e. Prednisone, Medrol)  No     For patients on steroid medications, course must have been completed for 4 days    Reviewed with patient:  If you are started on any steroids or antibiotics between now and your appointment, you must contact us because the procedure may need to be cancelled.  Yes    Is patient actively being treated for cancer or immunocompromised? No  If YES, do NOT schedule and route to RN pool     Are you able to get on and off an exam table with minimal or no assistance? Yes  If NO, do NOT schedule and route to RN pool    Are you able to roll over and lay on your stomach with minimal or no assistance? Yes  If NO, do NOT schedule and route to RN pool     Any allergies to contrast dye, iodine, shellfish, or numbing and steroid medications? No  If YES, route to RN pool AND add allergy information to appointment notes    Allergies: Patient has no known allergies.      Has the patient had a flu shot or any other vaccinations within 7 days before or after the procedure.  No     Does patient have an MRI/CT?  YES: mRI  (SI joint, hip injections, lumbar sympathetic blocks, and stellate ganglion blocks do not require an MRI)    Was the MRI done w/in the last 3 years?  Yes    Was MRI done at [place]? Yes      If not, where was it done? N/A       If MRI was not done at [place], [facility] or [facility] do NOT schedule and route to nursing.   If pt has an imaging disc, the injection may be scheduled but pt has to bring disc to appt. If they show up w/out disc the injection cannot be done    Reminders (please tell patient if applicable):       Instructed pt to arrive 30 minutes early for IV start if this is for a cervical procedure, ALL sympathetic (stellate ganglion, hypogastric, or lumbar sympathetic block) and all sedation procedures (RFA, spinal cord stimulation trials).  Not Applicable   -IVs are not routinely placed for Dr. Hsu cervical cases   -Dr. Stewart: IVs for cervical ESIs and cervical TBDs (not CMBBs/facet inj)      If NPO for sedation, informed patient that it is okay to take medications with sips of water (except if they are to hold blood thinners).  Not Applicable   *DO take blood pressure medication if it is prescribed*      If this is for a cervical LADY, informed patient that aspirin needs to be held for 6 days.   Not Applicable      For all patients not having spinal cord stimulator (SCS) trials or radiofrequency ablations (RFAs), informed patient:    IV sedation is not provided for this procedure.  If you feel that an oral anti-anxiety medication is needed, you can discuss this further with your referring provider or primary care provider.  The Pain Clinic provider will discuss specifics of what the procedure includes at your appointment.  Most procedures last 10-20 minutes.  We use numbing medications to help with any discomfort during the procedure.  Not Applicable      Do not schedule procedures requiring IV placement in the first appointment of the day or first appointment after lunch. Do NOT schedule at 0745, 0815 or 1245.       For patients 85 or older we recommend having an adult stay w/ them for the remainder of the day.       Does the patient have any questions?  NO  Alannah Gonzalez  [facility]

## 2019-08-30 ENCOUNTER — RADIOLOGY INJECTION OFFICE VISIT (OUTPATIENT)
Dept: PALLIATIVE MEDICINE | Facility: CLINIC | Age: 64
End: 2019-08-30
Attending: PHYSICAL MEDICINE & REHABILITATION
Payer: COMMERCIAL

## 2019-08-30 ENCOUNTER — ANCILLARY PROCEDURE (OUTPATIENT)
Dept: RADIOLOGY | Facility: CLINIC | Age: 64
End: 2019-08-30
Attending: PAIN MEDICINE
Payer: COMMERCIAL

## 2019-08-30 VITALS — HEART RATE: 88 BPM | DIASTOLIC BLOOD PRESSURE: 91 MMHG | OXYGEN SATURATION: 98 % | SYSTOLIC BLOOD PRESSURE: 119 MMHG

## 2019-08-30 DIAGNOSIS — M54.16 LUMBAR RADICULOPATHY: Primary | ICD-10-CM

## 2019-08-30 DIAGNOSIS — M54.16 LUMBAR RADICULOPATHY: ICD-10-CM

## 2019-08-30 PROCEDURE — 64484 NJX AA&/STRD TFRM EPI L/S EA: CPT | Mod: LT | Performed by: PAIN MEDICINE

## 2019-08-30 PROCEDURE — 64483 NJX AA&/STRD TFRM EPI L/S 1: CPT | Mod: LT | Performed by: PAIN MEDICINE

## 2019-08-30 ASSESSMENT — PAIN SCALES - GENERAL: PAINLEVEL: EXTREME PAIN (9)

## 2019-08-30 NOTE — NURSING NOTE
Discharge Information    IV Discontiued Time:  NA    Amount of Fluid Infused:  NA    Discharge Criteria = When patient returns to baseline or as per MD order    Consciousness:  Pt is fully awake    Circulation:  BP +/- 20% of pre-procedure level    Respiration:  Patient is able to breathe deeply    O2 Sat:  Patient is able to maintain O2 Sat >92% on room air    Activity:  Moves 4 extremities on command    Ambulation:  Patient is able to stand and walk or stand and pivot into wheelchair    Dressing:  Clean/dry or No Dressing    Notes:   Discharge instructions and AVS given to patient    Patient meets criteria for discharge?  YES    Admitted to PCU?  No    Responsible adult present to accompany patient home?  Yes    Signature/Title:    shant mitchell RN  RN Care Coordinator  [facility]

## 2019-08-30 NOTE — PATIENT INSTRUCTIONS
[facility]   Procedure Discharge Instructions    Today you saw: Carmine Cesar    You had an:  Epidural steroid injection   -lumbar    Medications used:  Lidocaine   Bupivacaine   Dexamethasone   Omnipaque          Be cautious when walking. Numbness and/or weakness in the lower extremities may occur for up to 6-8 hours after the procedure due to effect of the local anesthetic  Do not drive for 6 hours. The effect of the local anesthetic could slow your reflexes.   You may resume your regular activities after 24 hours  Avoid strenuous activity for the first 24 hours  You may shower, however avoid swimming, tub baths or hot tubs for 24 hours following your procedure  You may have a mild to moderate increase in pain for several days following the injection.  It may take up to 14 days for the steroid medication to start working although you may feel the effect as early as a few days after the procedure.     You may use ice packs for 10-15 minutes, 3 to 4 times a day at the injection site for comfort  Do not use heat to painful areas for 6 to 8 hours. This will give the local anesthetic time to wear off and prevent you from accidentally burning your skin.   Unless you have been directed to avoid the use of anti-inflammatory medications (NSAIDS), you may use medications such as ibuprofen, Aleve or Tylenol for pain control if needed.   If you were fasting, you may resume your normal diet and medications after the procedure  If you have diabetes, check your blood sugar more frequently than usual as your blood sugar may be higher than normal for 10-14 days following a steroid injection. Contact your doctor who manages your diabetes if your blood sugar is higher than usual  Possible side effects of steroids that you may experience include flushing, elevated blood pressure, increased appetite, mild headaches and restlessness.  All of these symptoms will get better with time.  If you experience any  of the following, call the Pain Clinic during work hours at [phone] or the Provider Line after hours at [phone]:  -Fever over 100 degree F  -Swelling, bleeding, redness, drainage, warmth at the injection site  -Progressive weakness or numbness in your legs or arms  -Loss of bowel or bladder function  -Unusual headache that is not relieved by Tylenol or other pain reliever  -Unusual new onset of pain that is not improving

## 2019-08-30 NOTE — PROGRESS NOTES
Pre procedure Diagnosis: lumbar radiculopathy, lumbar degenerative disc disease   Post procedure Diagnosis: Same  Procedure performed: lumbar transforaminal epidural steroid injection at left L4-5, L5-S1, fluoroscopically guided, contrast controlled  Anesthesia: none  Complications: lisa  Operators: Carmine Stewart MD     Indications:   Kathrine Isaac is a 64 year old male.  They have a history of left-sided low back pain radiating to his lower extremity.  Exam shows positive slump meds and they have tried conservative treatment including meds/pt .    MRI      There is normal alignment of the lumbar vertebrae. Vertebral body  heights of the lumbar spine are normal. Marrow signal throughout the  lumbar vertebrae is normal. There is no evidence for fracture or  pathologic bony lesion of the lumbar spine.      There is loss of disc height, disc desiccation and posterior disc  bulging/herniation at the L3-L4 and L4-L5 discs as well as minimal  posterior disc bulging of the L5-S1 disc. The upper two lumbar  intervertebral discs are normal in height, contour and signal  intensity.     The tip of the conus medullaris is at the mid L1 level which is within  normal limits. There is no evidence for intrathecal abnormality.      Level by level:      L2-L3: There is no spinal canal or neural foraminal stenosis at this  level.     L3-L4: There is a circumferential disc bulge and mild facet  arthropathy bilaterally. These findings result in mild-moderate  bilateral neural foraminal narrowing and minimal spinal canal  narrowing.     L4-L5: There is a circumferential disc bulge with a superimposed right  paracentral disc herniation (protrusion) and moderate facet  arthropathy bilaterally. These findings result in moderate left  foraminal stenosis, moderate-severe right foraminal stenosis and  moderate spinal canal narrowing with severe narrowing of the right  lateral recess of the spinal canal.     L5-S1: There is a  circumferential disc bulge and moderate facet  arthropathy bilaterally. These findings result in moderate bilateral  neural foraminal stenosis but no spinal canal stenosis.                                                                      IMPRESSION: Degenerative changes of the lumbar spine as described  above.     Options/alternatives, benefits and risks were discussed with the patient including bleeding, infection, tissue trauma, numbness, weakness, paralysis, spinal cord injury, radiation exposure, headache and reaction to medications. Questions were answered to his satisfaction and he agrees to proceed. Voluntary informed consent was obtained and signed.     Vitals were reviewed: Yes  BP (!) 119/91   Pulse 88   SpO2 98%   Allergies were reviewed:  Yes   Medications were reviewed:  Yes   Pre-procedure pain score: 9/10    Procedure:  After getting informed consent, patient was brought into the procedure suite and was placed in a prone position on the procedure table.   A Pause for the Cause was performed.  Patient was prepped and draped in sterile fashion.     After identifying the left L4-5, L5-S1 neuroforamen, the C-arm was rotated to a left lateral oblique angle.  A total of 6ml of Lidocaine 1% was used to anesthetize the skin and the needle track at a skin entry site coaxial with the fluoroscopy beam, and overriding the superior aspect of the neuroforamen.  A 22 gauge 3.5 inch spinal needle was advanced under intermittent fluoroscopy until it entered the foramen superiorly.    The position was then inspected from anteroposterior and lateral views, and the needle adjusted appropriately.  A total of 1ml of Omnipaque-300 was injected, confirming appropriate position, with spread into the nerve root sheath and the epidural space, with no intravascular uptake. 9ml was wasted    Then, after repeated negative aspiration, a combination of Decadron 10 mg, 0.25% bupivacaine 2 ml, diluted with 3ml of normal saline  was injected.     Hemostasis was achieved, the area was cleaned, and bandaids were placed when appropriate.  The patient tolerated the procedure well, and was taken to the recovery room.    Images were saved to PACS.    Post-procedure pain score: 3/10  Follow-up includes:   -f/u phone call in one week  -f/u with referring provider    Carmine Stewart MD  [facility]

## 2019-08-30 NOTE — NURSING NOTE
Pre-procedure Intake    Have you been fasting? NA    If yes, for how long? No     Are you taking a prescribed blood thinner such as coumadin, Plavix, Xarelto?    No    If yes, when did you take your last dose? No     Do you take aspirin?  Yes    If cervical procedure, have you held aspirin for 6 days?   No     Do you have any allergies to contrast dye, iodine, steroid and/or numbing medications?  NO    Are you currently taking antibiotics or have an active infection?  NO    Have you had a fever/elevated temperature within the past week? NO    Are you currently taking oral steroids? NO    Do you have a ? Yes       Are you pregnant or breastfeeding?  NO    Are the vital signs normal?  Yes  Karishma Lerner MA

## 2019-09-04 ENCOUNTER — OFFICE VISIT (OUTPATIENT)
Dept: ORTHOPEDICS | Facility: CLINIC | Age: 64
End: 2019-09-04
Payer: COMMERCIAL

## 2019-09-04 ENCOUNTER — ANCILLARY PROCEDURE (OUTPATIENT)
Dept: GENERAL RADIOLOGY | Facility: CLINIC | Age: 64
End: 2019-09-04
Attending: PREVENTIVE MEDICINE
Payer: COMMERCIAL

## 2019-09-04 VITALS
DIASTOLIC BLOOD PRESSURE: 87 MMHG | WEIGHT: 194 LBS | HEIGHT: 67 IN | HEART RATE: 78 BPM | SYSTOLIC BLOOD PRESSURE: 135 MMHG | BODY MASS INDEX: 30.45 KG/M2

## 2019-09-04 DIAGNOSIS — G89.29 CHRONIC PAIN OF LEFT KNEE: Primary | ICD-10-CM

## 2019-09-04 DIAGNOSIS — M17.12 ARTHRITIS OF LEFT KNEE: ICD-10-CM

## 2019-09-04 DIAGNOSIS — M51.16 LUMBAR DISC HERNIATION WITH RADICULOPATHY: ICD-10-CM

## 2019-09-04 DIAGNOSIS — M25.562 CHRONIC PAIN OF LEFT KNEE: Primary | ICD-10-CM

## 2019-09-04 DIAGNOSIS — M25.562 LEFT KNEE PAIN: ICD-10-CM

## 2019-09-04 PROCEDURE — 20610 DRAIN/INJ JOINT/BURSA W/O US: CPT | Mod: LT | Performed by: PREVENTIVE MEDICINE

## 2019-09-04 PROCEDURE — 73562 X-RAY EXAM OF KNEE 3: CPT | Mod: LT | Performed by: RADIOLOGY

## 2019-09-04 PROCEDURE — 99214 OFFICE O/P EST MOD 30 MIN: CPT | Mod: 25 | Performed by: PREVENTIVE MEDICINE

## 2019-09-04 RX ORDER — DICLOFENAC SODIUM 75 MG/1
75 TABLET, DELAYED RELEASE ORAL 2 TIMES DAILY PRN
Qty: 40 TABLET | Refills: 1 | Status: SHIPPED | OUTPATIENT
Start: 2019-09-04 | End: 2019-11-08

## 2019-09-04 RX ORDER — TRIAMCINOLONE ACETONIDE 40 MG/ML
40 INJECTION, SUSPENSION INTRA-ARTICULAR; INTRAMUSCULAR
Status: SHIPPED | OUTPATIENT
Start: 2019-09-04

## 2019-09-04 RX ORDER — GABAPENTIN 300 MG/1
300 CAPSULE ORAL AT BEDTIME
Qty: 30 CAPSULE | Refills: 0 | Status: SHIPPED | OUTPATIENT
Start: 2019-09-04 | End: 2020-01-02

## 2019-09-04 RX ADMIN — TRIAMCINOLONE ACETONIDE 40 MG: 40 INJECTION, SUSPENSION INTRA-ARTICULAR; INTRAMUSCULAR at 09:02

## 2019-09-04 ASSESSMENT — MIFFLIN-ST. JEOR: SCORE: 1638.61

## 2019-09-04 ASSESSMENT — PAIN SCALES - GENERAL: PAINLEVEL: WORST PAIN (10)

## 2019-09-04 NOTE — PATIENT INSTRUCTIONS
Thanks for coming today.  Ortho/Sports Medicine Clinic  [address] [place] [address]    To schedule future appointments in Ortho Clinic, you may call [phone].    To schedule ordered imaging by your provider:   Call [facility] [phone]    To schedule an injection ordered by your provider:  Call [facility] Injection scheduling line: [phone]  agreement24 [email or web address] available online at:  [email or web address]    Please call if any further questions or concerns ([phone]).  Clinic hours 8 am to 5 pm.    Return to clinic (call) if symptoms worsen or fail to improve.

## 2019-09-04 NOTE — PROGRESS NOTES
HISTORY OF PRESENT ILLNESS  Mr. Isaac is a pleasant 64 year old year old male who presents to clinic today with left knee and leg radicular pain  Had recent lumbar injection  Still has pain in low back and in lower left leg  Has had left knee injections in the past that have helped some of his knee and leg pain and would like to consider again    MEDICAL HISTORY  Patient Active Problem List   Diagnosis     Glaucoma suspect     Type 2 diabetes mellitus with retinopathy, without long-term current use of insulin, macular edema presence unspecified, unspecified laterality, unspecified retinopathy severity (H)     Hyperlipidemia LDL goal <100     Essential hypertension     Advance care planning     Diverticulosis of large intestine without hemorrhage     Obesity, Class I, BMI 30.0-34.9 (see actual BMI)     Mechanical low back pain     Hx of LASIK       Current Outpatient Medications   Medication Sig Dispense Refill     alcohol swab prep pads Use to swab area of injection/nkechi as directed. 100 each 3     aspirin (ASPIRIN LOW DOSE) 81 MG EC tablet Take 1 tablet (81 mg) by mouth daily 90 tablet 3     atorvastatin (LIPITOR) 20 MG tablet Take 1 tablet (20 mg) by mouth daily 90 tablet 1     blood glucose (NO BRAND SPECIFIED) test strip Use to test blood sugar 1 times daily or as directed. To accompany: Blood Glucose Monitor Brands: per insurance. 100 strip 6     blood glucose (NO BRAND SPECIFIED) test strip Use to test blood sugars once times daily or as directed 1 Box 11     blood glucose calibration (NO BRAND SPECIFIED) solution To accompany: Blood Glucose Monitor Brands: per insurance. 1 Bottle 3     blood glucose monitoring (NO BRAND SPECIFIED) meter device kit Use to test blood sugar 1 times daily or as directed. Preferred blood glucose meter/supplies to accompany: Monitor Brands: per insurance. 1 kit 0     empagliflozin (JARDIANCE) 10 MG TABS tablet Take 1 tablet (10 mg) by mouth daily 90 tablet 1     glimepiride  (AMARYL) 4 MG tablet TAKE ONE TABLET BY MOUTH EVERY MORNING BEFORE BREAKFAST 90 tablet 3     hydrochlorothiazide (HYDRODIURIL) 12.5 MG tablet Take 1 tablet (12.5 mg) by mouth daily 90 tablet 3     latanoprost (XALATAN) 0.005 % ophthalmic solution Place 1 drop into both eyes daily 1 Bottle 11     lisinopril (PRINIVIL/ZESTRIL) 40 MG tablet Take 1 tablet (40 mg) by mouth daily 90 tablet 3     metFORMIN (GLUCOPHAGE-XR) 500 MG 24 hr tablet Take 4 tablets (2,000 mg) by mouth daily (with dinner) 360 tablet 3     methocarbamol (ROBAXIN) 500 MG tablet Take 1 tablet (500 mg) by mouth 3 times daily as needed for muscle spasms 30 tablet 1     naproxen (NAPROSYN) 500 MG tablet TAKE ONE TABLET BY MOUTH TWICE A DAY AS NEEDED FOR MODERATE PAIN 90 tablet 1     ORDER FOR DME, SET TO LOCAL PRINT, Equipment being ordered: wrist quick fit [id number] 1 each 0     order for DME Equipment being ordered: One touch lancets, test strips.  Patient to check sugars once times daily, 90 day supply for test strips and lancets, refill 3 times 1 Device 0     order for DME Equipment being ordered: Glucometer per insurance preference, lancets, test strips.  Patient to check sugars two times daily, 90 day supply for test strips and lancets, refill 3 times 1 Device 11     order for DME Equipment being ordered: Glucometer Accucheck Sulema, lancets, test strips.  Patient to check sugars bid times daily, 90 day supply for test strips and lancets, refill 6 times 1 Device 0     order for DME Equipment being ordered: lancets for one touch glucometer for once daily testing 1 Box 6     sitagliptin (JANUVIA) 100 MG tablet Take 1 tablet (100 mg) by mouth daily 90 tablet 3     thin (NO BRAND SPECIFIED) lancets Use with lanceting device. To accompany: Blood Glucose Monitor Brands: per insurance. 200 each 6       No Known Allergies    Family History   Problem Relation Age of Onset     Diabetes Mother      Glaucoma No family hx of      Macular Degeneration No family  "hx of        Additional medical/Social/Surgical histories reviewed in [facility] and updated as appropriate.     REVIEW OF SYSTEMS (9/4/2019)  10 point ROS of systems including Constitutional, Eyes, Respiratory, Cardiovascular, Gastroenterology, Genitourinary, Integumentary, Musculoskeletal, Psychiatric were all negative except for pertinent positives noted in my HPI.     PHYSICAL EXAM  Vitals:    09/04/19 0809   BP: 135/87   Pulse: 78   Weight: 88 kg (194 lb)   Height: 1.702 m (5' 7\")     Vital Signs: /87   Pulse 78   Ht 1.702 m (5' 7\")   Wt 88 kg (194 lb)   BMI 30.38 kg/m   Patient declined being weighed. Body mass index is 30.38 kg/m .    General  - normal appearance, in no obvious distress  CV  - normal popliteal pulse  Pulm  - normal respiratory pattern, non-labored  Musculoskeletal - left knee  - stance: mildly antalgic gait, genu varum  - inspection: no generalized swelling, trace effusion  - palpation: medial joint line tenderness, patella and patellar tendon non-tender, normal popliteal pulse  - ROM: 100 degrees flexion, 0 degrees extension, painful active ROM  - strength: 5/5 in flexion, 5/5 in extension  - neuro: no sensory or motor deficit  - special tests:  (-) Lachman  (-) anterior drawer  (-) posterior drawer  (-) pivot shift  (-) Sierra  (-) Thessaly  (-) varus at 0 and 30 degrees flexion  (-) valgus at 0 and 30 degrees flexion  (+) Richard s compression test  (-) patellar apprehension  Neuro  - no sensory or motor deficit, grossly normal coordination, normal muscle tone  Skin  - no ecchymosis, erythema, warmth, or induration, no obvious rash  Psych  - interactive, appropriate, normal mood and affect  Lumbar : pain with flexion and extension, positive SLR on left  ASSESSMENT & PLAN  63 yo male with left knee arthritis, lumbar ddd, radicular pain  Reviewed new xrays of left knee: shows djd  Given injection today  Cont. HEP  F/u 3 weeks  Start gabapentin and diclofenac  Consider spine referral if " injection in low back did not work  Herman Lindquist MD, CAQSM    PROCEDURE:          left  Knee joint injection     The patient was apprised of the risks and the benefits of the procedure and consented and a written consent was signed.   The patient s  KNEE was sterilely prepped with chloraprep.   40 mg of triamcinolone suspension was drawn up into a 5 mL syringe with 2 mL of 1% lidocaine  The patient was injected with a 1.5-inch 22-gauge needle at the_superiorlateral aspect of their knee joint  There were no complications. The patient tolerated the procedure well. There was minimal bleeding.   The patient was instructed to ice the knee upon leaving clinic and refrain from overuse over the next 3 days.   The patient was instructed to go to the emergency room with any usual pain, swelling, or redness occurred in the injected area.   The patient was given a followup appointment to evaluate response to the injection to their increased range of motion and reduction of pain.  Follow up PRN  Dr Herman Lindquist

## 2019-09-04 NOTE — LETTER
"    9/4/2019         RE: Kathrine Isaac  [id number] [facility] [address]        Dear Colleague,    Thank you for referring your patient, Kathrine Isaac, to the [facility]. Please see a copy of my visit note below.    NEW PATIENT INTAKE QUESTIONNAIRE  [facility] 9/4/2019      Kathrine Isaac's chief complaint for this visit includes:  Chief Complaint   Patient presents with     Knee Pain     Left knee pain which started 6-7 months.      PCP: Nighat Joe    Referring Provider:  No referring provider defined for this encounter.    /87   Pulse 78   Ht 1.702 m (5' 7\")   Wt 88 kg (194 lb)   BMI 30.38 kg/m     Worst Pain (10)         Reason for Visit:    What part of your body is injured / painful?  left knee    What caused the injury /pain? No inciting event     How long ago did your injury occur or pain begin? several months ago    What are your most bothersome symptoms? Pain    How would you characterize your symptom? aching    What makes your symptoms better? Rest    What makes your symptoms worse? Movement    Have you been previously seen for this problem? No    Medical History:    Medical History: Reviewed in chart    Have you had surgery on this body part before? No    Medications: Reviewed    Allergies: No known drug allergies    Review of Systems:    Have you recently had a a fever, chills, weight loss? No    Do you have any vision problems? No    Do you have any chest pain or edema? No    Do you have any shortness of breath or wheezing?  No    Do you have stomach problems? No    Do you have any numbness or focal weakness? No    Do you have diabetes? No    Do you have problems with bleeding or clotting? No    Do you have an rashes or other skin lesions? No    Large Joint Injection/Arthocentesis: L knee joint  Date/Time: 9/4/2019 9:02 AM  Performed by: Herman Lindquist MD  Authorized by: Herman Lindquist MD     Location:  " Knee      Medications:  40 mg triamcinolone 40 MG/ML  Procedure discussed: discussed risks, benefits, and alternatives    Consent Given by:  Patient  Prep: patient was prepped and draped in usual sterile fashion     NDC: [address]-1            HISTORY OF PRESENT ILLNESS  Mr. Isaac is a pleasant 64 year old year old male who presents to clinic today with left knee and leg radicular pain  Had recent lumbar injection  Still has pain in low back and in lower left leg  Has had left knee injections in the past that have helped some of his knee and leg pain and would like to consider again    MEDICAL HISTORY  Patient Active Problem List   Diagnosis     Glaucoma suspect     Type 2 diabetes mellitus with retinopathy, without long-term current use of insulin, macular edema presence unspecified, unspecified laterality, unspecified retinopathy severity (H)     Hyperlipidemia LDL goal <100     Essential hypertension     Advance care planning     Diverticulosis of large intestine without hemorrhage     Obesity, Class I, BMI 30.0-34.9 (see actual BMI)     Mechanical low back pain     Hx of LASIK       Current Outpatient Medications   Medication Sig Dispense Refill     alcohol swab prep pads Use to swab area of injection/nkechi as directed. 100 each 3     aspirin (ASPIRIN LOW DOSE) 81 MG EC tablet Take 1 tablet (81 mg) by mouth daily 90 tablet 3     atorvastatin (LIPITOR) 20 MG tablet Take 1 tablet (20 mg) by mouth daily 90 tablet 1     blood glucose (NO BRAND SPECIFIED) test strip Use to test blood sugar 1 times daily or as directed. To accompany: Blood Glucose Monitor Brands: per insurance. 100 strip 6     blood glucose (NO BRAND SPECIFIED) test strip Use to test blood sugars once times daily or as directed 1 Box 11     blood glucose calibration (NO BRAND SPECIFIED) solution To accompany: Blood Glucose Monitor Brands: per insurance. 1 Bottle 3     blood glucose monitoring (NO BRAND SPECIFIED) meter device kit Use to test blood  sugar 1 times daily or as directed. Preferred blood glucose meter/supplies to accompany: Monitor Brands: per insurance. 1 kit 0     empagliflozin (JARDIANCE) 10 MG TABS tablet Take 1 tablet (10 mg) by mouth daily 90 tablet 1     glimepiride (AMARYL) 4 MG tablet TAKE ONE TABLET BY MOUTH EVERY MORNING BEFORE BREAKFAST 90 tablet 3     hydrochlorothiazide (HYDRODIURIL) 12.5 MG tablet Take 1 tablet (12.5 mg) by mouth daily 90 tablet 3     latanoprost (XALATAN) 0.005 % ophthalmic solution Place 1 drop into both eyes daily 1 Bottle 11     lisinopril (PRINIVIL/ZESTRIL) 40 MG tablet Take 1 tablet (40 mg) by mouth daily 90 tablet 3     metFORMIN (GLUCOPHAGE-XR) 500 MG 24 hr tablet Take 4 tablets (2,000 mg) by mouth daily (with dinner) 360 tablet 3     methocarbamol (ROBAXIN) 500 MG tablet Take 1 tablet (500 mg) by mouth 3 times daily as needed for muscle spasms 30 tablet 1     naproxen (NAPROSYN) 500 MG tablet TAKE ONE TABLET BY MOUTH TWICE A DAY AS NEEDED FOR MODERATE PAIN 90 tablet 1     ORDER FOR DME, SET TO LOCAL PRINT, Equipment being ordered: wrist quick fit [id number] 1 each 0     order for DME Equipment being ordered: One touch lancets, test strips.  Patient to check sugars once times daily, 90 day supply for test strips and lancets, refill 3 times 1 Device 0     order for DME Equipment being ordered: Glucometer per insurance preference, lancets, test strips.  Patient to check sugars two times daily, 90 day supply for test strips and lancets, refill 3 times 1 Device 11     order for DME Equipment being ordered: Glucometer Accucheck Sulema, lancets, test strips.  Patient to check sugars bid times daily, 90 day supply for test strips and lancets, refill 6 times 1 Device 0     order for DME Equipment being ordered: lancets for one touch glucometer for once daily testing 1 Box 6     sitagliptin (JANUVIA) 100 MG tablet Take 1 tablet (100 mg) by mouth daily 90 tablet 3     thin (NO BRAND SPECIFIED) lancets Use with lanceting  "device. To accompany: Blood Glucose Monitor Brands: per insurance. 200 each 6       No Known Allergies    Family History   Problem Relation Age of Onset     Diabetes Mother      Glaucoma No family hx of      Macular Degeneration No family hx of        Additional medical/Social/Surgical histories [facility] in [facility] and updated as appropriate.     REVIEW OF SYSTEMS (9/4/2019)  10 point ROS of systems including Constitutional, Eyes, Respiratory, Cardiovascular, Gastroenterology, Genitourinary, Integumentary, Musculoskeletal, Psychiatric were all negative except for pertinent positives noted in my HPI.     PHYSICAL EXAM  Vitals:    09/04/19 0809   BP: 135/87   Pulse: 78   Weight: 88 kg (194 lb)   Height: 1.702 m (5' 7\")     Vital Signs: /87   Pulse 78   Ht 1.702 m (5' 7\")   Wt 88 kg (194 lb)   BMI 30.38 kg/m    Patient declined being weighed. Body mass index is 30.38 kg/m .    General  - normal appearance, in no obvious distress  CV  - normal popliteal pulse  Pulm  - normal respiratory pattern, non-labored  Musculoskeletal - left knee  - stance: mildly antalgic gait, genu varum  - inspection: no generalized swelling, trace effusion  - palpation: medial joint line tenderness, patella and patellar tendon non-tender, normal popliteal pulse  - ROM: 100 degrees flexion, 0 degrees extension, painful active ROM  - strength: 5/5 in flexion, 5/5 in extension  - neuro: no sensory or motor deficit  - special tests:  (-) Lachman  (-) anterior drawer  (-) posterior drawer  (-) pivot shift  (-) Sierra  (-) Thessaly  (-) varus at 0 and 30 degrees flexion  (-) valgus at 0 and 30 degrees flexion  (+) Richard s compression test  (-) patellar apprehension  Neuro  - no sensory or motor deficit, grossly normal coordination, normal muscle tone  Skin  - no ecchymosis, erythema, warmth, or induration, no obvious rash  Psych  - interactive, appropriate, normal mood and affect  Lumbar : pain with flexion and extension, positive SLR on " left  ASSESSMENT & PLAN  65 yo male with left knee arthritis, lumbar ddd, radicular pain  Reviewed new xrays of left knee: shows djd  Given injection today  Cont. HEP  F/u 3 weeks  Start gabapentin and diclofenac  Consider spine referral if injection in low back did not work  Herman Lindquist MD, CAQSM    PROCEDURE:          left  Knee joint injection     The patient was apprised of the risks and the benefits of the procedure and consented and a written consent was signed.   The patient s  KNEE was sterilely prepped with chloraprep.   40 mg of triamcinolone suspension was drawn up into a 5 mL syringe with 2 mL of 1% lidocaine  The patient was injected with a 1.5-inch 22-gauge needle at the_superiorlateral aspect of their knee joint  There were no complications. The patient tolerated the procedure well. There was minimal bleeding.   The patient was instructed to ice the knee upon leaving clinic and refrain from overuse over the next 3 days.   The patient was instructed to go to the emergency room with any usual pain, swelling, or redness occurred in the injected area.   The patient was given a followup appointment to evaluate response to the injection to their increased range of motion and reduction of pain.  Follow up PRN  Dr Herman Lindquist    Again, thank you for allowing me to participate in the care of your patient.        Sincerely,        Herman Lindquist MD

## 2019-09-04 NOTE — PROGRESS NOTES
"NEW PATIENT INTAKE QUESTIONNAIRE  [facility] 9/4/2019      Kathrine Isaac's chief complaint for this visit includes:  Chief Complaint   Patient presents with     Knee Pain     Left knee pain which started 6-7 months.      PCP: Nighat Joe    Referring Provider:  No referring provider defined for this encounter.    /87   Pulse 78   Ht 1.702 m (5' 7\")   Wt 88 kg (194 lb)   BMI 30.38 kg/m    Worst Pain (10)         Reason for Visit:    What part of your body is injured / painful?  left knee    What caused the injury /pain? No inciting event     How long ago did your injury occur or pain begin? several months ago    What are your most bothersome symptoms? Pain    How would you characterize your symptom? aching    What makes your symptoms better? Rest    What makes your symptoms worse? Movement    Have you been previously seen for this problem? No    Medical History:    Medical History: Reviewed in chart    Have you had surgery on this body part before? No    Medications: Reviewed    Allergies: No known drug allergies    Review of Systems:    Have you recently had a a fever, chills, weight loss? No    Do you have any vision problems? No    Do you have any chest pain or edema? No    Do you have any shortness of breath or wheezing?  No    Do you have stomach problems? No    Do you have any numbness or focal weakness? No    Do you have diabetes? No    Do you have problems with bleeding or clotting? No    Do you have an rashes or other skin lesions? No    Large Joint Injection/Arthocentesis: L knee joint  Date/Time: 9/4/2019 9:02 AM  Performed by: Herman Lindquist MD  Authorized by: Herman Lindquist MD     Location:  Knee      Medications:  40 mg triamcinolone 40 MG/ML  Procedure discussed: discussed risks, benefits, and alternatives    Consent Given by:  Patient  Prep: patient was prepped and draped in usual sterile fashion     NDC: [address]-3          "

## 2019-09-26 ENCOUNTER — OFFICE VISIT (OUTPATIENT)
Dept: ORTHOPEDICS | Facility: CLINIC | Age: 64
End: 2019-09-26
Payer: COMMERCIAL

## 2019-09-26 VITALS
DIASTOLIC BLOOD PRESSURE: 88 MMHG | HEIGHT: 67 IN | HEART RATE: 85 BPM | SYSTOLIC BLOOD PRESSURE: 124 MMHG | WEIGHT: 194 LBS | BODY MASS INDEX: 30.45 KG/M2

## 2019-09-26 DIAGNOSIS — M51.16 LUMBAR DISC HERNIATION WITH RADICULOPATHY: Primary | ICD-10-CM

## 2019-09-26 DIAGNOSIS — M17.12 ARTHRITIS OF LEFT KNEE: ICD-10-CM

## 2019-09-26 PROCEDURE — 99213 OFFICE O/P EST LOW 20 MIN: CPT | Performed by: PREVENTIVE MEDICINE

## 2019-09-26 ASSESSMENT — PAIN SCALES - GENERAL: PAINLEVEL: NO PAIN (0)

## 2019-09-26 ASSESSMENT — MIFFLIN-ST. JEOR: SCORE: 1638.61

## 2019-09-26 NOTE — PROGRESS NOTES
HISTORY OF PRESENT ILLNESS  Mr. Isaac is a pleasant 64 year old year old male who presents to clinic today for followup for lumbar radicualr pain and left knee arthritis, improved overall  Had knee injection last visit and feels no pain now  Had lumbar injection over a month ago and only residual symptoms is some tingling at times in lower leg    MEDICAL HISTORY  Patient Active Problem List   Diagnosis     Glaucoma suspect     Type 2 diabetes mellitus with retinopathy, without long-term current use of insulin, macular edema presence unspecified, unspecified laterality, unspecified retinopathy severity (H)     Hyperlipidemia LDL goal <100     Essential hypertension     Advance care planning     Diverticulosis of large intestine without hemorrhage     Obesity, Class I, BMI 30.0-34.9 (see actual BMI)     Mechanical low back pain     Hx of LASIK       Current Outpatient Medications   Medication Sig Dispense Refill     alcohol swab prep pads Use to swab area of injection/nkechi as directed. 100 each 3     aspirin (ASPIRIN LOW DOSE) 81 MG EC tablet Take 1 tablet (81 mg) by mouth daily 90 tablet 3     atorvastatin (LIPITOR) 20 MG tablet Take 1 tablet (20 mg) by mouth daily 90 tablet 1     blood glucose (NO BRAND SPECIFIED) test strip Use to test blood sugar 1 times daily or as directed. To accompany: Blood Glucose Monitor Brands: per insurance. 100 strip 6     blood glucose (NO BRAND SPECIFIED) test strip Use to test blood sugars once times daily or as directed 1 Box 11     blood glucose calibration (NO BRAND SPECIFIED) solution To accompany: Blood Glucose Monitor Brands: per insurance. 1 Bottle 3     blood glucose monitoring (NO BRAND SPECIFIED) meter device kit Use to test blood sugar 1 times daily or as directed. Preferred blood glucose meter/supplies to accompany: Monitor Brands: per insurance. 1 kit 0     diclofenac (VOLTAREN) 75 MG EC tablet Take 1 tablet (75 mg) by mouth 2 times daily as needed 40 tablet 1      empagliflozin (JARDIANCE) 10 MG TABS tablet Take 1 tablet (10 mg) by mouth daily 90 tablet 1     gabapentin (NEURONTIN) 300 MG capsule Take 1 capsule (300 mg) by mouth At Bedtime 30 capsule 0     glimepiride (AMARYL) 4 MG tablet TAKE ONE TABLET BY MOUTH EVERY MORNING BEFORE BREAKFAST 90 tablet 3     hydrochlorothiazide (HYDRODIURIL) 12.5 MG tablet Take 1 tablet (12.5 mg) by mouth daily 90 tablet 3     latanoprost (XALATAN) 0.005 % ophthalmic solution Place 1 drop into both eyes daily 1 Bottle 11     lisinopril (PRINIVIL/ZESTRIL) 40 MG tablet Take 1 tablet (40 mg) by mouth daily 90 tablet 3     metFORMIN (GLUCOPHAGE-XR) 500 MG 24 hr tablet Take 4 tablets (2,000 mg) by mouth daily (with dinner) 360 tablet 3     methocarbamol (ROBAXIN) 500 MG tablet Take 1 tablet (500 mg) by mouth 3 times daily as needed for muscle spasms 30 tablet 1     naproxen (NAPROSYN) 500 MG tablet TAKE ONE TABLET BY MOUTH TWICE A DAY AS NEEDED FOR MODERATE PAIN 90 tablet 1     ORDER FOR DME, SET TO LOCAL PRINT, Equipment being ordered: wrist quick fit [id number] 1 each 0     order for DME Equipment being ordered: One touch lancets, test strips.  Patient to check sugars once times daily, 90 day supply for test strips and lancets, refill 3 times 1 Device 0     order for DME Equipment being ordered: Glucometer per insurance preference, lancets, test strips.  Patient to check sugars two times daily, 90 day supply for test strips and lancets, refill 3 times 1 Device 11     order for DME Equipment being ordered: Glucometer Accucheck Sulema, lancets, test strips.  Patient to check sugars bid times daily, 90 day supply for test strips and lancets, refill 6 times 1 Device 0     order for DME Equipment being ordered: lancets for one touch glucometer for once daily testing 1 Box 6     sitagliptin (JANUVIA) 100 MG tablet Take 1 tablet (100 mg) by mouth daily 90 tablet 3     thin (NO BRAND SPECIFIED) lancets Use with lanceting device. To accompany: Blood  "Glucose Monitor Brands: per insurance. 200 each 6       No Known Allergies    Family History   Problem Relation Age of Onset     Diabetes Mother      Glaucoma No family hx of      Macular Degeneration No family hx of        Additional medical/Social/Surgical histories reviewed in [facility] and updated as appropriate.     REVIEW OF SYSTEMS (9/26/2019)  10 point ROS of systems including Constitutional, Eyes, Respiratory, Cardiovascular, Gastroenterology, Genitourinary, Integumentary, Musculoskeletal, Psychiatric were all negative except for pertinent positives noted in my HPI.     PHYSICAL EXAM  Vitals:    09/26/19 0805   BP: 124/88   Pulse: 85   Weight: 88 kg (194 lb)   Height: 1.702 m (5' 7\")     Vital Signs: /88   Pulse 85   Ht 1.702 m (5' 7\")   Wt 88 kg (194 lb)   BMI 30.38 kg/m   Patient declined being weighed. Body mass index is 30.38 kg/m .    General  - normal appearance, in no obvious distress  CV  - normal peripheral perfusion  Pulm  - normal respiratory pattern, non-labored  Musculoskeletal - lumbar spine  - stance: normal gait without limp, no obvious leg length discrepancy, normal heel and toe walk  - inspection: normal bone and joint alignment, no obvious scoliosis  - palpation: no paravertebral or bony tenderness  - ROM: flexion does not exacerbates pain, normal extension, sidebending, rotation  - strength: lower extremities 5/5 in all planes  - special tests:  (-) straight leg raise  (-) slump test  Neuro  - patellar and Achilles DTRs 2+ bilaterally, no lower extremity sensory deficit throughout L5 distribution, grossly normal coordination, normal muscle tone  Skin  - no ecchymosis, erythema, warmth, or induration, no obvious rash  Psych  - interactive, appropriate, normal mood and affect  ASSESSMENT & PLAN  63 yo male with lumbar ddd, disc herniation, stable, and left knee arthritis, improved  Reviewed HEP   voltaren PRN  Gabapentin nightly  Follow-up 1 month  Consider lumbar LADY if not " improved    Herman Lindquist MD, [facility]

## 2019-09-26 NOTE — PROGRESS NOTES
"Kathrine Isaac's chief complaint for this visit includes:  Chief Complaint   Patient presents with     Follow Up     Follow up for low back/left knee. Reporting tingling down lower leg since back injection      PCP: Nighat Joe    Referring Provider:  No referring provider defined for this encounter.    /88   Pulse 85   Ht 1.702 m (5' 7\")   Wt 88 kg (194 lb)   BMI 30.38 kg/m    No Pain (0)         "

## 2019-09-26 NOTE — PATIENT INSTRUCTIONS
Thanks for coming today.  [facility]  [address] [place] [address]    To schedule future appointments in Ortho Clinic, you may call [phone].    To schedule ordered imaging by your provider:   Call [facility] [phone]    To schedule an injection ordered by your provider:  Call [facility] Injection scheduling line: [phone]  [email or web address] available online at:  [email or web address]    Please call if any further questions or concerns ([phone]).  Clinic hours 8 am to 5 pm.    Return to clinic (call) if symptoms worsen or fail to improve.

## 2019-09-26 NOTE — LETTER
"    9/26/2019         RE: Kathrine Isaac  [id number] [facility] [address]        Dear Colleague,    Thank you for referring your patient, Kathrine Isaac, to the [facility]. Please see a copy of my visit note below.    Kathrine Isaac's chief complaint for this visit includes:  Chief Complaint   Patient presents with     Follow Up     Follow up for low back/left knee. Reporting tingling down lower leg since back injection      PCP: Nighat Joe    Referring Provider:  No referring provider defined for this encounter.    /88   Pulse 85   Ht 1.702 m (5' 7\")   Wt 88 kg (194 lb)   BMI 30.38 kg/m     No Pain (0)           HISTORY OF PRESENT ILLNESS  Mr. Isaac is a pleasant 64 year old year old male who presents to clinic today for followup for lumbar radicualr pain and left knee arthritis, improved overall  Had knee injection last visit and feels no pain now  Had lumbar injection over a month ago and only residual symptoms is some tingling at times in lower leg    MEDICAL HISTORY  Patient Active Problem List   Diagnosis     Glaucoma suspect     Type 2 diabetes mellitus with retinopathy, without long-term current use of insulin, macular edema presence unspecified, unspecified laterality, unspecified retinopathy severity (H)     Hyperlipidemia LDL goal <100     Essential hypertension     Advance care planning     Diverticulosis of large intestine without hemorrhage     Obesity, Class I, BMI 30.0-34.9 (see actual BMI)     Mechanical low back pain     Hx of LASIK       Current Outpatient Medications   Medication Sig Dispense Refill     alcohol swab prep pads Use to swab area of injection/nkechi as directed. 100 each 3     aspirin (ASPIRIN LOW DOSE) 81 MG EC tablet Take 1 tablet (81 mg) by mouth daily 90 tablet 3     atorvastatin (LIPITOR) 20 MG tablet Take 1 tablet (20 mg) by mouth daily 90 tablet 1     blood glucose (NO BRAND SPECIFIED) test strip Use to test blood sugar " 1 times daily or as directed. To accompany: Blood Glucose Monitor Brands: per insurance. 100 strip 6     blood glucose (NO BRAND SPECIFIED) test strip Use to test blood sugars once times daily or as directed 1 Box 11     blood glucose calibration (NO BRAND SPECIFIED) solution To accompany: Blood Glucose Monitor Brands: per insurance. 1 Bottle 3     blood glucose monitoring (NO BRAND SPECIFIED) meter device kit Use to test blood sugar 1 times daily or as directed. Preferred blood glucose meter/supplies to accompany: Monitor Brands: per insurance. 1 kit 0     diclofenac (VOLTAREN) 75 MG EC tablet Take 1 tablet (75 mg) by mouth 2 times daily as needed 40 tablet 1     empagliflozin (JARDIANCE) 10 MG TABS tablet Take 1 tablet (10 mg) by mouth daily 90 tablet 1     gabapentin (NEURONTIN) 300 MG capsule Take 1 capsule (300 mg) by mouth At Bedtime 30 capsule 0     glimepiride (AMARYL) 4 MG tablet TAKE ONE TABLET BY MOUTH EVERY MORNING BEFORE BREAKFAST 90 tablet 3     hydrochlorothiazide (HYDRODIURIL) 12.5 MG tablet Take 1 tablet (12.5 mg) by mouth daily 90 tablet 3     latanoprost (XALATAN) 0.005 % ophthalmic solution Place 1 drop into both eyes daily 1 Bottle 11     lisinopril (PRINIVIL/ZESTRIL) 40 MG tablet Take 1 tablet (40 mg) by mouth daily 90 tablet 3     metFORMIN (GLUCOPHAGE-XR) 500 MG 24 hr tablet Take 4 tablets (2,000 mg) by mouth daily (with dinner) 360 tablet 3     methocarbamol (ROBAXIN) 500 MG tablet Take 1 tablet (500 mg) by mouth 3 times daily as needed for muscle spasms 30 tablet 1     naproxen (NAPROSYN) 500 MG tablet TAKE ONE TABLET BY MOUTH TWICE A DAY AS NEEDED FOR MODERATE PAIN 90 tablet 1     ORDER FOR DME, SET TO LOCAL PRINT, Equipment being ordered: wrist quick fit [id number] 1 each 0     order for DME Equipment being ordered: One touch lancets, test strips.  Patient to check sugars once times daily, 90 day supply for test strips and lancets, refill 3 times 1 Device 0     order for DME Equipment  "being ordered: Glucometer per insurance preference, lancets, test strips.  Patient to check sugars two times daily, 90 day supply for test strips and lancets, refill 3 times 1 Device 11     order for DME Equipment being ordered: Glucometer Accucheck Sulema, lancets, test strips.  Patient to check sugars bid times daily, 90 day supply for test strips and lancets, refill 6 times 1 Device 0     order for DME Equipment being ordered: lancets for one touch glucometer for once daily testing 1 Box 6     sitagliptin (JANUVIA) 100 MG tablet Take 1 tablet (100 mg) by mouth daily 90 tablet 3     thin (NO BRAND SPECIFIED) lancets Use with lanceting device. To accompany: Blood Glucose Monitor Brands: per insurance. 200 each 6       No Known Allergies    Family History   Problem Relation Age of Onset     Diabetes Mother      Glaucoma No family hx of      Macular Degeneration No family hx of        Additional medical/Social/Surgical histories reviewed in [employer] and updated as appropriate.     REVIEW OF SYSTEMS (9/26/2019)  10 point ROS of systems including Constitutional, Eyes, Respiratory, Cardiovascular, Gastroenterology, Genitourinary, Integumentary, Musculoskeletal, Psychiatric were all negative except for pertinent positives noted in my HPI.     PHYSICAL EXAM  Vitals:    09/26/19 0805   BP: 124/88   Pulse: 85   Weight: 88 kg (194 lb)   Height: 1.702 m (5' 7\")     Vital Signs: /88   Pulse 85   Ht 1.702 m (5' 7\")   Wt 88 kg (194 lb)   BMI 30.38 kg/m    Patient declined being weighed. Body mass index is 30.38 kg/m .    General  - normal appearance, in no obvious distress  CV  - normal peripheral perfusion  Pulm  - normal respiratory pattern, non-labored  Musculoskeletal - lumbar spine  - stance: normal gait without limp, no obvious leg length discrepancy, normal heel and toe walk  - inspection: normal bone and joint alignment, no obvious scoliosis  - palpation: no paravertebral or bony tenderness  - ROM: flexion does not " exacerbates pain, normal extension, sidebending, rotation  - strength: lower extremities 5/5 in all planes  - special tests:  (-) straight leg raise  (-) slump test  Neuro  - patellar and Achilles DTRs 2+ bilaterally, no lower extremity sensory deficit throughout L5 distribution, grossly normal coordination, normal muscle tone  Skin  - no ecchymosis, erythema, warmth, or induration, no obvious rash  Psych  - interactive, appropriate, normal mood and affect  ASSESSMENT & PLAN  63 yo male with lumbar ddd, disc herniation, stable, and left knee arthritis, improved  Reviewed HEP   voltaren PRN  Gabapentin nightly  Follow-up 1 month  Consider lumbar LADY if not improved    Herman Lindquist MD, CAQSM    Again, thank you for allowing me to participate in the care of your patient.        Sincerely,        Herman Lindquist MD

## 2019-11-08 DIAGNOSIS — M25.562 CHRONIC PAIN OF LEFT KNEE: ICD-10-CM

## 2019-11-08 DIAGNOSIS — G89.29 CHRONIC PAIN OF LEFT KNEE: ICD-10-CM

## 2019-11-08 DIAGNOSIS — M51.16 LUMBAR DISC HERNIATION WITH RADICULOPATHY: ICD-10-CM

## 2019-11-08 DIAGNOSIS — M17.12 ARTHRITIS OF LEFT KNEE: ICD-10-CM

## 2019-11-08 RX ORDER — DICLOFENAC SODIUM 75 MG/1
75 TABLET, DELAYED RELEASE ORAL 2 TIMES DAILY PRN
Qty: 40 TABLET | Refills: 1 | Status: SHIPPED | OUTPATIENT
Start: 2019-11-08 | End: 2019-12-30

## 2019-11-08 NOTE — TELEPHONE ENCOUNTER
Disp Refills Start End APRIL   diclofenac (VOLTAREN) 75 MG EC tablet 40 tablet 1 9/4/2019  --   Sig - Route: Take 1 tablet (75 mg) by mouth 2 times daily as needed - Oral   Sent to pharmacy as: diclofenac (VOLTAREN) 75 MG EC tablet   Class: E-Prescribe   Order: [id number]   E-Prescribing Status: Receipt confirmed by pharmacy (9/4/2019  8:53 AM CDT)

## 2019-12-18 DIAGNOSIS — E11.319 TYPE 2 DIABETES MELLITUS WITH RETINOPATHY, WITHOUT LONG-TERM CURRENT USE OF INSULIN, MACULAR EDEMA PRESENCE UNSPECIFIED, UNSPECIFIED LATERALITY, UNSPECIFIED RETINOPATHY SEVERITY (H): ICD-10-CM

## 2019-12-18 NOTE — TELEPHONE ENCOUNTER
"Requested Prescriptions   Pending Prescriptions Disp Refills     JARDIANCE 10 MG TABS tablet [Pharmacy Med Name: JARDIANCE 10MG TABS]  Last Written Prescription Date:  06/14/19  Last Fill Quantity: 90,  # refills: 1   Last Office Visit with G, P or [facility] prescribing provider:  06/14/19-Thein   Future Office Visit:    90 tablet 1     Sig: TAKE ONE TABLET BY MOUTH ONCE DAILY       Sodium Glucose Co-Transport Inhibitor Agents Failed - 12/18/2019  9:12 AM        Failed - Patient has documented LDL within the past 12 mos.     Recent Labs   Lab Test 11/07/18  0836   LDL 32             Failed - Patient has documented A1c within the specified period of time.     If HgbA1C is 8 or greater, it needs to be on file within the past 3 months.  If less than 8, must be on file within the past 6 months.     Recent Labs   Lab Test 06/14/19  0937   A1C 9.2*             Failed - Recent (6 mo) or future (30 days) visit within the authorizing provider's specialty     Patient had office visit in the last 6 months or has a visit in the next 30 days with authorizing provider or within the authorizing provider's specialty.  See \"Patient Info\" tab in inbasket, or \"Choose Columns\" in Meds & Orders section of the refill encounter.            Passed - Blood pressure less than 140/90 in past 6 months     BP Readings from Last 3 Encounters:   09/26/19 124/88   09/04/19 135/87   08/30/19 (!) 119/91                 Passed - Patient has had a Microalbumin in the past 15 mos.     Recent Labs   Lab Test 11/07/18  0844   MICROL 16   UMALCR 10.26             Passed - No creatinine >1.4 or GFR <45 within the past 12 mos     Recent Labs   Lab Test 02/13/19  0943   GFRESTIMATED 82   GFRESTBLACK >90       Recent Labs   Lab Test 02/13/19  0943   CR 0.98             Passed - Medication is active on med list        Passed - Patient is age 18 or older        Passed - Patient has documented normal Potassium within the last 12 mos.     Recent Labs   Lab " Test 02/13/19  0943   POTASSIUM 4.3

## 2019-12-20 RX ORDER — EMPAGLIFLOZIN 10 MG/1
TABLET, FILM COATED ORAL
Qty: 30 TABLET | Refills: 0 | Status: SHIPPED | OUTPATIENT
Start: 2019-12-20 | End: 2019-12-30

## 2019-12-20 NOTE — TELEPHONE ENCOUNTER
Please schedule patient. Laura refill given.   Needs appointment and fasting labs for further refills.     Sarai Mari RN  [facility]

## 2019-12-20 NOTE — TELEPHONE ENCOUNTER
Called and spoke to the patient and scheduled a fasting office visit with Sushma for 12/30/19. Fasting instructions explained, patient understands.  [facility]  2nd Floor  Primary Care

## 2019-12-26 DIAGNOSIS — M17.12 ARTHRITIS OF LEFT KNEE: ICD-10-CM

## 2019-12-26 DIAGNOSIS — M25.562 CHRONIC PAIN OF LEFT KNEE: ICD-10-CM

## 2019-12-26 DIAGNOSIS — G89.29 CHRONIC PAIN OF LEFT KNEE: ICD-10-CM

## 2019-12-26 DIAGNOSIS — M51.16 LUMBAR DISC HERNIATION WITH RADICULOPATHY: ICD-10-CM

## 2019-12-30 ENCOUNTER — OFFICE VISIT (OUTPATIENT)
Dept: FAMILY MEDICINE | Facility: CLINIC | Age: 64
End: 2019-12-30
Payer: COMMERCIAL

## 2019-12-30 VITALS
DIASTOLIC BLOOD PRESSURE: 85 MMHG | BODY MASS INDEX: 30.37 KG/M2 | HEIGHT: 66 IN | TEMPERATURE: 98 F | HEART RATE: 74 BPM | OXYGEN SATURATION: 98 % | SYSTOLIC BLOOD PRESSURE: 130 MMHG | WEIGHT: 189 LBS

## 2019-12-30 DIAGNOSIS — E11.319 TYPE 2 DIABETES MELLITUS WITH RETINOPATHY, WITHOUT LONG-TERM CURRENT USE OF INSULIN, MACULAR EDEMA PRESENCE UNSPECIFIED, UNSPECIFIED LATERALITY, UNSPECIFIED RETINOPATHY SEVERITY (H): Primary | ICD-10-CM

## 2019-12-30 DIAGNOSIS — M51.16 LUMBAR DISC HERNIATION WITH RADICULOPATHY: ICD-10-CM

## 2019-12-30 DIAGNOSIS — Z23 NEED FOR IMMUNIZATION AGAINST INFLUENZA: ICD-10-CM

## 2019-12-30 DIAGNOSIS — E78.5 HYPERLIPIDEMIA LDL GOAL <100: ICD-10-CM

## 2019-12-30 DIAGNOSIS — Z11.4 ENCOUNTER FOR SCREENING FOR HIV: ICD-10-CM

## 2019-12-30 DIAGNOSIS — I10 ESSENTIAL HYPERTENSION: ICD-10-CM

## 2019-12-30 DIAGNOSIS — H40.003 GLAUCOMA SUSPECT OF BOTH EYES: ICD-10-CM

## 2019-12-30 DIAGNOSIS — M17.12 ARTHRITIS OF LEFT KNEE: ICD-10-CM

## 2019-12-30 LAB
ALBUMIN SERPL-MCNC: 3.8 G/DL (ref 3.4–5)
ALP SERPL-CCNC: 67 U/L (ref 40–150)
ALT SERPL W P-5'-P-CCNC: 29 U/L (ref 0–70)
ANION GAP SERPL CALCULATED.3IONS-SCNC: 8 MMOL/L (ref 3–14)
AST SERPL W P-5'-P-CCNC: 20 U/L (ref 0–45)
BILIRUB SERPL-MCNC: 0.8 MG/DL (ref 0.2–1.3)
BUN SERPL-MCNC: 18 MG/DL (ref 7–30)
CALCIUM SERPL-MCNC: 9.4 MG/DL (ref 8.5–10.1)
CHLORIDE SERPL-SCNC: 102 MMOL/L (ref 94–109)
CHOLEST SERPL-MCNC: 127 MG/DL
CO2 SERPL-SCNC: 26 MMOL/L (ref 20–32)
CREAT SERPL-MCNC: 1.04 MG/DL (ref 0.66–1.25)
CREAT UR-MCNC: 138 MG/DL
GFR SERPL CREATININE-BSD FRML MDRD: 76 ML/MIN/{1.73_M2}
GLUCOSE SERPL-MCNC: 112 MG/DL (ref 70–99)
HBA1C MFR BLD: 6.7 % (ref 0–5.6)
HDLC SERPL-MCNC: 44 MG/DL
LDLC SERPL CALC-MCNC: 54 MG/DL
MICROALBUMIN UR-MCNC: 10 MG/L
MICROALBUMIN/CREAT UR: 7.61 MG/G CR (ref 0–17)
NONHDLC SERPL-MCNC: 83 MG/DL
POTASSIUM SERPL-SCNC: 3.9 MMOL/L (ref 3.4–5.3)
PROT SERPL-MCNC: 7.7 G/DL (ref 6.8–8.8)
SODIUM SERPL-SCNC: 136 MMOL/L (ref 133–144)
TRIGL SERPL-MCNC: 144 MG/DL
TSH SERPL DL<=0.005 MIU/L-ACNC: 1.26 MU/L (ref 0.4–4)

## 2019-12-30 PROCEDURE — 99214 OFFICE O/P EST MOD 30 MIN: CPT | Mod: 25 | Performed by: NURSE PRACTITIONER

## 2019-12-30 PROCEDURE — 90471 IMMUNIZATION ADMIN: CPT | Performed by: NURSE PRACTITIONER

## 2019-12-30 PROCEDURE — 80061 LIPID PANEL: CPT | Performed by: NURSE PRACTITIONER

## 2019-12-30 PROCEDURE — 36415 COLL VENOUS BLD VENIPUNCTURE: CPT | Performed by: NURSE PRACTITIONER

## 2019-12-30 PROCEDURE — 87389 HIV-1 AG W/HIV-1&-2 AB AG IA: CPT | Performed by: NURSE PRACTITIONER

## 2019-12-30 PROCEDURE — 90682 RIV4 VACC RECOMBINANT DNA IM: CPT | Performed by: NURSE PRACTITIONER

## 2019-12-30 PROCEDURE — 82043 UR ALBUMIN QUANTITATIVE: CPT | Performed by: NURSE PRACTITIONER

## 2019-12-30 PROCEDURE — 83036 HEMOGLOBIN GLYCOSYLATED A1C: CPT | Performed by: NURSE PRACTITIONER

## 2019-12-30 PROCEDURE — 80053 COMPREHEN METABOLIC PANEL: CPT | Performed by: NURSE PRACTITIONER

## 2019-12-30 PROCEDURE — 84443 ASSAY THYROID STIM HORMONE: CPT | Performed by: NURSE PRACTITIONER

## 2019-12-30 RX ORDER — METHOCARBAMOL 500 MG/1
500 TABLET, FILM COATED ORAL 3 TIMES DAILY PRN
Qty: 30 TABLET | Refills: 1 | Status: CANCELLED | OUTPATIENT
Start: 2019-12-30

## 2019-12-30 RX ORDER — METFORMIN HCL 500 MG
2000 TABLET, EXTENDED RELEASE 24 HR ORAL
Qty: 360 TABLET | Refills: 3 | Status: SHIPPED | OUTPATIENT
Start: 2019-12-30 | End: 2020-07-14

## 2019-12-30 RX ORDER — GLIMEPIRIDE 4 MG/1
TABLET ORAL
Qty: 90 TABLET | Refills: 3 | Status: SHIPPED | OUTPATIENT
Start: 2019-12-30 | End: 2021-09-21

## 2019-12-30 RX ORDER — HYDROCHLOROTHIAZIDE 12.5 MG/1
12.5 TABLET ORAL DAILY
Qty: 90 TABLET | Refills: 3 | Status: SHIPPED | OUTPATIENT
Start: 2019-12-30 | End: 2020-08-19

## 2019-12-30 RX ORDER — NAPROXEN 500 MG/1
TABLET ORAL
Qty: 90 TABLET | Refills: 1 | Status: CANCELLED | OUTPATIENT
Start: 2019-12-30

## 2019-12-30 RX ORDER — LATANOPROST 50 UG/ML
1 SOLUTION/ DROPS OPHTHALMIC DAILY
Qty: 1 BOTTLE | Refills: 11 | Status: CANCELLED | OUTPATIENT
Start: 2019-12-30

## 2019-12-30 RX ORDER — DICLOFENAC SODIUM 75 MG/1
75 TABLET, DELAYED RELEASE ORAL 2 TIMES DAILY PRN
Qty: 40 TABLET | Refills: 1 | Status: SHIPPED | OUTPATIENT
Start: 2019-12-30 | End: 2020-03-17

## 2019-12-30 RX ORDER — GABAPENTIN 300 MG/1
300 CAPSULE ORAL AT BEDTIME
Qty: 30 CAPSULE | Refills: 0 | Status: CANCELLED | OUTPATIENT
Start: 2019-12-30

## 2019-12-30 RX ORDER — LISINOPRIL 40 MG/1
40 TABLET ORAL DAILY
Qty: 90 TABLET | Refills: 3 | Status: SHIPPED | OUTPATIENT
Start: 2019-12-30 | End: 2020-07-16

## 2019-12-30 RX ORDER — ATORVASTATIN CALCIUM 20 MG/1
20 TABLET, FILM COATED ORAL DAILY
Qty: 90 TABLET | Refills: 1 | Status: SHIPPED | OUTPATIENT
Start: 2019-12-30 | End: 2020-07-07

## 2019-12-30 ASSESSMENT — MIFFLIN-ST. JEOR: SCORE: 1592.11

## 2019-12-30 ASSESSMENT — PAIN SCALES - GENERAL: PAINLEVEL: NO PAIN (0)

## 2019-12-30 NOTE — PATIENT INSTRUCTIONS
At [facility], we strive to deliver an exceptional experience to you, every time we see you.  If you receive a survey in the mail, please send us back your thoughts. We really do value your feedback.    Based on your medical history, these are the current health maintenance/preventive care services that you are due for (some may have been done at this visit.)  Health Maintenance Due   Topic Date Due     HIV SCREENING  01/11/1972     PREVENTIVE CARE VISIT  11/11/2016     ZOSTER IMMUNIZATION (2 of 2) 05/23/2018     INFLUENZA VACCINE (1) 09/01/2019     LIPID  11/07/2019     MICROALBUMIN  11/07/2019     TSH W/FREE T4 REFLEX  11/17/2019     A1C  12/14/2019         Suggested websites for health information:  Www.[facility].org : Up to date and easily searchable information on multiple topics.  Www.medlineplus.gov : medication info, interactive tutorials, watch real surgeries online  Www.familydoctor.org : good info from the Academy of Family Physicians  Www.cdc.gov : public health info, travel advisories, epidemics (H1N1)  Www.aap.org : children's health info, normal development, vaccinations  Www.health.[facility].[place].us : [place] dept of health, public health issues in [place], N1N1    Your care team:                            Family Medicine Internal Medicine   MD Tobias Lu MD Shantel Branch-Fleming, MD Katya Georgiev PA-C Nam Ho, MD Pediatrics   LAN Guillermo, MD Casi Rutherford CNP, MD Deborah Mielke, MD Kim Thein, APRN CNP      Clinic hours: Monday - Thursday 7 am-7 pm; Fridays 7 am-5 pm.   Urgent care: Monday - Friday 11 am-9 pm; Saturday and Sunday 9 am-5 pm.  Pharmacy : Monday -Thursday 8 am-8 pm; Friday 8 am-6 pm; Saturday and Sunday 9 am-5 pm.     Clinic: [phone]   Pharmacy: [phone]    Patient Education     Diabetes: Tracking Your Fitness Progress    Tracking your fitness progress can help you  improve your long-term health. Seeing how far you ve come may motivate you to achieve more. Your doctor can also use a record of any progress to help plan your treatment.  Recording blood sugar levels  Your healthcare provider may have shown you how to check your blood sugar. Now that you re more active, you may need to check it more often. Keep a blood sugar log. That way, you can see how your efforts are paying off. You may also include a column for blood sugar readings in a fitness log (see Keeping a Fitness Log below). Bring any log books with you on healthcare provider visits. Your healthcare provider can use these records to help decide whether to adjust your medications.  Setting a fitness goal  A fitness goal gives you something to reach for. Set a goal you can achieve. It does no good if your goal is beyond your ability. And choose a goal that focuses on action. For instance, your first goal may be to take two 10-minute walks a day for one week. After you reach your first goal, try making the next one more challenging. Invite a friend to exercise with you so you can encourage each other to strive toward your goals.   Keeping a fitness log  Include the information that matters most to you in your fitness log. This may be how you felt before, during, or after exercising. And don t forget to record your blood sugar reading. As time goes on, compare your first entry with more recent entries. You may see a rise in your fitness level and a drop in your blood sugar.  Your fitness reward  Your chances of reaching a goal increase if you plan a reward. Write down a nonfood reward that matters to you. For instance, you might reward yourself with a night at the movies, a new warm-up suit, or some relaxing music.  Date Last Reviewed: 7/1/2016 2000-2018 [employer]. [address] [place] [address], [place] [address]. All rights reserved. This information is not intended as a substitute for professional medical  care. Always follow your healthcare professional's instructions.

## 2019-12-31 LAB — HIV 1+2 AB+HIV1 P24 AG SERPL QL IA: NONREACTIVE

## 2019-12-31 NOTE — TELEPHONE ENCOUNTER
Routing refill request to provider for review/approval because:  Drug not on the [facility] refill protocol   A break in medication      Josh Liu RN, BSN, PHN

## 2020-01-02 RX ORDER — GABAPENTIN 300 MG/1
300 CAPSULE ORAL AT BEDTIME
Qty: 30 CAPSULE | Refills: 0 | Status: SHIPPED | OUTPATIENT
Start: 2020-01-02 | End: 2020-02-12

## 2020-02-11 DIAGNOSIS — M25.562 CHRONIC PAIN OF LEFT KNEE: ICD-10-CM

## 2020-02-11 DIAGNOSIS — M51.16 LUMBAR DISC HERNIATION WITH RADICULOPATHY: ICD-10-CM

## 2020-02-11 DIAGNOSIS — G89.29 CHRONIC PAIN OF LEFT KNEE: ICD-10-CM

## 2020-02-11 DIAGNOSIS — M17.12 ARTHRITIS OF LEFT KNEE: ICD-10-CM

## 2020-02-12 DIAGNOSIS — M54.59 MECHANICAL LOW BACK PAIN: ICD-10-CM

## 2020-02-12 RX ORDER — GABAPENTIN 300 MG/1
CAPSULE ORAL
Qty: 30 CAPSULE | Refills: 0 | Status: SHIPPED | OUTPATIENT
Start: 2020-02-12 | End: 2020-03-18

## 2020-02-12 NOTE — TELEPHONE ENCOUNTER
Requested Prescriptions   Pending Prescriptions Disp Refills     methocarbamol (ROBAXIN) 500 MG tablet 30 tablet 1     Sig: Take 1 tablet (500 mg) by mouth 3 times daily as needed for muscle spasms       There is no refill protocol information for this order        Last Written Prescription Date:  11/8/19  Last Fill Quantity: 30,  # refills: 1   Last Office Visit with [facility], [facility] or [facility] prescribing provider:  12/30/19   Future Office Visit:           Noe Ware  [facility]

## 2020-02-12 NOTE — TELEPHONE ENCOUNTER
Routing refill request to provider for review/approval because:  Drug not on the [facility] refill protocol     Josh Liu RN, BSN, PHN

## 2020-02-17 RX ORDER — METHOCARBAMOL 500 MG/1
500 TABLET, FILM COATED ORAL 3 TIMES DAILY PRN
Qty: 30 TABLET | Refills: 1 | Status: SHIPPED | OUTPATIENT
Start: 2020-02-17 | End: 2023-10-12

## 2020-03-03 ENCOUNTER — OFFICE VISIT (OUTPATIENT)
Dept: FAMILY MEDICINE | Facility: CLINIC | Age: 65
End: 2020-03-03
Payer: COMMERCIAL

## 2020-03-03 VITALS
OXYGEN SATURATION: 97 % | HEART RATE: 81 BPM | WEIGHT: 195 LBS | HEIGHT: 66 IN | TEMPERATURE: 99.9 F | DIASTOLIC BLOOD PRESSURE: 69 MMHG | BODY MASS INDEX: 31.34 KG/M2 | SYSTOLIC BLOOD PRESSURE: 108 MMHG

## 2020-03-03 DIAGNOSIS — N48.1 BALANITIS: Primary | ICD-10-CM

## 2020-03-03 DIAGNOSIS — E11.319 TYPE 2 DIABETES MELLITUS WITH RETINOPATHY, WITHOUT LONG-TERM CURRENT USE OF INSULIN, MACULAR EDEMA PRESENCE UNSPECIFIED, UNSPECIFIED LATERALITY, UNSPECIFIED RETINOPATHY SEVERITY (H): ICD-10-CM

## 2020-03-03 PROCEDURE — 99213 OFFICE O/P EST LOW 20 MIN: CPT | Performed by: PHYSICIAN ASSISTANT

## 2020-03-03 RX ORDER — CLOTRIMAZOLE 1 %
CREAM (GRAM) TOPICAL 2 TIMES DAILY
Qty: 30 G | Refills: 0 | Status: SHIPPED | OUTPATIENT
Start: 2020-03-03 | End: 2022-12-08

## 2020-03-03 ASSESSMENT — MIFFLIN-ST. JEOR: SCORE: 1622.26

## 2020-03-03 ASSESSMENT — PAIN SCALES - GENERAL: PAINLEVEL: NO PAIN (0)

## 2020-03-03 NOTE — PATIENT INSTRUCTIONS
At [facility], we strive to deliver an exceptional experience to you, every time we see you. If you receive a survey, please complete it as we do value your feedback.  If you have MyChart, you can expect to receive results automatically within 24 hours of their completion.  Your provider will send a note interpreting your results as well.   If you do not have MyChart, you should receive your results in about a week by mail.    Your care team:                            Family Medicine Internal Medicine   MD Tobias Lu MD Shantel Branch-Fleming, MD Katya Georgiev PA-C Megan Hill, APRN CNP    Tan Ghosh, MD Pediatrics   Michi Bowman, PAJANEL Polk, MD Conchis Hansen APRN CNP   MD Casi Laird MD Deborah Mielke, MD Kim Thein, APRN [facility]      Clinic hours: Monday - Thursday 7 am-7 pm; Fridays 7 am-5 pm.   Urgent care: Monday - Friday 11 am-9 pm; Saturday and Sunday 9 am-5 pm.  Pharmacy : Monday -Thursday 8 am-8 pm; Friday 8 am-6 pm; Saturday and Sunday 9 am-5 pm.     Clinic: [phone]   Pharmacy: [phone]        Patient Education     Balanitis    Balanitis is an inflammation of the head of the penis. It can happen because of a buildup of germs (bacteria, viruses, or fungi) under the foreskin. It can also happen because of exposure to soaps and other chemicals. In adults, this is most often a complication of diabetes. It can also happen because of obesity or poor genital cleaning habits.  If it is not treated right away, this can lead to a condition called phimosis. This means you cannot pull back the foreskin from the head of the penis.  Symptoms of balanitis may include pain or tenderness of the penis, discharge, inability to retract the foreskin, difficulty urinating, and impotence.  Home care  The following guidelines will help you care for your condition at home:    If you are able to retract your  foreskin:  ? Children. Retract the foreskin and clean with water. Apply antibiotic cream or ointment to the penis three times a day.  ? Adults. Retract the foreskin and clean with water. Apply clotrimazole cream to the penis 3 times a day unless another medicine was prescribed. Clotrimazole cream is available over the counter. Avoid sexual activity while being treated.  ? Sitz baths. Soak the penis and foreskin in warm water while inflammation is present.    If you have diabetes, talk with your doctor about keeping your diabetes in good control.    If you are overweight, talk with your doctor about a weight loss plan.  Follow-up care  Follow up with your healthcare provider, or as advised.  When to seek medical advice  Call your healthcare provider right away if any of these occur:    You can't retract the foreskin    You can't return the retracted foreskin to the forward position. This requires immediate attention!    Your symptoms get worse    You have partial or complete blockage of the flow of urine  Date Last Reviewed: 9/1/2016 2000-2019 [employer]. [address] [place] [address], [place] [address]. All rights reserved. This information is not intended as a substitute for professional medical care. Always follow your healthcare professional's instructions.

## 2020-03-03 NOTE — PROGRESS NOTES
Subjective     Kathrine Isaac is a 65 year old male who presents to clinic today for the following health issues:    HPI   Rash  Onset: about 1 month ago     Description:   Location: Penis, and foreskin  Character: round, red & tender  Itching (Pruritis): YES, sometimes    Progression of Symptoms:  worsening    Accompanying Signs & Symptoms:  Fever: no   Body aches or joint pain: no   Sore throat symptoms: no   Recent cold symptoms: no     History:   Previous similar rash: no     Precipitating factors:   Exposure to similar rash: no   New exposures: Hot tub & pool   Recent travel: no     Alleviating factors:  none    Therapies Tried and outcome: none     Most recent A1c very well controlled.                No Known Allergies    Past Medical History:   Diagnosis Date     Arthritis      Diabetes (H)      Essential hypertension 10/28/2015       alcohol swab prep pads, Use to swab area of injection/nkechi as directed.  aspirin (ASPIRIN LOW DOSE) 81 MG EC tablet, Take 1 tablet (81 mg) by mouth daily  atorvastatin (LIPITOR) 20 MG tablet, Take 1 tablet (20 mg) by mouth daily  blood glucose (NO BRAND SPECIFIED) test strip, Use to test blood sugar 1 times daily or as directed. To accompany: Blood Glucose Monitor Brands: per insurance.  blood glucose (NO BRAND SPECIFIED) test strip, Use to test blood sugars once times daily or as directed  blood glucose calibration (NO BRAND SPECIFIED) solution, To accompany: Blood Glucose Monitor Brands: per insurance.  blood glucose monitoring (NO BRAND SPECIFIED) meter device kit, Use to test blood sugar 1 times daily or as directed. Preferred blood glucose meter/supplies to accompany: Monitor Brands: per insurance.  diclofenac (VOLTAREN) 75 MG EC tablet, Take 1 tablet (75 mg) by mouth 2 times daily as needed  empagliflozin (JARDIANCE) 10 MG TABS tablet, Take 1 tablet (10 mg) by mouth daily  gabapentin (NEURONTIN) 300 MG capsule, TAKE ONE CAPSULE BY MOUTH AT BEDTIME  glimepiride (AMARYL) 4  MG tablet, TAKE ONE TABLET BY MOUTH EVERY MORNING BEFORE BREAKFAST  hydrochlorothiazide (HYDRODIURIL) 12.5 MG tablet, Take 1 tablet (12.5 mg) by mouth daily  latanoprost (XALATAN) 0.005 % ophthalmic solution, Place 1 drop into both eyes daily  lisinopril (PRINIVIL/ZESTRIL) 40 MG tablet, Take 1 tablet (40 mg) by mouth daily  metFORMIN (GLUCOPHAGE-XR) 500 MG 24 hr tablet, Take 4 tablets (2,000 mg) by mouth daily (with dinner)  methocarbamol (ROBAXIN) 500 MG tablet, Take 1 tablet (500 mg) by mouth 3 times daily as needed for muscle spasms  naproxen (NAPROSYN) 500 MG tablet, TAKE ONE TABLET BY MOUTH TWICE A DAY AS NEEDED FOR MODERATE PAIN  ORDER FOR DME, SET TO LOCAL PRINT,, Equipment being ordered: wrist quick fit [id number]  order for DME, Equipment being ordered: One touch lancets, test strips.  Patient to check sugars once times daily, 90 day supply for test strips and lancets, refill 3 times  order for DME, Equipment being ordered: Glucometer per insurance preference, lancets, test strips.  Patient to check sugars two times daily, 90 day supply for test strips and lancets, refill 3 times  order for DME, Equipment being ordered: Glucometer Accucheck Sulema, lancets, test strips.  Patient to check sugars bid times daily, 90 day supply for test strips and lancets, refill 6 times  order for DME, Equipment being ordered: lancets for one touch glucometer for once daily testing  sitagliptin (JANUVIA) 100 MG tablet, Take 1 tablet (100 mg) by mouth daily  thin (NO BRAND SPECIFIED) lancets, Use with lanceting device. To accompany: Blood Glucose Monitor Brands: per insurance.    triamcinolone (KENALOG-40) injection 40 mg        Social History     Tobacco Use     Smoking status: Never Smoker     Smokeless tobacco: Never Used   Substance Use Topics     Alcohol use: Yes     Drug use: No       ROS:  General: negative for fever  SKIN: + as above   no dysuria    Physcial Exam:  /69 (BP Location: Right arm, Patient Position:  "Chair, Cuff Size: Adult Large)   Pulse 81   Temp 99.9  F (37.7  C) (Oral)   Ht 1.676 m (5' 6\")   Wt 88.5 kg (195 lb)   SpO2 97%   BMI 31.47 kg/m      GENERAL: alert, no acute distress  EYES: conjunctival clear  RESP: Regular breathing rate  NEURO: awake .  SKIN: some small superficial joseph skin tears to outside part of foreskin, which is retractable, there is some redness to the glans, no discharge.    ASSESSMENT:well appearing,     ICD-10-CM    1. Balanitis N48.1 clotrimazole (LOTRIMIN) 1 % external cream   2. Type 2 diabetes mellitus with retinopathy, without long-term current use of insulin, macular edema presence unspecified, unspecified laterality, unspecified retinopathy severity (H) E11.319        PLAN:  See today's orders.  Follow-up with primary clinic if not improving.  Advised about symptoms which might herald more serious problems.      "

## 2020-03-10 ENCOUNTER — HEALTH MAINTENANCE LETTER (OUTPATIENT)
Age: 65
End: 2020-03-10

## 2020-03-13 DIAGNOSIS — M25.562 CHRONIC PAIN OF LEFT KNEE: ICD-10-CM

## 2020-03-13 DIAGNOSIS — M51.16 LUMBAR DISC HERNIATION WITH RADICULOPATHY: ICD-10-CM

## 2020-03-13 DIAGNOSIS — M17.12 ARTHRITIS OF LEFT KNEE: ICD-10-CM

## 2020-03-13 DIAGNOSIS — G89.29 CHRONIC PAIN OF LEFT KNEE: ICD-10-CM

## 2020-03-16 DIAGNOSIS — M17.12 ARTHRITIS OF LEFT KNEE: ICD-10-CM

## 2020-03-16 DIAGNOSIS — M51.16 LUMBAR DISC HERNIATION WITH RADICULOPATHY: ICD-10-CM

## 2020-03-16 NOTE — TELEPHONE ENCOUNTER
"Requested Prescriptions   Pending Prescriptions Disp Refills     diclofenac (VOLTAREN) 75 MG EC tablet  Last Written Prescription Date:  12/30/19  Last Fill Quantity: 40,  # refills: 1   Last Office Visit with MAYRA, JOSE MANUEL or [facility] prescribing provider:  03/03/2020Aracelis   Future Office Visit:    40 tablet 1     Sig: Take 1 tablet (75 mg) by mouth 2 times daily as needed       NSAID Medications Failed - 3/16/2020  1:07 PM        Failed - Normal CBC on file in past 12 months     Recent Labs   Lab Test 03/28/18  0826   WBC 10.2   RBC 4.93   HGB 14.7   HCT 42.3                    Passed - Blood pressure under 140/90 in past 12 months     BP Readings from Last 3 Encounters:   03/03/20 108/69   12/30/19 130/85   09/26/19 124/88                 Passed - Normal ALT on file in past 12 months     Recent Labs   Lab Test 12/30/19  0938   ALT 29             Passed - Normal AST on file in past 12 months     Recent Labs   Lab Test 12/30/19  0938   AST 20             Passed - Recent (12 mo) or future (30 days) visit within the authorizing provider's specialty     Patient has had an office visit with the authorizing provider or a provider within the authorizing providers department within the previous 12 mos or has a future within next 30 days. See \"Patient Info\" tab in inbasket, or \"Choose Columns\" in Meds & Orders section of the refill encounter.              Passed - Patient is age 6-64 years        Passed - Medication is active on med list        Passed - Normal serum creatinine on file in past 12 months     Recent Labs   Lab Test 12/30/19  0938   CR 1.04       Ok to refill medication if creatinine is low               "

## 2020-03-16 NOTE — TELEPHONE ENCOUNTER
gabapentin (NEURONTIN) 300 MG capsule   Last Written Prescription Date:  02/12/2020  Last Fill Quantity: 30,   # refills: 0  Last Office Visit: 09/26/2019  Future Office visit:   None scheduled

## 2020-03-17 RX ORDER — DICLOFENAC SODIUM 75 MG/1
75 TABLET, DELAYED RELEASE ORAL 2 TIMES DAILY PRN
Qty: 40 TABLET | Refills: 0 | Status: SHIPPED | OUTPATIENT
Start: 2020-03-17 | End: 2020-04-14

## 2020-03-18 RX ORDER — GABAPENTIN 300 MG/1
CAPSULE ORAL
Qty: 30 CAPSULE | Refills: 0 | Status: SHIPPED | OUTPATIENT
Start: 2020-03-18 | End: 2020-04-13

## 2020-04-10 DIAGNOSIS — M17.12 ARTHRITIS OF LEFT KNEE: ICD-10-CM

## 2020-04-10 DIAGNOSIS — M51.16 LUMBAR DISC HERNIATION WITH RADICULOPATHY: ICD-10-CM

## 2020-04-10 DIAGNOSIS — M25.562 CHRONIC PAIN OF LEFT KNEE: ICD-10-CM

## 2020-04-10 DIAGNOSIS — G89.29 CHRONIC PAIN OF LEFT KNEE: ICD-10-CM

## 2020-04-10 NOTE — TELEPHONE ENCOUNTER
"Requested Prescriptions   Pending Prescriptions Disp Refills     diclofenac (VOLTAREN) 75 MG EC tablet  Last Written Prescription Date:  03/17/19  Last Fill Quantity: 40,  # refills: 0   Last Office Visit with MAYRA, JOSE MANUEL or [facility] prescribing provider:  03/03/2020Simi   Future Office Visit:    40 tablet 0     Sig: Take 1 tablet (75 mg) by mouth 2 times daily as needed for moderate pain       NSAID Medications Failed - 4/10/2020  1:57 PM        Failed - Normal CBC on file in past 12 months     Recent Labs   Lab Test 03/28/18  0826   WBC 10.2   RBC 4.93   HGB 14.7   HCT 42.3                    Passed - Blood pressure under 140/90 in past 12 months     BP Readings from Last 3 Encounters:   03/03/20 108/69   12/30/19 130/85   09/26/19 124/88                 Passed - Normal ALT on file in past 12 months     Recent Labs   Lab Test 12/30/19  0938   ALT 29             Passed - Normal AST on file in past 12 months     Recent Labs   Lab Test 12/30/19  0938   AST 20             Passed - Recent (12 mo) or future (30 days) visit within the authorizing provider's specialty     Patient has had an office visit with the authorizing provider or a provider within the authorizing providers department within the previous 12 mos or has a future within next 30 days. See \"Patient Info\" tab in inbasket, or \"Choose Columns\" in Meds & Orders section of the refill encounter.              Passed - Patient is age 6-64 years        Passed - Medication is active on med list        Passed - Normal serum creatinine on file in past 12 months     Recent Labs   Lab Test 12/30/19  0938   CR 1.04       Ok to refill medication if creatinine is low               "

## 2020-04-13 RX ORDER — GABAPENTIN 300 MG/1
CAPSULE ORAL
Qty: 30 CAPSULE | Refills: 0 | Status: SHIPPED | OUTPATIENT
Start: 2020-04-13 | End: 2020-05-20

## 2020-04-14 RX ORDER — DICLOFENAC SODIUM 75 MG/1
75 TABLET, DELAYED RELEASE ORAL 2 TIMES DAILY PRN
Qty: 40 TABLET | Refills: 0 | Status: SHIPPED | OUTPATIENT
Start: 2020-04-14 | End: 2020-08-03

## 2020-04-14 NOTE — TELEPHONE ENCOUNTER
Routing refill request to provider for review/approval because:  Labs not current:  CBC      Patrica Robison RN  [place]

## 2020-05-20 DIAGNOSIS — M51.16 LUMBAR DISC HERNIATION WITH RADICULOPATHY: ICD-10-CM

## 2020-05-20 DIAGNOSIS — M25.562 CHRONIC PAIN OF LEFT KNEE: ICD-10-CM

## 2020-05-20 DIAGNOSIS — M17.12 ARTHRITIS OF LEFT KNEE: ICD-10-CM

## 2020-05-20 DIAGNOSIS — G89.29 CHRONIC PAIN OF LEFT KNEE: ICD-10-CM

## 2020-05-20 RX ORDER — GABAPENTIN 300 MG/1
CAPSULE ORAL
Qty: 30 CAPSULE | Refills: 0 | Status: SHIPPED | OUTPATIENT
Start: 2020-05-20 | End: 2020-06-18

## 2020-06-18 DIAGNOSIS — M17.12 ARTHRITIS OF LEFT KNEE: ICD-10-CM

## 2020-06-18 DIAGNOSIS — M25.562 CHRONIC PAIN OF LEFT KNEE: ICD-10-CM

## 2020-06-18 DIAGNOSIS — M51.16 LUMBAR DISC HERNIATION WITH RADICULOPATHY: ICD-10-CM

## 2020-06-18 DIAGNOSIS — G89.29 CHRONIC PAIN OF LEFT KNEE: ICD-10-CM

## 2020-06-18 RX ORDER — GABAPENTIN 300 MG/1
CAPSULE ORAL
Qty: 30 CAPSULE | Refills: 0 | Status: SHIPPED | OUTPATIENT
Start: 2020-06-18 | End: 2020-07-28

## 2020-07-05 DIAGNOSIS — I10 ESSENTIAL HYPERTENSION: ICD-10-CM

## 2020-07-05 DIAGNOSIS — E78.5 HYPERLIPIDEMIA LDL GOAL <100: ICD-10-CM

## 2020-07-05 DIAGNOSIS — E11.319 TYPE 2 DIABETES MELLITUS WITH RETINOPATHY, WITHOUT LONG-TERM CURRENT USE OF INSULIN, MACULAR EDEMA PRESENCE UNSPECIFIED, UNSPECIFIED LATERALITY, UNSPECIFIED RETINOPATHY SEVERITY (H): ICD-10-CM

## 2020-07-07 RX ORDER — ATORVASTATIN CALCIUM 20 MG/1
20 TABLET, FILM COATED ORAL DAILY
Qty: 90 TABLET | Refills: 0 | Status: SHIPPED | OUTPATIENT
Start: 2020-07-07 | End: 2020-08-03

## 2020-07-07 RX ORDER — METFORMIN HCL 500 MG
2000 TABLET, EXTENDED RELEASE 24 HR ORAL
Qty: 360 TABLET | Refills: 3 | OUTPATIENT
Start: 2020-07-07

## 2020-07-07 RX ORDER — LISINOPRIL 40 MG/1
40 TABLET ORAL DAILY
Qty: 90 TABLET | Refills: 3 | OUTPATIENT
Start: 2020-07-07

## 2020-07-07 RX ORDER — GLIMEPIRIDE 4 MG/1
TABLET ORAL
Qty: 90 TABLET | Refills: 3 | OUTPATIENT
Start: 2020-07-07

## 2020-07-07 RX ORDER — HYDROCHLOROTHIAZIDE 12.5 MG/1
12.5 TABLET ORAL DAILY
Qty: 90 TABLET | Refills: 3 | OUTPATIENT
Start: 2020-07-07

## 2020-07-07 NOTE — TELEPHONE ENCOUNTER
"Requested Prescriptions   Pending Prescriptions Disp Refills     metFORMIN (GLUCOPHAGE-XR) 500 MG 24 hr tablet 360 tablet 3     Sig: Take 4 tablets (2,000 mg) by mouth daily (with dinner)       Biguanide Agents Failed - 7/5/2020 10:47 AM        Failed - Patient has documented A1c within the specified period of time.     If HgbA1C is 8 or greater, it needs to be on file within the past 3 months.  If less than 8, must be on file within the past 6 months.     Recent Labs   Lab Test 12/30/19  0938   A1C 6.7*             Passed - Patient is age 10 or older        Passed - Patient's CR is NOT>1.4 OR Patient's EGFR is NOT<45 within past 12 mos.     Recent Labs   Lab Test 12/30/19  0938   GFRESTIMATED 76   GFRESTBLACK 88       Recent Labs   Lab Test 12/30/19  0938   CR 1.04             Passed - Patient does NOT have a diagnosis of CHF.        Passed - Medication is active on med list        Passed - Recent (6 mo) or future (30 days) visit within the authorizing provider's specialty     Patient had office visit in the last 6 months or has a visit in the next 30 days with authorizing provider or within the authorizing provider's specialty.  See \"Patient Info\" tab in inbasket, or \"Choose Columns\" in Meds & Orders section of the refill encounter.               lisinopril (ZESTRIL) 40 MG tablet 90 tablet 3     Sig: Take 1 tablet (40 mg) by mouth daily       ACE Inhibitors (Including Combos) Protocol Passed - 7/5/2020 10:47 AM        Passed - Blood pressure under 140/90 in past 12 months     BP Readings from Last 3 Encounters:   03/03/20 108/69   12/30/19 130/85   09/26/19 124/88                 Passed - Recent (12 mo) or future (30 days) visit within the authorizing provider's specialty     Patient has had an office visit with the authorizing provider or a provider within the authorizing providers department within the previous 12 mos or has a future within next 30 days. See \"Patient Info\" tab in inbasket, or \"Choose Columns\" in " "Meds & Orders section of the refill encounter.              Passed - Medication is active on med list        Passed - Patient is age 18 or older        Passed - Normal serum creatinine on file in past 12 months     Recent Labs   Lab Test 12/30/19  0938   CR 1.04       Ok to refill medication if creatinine is low          Passed - Normal serum potassium on file in past 12 months     Recent Labs   Lab Test 12/30/19  0938   POTASSIUM 3.9                atorvastatin (LIPITOR) 20 MG tablet 90 tablet 1     Sig: Take 1 tablet (20 mg) by mouth daily       Statins Protocol Passed - 7/5/2020 10:47 AM        Passed - LDL on file in past 12 months     Recent Labs   Lab Test 12/30/19  0938   LDL 54             Passed - No abnormal creatine kinase in past 12 months     No lab results found.             Passed - Recent (12 mo) or future (30 days) visit within the authorizing provider's specialty     Patient has had an office visit with the authorizing provider or a provider within the authorizing providers department within the previous 12 mos or has a future within next 30 days. See \"Patient Info\" tab in inbasket, or \"Choose Columns\" in Meds & Orders section of the refill encounter.              Passed - Medication is active on med list        Passed - Patient is age 18 or older           glimepiride (AMARYL) 4 MG tablet 90 tablet 3     Sig: TAKE ONE TABLET BY MOUTH EVERY MORNING BEFORE BREAKFAST       Sulfonylurea Agents Failed - 7/5/2020 10:47 AM        Failed - Patient has documented A1c within the specified period of time.     If HgbA1C is 8 or greater, it needs to be on file within the past 3 months.  If less than 8, must be on file within the past 6 months.     Recent Labs   Lab Test 12/30/19  0938   A1C 6.7*             Passed - Medication is active on med list        Passed - Patient is age 18 or older        Passed - Patient has a recent creatinine (normal) within the past 12 mos.     Recent Labs   Lab Test " "12/30/19  0938   CR 1.04       Ok to refill medication if creatinine is low          Passed - Recent (6 mo) or future (30 days) visit within the authorizing provider's specialty     Patient had office visit in the last 6 months or has a visit in the next 30 days with authorizing provider or within the authorizing provider's specialty.  See \"Patient Info\" tab in inbasket, or \"Choose Columns\" in Meds & Orders section of the refill encounter.               hydrochlorothiazide (HYDRODIURIL) 12.5 MG tablet 90 tablet 3     Sig: Take 1 tablet (12.5 mg) by mouth daily       Diuretics (Including Combos) Protocol Passed - 7/5/2020 10:47 AM        Passed - Blood pressure under 140/90 in past 12 months     BP Readings from Last 3 Encounters:   03/03/20 108/69   12/30/19 130/85   09/26/19 124/88                 Passed - Recent (12 mo) or future (30 days) visit within the authorizing provider's specialty     Patient has had an office visit with the authorizing provider or a provider within the authorizing providers department within the previous 12 mos or has a future within next 30 days. See \"Patient Info\" tab in inbasket, or \"Choose Columns\" in Meds & Orders section of the refill encounter.              Passed - Medication is active on med list        Passed - Patient is age 18 or older        Passed - Normal serum creatinine on file in past 12 months     Recent Labs   Lab Test 12/30/19  0938   CR 1.04              Passed - Normal serum potassium on file in past 12 months     Recent Labs   Lab Test 12/30/19  0938   POTASSIUM 3.9                    Passed - Normal serum sodium on file in past 12 months     Recent Labs   Lab Test 12/30/19  0938                    Patient has refills of all meds, except lipitor.  Prescription approved per [facility].  Needs to be seen for more refills.  Samreen Rm RN    "

## 2020-07-14 RX ORDER — METFORMIN HCL 500 MG
2000 TABLET, EXTENDED RELEASE 24 HR ORAL
Qty: 360 TABLET | Refills: 0 | Status: SHIPPED | OUTPATIENT
Start: 2020-07-14 | End: 2020-08-03

## 2020-07-14 NOTE — TELEPHONE ENCOUNTER
Reason for Call:  Other prescription    Detailed comments: Patient has an appointment scheduled for his diabetic check with Sushma Joe on Monday, August 3, 2020.  He called stating that he will be out of some of his medications and would like refills prior to his appointment.    Phone Number Patient can be reached at: Cell number on file:    Telephone Information:   Mobile [phone]       Best Time: Any    Can we leave a detailed message on this number? YES    Call taken on 7/14/2020 at 10:01 AM by Bentley Hill

## 2020-07-14 NOTE — TELEPHONE ENCOUNTER
Medication is being filled for 1 time refill only due to:  Patient needs to be seen because due for diabetic recheck, patient is scheduled.         Josh Liu RN, BSN, PHN

## 2020-07-15 DIAGNOSIS — I10 ESSENTIAL HYPERTENSION: ICD-10-CM

## 2020-07-15 NOTE — TELEPHONE ENCOUNTER
Please send scripts to [facility] pharmacy from our closed pharmacy.    lisinopril (PRINIVIL/ZESTRIL) 40 MG tablet  90 tablet  3  12/30/2019           Please see refill encounter from 07/05/2020, come of the refused meds may need to be transferred as well

## 2020-07-16 RX ORDER — LISINOPRIL 40 MG/1
40 TABLET ORAL DAILY
Qty: 90 TABLET | Refills: 1 | Status: SHIPPED | OUTPATIENT
Start: 2020-07-16 | End: 2021-01-14

## 2020-07-27 DIAGNOSIS — G89.29 CHRONIC PAIN OF LEFT KNEE: ICD-10-CM

## 2020-07-27 DIAGNOSIS — M17.12 ARTHRITIS OF LEFT KNEE: ICD-10-CM

## 2020-07-27 DIAGNOSIS — M25.562 CHRONIC PAIN OF LEFT KNEE: ICD-10-CM

## 2020-07-27 DIAGNOSIS — M51.16 LUMBAR DISC HERNIATION WITH RADICULOPATHY: ICD-10-CM

## 2020-07-28 RX ORDER — GABAPENTIN 300 MG/1
CAPSULE ORAL
Qty: 30 CAPSULE | Refills: 0 | Status: SHIPPED | OUTPATIENT
Start: 2020-07-28 | End: 2020-08-03

## 2020-08-02 ENCOUNTER — MYC MEDICAL ADVICE (OUTPATIENT)
Dept: FAMILY MEDICINE | Facility: CLINIC | Age: 65
End: 2020-08-02

## 2020-08-03 ENCOUNTER — OFFICE VISIT (OUTPATIENT)
Dept: FAMILY MEDICINE | Facility: CLINIC | Age: 65
End: 2020-08-03
Payer: COMMERCIAL

## 2020-08-03 ENCOUNTER — TELEPHONE (OUTPATIENT)
Dept: FAMILY MEDICINE | Facility: CLINIC | Age: 65
End: 2020-08-03

## 2020-08-03 VITALS
HEIGHT: 66 IN | RESPIRATION RATE: 16 BRPM | WEIGHT: 196 LBS | BODY MASS INDEX: 31.5 KG/M2 | DIASTOLIC BLOOD PRESSURE: 76 MMHG | OXYGEN SATURATION: 97 % | TEMPERATURE: 98.5 F | SYSTOLIC BLOOD PRESSURE: 115 MMHG | HEART RATE: 88 BPM

## 2020-08-03 DIAGNOSIS — M51.16 LUMBAR DISC HERNIATION WITH RADICULOPATHY: ICD-10-CM

## 2020-08-03 DIAGNOSIS — E11.319 TYPE 2 DIABETES MELLITUS WITH RETINOPATHY, WITHOUT LONG-TERM CURRENT USE OF INSULIN, MACULAR EDEMA PRESENCE UNSPECIFIED, UNSPECIFIED LATERALITY, UNSPECIFIED RETINOPATHY SEVERITY (H): Primary | ICD-10-CM

## 2020-08-03 DIAGNOSIS — M25.562 CHRONIC PAIN OF LEFT KNEE: ICD-10-CM

## 2020-08-03 DIAGNOSIS — M17.12 ARTHRITIS OF LEFT KNEE: ICD-10-CM

## 2020-08-03 DIAGNOSIS — E78.5 HYPERLIPIDEMIA LDL GOAL <100: ICD-10-CM

## 2020-08-03 DIAGNOSIS — G89.29 CHRONIC PAIN OF LEFT KNEE: ICD-10-CM

## 2020-08-03 DIAGNOSIS — E66.811 OBESITY, CLASS I, BMI 30.0-34.9 (SEE ACTUAL BMI): ICD-10-CM

## 2020-08-03 LAB
BASOPHILS # BLD AUTO: 0.1 10E9/L (ref 0–0.2)
BASOPHILS NFR BLD AUTO: 0.6 %
DIFFERENTIAL METHOD BLD: ABNORMAL
EOSINOPHIL # BLD AUTO: 0.1 10E9/L (ref 0–0.7)
EOSINOPHIL NFR BLD AUTO: 0.8 %
ERYTHROCYTE [DISTWIDTH] IN BLOOD BY AUTOMATED COUNT: 14.8 % (ref 10–15)
ERYTHROCYTE [DISTWIDTH] IN BLOOD BY AUTOMATED COUNT: NORMAL % (ref 10–15)
HCT VFR BLD AUTO: 45.1 % (ref 40–53)
HCT VFR BLD AUTO: NORMAL % (ref 40–53)
HGB BLD-MCNC: 14.8 G/DL (ref 13.3–17.7)
HGB BLD-MCNC: NORMAL G/DL (ref 13.3–17.7)
LYMPHOCYTES # BLD AUTO: 3.1 10E9/L (ref 0.8–5.3)
LYMPHOCYTES NFR BLD AUTO: 27.6 %
MCH RBC QN AUTO: 27.1 PG (ref 26.5–33)
MCH RBC QN AUTO: NORMAL PG (ref 26.5–33)
MCHC RBC AUTO-ENTMCNC: 32.8 G/DL (ref 31.5–36.5)
MCHC RBC AUTO-ENTMCNC: NORMAL G/DL (ref 31.5–36.5)
MCV RBC AUTO: 83 FL (ref 78–100)
MCV RBC AUTO: NORMAL FL (ref 78–100)
MONOCYTES # BLD AUTO: 0.7 10E9/L (ref 0–1.3)
MONOCYTES NFR BLD AUTO: 5.9 %
NEUTROPHILS # BLD AUTO: 7.3 10E9/L (ref 1.6–8.3)
NEUTROPHILS NFR BLD AUTO: 65.1 %
PLATELET # BLD AUTO: 258 10E9/L (ref 150–450)
PLATELET # BLD AUTO: NORMAL 10E9/L (ref 150–450)
RBC # BLD AUTO: 5.46 10E12/L (ref 4.4–5.9)
RBC # BLD AUTO: NORMAL 10E12/L (ref 4.4–5.9)
WBC # BLD AUTO: 11.3 10E9/L (ref 4–11)
WBC # BLD AUTO: NORMAL 10E9/L (ref 4–11)

## 2020-08-03 PROCEDURE — 85025 COMPLETE CBC W/AUTO DIFF WBC: CPT | Performed by: NURSE PRACTITIONER

## 2020-08-03 PROCEDURE — 36415 COLL VENOUS BLD VENIPUNCTURE: CPT | Performed by: NURSE PRACTITIONER

## 2020-08-03 PROCEDURE — 99207 C FOOT EXAM  NO CHARGE: CPT | Performed by: NURSE PRACTITIONER

## 2020-08-03 PROCEDURE — 99214 OFFICE O/P EST MOD 30 MIN: CPT | Performed by: NURSE PRACTITIONER

## 2020-08-03 RX ORDER — DICLOFENAC SODIUM 75 MG/1
75 TABLET, DELAYED RELEASE ORAL 2 TIMES DAILY PRN
Qty: 40 TABLET | Refills: 0 | Status: SHIPPED | OUTPATIENT
Start: 2020-08-03 | End: 2020-09-02

## 2020-08-03 RX ORDER — ATORVASTATIN CALCIUM 20 MG/1
20 TABLET, FILM COATED ORAL DAILY
Qty: 90 TABLET | Refills: 1 | Status: SHIPPED | OUTPATIENT
Start: 2020-08-03 | End: 2021-04-12

## 2020-08-03 RX ORDER — GABAPENTIN 300 MG/1
CAPSULE ORAL
Qty: 30 CAPSULE | Refills: 3 | Status: SHIPPED | OUTPATIENT
Start: 2020-08-03 | End: 2020-08-25

## 2020-08-03 RX ORDER — METFORMIN HCL 500 MG
2000 TABLET, EXTENDED RELEASE 24 HR ORAL
Qty: 360 TABLET | Refills: 1 | Status: SHIPPED | OUTPATIENT
Start: 2020-08-03 | End: 2021-03-24

## 2020-08-03 ASSESSMENT — PAIN SCALES - GENERAL: PAINLEVEL: NO PAIN (0)

## 2020-08-03 ASSESSMENT — MIFFLIN-ST. JEOR: SCORE: 1626.8

## 2020-08-03 NOTE — PROGRESS NOTES
Subjective     Kathrine Isaac is a 65 year old male who presents to clinic today for the following health issues:    HPI       Diabetes Follow-up    How often are you checking your blood sugar? A few times a month  What time of day are you checking your blood sugars (select all that apply)?  mornings and afternoons  Have you had any blood sugars above 200?  No  Have you had any blood sugars below 70?  No    What symptoms do you notice when your blood sugar is low?  Dizzy    What concerns do you have today about your diabetes? None     Do you have any of these symptoms? (Select all that apply)  No numbness or tingling in feet.  No redness, sores or blisters on feet.  No complaints of excessive thirst.  No reports of blurry vision.  No significant changes to weight.    Have you had a diabetic eye exam in the last 12 months? Yes- Date of last eye exam: August 2019,  Location: [place]            Hyperlipidemia Follow-Up      Are you regularly taking any medication or supplement to lower your cholesterol?   Yes- atorvastatin    Are you having muscle aches or other side effects that you think could be caused by your cholesterol lowering medication?  No    Hypertension Follow-up      Do you check your blood pressure regularly outside of the clinic? No     Are you following a low salt diet? No    Are your blood pressures ever more than 140 on the top number (systolic) OR more   than 90 on the bottom number (diastolic), for example 140/90? No    BP Readings from Last 2 Encounters:   08/03/20 115/76   03/03/20 108/69     Hemoglobin A1C (%)   Date Value   12/30/2019 6.7 (H)   06/14/2019 9.2 (H)     LDL Cholesterol Calculated (mg/dL)   Date Value   12/30/2019 54   11/07/2018 32         How many servings of fruits and vegetables do you eat daily?  2-3    On average, how many sweetened beverages do you drink each day (Examples: soda, juice, sweet tea, etc.  Do NOT count diet or artificially sweetened beverages)?   0    How  many days per week do you exercise enough to make your heart beat faster? 3 or less    How many minutes a day do you exercise enough to make your heart beat faster? 9 or less    How many days per week do you miss taking your medication? 0    Patient Active Problem List   Diagnosis     Glaucoma suspect     Type 2 diabetes mellitus with retinopathy, without long-term current use of insulin, macular edema presence unspecified, unspecified laterality, unspecified retinopathy severity (H)     Hyperlipidemia LDL goal <100     Essential hypertension     Advance care planning     Diverticulosis of large intestine without hemorrhage     Obesity, Class I, BMI 30.0-34.9 (see actual BMI)     Mechanical low back pain     Hx of LASIK     Past Surgical History:   Procedure Laterality Date     COLONOSCOPY N/A 1/14/2016    Procedure: COLONOSCOPY;  Surgeon: Ventura Monge MD;  Location: [facility] OR     LASIK BILATERAL       ROTATOR CUFF REPAIR RT/LT  1992     SURGICAL HISTORY OF -       uvula remove        Social History     Tobacco Use     Smoking status: Never Smoker     Smokeless tobacco: Never Used   Substance Use Topics     Alcohol use: Yes     Family History   Problem Relation Age of Onset     Diabetes Mother      Glaucoma No family hx of      Macular Degeneration No family hx of          Current Outpatient Medications   Medication Sig Dispense Refill     alcohol swab prep pads Use to swab area of injection/nkechi as directed. 100 each 3     aspirin (ASPIRIN LOW DOSE) 81 MG EC tablet Take 1 tablet (81 mg) by mouth daily 90 tablet 3     atorvastatin (LIPITOR) 20 MG tablet Take 1 tablet (20 mg) by mouth daily 90 tablet 0     blood glucose (NO BRAND SPECIFIED) test strip Use to test blood sugar 1 times daily or as directed. To accompany: Blood Glucose Monitor Brands: per insurance. 100 strip 6     blood glucose (NO BRAND SPECIFIED) test strip Use to test blood sugars once times daily or as directed 1 Box 11     blood glucose  calibration (NO BRAND SPECIFIED) solution To accompany: Blood Glucose Monitor Brands: per insurance. 1 Bottle 3     blood glucose monitoring (NO BRAND SPECIFIED) meter device kit Use to test blood sugar 1 times daily or as directed. Preferred blood glucose meter/supplies to accompany: Monitor Brands: per insurance. 1 kit 0     diclofenac (VOLTAREN) 75 MG EC tablet Take 1 tablet (75 mg) by mouth 2 times daily as needed for moderate pain 40 tablet 0     empagliflozin (JARDIANCE) 10 MG TABS tablet Take 1 tablet (10 mg) by mouth daily 90 tablet 3     gabapentin (NEURONTIN) 300 MG capsule TAKE 1 CAPSULE BY MOUTH AT BEDTIME 30 capsule 0     glimepiride (AMARYL) 4 MG tablet TAKE ONE TABLET BY MOUTH EVERY MORNING BEFORE BREAKFAST 90 tablet 3     hydrochlorothiazide (HYDRODIURIL) 12.5 MG tablet Take 1 tablet (12.5 mg) by mouth daily 90 tablet 3     latanoprost (XALATAN) 0.005 % ophthalmic solution Place 1 drop into both eyes daily 1 Bottle 11     lisinopril (ZESTRIL) 40 MG tablet Take 1 tablet (40 mg) by mouth daily 90 tablet 1     metFORMIN (GLUCOPHAGE-XR) 500 MG 24 hr tablet Take 4 tablets (2,000 mg) by mouth daily (with dinner) 360 tablet 0     ORDER FOR DME, SET TO LOCAL PRINT, Equipment being ordered: wrist quick fit [id number] 1 each 0     order for DME Equipment being ordered: One touch lancets, test strips.  Patient to check sugars once times daily, 90 day supply for test strips and lancets, refill 3 times 1 Device 0     order for DME Equipment being ordered: Glucometer per insurance preference, lancets, test strips.  Patient to check sugars two times daily, 90 day supply for test strips and lancets, refill 3 times 1 Device 11     order for DME Equipment being ordered: Glucometer Accucheck Sulema, lancets, test strips.  Patient to check sugars bid times daily, 90 day supply for test strips and lancets, refill 6 times 1 Device 0     order for DME Equipment being ordered: lancets for one touch glucometer for once daily  "testing 1 Box 6     sitagliptin (JANUVIA) 100 MG tablet Take 1 tablet (100 mg) by mouth daily 90 tablet 3     thin (NO BRAND SPECIFIED) lancets Use with lanceting device. To accompany: Blood Glucose Monitor Brands: per insurance. 200 each 6     methocarbamol (ROBAXIN) 500 MG tablet Take 1 tablet (500 mg) by mouth 3 times daily as needed for muscle spasms (Patient not taking: Reported on 8/3/2020) 30 tablet 1     Recent Labs   Lab Test 12/30/19  0938 06/14/19  0937 02/13/19  0943 11/07/18  0836 11/07/18  0835  11/17/17  0944   A1C 6.7* 9.2* 7.4* 8.0*  --    < > 6.4*   LDL 54  --   --  32  --   --  21   HDL 44  --   --  41  --   --  44   TRIG 144  --   --  160*  --   --  166*   ALT 29  --   --   --  46  --  42   CR 1.04  --  0.98 0.82  --   --  0.92   GFRESTIMATED 76  --  82 >90  --   --  83   GFRESTBLACK 88  --  >90 >90  --   --  >90   POTASSIUM 3.9  --  4.3 4.3  --   --  4.1   TSH 1.26  --   --   --   --   --  1.68    < > = values in this interval not displayed.      BP Readings from Last 3 Encounters:   08/03/20 115/76   03/03/20 108/69   12/30/19 130/85    Wt Readings from Last 3 Encounters:   08/03/20 88.9 kg (196 lb)   03/03/20 88.5 kg (195 lb)   12/30/19 85.7 kg (189 lb)                    Reviewed and updated as needed this visit by Provider         Review of Systems   Constitutional, HEENT, cardiovascular, pulmonary, gi and gu systems are negative, except as otherwise noted.      Objective    /76 (BP Location: Left arm, Patient Position: Sitting, Cuff Size: Adult Large)   Pulse 88   Temp 98.5  F (36.9  C) (Oral)   Resp 16   Ht 1.676 m (5' 6\")   Wt 88.9 kg (196 lb)   SpO2 97%   BMI 31.64 kg/m    Body mass index is 31.64 kg/m .  Physical Exam   GENERAL: healthy, alert and no distress  EYES: Eyes grossly normal to inspection, PERRL and conjunctivae and sclerae normal  HENT: ear canals and TM's normal, nose and mouth without ulcers or lesions  NECK: no adenopathy, no asymmetry, masses, or scars and " thyroid normal to palpation  RESP: lungs clear to auscultation - no rales, rhonchi or wheezes  CV: regular rate and rhythm, normal S1 S2, no S3 or S4, no murmur, click or rub, no peripheral edema and peripheral pulses strong  ABDOMEN: soft, nontender, no hepatosplenomegaly, no masses and bowel sounds normal   (male): normal male genitalia without lesions or urethral discharge, no hernia  MS: no gross musculoskeletal defects noted, no edema  SKIN: no suspicious lesions or rashes  NEURO: Normal strength and tone, mentation intact and speech normal  BACK: no CVA tenderness, no paralumbar tenderness  PSYCH: mentation appears normal, affect normal/bright  LYMPH: no cervical, supraclavicular, axillary, or inguinal adenopathy  Normal monofilament foot exam  Diagnostic Test Results:  Labs reviewed in Epic  No results found for this or any previous visit (from the past 24 hour(s)).        Assessment & Plan     1. Type 2 diabetes mellitus with retinopathy, without long-term current use of insulin, macular edema presence unspecified, unspecified laterality, unspecified retinopathy severity (H)  Checking labs, adjust medications as indicated. Due for eye exam, normal monofilamnet exam.    - FOOT EXAM  - aspirin (ASPIRIN LOW DOSE) 81 MG EC tablet; Take 1 tablet (81 mg) by mouth daily  Dispense: 90 tablet; Refill: 3  - blood glucose (NO BRAND SPECIFIED) test strip; Use to test blood sugar 1 times daily or as directed. To accompany: Blood Glucose Monitor Brands: per insurance.  Dispense: 100 strip; Refill: 6  - metFORMIN (GLUCOPHAGE-XR) 500 MG 24 hr tablet; Take 4 tablets (2,000 mg) by mouth daily (with dinner)  Dispense: 360 tablet; Refill: 1  - OPTOMETRY REFERRAL    2. Obesity, Class I, BMI 30.0-34.9 (see actual BMI)  Benefits of weight loss reviewed in detail, encouraged him to cut back on the carbohydrates in the diet, consume more fruits and vegetables, drink plenty of water, avoid fruit juices, sodas, get 150 min moderate  "exercise/week.  Recheck weight in 6 months.      3. Hyperlipidemia LDL goal <100  Checking Lipids, continue Lipitor 20 mg daily, low chol diet discussed   - atorvastatin (LIPITOR) 20 MG tablet; Take 1 tablet (20 mg) by mouth daily  Dispense: 90 tablet; Refill: 1    4. Arthritis of left knee  Continue on diclofenac prn pain, encouraged continued regular exercise (low/no impact) weight loss encouraged.  - CBC with platelets  - diclofenac (VOLTAREN) 75 MG EC tablet; Take 1 tablet (75 mg) by mouth 2 times daily as needed for moderate pain  Dispense: 40 tablet; Refill: 0  - gabapentin (NEURONTIN) 300 MG capsule; TAKE 1 CAPSULE BY MOUTH AT BEDTIME  Dispense: 30 capsule; Refill: 3    5. Lumbar disc herniation with radiculopathy  Refilled medications, weight loss encouraged,  Continue to be active, return to clinic if not improved, new, or worsening symptoms.   - diclofenac (VOLTAREN) 75 MG EC tablet; Take 1 tablet (75 mg) by mouth 2 times daily as needed for moderate pain  Dispense: 40 tablet; Refill: 0  - gabapentin (NEURONTIN) 300 MG capsule; TAKE 1 CAPSULE BY MOUTH AT BEDTIME  Dispense: 30 capsule; Refill: 3    6. Chronic pain of left knee  Refilled Neurontin  - gabapentin (NEURONTIN) 300 MG capsule; TAKE 1 CAPSULE BY MOUTH AT BEDTIME  Dispense: 30 capsule; Refill: 3       Offered Shingrix but patient declined it today.  BMI:   Estimated body mass index is 31.64 kg/m  as calculated from the following:    Height as of this encounter: 1.676 m (5' 6\").    Weight as of this encounter: 88.9 kg (196 lb).   Weight management plan: Discussed healthy diet and exercise guidelines        Work on weight loss  Regular exercise  See Patient Instructions    No follow-ups on file.    ABHISHEK [facility]  "

## 2020-08-03 NOTE — TELEPHONE ENCOUNTER
Reason for Call:  Other call back    Detailed comments: Pt would like to request a call back regarding his appointment today. Thank you.    Phone Number Patient can be reached at: Home number on file [phone] (home)    Best Time: Any    Can we leave a detailed message on this number? YES    Call taken on 8/3/2020 at 7:31 AM by Jes Melara

## 2020-08-03 NOTE — TELEPHONE ENCOUNTER
Called and spoke to the patient and patient will wait to have his labs drawn at his appt as the lab schedule is completely booked this am.  Maria Alejandra [facility]  2nd Floor  Primary Care

## 2020-08-03 NOTE — PATIENT INSTRUCTIONS
At [facility], we strive to deliver an exceptional experience to you, every time we see you. If you receive a survey, please complete it as we do value your feedback.  If you have MyChart, you can expect to receive results automatically within 24 hours of their completion.  Your provider will send a note interpreting your results as well.   If you do not have MyChart, you should receive your results in about a week by mail.    Your care team:                            Family Medicine Internal Medicine   MD Tobias Lu, MD Pierce Benitez MD Katya Georgiev PA-C Megan Hill, APRN CNP    Tan Ghosh, MD Pediatrics   Michi Bowman, PAAlvaroC  Dianna Polk, CNP MD Conchis Deras APRN CNP   MD Casi Laird MD Deborah Mielke, MD Sushma Joe, APRN CNP  Jackie Quinones PAJANEL Blunt, CNP  MD Yolanda Aguilar MD Angela Wermerskirchen, MD      Clinic hours: Monday - Thursday 7 am-7 pm; Fridays 7 am-5 pm.   Urgent care: Monday - Friday 11 am-9 pm; Saturday and Sunday 9 am-5 pm.    Clinic: [phone]       [facility]: Monday - Thursday 8 am - 7 pm; Friday 8 am - 6 pm  [facility]: [phone]     Use www.oncare.org for 24/7 diagnosis and treatment of dozens of conditions.    Patient Education     Diabetes: Inspecting Your Feet    Diabetes increases your chances of foot problems. So inspect your feet every day. This helps you find small skin irritations before they become serious ulcers or infections. If you have trouble seeing the bottoms of your feet, use a mirror or ask a family member or friend to help.  How to check your feet  These tips can help you look for foot problems. Try to check your feet at the same time each day, such as when you get out of bed in the morning:    Check the top of each foot. The tops of toes,  back of the heel, and outer edge of the foot can get a lot of rubbing from poor-fitting shoes.    Check the bottom of each foot. Daily wear and tear often leads to problems at pressure spots.    Check the toes and nails. Fungal infections often occur between toes. Toenail problems can also be a sign of fungal infections or lead to breaks in the skin.    Check your shoes, too. Loose objects inside a shoe can injure the foot. Use your hand to feel inside your shoes for things like adi, loose stitching, or rough areas that could irritate your skin.  Warning signs  Look for any color changes in the foot. Redness with streaks can signal a severe infection, which needs fast medical care. Tell your healthcare provider right away if you have any of these problems:    Swelling, sometimes with color changes, may be a sign of poor blood flow or infection. Symptoms include tenderness and an increase in the size of your foot. Extra fluid (edema) makes it harder for a wound to heal.    Warm or hot areas on your feet may be signs of infection. A foot that is cold may not be getting enough blood.    Feelings such as burning, tingling, or  pins and needles  can be signs of a nerve problem. Also check for numb areas.    Hot spots are caused by friction or pressure. Look for hot spots in areas that get a lot of rubbing. Hot spots can turn into blisters, calluses, or sores.    Cracks and sores are caused by dry or irritated skin. They are a sign that the skin is breaking down. This can lead to infection.    Toenail problems to watch for include nails growing into the skin (ingrown toenail). This can cause redness or pain. Thick, yellow, or discolored nails can be a sign of a fungal infection.    Drainage and odor can happen from untreated sores (ulcers). Call your healthcare provider right away if you see white or yellow drainage, bleeding, or unpleasant odor.   Date Last Reviewed: 11/1/2018 2000-2019 [employer]  [address] [place] [address]. All rights reserved. This information is not intended as a substitute for professional medical care. Always follow your healthcare professional's instructions.           Patient Education     Managing Diabetes: The A1C Test       Healthy red blood cells have some glucose stuck to them. A high A1C means that unhealthy amounts of glucose are stuck to the cells.   What is the A1C test?  Using your meter helps you track your blood sugar every day. But your glucose meter tells you the value at the time of testing only. You also need to know if your treatment plan is keeping you healthy over time. The hemoglobin A1C (or glycated hemoglobin) test can help. This test measures your average blood sugar level over a few months. A higher A1C result means that you have a higher risk of developing complications.  The A1C test  The A1C is a blood test done by your healthcare provider. You will likely have an A1C test every 3 to 6 months.  Your blood glucose goal  A1C has been shown as a percentage. But it can also be shown as a number representing the estimated Average Glucose (eAG). Unlike the A1C percentage, eAG is a number similar to the numbers listed on your daily glucose monitor. Both A1C and eAG measure the amount of glucose stuck to a protein called hemoglobin in red blood cells. Your healthcare provider will help you figure out what your ideal A1C or eAG should be. Your target number will depend on your age, general health, and other factors. If your current number is too high, your treatment plan may need changes, such as different medicines.  Sample results  Most people aim for an A1c lower than 7%. That s an eAG less than 154 mg/dL. Or, your healthcare provider may want you to aim for an A1C of 6%. That s an eAG of 126 mg/dL.     Glucose calculator  Visit http://professional.diabetes.org/diapro/glucose_calc for a chart that helps convert your A1C percentages into eAG numbers.    Date Last Reviewed: 6/1/2016 2000-2019 [employer]. [address] [place] [address]. All rights reserved. This information is not intended as a substitute for professional medical care. Always follow your healthcare professional's instructions.           Patient Education     Relieving Back Pain  Back pain is a common problem. You can strain back muscles by lifting too much weight or just by moving the wrong way. Back strain can be uncomfortable, even painful. And it can take weeks or months to improve. To help yourself feel better and prevent future back strains, try these tips.  Important: Don't give aspirin to children or teens without first discussing it with your child's healthcare provider.  Ice    Ice reduces muscle pain and swelling. It helps most during the first 24 to 48 hours after an injury.    Wrap an ice pack or a bag of frozen peas in a thin towel. Never put ice directly on your skin.    Place the ice where your back hurts the most.    Don t ice for more than 20 minutes at a time.    You can use ice several times a day.  Medicines  Over-the-counter pain relievers include acetaminophen and anti-inflammatory medicines, which includes aspirin, naproxen, or ibuprofen. They can help ease discomfort. Some also reduce swelling.    Tell your healthcare provider about any medicines you are already taking.    Take medicines only as directed.  Manipulation and massage  Having manipulation by an osteopathic doctor or chiropractor may be helpful. Getting a massage also may help.   Heat  After the first 48 hours, heat can relax sore muscles and improve blood flow.    Try a warm bath or shower. Or use a heating pad set on low. To prevent a burn, keep a cloth between you and the heating pad.    Don t use a heating pad for more than 15 minutes at a time. Never sleep on a heating pad.  Date Last Reviewed: 6/1/2018 2000-2019 [employer]. [address] [place] [address], [place]  [address]. All rights reserved. This information is not intended as a substitute for professional medical care. Always follow your healthcare professional's instructions.

## 2020-08-17 ENCOUNTER — OFFICE VISIT (OUTPATIENT)
Dept: ORTHOPEDICS | Facility: CLINIC | Age: 65
End: 2020-08-17
Payer: COMMERCIAL

## 2020-08-17 VITALS
HEART RATE: 86 BPM | DIASTOLIC BLOOD PRESSURE: 66 MMHG | SYSTOLIC BLOOD PRESSURE: 110 MMHG | WEIGHT: 196 LBS | HEIGHT: 66 IN | BODY MASS INDEX: 31.5 KG/M2

## 2020-08-17 DIAGNOSIS — E78.5 HYPERLIPIDEMIA LDL GOAL <100: ICD-10-CM

## 2020-08-17 DIAGNOSIS — M17.12 ARTHRITIS OF LEFT KNEE: Primary | ICD-10-CM

## 2020-08-17 PROCEDURE — 99214 OFFICE O/P EST MOD 30 MIN: CPT | Performed by: PREVENTIVE MEDICINE

## 2020-08-17 ASSESSMENT — PAIN SCALES - GENERAL: PAINLEVEL: SEVERE PAIN (7)

## 2020-08-17 ASSESSMENT — MIFFLIN-ST. JEOR: SCORE: 1626.8

## 2020-08-17 NOTE — LETTER
8/17/2020         RE: Kathrine Isaac  [address] [facility] [address]        Dear Colleague,    Thank you for referring your patient, Kathrine Isaac, to the [facility]. Please see a copy of my visit note below.    HISTORY OF PRESENT ILLNESS  Mr. Isaac is a pleasant 65 year old year old male who presents to clinic today with ongoing left knee pain  Kathrine explains that he had improvement from cortisone injection last year, but bothering him again  He wants to consider HL injection  Location: left knee  Quality:  achy pain    Severity: 6/10 at worst    Duration: past few months worse  Timing: occurs intermittently  Context: occurs while exercising and lifting  Modifying factors:  resting and non-use makes it better, movement and use makes it worse  Associated signs & symptoms: swelling    MEDICAL HISTORY  Patient Active Problem List   Diagnosis     Glaucoma suspect     Type 2 diabetes mellitus with retinopathy, without long-term current use of insulin, macular edema presence unspecified, unspecified laterality, unspecified retinopathy severity (H)     Hyperlipidemia LDL goal <100     Essential hypertension     Advance care planning     Diverticulosis of large intestine without hemorrhage     Obesity, Class I, BMI 30.0-34.9 (see actual BMI)     Mechanical low back pain     Hx of LASIK       Current Outpatient Medications   Medication Sig Dispense Refill     alcohol swab prep pads Use to swab area of injection/nkechi as directed. 100 each 3     aspirin (ASPIRIN LOW DOSE) 81 MG EC tablet Take 1 tablet (81 mg) by mouth daily 90 tablet 3     atorvastatin (LIPITOR) 20 MG tablet Take 1 tablet (20 mg) by mouth daily 90 tablet 1     blood glucose (NO BRAND SPECIFIED) test strip Use to test blood sugar 1 times daily or as directed. To accompany: Blood Glucose Monitor Brands: per insurance. 100 strip 6     blood glucose calibration (NO BRAND SPECIFIED) solution To accompany: Blood  Glucose Monitor Brands: per insurance. 1 Bottle 3     blood glucose monitoring (NO BRAND SPECIFIED) meter device kit Use to test blood sugar 1 times daily or as directed. Preferred blood glucose meter/supplies to accompany: Monitor Brands: per insurance. 1 kit 0     diclofenac (VOLTAREN) 75 MG EC tablet Take 1 tablet (75 mg) by mouth 2 times daily as needed for moderate pain 40 tablet 0     empagliflozin (JARDIANCE) 10 MG TABS tablet Take 1 tablet (10 mg) by mouth daily 90 tablet 3     gabapentin (NEURONTIN) 300 MG capsule TAKE 1 CAPSULE BY MOUTH AT BEDTIME 30 capsule 3     glimepiride (AMARYL) 4 MG tablet TAKE ONE TABLET BY MOUTH EVERY MORNING BEFORE BREAKFAST 90 tablet 3     hydrochlorothiazide (HYDRODIURIL) 12.5 MG tablet Take 1 tablet (12.5 mg) by mouth daily 90 tablet 3     latanoprost (XALATAN) 0.005 % ophthalmic solution Place 1 drop into both eyes daily 1 Bottle 11     lisinopril (ZESTRIL) 40 MG tablet Take 1 tablet (40 mg) by mouth daily 90 tablet 1     metFORMIN (GLUCOPHAGE-XR) 500 MG 24 hr tablet Take 4 tablets (2,000 mg) by mouth daily (with dinner) 360 tablet 1     methocarbamol (ROBAXIN) 500 MG tablet Take 1 tablet (500 mg) by mouth 3 times daily as needed for muscle spasms 30 tablet 1     ORDER FOR DME, SET TO LOCAL PRINT, Equipment being ordered: wrist quick fit [id number] 1 each 0     order for DME Equipment being ordered: One touch lancets, test strips.  Patient to check sugars once times daily, 90 day supply for test strips and lancets, refill 3 times 1 Device 0     order for DME Equipment being ordered: Glucometer per insurance preference, lancets, test strips.  Patient to check sugars two times daily, 90 day supply for test strips and lancets, refill 3 times 1 Device 11     order for DME Equipment being ordered: Glucometer Accucheck Sulema, lancets, test strips.  Patient to check sugars bid times daily, 90 day supply for test strips and lancets, refill 6 times 1 Device 0     order for DME  Equipment being ordered: lancets for one touch glucometer for once daily testing 1 Box 6     sitagliptin (JANUVIA) 100 MG tablet Take 1 tablet (100 mg) by mouth daily 90 tablet 3     thin (NO BRAND SPECIFIED) lancets Use with lanceting device. To accompany: Blood Glucose Monitor Brands: per insurance. 200 each 6       No Known Allergies    Family History   Problem Relation Age of Onset     Diabetes Mother      Glaucoma No family hx of      Macular Degeneration No family hx of      Social History     Socioeconomic History     Marital status: Single     Spouse name: Not on file     Number of children: Not on file     Years of education: Not on file     Highest education level: Not on file   Occupational History     Not on file   Social Needs     Financial resource strain: Not on file     Food insecurity     Worry: Not on file     Inability: Not on file     Transportation needs     Medical: Not on file     Non-medical: Not on file   Tobacco Use     Smoking status: Never Smoker     Smokeless tobacco: Never Used   Substance and Sexual Activity     Alcohol use: Yes     Drug use: No     Sexual activity: Not on file   Lifestyle     Physical activity     Days per week: Not on file     Minutes per session: Not on file     Stress: Not on file   Relationships     Social connections     Talks on phone: Not on file     Gets together: Not on file     Attends [employer] service: Not on file     Active member of club or organization: Not on file     Attends meetings of clubs or organizations: Not on file     Relationship status: Not on file     Intimate partner violence     Fear of current or ex partner: Not on file     Emotionally abused: Not on file     Physically abused: Not on file     Forced sexual activity: Not on file   Other Topics Concern     Parent/sibling w/ CABG, MI or angioplasty before 65F 55M? Not Asked   Social History Narrative     Not on file       Additional medical/Social/Surgical histories reviewed in [facility] and  "updated as appropriate.     REVIEW OF SYSTEMS (8/17/2020)  10 point ROS of systems including Constitutional, Eyes, Respiratory, Cardiovascular, Gastroenterology, Genitourinary, Integumentary, Musculoskeletal, Psychiatric, Allergic/Immunologic were all negative except for pertinent positives noted in my HPI.     PHYSICAL EXAM  Vitals:    08/17/20 1243   BP: 110/66   Pulse: 86   Weight: 88.9 kg (196 lb)   Height: 1.676 m (5' 6\")     Vital Signs: /66   Pulse 86   Ht 1.676 m (5' 6\")   Wt 88.9 kg (196 lb)   BMI 31.64 kg/m   Patient declined being weighed. Body mass index is 31.64 kg/m .    General  - normal appearance, in no obvious distress  HEENT  - conjunctivae not injected, moist mucous membranes, normocephalic/atraumatic head, ears normal appearance, no lesions, mouth normal appearance, no scars, normal dentition and teeth present  CV  - normal popliteal pulse  Pulm  - normal respiratory pattern, non-labored  Musculoskeletal - left knee  - stance: mildly antalgic gait, genu varum  - inspection: some generalized swelling, trace effusion  - palpation: medial joint line tenderness, patella and patellar tendon non-tender, normal popliteal pulse  - ROM: 100 degrees flexion, 0 degrees extension, painful active ROM  - strength: 5/5 in flexion, 5/5 in extension  - neuro: no sensory or motor deficit  - special tests:  (-) Lachman  (-) anterior drawer  (-) posterior drawer  (-) pivot shift  (-) Sierra  (-) Thessaly  (-) varus at 0 and 30 degrees flexion  (-) valgus at 0 and 30 degrees flexion  (+) Richard s compression test  (-) patellar apprehension  Neuro  - no sensory or motor deficit, grossly normal coordination, normal muscle tone  Skin  - no ecchymosis, erythema, warmth, or induration, no obvious rash  Psych  - interactive, appropriate, normal mood and affect    ASSESSMENT & PLAN  66 yo male with left knee arthritis, worse again  Reviewed previous xrays knee: shows arthritis  Will order HL injections and have " him come back for that  Cont. Gabapentin and diclofenac PRN  voltaren gel recommended  Cont. HEP  F/u for HL injections      Herman Lindquist MD, CAQSM      Again, thank you for allowing me to participate in the care of your patient.        Sincerely,        Herman Lindquist MD

## 2020-08-17 NOTE — PROGRESS NOTES
HISTORY OF PRESENT ILLNESS  Mr. Isaac is a pleasant 65 year old year old male who presents to clinic today with ongoing left knee pain  Kathrine explains that he had improvement from cortisone injection last year, but bothering him again  He wants to consider HL injection  Location: left knee  Quality:  achy pain    Severity: 6/10 at worst    Duration: past few months worse  Timing: occurs intermittently  Context: occurs while exercising and lifting  Modifying factors:  resting and non-use makes it better, movement and use makes it worse  Associated signs & symptoms: swelling    MEDICAL HISTORY  Patient Active Problem List   Diagnosis     Glaucoma suspect     Type 2 diabetes mellitus with retinopathy, without long-term current use of insulin, macular edema presence unspecified, unspecified laterality, unspecified retinopathy severity (H)     Hyperlipidemia LDL goal <100     Essential hypertension     Advance care planning     Diverticulosis of large intestine without hemorrhage     Obesity, Class I, BMI 30.0-34.9 (see actual BMI)     Mechanical low back pain     Hx of LASIK       Current Outpatient Medications   Medication Sig Dispense Refill     alcohol swab prep pads Use to swab area of injection/nkechi as directed. 100 each 3     aspirin (ASPIRIN LOW DOSE) 81 MG EC tablet Take 1 tablet (81 mg) by mouth daily 90 tablet 3     atorvastatin (LIPITOR) 20 MG tablet Take 1 tablet (20 mg) by mouth daily 90 tablet 1     blood glucose (NO BRAND SPECIFIED) test strip Use to test blood sugar 1 times daily or as directed. To accompany: Blood Glucose Monitor Brands: per insurance. 100 strip 6     blood glucose calibration (NO BRAND SPECIFIED) solution To accompany: Blood Glucose Monitor Brands: per insurance. 1 Bottle 3     blood glucose monitoring (NO BRAND SPECIFIED) meter device kit Use to test blood sugar 1 times daily or as directed. Preferred blood glucose meter/supplies to accompany: Monitor Brands: per insurance. 1 kit  0     diclofenac (VOLTAREN) 75 MG EC tablet Take 1 tablet (75 mg) by mouth 2 times daily as needed for moderate pain 40 tablet 0     empagliflozin (JARDIANCE) 10 MG TABS tablet Take 1 tablet (10 mg) by mouth daily 90 tablet 3     gabapentin (NEURONTIN) 300 MG capsule TAKE 1 CAPSULE BY MOUTH AT BEDTIME 30 capsule 3     glimepiride (AMARYL) 4 MG tablet TAKE ONE TABLET BY MOUTH EVERY MORNING BEFORE BREAKFAST 90 tablet 3     hydrochlorothiazide (HYDRODIURIL) 12.5 MG tablet Take 1 tablet (12.5 mg) by mouth daily 90 tablet 3     latanoprost (XALATAN) 0.005 % ophthalmic solution Place 1 drop into both eyes daily 1 Bottle 11     lisinopril (ZESTRIL) 40 MG tablet Take 1 tablet (40 mg) by mouth daily 90 tablet 1     metFORMIN (GLUCOPHAGE-XR) 500 MG 24 hr tablet Take 4 tablets (2,000 mg) by mouth daily (with dinner) 360 tablet 1     methocarbamol (ROBAXIN) 500 MG tablet Take 1 tablet (500 mg) by mouth 3 times daily as needed for muscle spasms 30 tablet 1     ORDER FOR DME, SET TO LOCAL PRINT, Equipment being ordered: wrist quick fit [id number] 1 each 0     order for DME Equipment being ordered: One touch lancets, test strips.  Patient to check sugars once times daily, 90 day supply for test strips and lancets, refill 3 times 1 Device 0     order for DME Equipment being ordered: Glucometer per insurance preference, lancets, test strips.  Patient to check sugars two times daily, 90 day supply for test strips and lancets, refill 3 times 1 Device 11     order for DME Equipment being ordered: Glucometer Accucheck Sulema, lancets, test strips.  Patient to check sugars bid times daily, 90 day supply for test strips and lancets, refill 6 times 1 Device 0     order for DME Equipment being ordered: lancets for one touch glucometer for once daily testing 1 Box 6     sitagliptin (JANUVIA) 100 MG tablet Take 1 tablet (100 mg) by mouth daily 90 tablet 3     thin (NO BRAND SPECIFIED) lancets Use with lanceting device. To accompany: Blood  Glucose Monitor Brands: per insurance. 200 each 6       No Known Allergies    Family History   Problem Relation Age of Onset     Diabetes Mother      Glaucoma No family hx of      Macular Degeneration No family hx of      Social History     Socioeconomic History     Marital status: Single     Spouse name: Not on file     Number of children: Not on file     Years of education: Not on file     Highest education level: Not on file   Occupational History     Not on file   Social Needs     Financial resource strain: Not on file     Food insecurity     Worry: Not on file     Inability: Not on file     Transportation needs     Medical: Not on file     Non-medical: Not on file   Tobacco Use     Smoking status: Never Smoker     Smokeless tobacco: Never Used   Substance and Sexual Activity     Alcohol use: Yes     Drug use: No     Sexual activity: Not on file   Lifestyle     Physical activity     Days per week: Not on file     Minutes per session: Not on file     Stress: Not on file   Relationships     Social connections     Talks on phone: Not on file     Gets together: Not on file     Attends Mandaen service: Not on file     Active member of club or organization: Not on file     Attends meetings of clubs or organizations: Not on file     Relationship status: Not on file     Intimate partner violence     Fear of current or ex partner: Not on file     Emotionally abused: Not on file     Physically abused: Not on file     Forced sexual activity: Not on file   Other Topics Concern     Parent/sibling w/ CABG, MI or angioplasty before 65F 55M? Not Asked   Social History Narrative     Not on file       Additional medical/Social/Surgical histories reviewed in [facility] and updated as appropriate.     REVIEW OF SYSTEMS (8/17/2020)  10 point ROS of systems including Constitutional, Eyes, Respiratory, Cardiovascular, Gastroenterology, Genitourinary, Integumentary, Musculoskeletal, Psychiatric, Allergic/Immunologic were all negative  "except for pertinent positives noted in my HPI.     PHYSICAL EXAM  Vitals:    08/17/20 1243   BP: 110/66   Pulse: 86   Weight: 88.9 kg (196 lb)   Height: 1.676 m (5' 6\")     Vital Signs: /66   Pulse 86   Ht 1.676 m (5' 6\")   Wt 88.9 kg (196 lb)   BMI 31.64 kg/m   Patient declined being weighed. Body mass index is 31.64 kg/m .    General  - normal appearance, in no obvious distress  HEENT  - conjunctivae not injected, moist mucous membranes, normocephalic/atraumatic head, ears normal appearance, no lesions, mouth normal appearance, no scars, normal dentition and teeth present  CV  - normal popliteal pulse  Pulm  - normal respiratory pattern, non-labored  Musculoskeletal - left knee  - stance: mildly antalgic gait, genu varum  - inspection: some generalized swelling, trace effusion  - palpation: medial joint line tenderness, patella and patellar tendon non-tender, normal popliteal pulse  - ROM: 100 degrees flexion, 0 degrees extension, painful active ROM  - strength: 5/5 in flexion, 5/5 in extension  - neuro: no sensory or motor deficit  - special tests:  (-) Lachman  (-) anterior drawer  (-) posterior drawer  (-) pivot shift  (-) Sierra  (-) Thessaly  (-) varus at 0 and 30 degrees flexion  (-) valgus at 0 and 30 degrees flexion  (+) Richard s compression test  (-) patellar apprehension  Neuro  - no sensory or motor deficit, grossly normal coordination, normal muscle tone  Skin  - no ecchymosis, erythema, warmth, or induration, no obvious rash  Psych  - interactive, appropriate, normal mood and affect    ASSESSMENT & PLAN  66 yo male with left knee arthritis, worse again  Reviewed previous xrays knee: shows arthritis  Will order HL injections and have him come back for that  Cont. Gabapentin and diclofenac PRN  voltaren gel recommended  Cont. HEP  F/u for HL injections      Herman Lindquist MD, CAQSM    "

## 2020-08-17 NOTE — PATIENT INSTRUCTIONS
Thanks for coming today.  Ortho/Sports Medicine Clinic  [address] [place] [address]    To schedule future appointments in Ortho Clinic, you may call [phone].    To schedule ordered imaging by your provider:   Call Central Imaging [phone]    To schedule an injection ordered by your provider:  Call Central Imaging Injection scheduling line: [phone]  [facility] Inchart available online at:  [email or web address]    Please call if any further questions or concerns ([phone]).  Clinic hours 8 am to 5 pm.    Return to clinic (call) if symptoms worsen or fail to improve.

## 2020-08-19 RX ORDER — HYDROCHLOROTHIAZIDE 12.5 MG/1
12.5 TABLET ORAL DAILY
Qty: 90 TABLET | Refills: 1 | Status: SHIPPED | OUTPATIENT
Start: 2020-08-19 | End: 2021-02-18

## 2020-08-19 NOTE — TELEPHONE ENCOUNTER
Prescription approved per [facility] Refill Protocol.    Patient switching pharmacies.     Lou Eaton RN

## 2020-08-20 ENCOUNTER — DOCUMENTATION ONLY (OUTPATIENT)
Dept: ORTHOPEDICS | Facility: CLINIC | Age: 65
End: 2020-08-20

## 2020-08-20 NOTE — PROGRESS NOTES
Patient has [facility] insurance, no pa or pre-d is needed and preferred product is euflexxa and gel one 08/20/2020    [email or web address]

## 2020-08-20 NOTE — PROGRESS NOTES
Financial Counselor Review for Hylan (gel) injection:    Procedure to be performed (include CPT code):Yes DRAIN/INJECT LARGE JOINT/BURSA - CPT: 97487    Diagnosis code (include ICD-10 code): Left knee degenerative osteoarthritis M17.12    Have they tried and failed any gel injections already? No    Medication order (include J code):Yes     Synvisc One (Hylan G-F 20) -   Euflexxa -   Supartz -   Gel One -   Monovisc -  ([employer] 2019 - no PA needed)  Orthovisc -  ([employer] 2019 - no PA needed)    Coverage and patient financial responsibility information:YES    Does patient need to be contacted by Financial Counselor:YES, only if Pt is required to pay anything    Note: Do not use abbreviations and route encounter to [facility] [[id number]]    --------------    FYI: If Patient has [employer] insurance, for 2019 year patient must try and fail Monovisc  or Orthovisc  before a PA can be done on Synvisc One, Euflexxa, Supartz, Gel One injections. No auth or pre-d is needed on Mono or Ortho injections and are a covered benefit under this patients plan.

## 2020-08-20 NOTE — TELEPHONE ENCOUNTER
8/20 Called and spoke to patient. He is currently scheduled for injection.     Lilia Toledo   Procedure    Ortho/Sports Med/Pod/Ent/Eye/Surgical Specialties  [facility]   [phone]

## 2020-08-24 DIAGNOSIS — M17.12 ARTHRITIS OF LEFT KNEE: ICD-10-CM

## 2020-08-24 DIAGNOSIS — M51.16 LUMBAR DISC HERNIATION WITH RADICULOPATHY: ICD-10-CM

## 2020-08-24 NOTE — LETTER
Kathrine Isaac  [address] [address] [address]          09/09/20      Dear Kathrine Isaac        At [facility] we care about your health and are committed to providing quality patient care. Regular appointments are a vital part of the care and management of your health and can help prevent many of the complications that can occur.      It has come to our attention that you are due for a lab only appointment. Please call [facility] at [phone] soon to schedule your appointment.    If you have transferred care to another clinic please call to inform us so that we do not continue to send you reminder letters.      Sincerely,      [facility] Care Team

## 2020-08-25 DIAGNOSIS — G89.29 CHRONIC PAIN OF LEFT KNEE: ICD-10-CM

## 2020-08-25 DIAGNOSIS — M25.562 CHRONIC PAIN OF LEFT KNEE: ICD-10-CM

## 2020-08-25 DIAGNOSIS — M17.12 ARTHRITIS OF LEFT KNEE: ICD-10-CM

## 2020-08-25 DIAGNOSIS — M51.16 LUMBAR DISC HERNIATION WITH RADICULOPATHY: ICD-10-CM

## 2020-08-25 RX ORDER — GABAPENTIN 300 MG/1
CAPSULE ORAL
Qty: 30 CAPSULE | Refills: 3 | Status: SHIPPED | OUTPATIENT
Start: 2020-08-25 | End: 2020-12-21

## 2020-08-25 NOTE — TELEPHONE ENCOUNTER
Routing refill request to provider for review/approval because:  Labs out of range:  CBC  Sylvia Andrade, Samreen Piña RN

## 2020-09-02 ENCOUNTER — OFFICE VISIT (OUTPATIENT)
Dept: ORTHOPEDICS | Facility: CLINIC | Age: 65
End: 2020-09-02
Payer: COMMERCIAL

## 2020-09-02 VITALS
SYSTOLIC BLOOD PRESSURE: 139 MMHG | HEART RATE: 80 BPM | DIASTOLIC BLOOD PRESSURE: 82 MMHG | HEIGHT: 66 IN | WEIGHT: 196 LBS | BODY MASS INDEX: 31.5 KG/M2

## 2020-09-02 DIAGNOSIS — M17.12 ARTHRITIS OF LEFT KNEE: Primary | ICD-10-CM

## 2020-09-02 PROCEDURE — 20610 DRAIN/INJ JOINT/BURSA W/O US: CPT | Mod: LT | Performed by: PREVENTIVE MEDICINE

## 2020-09-02 PROCEDURE — 99207 ZZC DROP WITH A PROCEDURE: CPT | Performed by: PREVENTIVE MEDICINE

## 2020-09-02 RX ORDER — DICLOFENAC SODIUM 75 MG/1
75 TABLET, DELAYED RELEASE ORAL 2 TIMES DAILY PRN
Qty: 40 TABLET | Refills: 0 | Status: SHIPPED | OUTPATIENT
Start: 2020-09-02 | End: 2020-09-21

## 2020-09-02 ASSESSMENT — MIFFLIN-ST. JEOR: SCORE: 1626.8

## 2020-09-02 ASSESSMENT — PAIN SCALES - GENERAL: PAINLEVEL: MODERATE PAIN (5)

## 2020-09-02 NOTE — PROGRESS NOTES
Large Joint Injection/Arthocentesis: L knee joint    Date/Time: 9/2/2020 9:16 AM  Performed by: Herman Lindquist MD  Authorized by: Herman Lindquist MD     Indications:  Pain and osteoarthritis  Needle Size:  22 G  Guidance: landmark guided    Approach:  Lateral  Location:  Knee      Medications:  30 mg cross-Linked Hyaluronate 30 MG/3ML  Procedure discussed: discussed risks, benefits, and alternatives    Consent Given by:  Patient     Jayshree: [phone]      Kathrine returns for gel one injection for left knee as previously discussed.  Diagnosis: left knee arthritis  PROCEDURE:        Left     Knee joint injection   The patient was apprised of the risks and the benefits of the procedure and consented and a written consent was signed.   The patient s left KNEE was sterilely prepped with chloraprep.   The patient was injected with gel-one into the knee with a 1.5-inch 22-gauge needle at the_superiorlateral aspect of their knee joint  There were no complications. The patient tolerated the procedure well. There was minimal bleeding.   The patient was instructed to ice the left knee upon leaving clinic and refrain from overuse over the next 3 days.   The patient was instructed to go to the emergency room with any usual pain, swelling, or redness occurred in the injected area.   The patient was given a followup appointment to evaluate response to the injection to their increased range of motion and reduction of pain.  Follow up in 6 months for repeat gel one injection  Dr Herman Lindquist

## 2020-09-02 NOTE — TELEPHONE ENCOUNTER
Needs lab (CBC) and also diabetes labs which have been previously ordered. Gave #40 tabs- no further refills until labs are completed.  Nighat WEISS, CNP

## 2020-09-02 NOTE — LETTER
9/2/2020         RE: Kathrine Isaac  [address] [address] [address]        Dear Colleague,    Thank you for referring your patient, Kathrine Isaac, to the [facility]. Please see a copy of my visit note below.    Large Joint Injection/Arthocentesis: L knee joint    Date/Time: 9/2/2020 9:16 AM  Performed by: Herman Lindquist MD  Authorized by: Herman Lindquist MD     Indications:  Pain and osteoarthritis  Needle Size:  22 G  Guidance: landmark guided    Approach:  Lateral  Location:  Knee      Medications:  30 mg cross-Linked Hyaluronate 30 MG/3ML  Procedure discussed: discussed risks, benefits, and alternatives    Consent Given by:  Patient     GelOne: [id number]      Kathrine returns for gel one injection for left knee as previously discussed.  Diagnosis: left knee arthritis  PROCEDURE:        Left     Knee joint injection   The patient was apprised of the risks and the benefits of the procedure and consented and a written consent was signed.   The patient s left KNEE was sterilely prepped with chloraprep.   The patient was injected with gel-one into the knee with a 1.5-inch 22-gauge needle at the_superiorlateral aspect of their knee joint  There were no complications. The patient tolerated the procedure well. There was minimal bleeding.   The patient was instructed to ice the left knee upon leaving clinic and refrain from overuse over the next 3 days.   The patient was instructed to go to the emergency room with any usual pain, swelling, or redness occurred in the injected area.   The patient was given a followup appointment to evaluate response to the injection to their increased range of motion and reduction of pain.  Follow up in 6 months for repeat gel one injection  Dr Herman Lindquist    Again, thank you for allowing me to participate in the care of your patient.        Sincerely,        Herman Lindquist MD

## 2020-09-09 NOTE — TELEPHONE ENCOUNTER
This writer attempted to contact patient on 09/09/20      Reason for call needs a lab only appt and left message and sent letter.      If patient calls back:   Schedule Lab appointment within 2 weeks with lab, document that pt called and close encounter         Maria Alejandra Tam MA

## 2020-09-16 DIAGNOSIS — E11.319 TYPE 2 DIABETES MELLITUS WITH RETINOPATHY, WITHOUT LONG-TERM CURRENT USE OF INSULIN, MACULAR EDEMA PRESENCE UNSPECIFIED, UNSPECIFIED LATERALITY, UNSPECIFIED RETINOPATHY SEVERITY (H): ICD-10-CM

## 2020-09-16 LAB
ANION GAP SERPL CALCULATED.3IONS-SCNC: 7 MMOL/L (ref 3–14)
BUN SERPL-MCNC: 15 MG/DL (ref 7–30)
CALCIUM SERPL-MCNC: 8.7 MG/DL (ref 8.5–10.1)
CHLORIDE SERPL-SCNC: 105 MMOL/L (ref 94–109)
CO2 SERPL-SCNC: 22 MMOL/L (ref 20–32)
CREAT SERPL-MCNC: 0.86 MG/DL (ref 0.66–1.25)
GFR SERPL CREATININE-BSD FRML MDRD: >90 ML/MIN/{1.73_M2}
GLUCOSE SERPL-MCNC: 185 MG/DL (ref 70–99)
HBA1C MFR BLD: 6.6 % (ref 0–5.6)
POTASSIUM SERPL-SCNC: 4.2 MMOL/L (ref 3.4–5.3)
SODIUM SERPL-SCNC: 134 MMOL/L (ref 133–144)

## 2020-09-16 PROCEDURE — 83036 HEMOGLOBIN GLYCOSYLATED A1C: CPT | Performed by: NURSE PRACTITIONER

## 2020-09-16 PROCEDURE — 80048 BASIC METABOLIC PNL TOTAL CA: CPT | Performed by: NURSE PRACTITIONER

## 2020-09-16 PROCEDURE — 36415 COLL VENOUS BLD VENIPUNCTURE: CPT | Performed by: NURSE PRACTITIONER

## 2020-09-19 DIAGNOSIS — M17.12 ARTHRITIS OF LEFT KNEE: ICD-10-CM

## 2020-09-19 DIAGNOSIS — M51.16 LUMBAR DISC HERNIATION WITH RADICULOPATHY: ICD-10-CM

## 2020-09-21 RX ORDER — DICLOFENAC SODIUM 75 MG/1
75 TABLET, DELAYED RELEASE ORAL 2 TIMES DAILY PRN
Qty: 40 TABLET | Refills: 0 | Status: SHIPPED | OUTPATIENT
Start: 2020-09-21 | End: 2020-10-15

## 2020-09-21 NOTE — TELEPHONE ENCOUNTER
Routing refill request to provider for review/approval because:       No Normal CBC on file in past 12 months     Carin Pinon RN

## 2020-10-11 DIAGNOSIS — M51.16 LUMBAR DISC HERNIATION WITH RADICULOPATHY: ICD-10-CM

## 2020-10-11 DIAGNOSIS — M17.12 ARTHRITIS OF LEFT KNEE: ICD-10-CM

## 2020-10-15 RX ORDER — DICLOFENAC SODIUM 75 MG/1
75 TABLET, DELAYED RELEASE ORAL 2 TIMES DAILY PRN
Qty: 40 TABLET | Refills: 0 | Status: SHIPPED | OUTPATIENT
Start: 2020-10-15 | End: 2021-09-25

## 2020-11-05 DIAGNOSIS — M51.16 LUMBAR DISC HERNIATION WITH RADICULOPATHY: ICD-10-CM

## 2020-11-05 DIAGNOSIS — M17.12 ARTHRITIS OF LEFT KNEE: ICD-10-CM

## 2020-11-06 RX ORDER — DICLOFENAC SODIUM 75 MG/1
75 TABLET, DELAYED RELEASE ORAL 2 TIMES DAILY PRN
Qty: 40 TABLET | Refills: 0 | OUTPATIENT
Start: 2020-11-06

## 2020-11-06 NOTE — TELEPHONE ENCOUNTER
Refused- need to complete labs for more refills. Pls assist in scheduling lab only appt.  Nighat WEISS, CNP

## 2020-11-06 NOTE — TELEPHONE ENCOUNTER
Routing refill request to provider for review/approval because:  Labs out of range:  CBC  Sarai Mari RN  [facility]

## 2020-11-09 NOTE — TELEPHONE ENCOUNTER
Spoke to patient and scheduled a lab only appt for 11/11/2020 at 11:30.  Maria Alejandra Tam MA  [facility]  2nd Floor  Primary Care

## 2020-11-11 DIAGNOSIS — M17.12 ARTHRITIS OF LEFT KNEE: ICD-10-CM

## 2020-11-11 DIAGNOSIS — M51.16 LUMBAR DISC HERNIATION WITH RADICULOPATHY: ICD-10-CM

## 2020-11-11 LAB
ERYTHROCYTE [DISTWIDTH] IN BLOOD BY AUTOMATED COUNT: 14.7 % (ref 10–15)
HCT VFR BLD AUTO: 42.5 % (ref 40–53)
HGB BLD-MCNC: 13.9 G/DL (ref 13.3–17.7)
MCH RBC QN AUTO: 26.7 PG (ref 26.5–33)
MCHC RBC AUTO-ENTMCNC: 32.7 G/DL (ref 31.5–36.5)
MCV RBC AUTO: 82 FL (ref 78–100)
PLATELET # BLD AUTO: 218 10E9/L (ref 150–450)
RBC # BLD AUTO: 5.21 10E12/L (ref 4.4–5.9)
WBC # BLD AUTO: 6.8 10E9/L (ref 4–11)

## 2020-11-11 PROCEDURE — 85027 COMPLETE CBC AUTOMATED: CPT | Performed by: NURSE PRACTITIONER

## 2020-12-20 ENCOUNTER — HEALTH MAINTENANCE LETTER (OUTPATIENT)
Age: 65
End: 2020-12-20

## 2020-12-20 DIAGNOSIS — M25.562 CHRONIC PAIN OF LEFT KNEE: ICD-10-CM

## 2020-12-20 DIAGNOSIS — M51.16 LUMBAR DISC HERNIATION WITH RADICULOPATHY: ICD-10-CM

## 2020-12-20 DIAGNOSIS — G89.29 CHRONIC PAIN OF LEFT KNEE: ICD-10-CM

## 2020-12-20 DIAGNOSIS — M17.12 ARTHRITIS OF LEFT KNEE: ICD-10-CM

## 2020-12-20 NOTE — TELEPHONE ENCOUNTER
Requested Prescriptions   Pending Prescriptions Disp Refills     gabapentin (NEURONTIN) 300 MG capsule 30 capsule 3       There is no refill protocol information for this order              Last Written Prescription Date:  8/25/20  Last Fill Quantity: 30,   # refills: 3      Routing refill request to provider for review/approval because:  Drug not on the [facility] or [facility] refill protocol or controlled substance

## 2020-12-21 RX ORDER — GABAPENTIN 300 MG/1
CAPSULE ORAL
Qty: 30 CAPSULE | Refills: 3 | Status: SHIPPED | OUTPATIENT
Start: 2020-12-21 | End: 2021-04-16

## 2020-12-24 DIAGNOSIS — E11.319 TYPE 2 DIABETES MELLITUS WITH RETINOPATHY, WITHOUT LONG-TERM CURRENT USE OF INSULIN, MACULAR EDEMA PRESENCE UNSPECIFIED, UNSPECIFIED LATERALITY, UNSPECIFIED RETINOPATHY SEVERITY (H): ICD-10-CM

## 2020-12-24 NOTE — TELEPHONE ENCOUNTER
Last Written Prescription Date:  12/30/19  Last Fill Quantity: 90,  # refills: 3   Last office visit: 8/3/2020 with prescribing provider: anmol    Future Office Visit:

## 2021-01-14 DIAGNOSIS — I10 ESSENTIAL HYPERTENSION: ICD-10-CM

## 2021-01-14 RX ORDER — LISINOPRIL 40 MG/1
40 TABLET ORAL DAILY
Qty: 90 TABLET | Refills: 1 | Status: SHIPPED | OUTPATIENT
Start: 2021-01-14 | End: 2021-07-14

## 2021-01-14 NOTE — TELEPHONE ENCOUNTER
Prescription approved per [facility] Refill Protocol.    Essence Tilley RN, BSN, [facility]  [facility]

## 2021-01-23 DIAGNOSIS — E11.319 TYPE 2 DIABETES MELLITUS WITH RETINOPATHY, WITHOUT LONG-TERM CURRENT USE OF INSULIN, MACULAR EDEMA PRESENCE UNSPECIFIED, UNSPECIFIED LATERALITY, UNSPECIFIED RETINOPATHY SEVERITY (H): ICD-10-CM

## 2021-01-25 ENCOUNTER — OFFICE VISIT (OUTPATIENT)
Dept: OPHTHALMOLOGY | Facility: CLINIC | Age: 66
End: 2021-01-25
Payer: COMMERCIAL

## 2021-01-25 DIAGNOSIS — H40.003 GLAUCOMA SUSPECT OF BOTH EYES: ICD-10-CM

## 2021-01-25 DIAGNOSIS — E11.319 TYPE 2 DIABETES MELLITUS WITH RETINOPATHY, WITHOUT LONG-TERM CURRENT USE OF INSULIN, MACULAR EDEMA PRESENCE UNSPECIFIED, UNSPECIFIED LATERALITY, UNSPECIFIED RETINOPATHY SEVERITY (H): Primary | ICD-10-CM

## 2021-01-25 PROCEDURE — 92014 COMPRE OPH EXAM EST PT 1/>: CPT | Performed by: OPHTHALMOLOGY

## 2021-01-25 PROCEDURE — 92133 CPTRZD OPH DX IMG PST SGM ON: CPT | Performed by: OPHTHALMOLOGY

## 2021-01-25 PROCEDURE — 92083 EXTENDED VISUAL FIELD XM: CPT | Performed by: OPHTHALMOLOGY

## 2021-01-25 ASSESSMENT — CUP TO DISC RATIO
OS_RATIO: 0.55
OD_RATIO: 0.65

## 2021-01-25 ASSESSMENT — REFRACTION_MANIFEST
OS_CYLINDER: +1.00
OD_CYLINDER: +0.50
OS_ADD: +2.25
OD_ADD: +2.25
OD_AXIS: 070
OD_SPHERE: +0.50
OS_SPHERE: +0.50
OS_AXIS: 165

## 2021-01-25 ASSESSMENT — EXTERNAL EXAM - LEFT EYE: OS_EXAM: NORMAL

## 2021-01-25 ASSESSMENT — TONOMETRY
IOP_METHOD: ICARE
OD_IOP_MMHG: 14
OS_IOP_MMHG: 14

## 2021-01-25 ASSESSMENT — SLIT LAMP EXAM - LIDS
COMMENTS: SCALLOPED LID MARGINS
COMMENTS: SCALLOPED LID MARGINS

## 2021-01-25 ASSESSMENT — EXTERNAL EXAM - RIGHT EYE: OD_EXAM: NORMAL

## 2021-01-25 ASSESSMENT — VISUAL ACUITY
OD_CC+: -1
CORRECTION_TYPE: GLASSES
METHOD: SNELLEN - LINEAR
OD_CC: 20/20
OS_CC: 20/40

## 2021-01-25 NOTE — NURSING NOTE
Chief Complaints and History of Present Illnesses   Patient presents with     Glaucoma       Chief Complaint(s) and History of Present Illness(es)     Glaucoma     Laterality: both eyes              Comments     Patient here for annual Diabetic Eye Exam and Glaucoma check.  Latanoprost drops used as directed, last dose 9:00pm last night. No concerns for today.         Lab Results       Component                Value               Date                       A1C                      6.6                 09/16/2020                 A1C                      6.7                 12/30/2019                 A1C                      9.2                 06/14/2019                 A1C                      7.4                 02/13/2019                 A1C                      8.0                 11/07/2018                          Tato Stein Ophthalmic Assistant

## 2021-01-25 NOTE — PROGRESS NOTES
Kathrine Isaac is a 66 year old male who presents with:   Review of systems for the eyes was negative other than the pertinent positives and negatives noted in the HPI.     Glaucoma suspect of both eyes  - Continue Xalatan each eye qhs  - OCT Optic Nerve RNFL Optovue OU (both eyes)  - HVF 24-2 OU     Type 2 diabetes mellitus  - Without retinopathy     H/O laser assisted in situ keratomileusis  - Pach's 530ish OU     Cataracts  - Good vision  - Observe        RTC 12 months for anniual exam with glaucoma testing.    Attending Physician Attestation:  Complete documentation of historical and exam elements from today's encounter can be found in the full encounter summary report (not reduplicated in this progress note).  I personally obtained the chief complaint(s) and history of present illness.  I confirmed and edited as necessary the review of systems, past medical/surgical history, family history, social history, and examination findings as documented by others; and I examined the patient myself.  I personally reviewed the relevant tests, images, and reports as documented above.  I formulated and edited as necessary the assessment and plan and discussed the findings and management plan with the patient and family. - Richard Brunner MD

## 2021-02-18 DIAGNOSIS — E78.5 HYPERLIPIDEMIA LDL GOAL <100: ICD-10-CM

## 2021-02-18 RX ORDER — HYDROCHLOROTHIAZIDE 12.5 MG/1
12.5 TABLET ORAL DAILY
Qty: 90 TABLET | Refills: 1 | Status: SHIPPED | OUTPATIENT
Start: 2021-02-18 | End: 2021-08-16

## 2021-02-23 DIAGNOSIS — H40.003 GLAUCOMA SUSPECT OF BOTH EYES: ICD-10-CM

## 2021-02-23 RX ORDER — LATANOPROST 50 UG/ML
1 SOLUTION/ DROPS OPHTHALMIC DAILY
Qty: 2.5 ML | Refills: 6 | Status: SHIPPED | OUTPATIENT
Start: 2021-02-23 | End: 2022-07-22

## 2021-02-23 NOTE — PROGRESS NOTES
Refill request for latanoprost received from Joann Piña. Last appointment 1/25/21, continue medication therapy.  Medication refilled as requested. Carey Gayle RN

## 2021-03-19 DIAGNOSIS — E78.5 HYPERLIPIDEMIA LDL GOAL <100: Primary | ICD-10-CM

## 2021-03-19 DIAGNOSIS — E11.319 TYPE 2 DIABETES MELLITUS WITH RETINOPATHY, WITHOUT LONG-TERM CURRENT USE OF INSULIN, MACULAR EDEMA PRESENCE UNSPECIFIED, UNSPECIFIED LATERALITY, UNSPECIFIED RETINOPATHY SEVERITY (H): ICD-10-CM

## 2021-03-24 ENCOUNTER — IMMUNIZATION (OUTPATIENT)
Dept: NURSING | Facility: CLINIC | Age: 66
End: 2021-03-24
Payer: COMMERCIAL

## 2021-03-24 DIAGNOSIS — E11.319 TYPE 2 DIABETES MELLITUS WITH RETINOPATHY, WITHOUT LONG-TERM CURRENT USE OF INSULIN, MACULAR EDEMA PRESENCE UNSPECIFIED, UNSPECIFIED LATERALITY, UNSPECIFIED RETINOPATHY SEVERITY (H): ICD-10-CM

## 2021-03-24 PROCEDURE — 91300 PR COVID VAC PFIZER DIL RECON 30 MCG/0.3 ML IM: CPT

## 2021-03-24 PROCEDURE — 0001A PR COVID VAC PFIZER DIL RECON 30 MCG/0.3 ML IM: CPT

## 2021-03-24 RX ORDER — METFORMIN HCL 500 MG
2000 TABLET, EXTENDED RELEASE 24 HR ORAL
Qty: 360 TABLET | Refills: 1 | Status: SHIPPED | OUTPATIENT
Start: 2021-03-24 | End: 2021-09-16

## 2021-03-24 NOTE — LETTER
Kathrine Isaac  [address] [address] [address]          03/25/21      Dear Kathrine Isaac        At [facility] we care about your health and are committed to providing quality patient care. Regular appointments are a vital part of the care and management of your health and can help prevent many of the complications that can occur.      We have refilled your medication(s  We will need you to schedule a Lab only appointment and a few days later a virtual visit before any additional refills can be given.  Please call [phone] to schedule this appointment.      Thank you,    [facility];

## 2021-03-25 NOTE — TELEPHONE ENCOUNTER
Routing refill request to provider for review/approval because:  Labs not current:  A1C    Celeste MONACON, RN

## 2021-04-02 DIAGNOSIS — E11.319 TYPE 2 DIABETES MELLITUS WITH RETINOPATHY, WITHOUT LONG-TERM CURRENT USE OF INSULIN, MACULAR EDEMA PRESENCE UNSPECIFIED, UNSPECIFIED LATERALITY, UNSPECIFIED RETINOPATHY SEVERITY (H): ICD-10-CM

## 2021-04-02 DIAGNOSIS — E78.5 HYPERLIPIDEMIA LDL GOAL <100: Primary | ICD-10-CM

## 2021-04-02 LAB
ANION GAP SERPL CALCULATED.3IONS-SCNC: 8 MMOL/L (ref 3–14)
BUN SERPL-MCNC: 15 MG/DL (ref 7–30)
CALCIUM SERPL-MCNC: 9.5 MG/DL (ref 8.5–10.1)
CHLORIDE SERPL-SCNC: 100 MMOL/L (ref 94–109)
CHOLEST SERPL-MCNC: 119 MG/DL
CO2 SERPL-SCNC: 26 MMOL/L (ref 20–32)
CREAT SERPL-MCNC: 0.9 MG/DL (ref 0.66–1.25)
CREAT UR-MCNC: 24 MG/DL
GFR SERPL CREATININE-BSD FRML MDRD: 89 ML/MIN/{1.73_M2}
GLUCOSE SERPL-MCNC: 125 MG/DL (ref 70–99)
HBA1C MFR BLD: 7.9 % (ref 0–5.6)
HDLC SERPL-MCNC: 44 MG/DL
LDLC SERPL CALC-MCNC: 49 MG/DL
MICROALBUMIN UR-MCNC: <5 MG/L
MICROALBUMIN/CREAT UR: NORMAL MG/G CR (ref 0–17)
NONHDLC SERPL-MCNC: 75 MG/DL
POTASSIUM SERPL-SCNC: 4.2 MMOL/L (ref 3.4–5.3)
SODIUM SERPL-SCNC: 134 MMOL/L (ref 133–144)
TRIGL SERPL-MCNC: 130 MG/DL

## 2021-04-02 PROCEDURE — 36415 COLL VENOUS BLD VENIPUNCTURE: CPT | Performed by: NURSE PRACTITIONER

## 2021-04-02 PROCEDURE — 82043 UR ALBUMIN QUANTITATIVE: CPT | Performed by: NURSE PRACTITIONER

## 2021-04-02 PROCEDURE — 84450 TRANSFERASE (AST) (SGOT): CPT | Performed by: NURSE PRACTITIONER

## 2021-04-02 PROCEDURE — 80061 LIPID PANEL: CPT | Performed by: NURSE PRACTITIONER

## 2021-04-02 PROCEDURE — 83036 HEMOGLOBIN GLYCOSYLATED A1C: CPT | Performed by: NURSE PRACTITIONER

## 2021-04-02 PROCEDURE — 84460 ALANINE AMINO (ALT) (SGPT): CPT | Performed by: NURSE PRACTITIONER

## 2021-04-02 PROCEDURE — 80048 BASIC METABOLIC PNL TOTAL CA: CPT | Performed by: NURSE PRACTITIONER

## 2021-04-05 LAB
ALT SERPL W P-5'-P-CCNC: 29 U/L (ref 0–70)
AST SERPL W P-5'-P-CCNC: 18 U/L (ref 0–45)

## 2021-04-10 DIAGNOSIS — E78.5 HYPERLIPIDEMIA LDL GOAL <100: ICD-10-CM

## 2021-04-12 RX ORDER — ATORVASTATIN CALCIUM 20 MG/1
20 TABLET, FILM COATED ORAL DAILY
Qty: 90 TABLET | Refills: 3 | Status: SHIPPED | OUTPATIENT
Start: 2021-04-12 | End: 2022-04-27

## 2021-04-14 ENCOUNTER — OFFICE VISIT (OUTPATIENT)
Dept: NURSING | Facility: CLINIC | Age: 66
End: 2021-04-14
Attending: INTERNAL MEDICINE
Payer: COMMERCIAL

## 2021-04-14 PROCEDURE — 0002A PR COVID VAC PFIZER DIL RECON 30 MCG/0.3 ML IM: CPT

## 2021-04-14 PROCEDURE — 91300 PR COVID VAC PFIZER DIL RECON 30 MCG/0.3 ML IM: CPT

## 2021-04-16 DIAGNOSIS — M25.562 CHRONIC PAIN OF LEFT KNEE: ICD-10-CM

## 2021-04-16 DIAGNOSIS — M17.12 ARTHRITIS OF LEFT KNEE: ICD-10-CM

## 2021-04-16 DIAGNOSIS — G89.29 CHRONIC PAIN OF LEFT KNEE: ICD-10-CM

## 2021-04-16 DIAGNOSIS — M51.16 LUMBAR DISC HERNIATION WITH RADICULOPATHY: ICD-10-CM

## 2021-04-16 RX ORDER — GABAPENTIN 300 MG/1
CAPSULE ORAL
Qty: 30 CAPSULE | Refills: 3 | Status: SHIPPED | OUTPATIENT
Start: 2021-04-16 | End: 2021-08-10

## 2021-04-16 NOTE — TELEPHONE ENCOUNTER
Routing refill request to provider for review/approval because:  Drug not on the [facility] refill protocol   Tala Govea RN

## 2021-04-23 DIAGNOSIS — E11.319 TYPE 2 DIABETES MELLITUS WITH RETINOPATHY, WITHOUT LONG-TERM CURRENT USE OF INSULIN, MACULAR EDEMA PRESENCE UNSPECIFIED, UNSPECIFIED LATERALITY, UNSPECIFIED RETINOPATHY SEVERITY (H): ICD-10-CM

## 2021-04-23 NOTE — TELEPHONE ENCOUNTER
"Requested Prescriptions   Pending Prescriptions Disp Refills    sitagliptin (JANUVIA) 100 MG tablet 90 tablet 0     Sig: Take 1 tablet (100 mg) by mouth daily       DPP4 Inhibitors Protocol Failed - 4/23/2021  7:47 AM        Failed - Recent (6 mo) or future (30 days) visit within the authorizing provider's specialty     Patient had office visit in the last 6 months or has a visit in the next 30 days with authorizing provider.  See \"Patient Info\" tab in inbasket, or \"Choose Columns\" in Meds & Orders section of the refill encounter.            Passed - HgbA1C in past 3 or 6 months     If HgbA1C is 8 or greater, it needs to be on file within the past 3 months.  If less than 8, must be on file within the past 6 months.     Recent Labs   Lab Test 04/02/21  0943   A1C 7.9*             Passed - Medication is active on med list        Passed - Patient is age 18 or older        Passed - Normal serum creatinine in past 12 months     Recent Labs   Lab Test 04/02/21  0943   CR 0.90       Ok to refill medication if creatinine is low               "
sitagliptin (JANUVIA) 100 MG tablet 90 tablet 0 1/25/2021        
2 seconds or less

## 2021-04-24 ENCOUNTER — HEALTH MAINTENANCE LETTER (OUTPATIENT)
Age: 66
End: 2021-04-24

## 2021-07-13 DIAGNOSIS — I10 ESSENTIAL HYPERTENSION: ICD-10-CM

## 2021-07-14 RX ORDER — LISINOPRIL 40 MG/1
TABLET ORAL
Qty: 90 TABLET | Refills: 0 | Status: SHIPPED | OUTPATIENT
Start: 2021-07-14 | End: 2021-09-09

## 2021-07-14 NOTE — TELEPHONE ENCOUNTER
Prescription approved per [facility] Refill Protocol.  APPT NEEDED FOR FURTHER REFILLS  Laura refill given per RN protocol.   Due for office visit  Pharmacy note to inform pt of office visit needed for continued medication use  Tala Govea RN

## 2021-08-07 DIAGNOSIS — M51.16 LUMBAR DISC HERNIATION WITH RADICULOPATHY: ICD-10-CM

## 2021-08-07 DIAGNOSIS — M25.562 CHRONIC PAIN OF LEFT KNEE: ICD-10-CM

## 2021-08-07 DIAGNOSIS — E11.319 TYPE 2 DIABETES MELLITUS WITH RETINOPATHY, WITHOUT LONG-TERM CURRENT USE OF INSULIN, MACULAR EDEMA PRESENCE UNSPECIFIED, UNSPECIFIED LATERALITY, UNSPECIFIED RETINOPATHY SEVERITY (H): ICD-10-CM

## 2021-08-07 DIAGNOSIS — M17.12 ARTHRITIS OF LEFT KNEE: ICD-10-CM

## 2021-08-07 DIAGNOSIS — G89.29 CHRONIC PAIN OF LEFT KNEE: ICD-10-CM

## 2021-08-09 NOTE — TELEPHONE ENCOUNTER
Routing refill request to provider for review/approval because:  Drug not on the [facility] refill protocol   Patient needs to be seen because it has been more than 1 year since last office visit.    Tala Govea RN

## 2021-08-10 ENCOUNTER — TELEPHONE (OUTPATIENT)
Dept: ORTHOPEDICS | Facility: CLINIC | Age: 66
End: 2021-08-10
Payer: COMMERCIAL

## 2021-08-10 DIAGNOSIS — M17.12 ARTHRITIS OF LEFT KNEE: Primary | ICD-10-CM

## 2021-08-10 RX ORDER — BLOOD SUGAR DIAGNOSTIC
STRIP MISCELLANEOUS
Qty: 100 STRIP | Refills: 0 | Status: SHIPPED | OUTPATIENT
Start: 2021-08-10 | End: 2021-09-09

## 2021-08-10 RX ORDER — GABAPENTIN 300 MG/1
CAPSULE ORAL
Qty: 30 CAPSULE | Refills: 0 | Status: SHIPPED | OUTPATIENT
Start: 2021-08-10 | End: 2021-09-08

## 2021-08-11 ENCOUNTER — OFFICE VISIT (OUTPATIENT)
Dept: ORTHOPEDICS | Facility: CLINIC | Age: 66
End: 2021-08-11
Payer: COMMERCIAL

## 2021-08-11 DIAGNOSIS — M17.12 ARTHRITIS OF LEFT KNEE: Primary | ICD-10-CM

## 2021-08-11 PROCEDURE — 99207 PR DROP WITH A PROCEDURE: CPT | Performed by: PREVENTIVE MEDICINE

## 2021-08-11 PROCEDURE — 20610 DRAIN/INJ JOINT/BURSA W/O US: CPT | Mod: LT | Performed by: PREVENTIVE MEDICINE

## 2021-08-11 RX ORDER — TRIAMCINOLONE ACETONIDE 40 MG/ML
40 INJECTION, SUSPENSION INTRA-ARTICULAR; INTRAMUSCULAR
Status: SHIPPED | OUTPATIENT
Start: 2021-08-11

## 2021-08-11 RX ADMIN — TRIAMCINOLONE ACETONIDE 40 MG: 40 INJECTION, SUSPENSION INTRA-ARTICULAR; INTRAMUSCULAR at 10:45

## 2021-08-11 ASSESSMENT — PAIN SCALES - GENERAL: PAINLEVEL: SEVERE PAIN (7)

## 2021-08-11 NOTE — PROGRESS NOTES
HISTORY OF PRESENT ILLNESS  Mr. Isaac is a pleasant 66 year old year old male who presents to clinic today with left knee pain  Kathrine explains that his knee has been bothering him more over the past 6 months  Location: left knee  Quality:  achy pain    Severity: 6/10 at worst    Duration: worse over past several months  Timing: occurs intermittently  Context: occurs while walking a lot  Modifying factors:  resting and non-use makes it better, movement and use makes it worse  Associated signs & symptoms: occasional swelling    MEDICAL HISTORY  Patient Active Problem List   Diagnosis     Glaucoma suspect     Type 2 diabetes mellitus with retinopathy, without long-term current use of insulin, macular edema presence unspecified, unspecified laterality, unspecified retinopathy severity (H)     Hyperlipidemia LDL goal <100     Essential hypertension     Advance care planning     Diverticulosis of large intestine without hemorrhage     Obesity, Class I, BMI 30.0-34.9 (see actual BMI)     Mechanical low back pain     Hx of LASIK       Current Outpatient Medications   Medication Sig Dispense Refill     ACCU-CHEK GUIDE test strip TEST BLOOD SUGAR ONCE DAILY OR AS DIRECTED 100 strip 0     alcohol swab prep pads Use to swab area of injection/nkechi as directed. 100 each 3     aspirin (ASPIRIN LOW DOSE) 81 MG EC tablet Take 1 tablet (81 mg) by mouth daily 90 tablet 3     atorvastatin (LIPITOR) 20 MG tablet Take 1 tablet (20 mg) by mouth daily 90 tablet 3     blood glucose calibration (NO BRAND SPECIFIED) solution To accompany: Blood Glucose Monitor Brands: per insurance. 1 Bottle 3     blood glucose monitoring (NO BRAND SPECIFIED) meter device kit Use to test blood sugar 1 times daily or as directed. Preferred blood glucose meter/supplies to accompany: Monitor Brands: per insurance. 1 kit 0     diclofenac (VOLTAREN) 75 MG EC tablet Take 1 tablet (75 mg) by mouth 2 times daily as needed for moderate pain 40 tablet 0      empagliflozin (JARDIANCE) 10 MG TABS tablet Take 1 tablet (10 mg) by mouth daily 90 tablet 0     gabapentin (NEURONTIN) 300 MG capsule TAKE 1 CAPSULE BY MOUTH AT BEDTIME 30 capsule 0     glimepiride (AMARYL) 4 MG tablet TAKE ONE TABLET BY MOUTH EVERY MORNING BEFORE BREAKFAST 90 tablet 3     hydrochlorothiazide (HYDRODIURIL) 12.5 MG tablet Take 1 tablet (12.5 mg) by mouth daily 90 tablet 1     JANUVIA 100 MG tablet TAKE 1 TABLET(100 MG) BY MOUTH DAILY 30 tablet 0     latanoprost (XALATAN) 0.005 % ophthalmic solution Place 1 drop into both eyes daily 2.5 mL 6     lisinopril (ZESTRIL) 40 MG tablet TAKE 1 TABLET(40 MG) BY MOUTH DAILY 90 tablet 0     metFORMIN (GLUCOPHAGE-XR) 500 MG 24 hr tablet Take 4 tablets (2,000 mg) by mouth daily (with dinner) 360 tablet 1     methocarbamol (ROBAXIN) 500 MG tablet Take 1 tablet (500 mg) by mouth 3 times daily as needed for muscle spasms 30 tablet 1     ORDER FOR DME, SET TO LOCAL PRINT, Equipment being ordered: wrist quick fit [id number] 1 each 0     order for DME Equipment being ordered: One touch lancets, test strips.  Patient to check sugars once times daily, 90 day supply for test strips and lancets, refill 3 times 1 Device 0     order for DME Equipment being ordered: Glucometer per insurance preference, lancets, test strips.  Patient to check sugars two times daily, 90 day supply for test strips and lancets, refill 3 times 1 Device 11     order for DME Equipment being ordered: Glucometer Accucheck Sulema, lancets, test strips.  Patient to check sugars bid times daily, 90 day supply for test strips and lancets, refill 6 times 1 Device 0     order for DME Equipment being ordered: lancets for one touch glucometer for once daily testing 1 Box 6     thin (NO BRAND SPECIFIED) lancets Use with lanceting device. To accompany: Blood Glucose Monitor Brands: per insurance. 200 each 6       No Known Allergies    Family History   Problem Relation Age of Onset     Diabetes Mother       Glaucoma No family hx of      Macular Degeneration No family hx of      Social History     Socioeconomic History     Marital status: Single     Spouse name: Not on file     Number of children: Not on file     Years of education: Not on file     Highest education level: Not on file   Occupational History     Not on file   Tobacco Use     Smoking status: Never Smoker     Smokeless tobacco: Never Used   Substance and Sexual Activity     Alcohol use: Yes     Drug use: No     Sexual activity: Not on file   Other Topics Concern     Parent/sibling w/ CABG, MI or angioplasty before 65F 55M? Not Asked   Social History Narrative     Not on file     Social Determinants of Health     Financial Resource Strain:      Difficulty of Paying Living Expenses:    Food Insecurity:      Worried About Running Out of Food in the Last Year:      Ran Out of Food in the Last Year:    Transportation Needs:      Lack of Transportation (Medical):      Lack of Transportation (Non-Medical):    Physical Activity:      Days of Exercise per Week:      Minutes of Exercise per Session:    Stress:      Feeling of Stress :    Social Connections:      Frequency of Communication with Friends and Family:      Frequency of Social Gatherings with Friends and Family:      Attends Yazidi Services:      Active Member of Clubs or Organizations:      Attends Club or Organization Meetings:      Marital Status:    Intimate Partner Violence:      Fear of Current or Ex-Partner:      Emotionally Abused:      Physically Abused:      Sexually Abused:        Additional medical/Social/Surgical histories reviewed in HRsoft and updated as appropriate.     REVIEW OF SYSTEMS (8/11/2021)  10 point ROS of systems including Constitutional, Eyes, Respiratory, Cardiovascular, Gastroenterology, Genitourinary, Integumentary, Musculoskeletal, Psychiatric, Allergic/Immunologic were all negative except for pertinent positives noted in my HPI.     PHYSICAL EXAM  VSS  General  - normal  appearance, in no obvious distress  HEENT  - conjunctivae not injected, moist mucous membranes, normocephalic/atraumatic head, ears normal appearance, no lesions, mouth normal appearance, no scars, normal dentition and teeth present  CV  - normal popliteal pulse  Pulm  - normal respiratory pattern, non-labored  Musculoskeletal - left knee  - stance: non antalgic gait, genu varum  - inspection: non generalized swelling, no effusion  - palpation: medial joint line tenderness, patella and patellar tendon non-tender, normal popliteal pulse  - ROM: 100 degrees flexion, 0 degrees extension, slightly painful active ROM  - strength: 5/5 in flexion, 5/5 in extension  - neuro: no sensory or motor deficit  - special tests:  (-) Lachman  (-) anterior drawer  (-) posterior drawer  (-) pivot shift  (+) Sierra- medial  (-) varus at 0 and 30 degrees flexion  (-) valgus at 0 and 30 degrees flexion  (+) Richard s compression test  (-) patellar apprehension  Neuro  - no sensory or motor deficit, grossly normal coordination, normal muscle tone  Skin  - no ecchymosis, erythema, warmth, or induration, no obvious rash  Psych  - interactive, appropriate, normal mood and affect    ASSESSMENT & PLAN  65 yo male with left knee arthritis, worse      I independently reviewed the following imaging studies:  Reviewed knee xrays: shows arthritis  Discussed followup for HL Injections  Cont. voltaren gel  Consider lumbar workup  After a 20 minute discussion and examination, we decided to perform a same day injection for diagnostic and therapeutic purposes for left knee  Appropriate PPE was utilized for prevention of spread of Covid-19.  Herman Lindquist MD, CAQSM    PROCEDURE:        left    Knee joint injection   The patient was apprised of the risks and the benefits of the procedure and consented and a written consent was signed.   The patient s  KNEE was sterilely prepped with chloraprep.   40 mg of triamcinolone suspension was drawn up into a 5 mL  syringe with 4 mL of 1% lidocaine  The patient was injected into the knee with a 1.5-inch 22-gauge needle at the_superiorlateral aspect of their knee joint  There were no complications. The patient tolerated the procedure well. There was minimal bleeding.   The patient was instructed to ice the knee upon leaving clinic and refrain from overuse over the next 3 days.   The patient was instructed to go to the emergency room with any usual pain, swelling, or redness occurred in the injected area.   The patient was given a followup appointment to evaluate response to the injection to their increased range of motion and reduction of pain.  Follow up PRN  Dr Herman Lindquist    Large Joint Injection/Arthocentesis: L knee joint    Date/Time: 8/11/2021 10:45 AM  Performed by: Herman Lindquist MD  Authorized by: Herman Lindquist MD     Indications:  Pain and osteoarthritis  Needle Size:  22 G  Guidance: landmark guided    Approach:  Superolateral  Location:  Knee      Medications:  40 mg triamcinolone 40 MG/ML  Procedure discussed: discussed risks, benefits, and alternatives    Consent Given by:  Patient  Timeout: timeout called immediately prior to procedure    Prep: patient was prepped and draped in usual sterile fashion     NDC: [address]-9

## 2021-08-11 NOTE — PATIENT INSTRUCTIONS
Thanks for coming today.  Ortho/Sports Medicine Clinic  [address] [place] [address]    To schedule future appointments in Ortho Clinic, you may call [phone].    To schedule ordered imaging by your provider:   Call Central Imaging [phone]    To schedule an injection ordered by your provider:  Call Central Imaging Injection scheduling line: [phone]  Jack and Jakeâ€™shart available online at:  [email or web address]    Please call if any further questions or concerns ([phone]).  Clinic hours 8 am to 5 pm.    Return to clinic (call) if symptoms worsen or fail to improve.

## 2021-08-11 NOTE — NURSING NOTE
[facility]   ORTHOPEDICS & SPORTS MEDICINE  [address] [place] [address]  Dept: [phone]  ______________________________________________________________________________    Patient: Kathrine Isaac   : 1955   MRN: [id number]   2021    INVASIVE PROCEDURE SAFETY CHECKLIST    Date: 21   Procedure:Left knee cortisone injection   Patient Name: Kathrine Isaac  MRN: [id number]  YOB: 1955    Action: Complete sections as appropriate. Any discrepancy results in a HARD COPY until resolved.     PRE PROCEDURE:  Patient ID verified with 2 identifiers (name and  or MRN): Yes  Procedure and site verified with patient/designee (when able): Yes  Accurate consent documentation in medical record: Yes  H&P (or appropriate assessment) documented in medical record: NA  H&P must be up to 20 days prior to procedure and updates within 24 hours of procedure as applicable: NA  Relevant diagnostic and radiology test results appropriately labeled and displayed as applicable: NA  Procedure site(s) marked with provider initials: NA    TIMEOUT:  Time-Out performed immediately prior to starting procedure, including verbal and active participation of all team members addressing the following:Yes  * Correct patient identify  * Confirmed that the correct side and site are marked  * An accurate procedure consent form  * Agreement on the procedure to be done  * Correct patient position  * Relevant images and results are properly labeled and appropriately displayed  * The need to administer antibiotics or fluids for irrigation purposes during the procedure as applicable   * Safety precautions based on patient history or medication use    DURING PROCEDURE: Verification of correct person, site, and procedures any time the responsibility for care of the patient is transferred to another member of the care team.       Prior to injection, verified patient identity using patient's name and date  of birth.  Due to injection administration, patient instructed to remain in clinic for 15 minutes  afterwards, and to report any adverse reaction to me immediately.    Joint injection was performed.      Drug Amount Wasted:  None.  Vial/Syringe: Single dose vial  Expiration Date:  12/2022      Mikki Arzola, ATC  August 11, 2021

## 2021-08-11 NOTE — LETTER
8/11/2021         RE: Kathrine Isaac  [address] [facility] [address]        Dear Colleague,    Thank you for referring your patient, Kathrine Isaac, to the [facility] SPORTS MEDICINE CLINIC [place]. Please see a copy of my visit note below.    HISTORY OF PRESENT ILLNESS  Mr. Isaac is a pleasant 66 year old year old male who presents to clinic today with left knee pain  Kathrine explains that his knee has been bothering him more over the past 6 months  Location: left knee  Quality:  achy pain    Severity: 6/10 at worst    Duration: worse over past several months  Timing: occurs intermittently  Context: occurs while walking a lot  Modifying factors:  resting and non-use makes it better, movement and use makes it worse  Associated signs & symptoms: occasional swelling    MEDICAL HISTORY  Patient Active Problem List   Diagnosis     Glaucoma suspect     Type 2 diabetes mellitus with retinopathy, without long-term current use of insulin, macular edema presence unspecified, unspecified laterality, unspecified retinopathy severity (H)     Hyperlipidemia LDL goal <100     Essential hypertension     Advance care planning     Diverticulosis of large intestine without hemorrhage     Obesity, Class I, BMI 30.0-34.9 (see actual BMI)     Mechanical low back pain     Hx of LASIK       Current Outpatient Medications   Medication Sig Dispense Refill     ACCU-CHEK GUIDE test strip TEST BLOOD SUGAR ONCE DAILY OR AS DIRECTED 100 strip 0     alcohol swab prep pads Use to swab area of injection/nkechi as directed. 100 each 3     aspirin (ASPIRIN LOW DOSE) 81 MG EC tablet Take 1 tablet (81 mg) by mouth daily 90 tablet 3     atorvastatin (LIPITOR) 20 MG tablet Take 1 tablet (20 mg) by mouth daily 90 tablet 3     blood glucose calibration (NO BRAND SPECIFIED) solution To accompany: Blood Glucose Monitor Brands: per insurance. 1 Bottle 3     blood glucose monitoring (NO BRAND SPECIFIED) meter device kit Use  to test blood sugar 1 times daily or as directed. Preferred blood glucose meter/supplies to accompany: Monitor Brands: per insurance. 1 kit 0     diclofenac (VOLTAREN) 75 MG EC tablet Take 1 tablet (75 mg) by mouth 2 times daily as needed for moderate pain 40 tablet 0     empagliflozin (JARDIANCE) 10 MG TABS tablet Take 1 tablet (10 mg) by mouth daily 90 tablet 0     gabapentin (NEURONTIN) 300 MG capsule TAKE 1 CAPSULE BY MOUTH AT BEDTIME 30 capsule 0     glimepiride (AMARYL) 4 MG tablet TAKE ONE TABLET BY MOUTH EVERY MORNING BEFORE BREAKFAST 90 tablet 3     hydrochlorothiazide (HYDRODIURIL) 12.5 MG tablet Take 1 tablet (12.5 mg) by mouth daily 90 tablet 1     JANUVIA 100 MG tablet TAKE 1 TABLET(100 MG) BY MOUTH DAILY 30 tablet 0     latanoprost (XALATAN) 0.005 % ophthalmic solution Place 1 drop into both eyes daily 2.5 mL 6     lisinopril (ZESTRIL) 40 MG tablet TAKE 1 TABLET(40 MG) BY MOUTH DAILY 90 tablet 0     metFORMIN (GLUCOPHAGE-XR) 500 MG 24 hr tablet Take 4 tablets (2,000 mg) by mouth daily (with dinner) 360 tablet 1     methocarbamol (ROBAXIN) 500 MG tablet Take 1 tablet (500 mg) by mouth 3 times daily as needed for muscle spasms 30 tablet 1     ORDER FOR DME, SET TO LOCAL PRINT, Equipment being ordered: wrist quick fit [id number] 1 each 0     order for DME Equipment being ordered: One touch lancets, test strips.  Patient to check sugars once times daily, 90 day supply for test strips and lancets, refill 3 times 1 Device 0     order for DME Equipment being ordered: Glucometer per insurance preference, lancets, test strips.  Patient to check sugars two times daily, 90 day supply for test strips and lancets, refill 3 times 1 Device 11     order for DME Equipment being ordered: Glucometer Accucheck Sulema, lancets, test strips.  Patient to check sugars bid times daily, 90 day supply for test strips and lancets, refill 6 times 1 Device 0     order for DME Equipment being ordered: lancets for one touch glucometer  for once daily testing 1 Box 6     thin (NO BRAND SPECIFIED) lancets Use with lanceting device. To accompany: Blood Glucose Monitor Brands: per insurance. 200 each 6       No Known Allergies    Family History   Problem Relation Age of Onset     Diabetes Mother      Glaucoma No family hx of      Macular Degeneration No family hx of      Social History     Socioeconomic History     Marital status: Single     Spouse name: Not on file     Number of children: Not on file     Years of education: Not on file     Highest education level: Not on file   Occupational History     Not on file   Tobacco Use     Smoking status: Never Smoker     Smokeless tobacco: Never Used   Substance and Sexual Activity     Alcohol use: Yes     Drug use: No     Sexual activity: Not on file   Other Topics Concern     Parent/sibling w/ CABG, MI or angioplasty before 65F 55M? Not Asked   Social History Narrative     Not on file     Social Determinants of Health     Financial Resource Strain:      Difficulty of Paying Living Expenses:    Food Insecurity:      Worried About Running Out of Food in the Last Year:      Ran Out of Food in the Last Year:    Transportation Needs:      Lack of Transportation (Medical):      Lack of Transportation (Non-Medical):    Physical Activity:      Days of Exercise per Week:      Minutes of Exercise per Session:    Stress:      Feeling of Stress :    Social Connections:      Frequency of Communication with Friends and Family:      Frequency of Social Gatherings with Friends and Family:      Attends [employer] Services:      Active Member of Clubs or Organizations:      Attends Club or Organization Meetings:      Marital Status:    Intimate Partner Violence:      Fear of Current or Ex-Partner:      Emotionally Abused:      Physically Abused:      Sexually Abused:        Additional medical/Social/Surgical histories reviewed in EPIC and updated as appropriate.     REVIEW OF SYSTEMS (8/11/2021)  10 point ROS of systems  including Constitutional, Eyes, Respiratory, Cardiovascular, Gastroenterology, Genitourinary, Integumentary, Musculoskeletal, Psychiatric, Allergic/Immunologic were all negative except for pertinent positives noted in my HPI.     PHYSICAL EXAM  VSS  General  - normal appearance, in no obvious distress  HEENT  - conjunctivae not injected, moist mucous membranes, normocephalic/atraumatic head, ears normal appearance, no lesions, mouth normal appearance, no scars, normal dentition and teeth present  CV  - normal popliteal pulse  Pulm  - normal respiratory pattern, non-labored  Musculoskeletal - left knee  - stance: non antalgic gait, genu varum  - inspection: non generalized swelling, no effusion  - palpation: medial joint line tenderness, patella and patellar tendon non-tender, normal popliteal pulse  - ROM: 100 degrees flexion, 0 degrees extension, slightly painful active ROM  - strength: 5/5 in flexion, 5/5 in extension  - neuro: no sensory or motor deficit  - special tests:  (-) Lachman  (-) anterior drawer  (-) posterior drawer  (-) pivot shift  (+) Sierra- medial  (-) varus at 0 and 30 degrees flexion  (-) valgus at 0 and 30 degrees flexion  (+) Richard s compression test  (-) patellar apprehension  Neuro  - no sensory or motor deficit, grossly normal coordination, normal muscle tone  Skin  - no ecchymosis, erythema, warmth, or induration, no obvious rash  Psych  - interactive, appropriate, normal mood and affect    ASSESSMENT & PLAN  65 yo male with left knee arthritis, worse      I independently reviewed the following imaging studies:  Reviewed knee xrays: shows arthritis  Discussed followup for HL Injections  Cont. voltaren gel  Consider lumbar workup  After a 20 minute discussion and examination, we decided to perform a same day injection for diagnostic and therapeutic purposes for left knee  Appropriate PPE was utilized for prevention of spread of Covid-19.  Herman Lindquist MD, CAQSM    PROCEDURE:        left     Knee joint injection   The patient was apprised of the risks and the benefits of the procedure and consented and a written consent was signed.   The patient s  KNEE was sterilely prepped with chloraprep.   40 mg of triamcinolone suspension was drawn up into a 5 mL syringe with 4 mL of 1% lidocaine  The patient was injected into the knee with a 1.5-inch 22-gauge needle at the_superiorlateral aspect of their knee joint  There were no complications. The patient tolerated the procedure well. There was minimal bleeding.   The patient was instructed to ice the knee upon leaving clinic and refrain from overuse over the next 3 days.   The patient was instructed to go to the emergency room with any usual pain, swelling, or redness occurred in the injected area.   The patient was given a followup appointment to evaluate response to the injection to their increased range of motion and reduction of pain.  Follow up PRN  Dr Herman Lindquist    Large Joint Injection/Arthocentesis: L knee joint    Date/Time: 8/11/2021 10:45 AM  Performed by: Herman Lindquist MD  Authorized by: Herman Lindquist MD     Indications:  Pain and osteoarthritis  Needle Size:  22 G  Guidance: landmark guided    Approach:  Superolateral  Location:  Knee      Medications:  40 mg triamcinolone 40 MG/ML  Procedure discussed: discussed risks, benefits, and alternatives    Consent Given by:  Patient  Timeout: timeout called immediately prior to procedure    Prep: patient was prepped and draped in usual sterile fashion     NDC: [address]-6          Again, thank you for allowing me to participate in the care of your patient.        Sincerely,        Herman Lindquist MD

## 2021-08-15 DIAGNOSIS — E78.5 HYPERLIPIDEMIA LDL GOAL <100: ICD-10-CM

## 2021-08-16 RX ORDER — HYDROCHLOROTHIAZIDE 12.5 MG/1
TABLET ORAL
Qty: 90 TABLET | Refills: 0 | Status: SHIPPED | OUTPATIENT
Start: 2021-08-16 | End: 2021-09-09

## 2021-08-24 DIAGNOSIS — E78.5 HYPERLIPIDEMIA LDL GOAL <100: ICD-10-CM

## 2021-08-24 RX ORDER — HYDROCHLOROTHIAZIDE 12.5 MG/1
TABLET ORAL
Qty: 90 TABLET | Refills: 0 | OUTPATIENT
Start: 2021-08-24

## 2021-08-24 NOTE — TELEPHONE ENCOUNTER
Duplicate order prescription filled on 8/16/2021 with a 90 day supply and 0 refills. Pt. Needs to be seen by PCP and labs prior to further refills.   Kemi Arroyo RN, BSN   [facility]

## 2021-08-25 ENCOUNTER — OFFICE VISIT (OUTPATIENT)
Dept: ORTHOPEDICS | Facility: CLINIC | Age: 66
End: 2021-08-25
Payer: COMMERCIAL

## 2021-08-25 DIAGNOSIS — M17.12 ARTHRITIS OF LEFT KNEE: Primary | ICD-10-CM

## 2021-08-25 PROCEDURE — 20610 DRAIN/INJ JOINT/BURSA W/O US: CPT | Mod: LT | Performed by: PREVENTIVE MEDICINE

## 2021-08-25 PROCEDURE — 99207 PR DROP WITH A PROCEDURE: CPT | Performed by: PREVENTIVE MEDICINE

## 2021-08-25 ASSESSMENT — PAIN SCALES - GENERAL: PAINLEVEL: MILD PAIN (2)

## 2021-08-25 NOTE — PROGRESS NOTES
Kathrine gomes for left knee gel one injection as previously planned  Diagnosis: left knee arthritis    PROCEDURE:           Left  Knee joint injection   The patient was apprised of the risks and the benefits of the procedure and consented and a written consent was signed.   The patient s left KNEE was sterilely prepped with chloraprep.   The patient was injected with gel one syringe into the knee with a 1.5-inch 22-gauge needle at the_superiorlateral aspect of their knee joint  There were no complications. The patient tolerated the procedure well. There was minimal bleeding.   The patient was instructed to ice the knee upon leaving clinic and refrain from overuse over the next 3 days.   The patient was instructed to go to the emergency room with any usual pain, swelling, or redness occurred in the injected area.   The patient was given a followup appointment to evaluate response to the injection to their increased range of motion and reduction of pain.  Follow up PRN  Dr Herman Lindquist    Large Joint Injection/Arthocentesis: L knee joint    Date/Time: 8/25/2021 9:07 AM  Performed by: Herman Lindquist MD  Authorized by: Herman Lindquist MD     Indications:  Pain and osteoarthritis  Needle Size:  22 G  Guidance: landmark guided    Approach:  Superolateral  Location:  Knee      Medications:  30 mg cross-Linked Hyaluronate 30 MG/3ML  Procedure discussed: discussed risks, benefits, and alternatives    Consent Given by:  Patient  Timeout: timeout called immediately prior to procedure    Prep: patient was prepped and draped in usual sterile fashion         [employer]: [id number]

## 2021-08-25 NOTE — NURSING NOTE
[facility]   ORTHOPEDICS & SPORTS MEDICINE  [address] [place] [address]  Dept: [phone]  ______________________________________________________________________________    Patient: Kathrine Isaac   : 1955   MRN: [id number]   2021    INVASIVE PROCEDURE SAFETY CHECKLIST    Date:21  Procedure: Left knee Gel One injection   Patient Name: Kathrine Isaac  MRN: [id number]  YOB: 1955    Action: Complete sections as appropriate. Any discrepancy results in a HARD COPY until resolved.     PRE PROCEDURE:  Patient ID verified with 2 identifiers (name and  or MRN): Yes  Procedure and site verified with patient/designee (when able): Yes  Accurate consent documentation in medical record: Yes  H&P (or appropriate assessment) documented in medical record: NA  H&P must be up to 20 days prior to procedure and updates within 24 hours of procedure as applicable: NA  Relevant diagnostic and radiology test results appropriately labeled and displayed as applicable: NA  Procedure site(s) marked with provider initials: NA    TIMEOUT:  Time-Out performed immediately prior to starting procedure, including verbal and active participation of all team members addressing the following:Yes  * Correct patient identify  * Confirmed that the correct side and site are marked  * An accurate procedure consent form  * Agreement on the procedure to be done  * Correct patient position  * Relevant images and results are properly labeled and appropriately displayed  * The need to administer antibiotics or fluids for irrigation purposes during the procedure as applicable   * Safety precautions based on patient history or medication use    DURING PROCEDURE: Verification of correct person, site, and procedures any time the responsibility for care of the patient is transferred to another member of the care team.       Prior to injection, verified patient identity using patient's name and date of  birth.  Due to injection administration, patient instructed to remain in clinic for 15 minutes  afterwards, and to report any adverse reaction to me immediately.    Joint injection was performed.      Drug Amount Wasted:  None.  Vial/Syringe: Syringe  Expiration Date:  05/30/2023      Mikki Arzola, ATC  August 25, 2021

## 2021-08-25 NOTE — LETTER
8/25/2021         RE: Kathrine Isaac  [address] [facility] [address]        Dear Colleague,    Thank you for referring your patient, Kathrine Isaac, to the [facility] SPORTS MEDICINE CLINIC [place]. Please see a copy of my visit note below.    Kathrine gomes for left knee gel one injection as previously planned  Diagnosis: left knee arthritis    PROCEDURE:           Left  Knee joint injection   The patient was apprised of the risks and the benefits of the procedure and consented and a written consent was signed.   The patient s left KNEE was sterilely prepped with chloraprep.   The patient was injected with gel one syringe into the knee with a 1.5-inch 22-gauge needle at the_superiorlateral aspect of their knee joint  There were no complications. The patient tolerated the procedure well. There was minimal bleeding.   The patient was instructed to ice the knee upon leaving clinic and refrain from overuse over the next 3 days.   The patient was instructed to go to the emergency room with any usual pain, swelling, or redness occurred in the injected area.   The patient was given a followup appointment to evaluate response to the injection to their increased range of motion and reduction of pain.  Follow up PRN  Dr Herman Lindquist    Large Joint Injection/Arthocentesis: L knee joint    Date/Time: 8/25/2021 9:07 AM  Performed by: Herman Lindquist MD  Authorized by: Herman Lindquist MD     Indications:  Pain and osteoarthritis  Needle Size:  22 G  Guidance: landmark guided    Approach:  Superolateral  Location:  Knee      Medications:  30 mg cross-Linked Hyaluronate 30 MG/3ML  Procedure discussed: discussed risks, benefits, and alternatives    Consent Given by:  Patient  Timeout: timeout called immediately prior to procedure    Prep: patient was prepped and draped in usual sterile fashion         GelOne: [id number]            Again, thank you for allowing me to participate in  the care of your patient.        Sincerely,        Herman Lindquist MD

## 2021-09-06 DIAGNOSIS — G89.29 CHRONIC PAIN OF LEFT KNEE: ICD-10-CM

## 2021-09-06 DIAGNOSIS — M51.16 LUMBAR DISC HERNIATION WITH RADICULOPATHY: ICD-10-CM

## 2021-09-06 DIAGNOSIS — M17.12 ARTHRITIS OF LEFT KNEE: ICD-10-CM

## 2021-09-06 DIAGNOSIS — M25.562 CHRONIC PAIN OF LEFT KNEE: ICD-10-CM

## 2021-09-08 RX ORDER — GABAPENTIN 300 MG/1
CAPSULE ORAL
Qty: 90 CAPSULE | Refills: 0 | Status: SHIPPED | OUTPATIENT
Start: 2021-09-08 | End: 2021-09-21

## 2021-09-09 ENCOUNTER — MYC REFILL (OUTPATIENT)
Dept: FAMILY MEDICINE | Facility: CLINIC | Age: 66
End: 2021-09-09

## 2021-09-09 DIAGNOSIS — M25.562 CHRONIC PAIN OF LEFT KNEE: ICD-10-CM

## 2021-09-09 DIAGNOSIS — G89.29 CHRONIC PAIN OF LEFT KNEE: ICD-10-CM

## 2021-09-09 DIAGNOSIS — E78.5 HYPERLIPIDEMIA LDL GOAL <100: ICD-10-CM

## 2021-09-09 DIAGNOSIS — E11.319 TYPE 2 DIABETES MELLITUS WITH RETINOPATHY, WITHOUT LONG-TERM CURRENT USE OF INSULIN, MACULAR EDEMA PRESENCE UNSPECIFIED, UNSPECIFIED LATERALITY, UNSPECIFIED RETINOPATHY SEVERITY (H): ICD-10-CM

## 2021-09-09 DIAGNOSIS — M51.16 LUMBAR DISC HERNIATION WITH RADICULOPATHY: ICD-10-CM

## 2021-09-09 DIAGNOSIS — I10 ESSENTIAL HYPERTENSION: ICD-10-CM

## 2021-09-09 DIAGNOSIS — M17.12 ARTHRITIS OF LEFT KNEE: ICD-10-CM

## 2021-09-09 RX ORDER — GABAPENTIN 300 MG/1
CAPSULE ORAL
Qty: 90 CAPSULE | Refills: 0 | Status: CANCELLED | OUTPATIENT
Start: 2021-09-09

## 2021-09-10 NOTE — TELEPHONE ENCOUNTER
Patient calling on th status of the refills patient is out of ASA and Januvia. Patient also has an appt with the provider on 9/21/2021.  Maria Alejandra Tam [facility]  2nd Floor  Primary Care

## 2021-09-10 NOTE — TELEPHONE ENCOUNTER
Duplicate order prescription filled on 9/8/2021 with a 90 day supply and 0 refills.   Kemi Arroyo RN, BSN   [facility]      10-Sep-2021

## 2021-09-14 ASSESSMENT — ENCOUNTER SYMPTOMS
JOINT SWELLING: 0
DIZZINESS: 0
ARTHRALGIAS: 1
FEVER: 0
HEMATURIA: 0
HEADACHES: 0
SHORTNESS OF BREATH: 0
MYALGIAS: 0
SORE THROAT: 0
CHILLS: 0
HEMATOCHEZIA: 0
FREQUENCY: 0
PALPITATIONS: 0
EYE PAIN: 0
NAUSEA: 0
ABDOMINAL PAIN: 0
DYSURIA: 0
NERVOUS/ANXIOUS: 0
WEAKNESS: 0
DIARRHEA: 0
COUGH: 0
CONSTIPATION: 0
PARESTHESIAS: 0
HEARTBURN: 0

## 2021-09-15 ENCOUNTER — MYC MEDICAL ADVICE (OUTPATIENT)
Dept: FAMILY MEDICINE | Facility: CLINIC | Age: 66
End: 2021-09-15

## 2021-09-15 NOTE — TELEPHONE ENCOUNTER
Patient calling again regarding his refill.  Maria Alejandra Tam MA  [facility]  2nd Floor  Primary Care

## 2021-09-16 DIAGNOSIS — E11.319 TYPE 2 DIABETES MELLITUS WITH RETINOPATHY, WITHOUT LONG-TERM CURRENT USE OF INSULIN, MACULAR EDEMA PRESENCE UNSPECIFIED, UNSPECIFIED LATERALITY, UNSPECIFIED RETINOPATHY SEVERITY (H): ICD-10-CM

## 2021-09-16 DIAGNOSIS — M17.12 ARTHRITIS OF LEFT KNEE: ICD-10-CM

## 2021-09-16 DIAGNOSIS — M51.16 LUMBAR DISC HERNIATION WITH RADICULOPATHY: ICD-10-CM

## 2021-09-16 RX ORDER — BLOOD SUGAR DIAGNOSTIC
STRIP MISCELLANEOUS
Qty: 100 STRIP | Refills: 0 | OUTPATIENT
Start: 2021-09-16

## 2021-09-16 RX ORDER — BLOOD SUGAR DIAGNOSTIC
STRIP MISCELLANEOUS
Qty: 100 STRIP | Refills: 11 | Status: SHIPPED | OUTPATIENT
Start: 2021-09-16 | End: 2022-10-26

## 2021-09-16 RX ORDER — LISINOPRIL 40 MG/1
40 TABLET ORAL DAILY
Qty: 90 TABLET | Refills: 0 | Status: SHIPPED | OUTPATIENT
Start: 2021-09-16 | End: 2021-09-21

## 2021-09-16 RX ORDER — HYDROCHLOROTHIAZIDE 12.5 MG/1
12.5 TABLET ORAL DAILY
Qty: 90 TABLET | Refills: 0 | Status: SHIPPED | OUTPATIENT
Start: 2021-09-16 | End: 2021-09-21

## 2021-09-16 NOTE — TELEPHONE ENCOUNTER
"Routing refill request to provider for review/approval because:  Requested Prescriptions   Pending Prescriptions Disp Refills    diclofenac (VOLTAREN) 75 MG EC tablet [Pharmacy Med Name: DICLOFENAC SODIUM 75MG DR TABLETS] 40 tablet 0     Sig: TAKE 1 TABLET(75 MG) BY MOUTH TWICE DAILY AS NEEDED FOR MODERATE PAIN        NSAID Medications Failed - 9/16/2021  7:26 AM        Failed - Blood pressure under 140/90 in past 12 months       BP Readings from Last 3 Encounters:   09/02/20 139/82   08/17/20 110/66   08/03/20 115/76                 Passed - Normal ALT on file in past 12 months     Recent Labs   Lab Test 04/02/21  0943   ALT 29             Passed - Normal AST on file in past 12 months       Recent Labs   Lab Test 04/02/21  0943   AST 18             Passed - Recent (12 mo) or future (30 days) visit within the authorizing provider's specialty     Patient has had an office visit with the authorizing provider or a provider within the authorizing providers department within the previous 12 mos or has a future within next 30 days. See \"Patient Info\" tab in inbasket, or \"Choose Columns\" in Meds & Orders section of the refill encounter.              Passed - Patient is age 6-64 years        Passed - Normal CBC on file in past 12 months     Recent Labs   Lab Test 11/11/20  1041   WBC 6.8   RBC 5.21   HGB 13.9   HCT 42.5                    Passed - Medication is active on med list        Passed - Normal serum creatinine on file in past 12 months     Recent Labs   Lab Test 04/02/21  0943   CR 0.90       Ok to refill medication if creatinine is low                  Alexa MONACON, RN        "

## 2021-09-16 NOTE — TELEPHONE ENCOUNTER
Due for OV and labs. Pls schedule appt.   No further refills until he is seen.  Nighat WEISS, CNP

## 2021-09-16 NOTE — TELEPHONE ENCOUNTER
Called and spoke to the patient and explained that refills were sent and he has a Physical scheduled for 9/21/2021.  Maria Alejandra Tam MA  [facility]  2nd Floor  Primary Care

## 2021-09-16 NOTE — TELEPHONE ENCOUNTER
Late entry: Spoke with Dr. Fraire this morning at 10am. She was given permission by patient to contact Dr. Shay with patient care information.   Has spoke with her by phone a few times this weekend after a message was left for her my patient. No immediate concern for suicidal ideation. Did encourage patient to stay with a friend or family as she has been feeling down and hitting self. Was with family for Easter holiday. 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   Says that her relationship with her  is not the best. Holding in lots of tears and feelings.     Has history of taking generic Celexa.     Yvette reached out to our office via portal so I will open the line of communication with her to coordinate care.     Dr. Shay - Dr. Fraire is available by phone if needed for any additional questions    See [employer] messages below.  Refills sent this morning for patient.  Patient updated via [employer] message.      Dianne Whipple RN  [facility]

## 2021-09-20 ASSESSMENT — ENCOUNTER SYMPTOMS
HEADACHES: 0
FEVER: 0
NAUSEA: 0
WEAKNESS: 0
SORE THROAT: 0
NERVOUS/ANXIOUS: 0
PALPITATIONS: 0
MYALGIAS: 0
DIARRHEA: 0
DIZZINESS: 0
ARTHRALGIAS: 1
HEARTBURN: 0
HEMATOCHEZIA: 0
PARESTHESIAS: 0
JOINT SWELLING: 0
HEMATURIA: 0
FREQUENCY: 0
ABDOMINAL PAIN: 0
SHORTNESS OF BREATH: 0
DYSURIA: 0
COUGH: 0
CONSTIPATION: 0
EYE PAIN: 0
CHILLS: 0

## 2021-09-20 NOTE — PROGRESS NOTES
SUBJECTIVE:   CC: Kathrine Isaac is an 66 year old male who presents for preventative health visit.       Patient has been advised of split billing requirements and indicates understanding: Yes  Healthy Habits:     Getting at least 3 servings of Calcium per day:  Yes    Bi-annual eye exam:  Yes    Dental care twice a year:  NO    Sleep apnea or symptoms of sleep apnea:  None    Diet:  Regular (no restrictions)    Frequency of exercise:  None    Taking medications regularly:  No    Medication side effects:  None    PHQ-2 Total Score: 0    Additional concerns today:  Yes      Diabetes Follow-up    How often are you checking your blood sugar? Two times daily, am 90-low 100's,   Blood sugar testing frequency justification:  Uncontrolled diabetes  What time of day are you checking your blood sugars (select all that apply)?  Before meals and At bedtime  Have you had any blood sugars above 200?  No  Have you had any blood sugars below 70?  No    What symptoms do you notice when your blood sugar is low?  Dizzy    What concerns do you have today about your diabetes? None     Do you have any of these symptoms? (Select all that apply)  No numbness or tingling in feet.  No redness, sores or blisters on feet.  No complaints of excessive thirst.  No reports of blurry vision.  No significant changes to weight.        Hyperlipidemia Follow-Up      Are you regularly taking any medication or supplement to lower your cholesterol?   Yes- Lipitor 20 mg daily    Are you having muscle aches or other side effects that you think could be caused by your cholesterol lowering medication?  No    Hypertension Follow-up      Do you check your blood pressure regularly outside of the clinic? Yes     Are you following a low salt diet? Yes    Are your blood pressures ever more than 140 on the top number (systolic) OR more   than 90 on the bottom number (diastolic), for example 140/90? No Home /77    BP Readings from Last 2 Encounters:    09/21/21 133/84   09/02/20 139/82     Hemoglobin A1C (%)   Date Value   09/21/2021 8.8 (H)   04/02/2021 7.9 (H)   09/16/2020 6.6 (H)     LDL Cholesterol Calculated (mg/dL)   Date Value   04/02/2021 49   12/30/2019 54         Today's PHQ-2 Score:   PHQ-2 ( 1999 Pfizer) 9/14/2021   Q1: Little interest or pleasure in doing things 0   Q2: Feeling down, depressed or hopeless 0   PHQ-2 Score 0   Q1: Little interest or pleasure in doing things Not at all   Q2: Feeling down, depressed or hopeless Not at all   PHQ-2 Score 0       Abuse: Current or Past(Physical, Sexual or Emotional)- No  Do you feel safe in your environment? Yes    Have you ever done Advance Care Planning? (For example, a Health Directive, POLST, or a discussion with a medical provider or your loved ones about your wishes): No, advance care planning information given to patient to review.  Patient declined advance care planning discussion at this time.    Social History     Tobacco Use     Smoking status: Never Smoker     Smokeless tobacco: Never Used   Substance Use Topics     Alcohol use: Yes     If you drink alcohol do you typically have >3 drinks per day or >7 drinks per week? No    Alcohol Use 9/21/2021   Prescreen: >3 drinks/day or >7 drinks/week? -   Prescreen: >3 drinks/day or >7 drinks/week? No       Last PSA: No results found for: PSA    Reviewed orders with patient. Reviewed health maintenance and updated orders accordingly - Yes  Labs reviewed in EPIC  BP Readings from Last 3 Encounters:   09/21/21 133/84   09/02/20 139/82   08/17/20 110/66    Wt Readings from Last 3 Encounters:   09/21/21 83.7 kg (184 lb 9.6 oz)   09/02/20 88.9 kg (196 lb)   08/17/20 88.9 kg (196 lb)                  Patient Active Problem List   Diagnosis     Glaucoma suspect     Type 2 diabetes mellitus with retinopathy, without long-term current use of insulin, macular edema presence unspecified, unspecified laterality, unspecified retinopathy severity (H)      Hyperlipidemia LDL goal <100     Essential hypertension     Advance care planning     Diverticulosis of large intestine without hemorrhage     Obesity, Class I, BMI 30.0-34.9 (see actual BMI)     Mechanical low back pain     Hx of LASIK     Past Surgical History:   Procedure Laterality Date     COLONOSCOPY N/A 1/14/2016    Procedure: COLONOSCOPY;  Surgeon: Ventura Monge MD;  Location: [facility]     LASIK BILATERAL       ROTATOR CUFF REPAIR RT/LT  1992     SURGICAL HISTORY OF -       uvula remove        Social History     Tobacco Use     Smoking status: Never Smoker     Smokeless tobacco: Never Used   Substance Use Topics     Alcohol use: Yes     Family History   Problem Relation Age of Onset     Diabetes Mother      Glaucoma No family hx of      Macular Degeneration No family hx of            Reviewed and updated as needed this visit by clinical staff  Tobacco                Reviewed and updated as needed this visit by Provider                Past Medical History:   Diagnosis Date     Arthritis      Diabetes (H)      Essential hypertension 10/28/2015      Past Surgical History:   Procedure Laterality Date     COLONOSCOPY N/A 1/14/2016    Procedure: COLONOSCOPY;  Surgeon: Ventura Monge MD;  Location: [facility] OR     LASIK BILATERAL       ROTATOR CUFF REPAIR RT/LT  1992     SURGICAL HISTORY OF -       uvula remove        Review of Systems   Constitutional: Negative for chills and fever.   HENT: Negative for congestion, ear pain, hearing loss and sore throat.    Eyes: Negative for pain and visual disturbance.   Respiratory: Negative for cough and shortness of breath.    Cardiovascular: Negative for chest pain, palpitations and peripheral edema.   Gastrointestinal: Negative for abdominal pain, constipation, diarrhea, heartburn, hematochezia and nausea.   Genitourinary: Negative for discharge, dysuria, frequency, genital sores, hematuria, impotence and urgency.   Musculoskeletal: Positive for arthralgias.  "Negative for joint swelling and myalgias.   Skin: Negative for rash.   Neurological: Negative for dizziness, weakness, headaches and paresthesias.   Psychiatric/Behavioral: Negative for mood changes. The patient is not nervous/anxious.      CONSTITUTIONAL: NEGATIVE for fever, chills, change in weight  INTEGUMENTARY/SKIN: NEGATIVE for worrisome rashes, moles or lesions  EYES: NEGATIVE for vision changes or irritation  ENT: NEGATIVE for ear, mouth and throat problems  RESP: NEGATIVE for significant cough or SOB  CV: NEGATIVE for chest pain, palpitations or peripheral edema  GI: NEGATIVE for nausea, abdominal pain, heartburn, or change in bowel habits   male: negative for dysuria, hematuria, decreased urinary stream, erectile dysfunction, urethral discharge  MUSCULOSKELETAL: NEGATIVE for significant arthralgias or myalgia  NEURO: NEGATIVE for weakness, dizziness or paresthesias  PSYCHIATRIC: NEGATIVE for changes in mood or affect    OBJECTIVE:   /84 (BP Location: Left arm, Patient Position: Sitting, Cuff Size: Adult Regular)   Pulse 81   Temp 98  F (36.7  C) (Tympanic)   Ht 1.676 m (5' 6\")   Wt 83.7 kg (184 lb 9.6 oz)   SpO2 99%   BMI 29.80 kg/m      Physical Exam  GENERAL: healthy, alert and no distress  EYES: Eyes grossly normal to inspection, PERRL and conjunctivae and sclerae normal  HENT: ear canals and TM's normal, nose and mouth without ulcers or lesions  NECK: no adenopathy, no asymmetry, masses, or scars and thyroid normal to palpation  RESP: lungs clear to auscultation - no rales, rhonchi or wheezes  CV: regular rate and rhythm, normal S1 S2, no S3 or S4, no murmur, click or rub, no peripheral edema and peripheral pulses strong  ABDOMEN: soft, nontender, no hepatosplenomegaly, no masses and bowel sounds normal   (male): normal male genitalia without lesions or urethral discharge, no hernia  RECTAL: deferred per pt  MS: no gross musculoskeletal defects noted, no edema  SKIN: no suspicious " lesions or rashes  NEURO: Normal strength and tone, mentation intact and speech normal  PSYCH: mentation appears normal, affect normal/bright  LYMPH: no cervical, supraclavicular, axillary, or inguinal adenopathy    Diagnostic Test Results:  Labs reviewed in Epic  Results for orders placed or performed in visit on 09/21/21 (from the past 24 hour(s))   HEMOGLOBIN A1C   Result Value Ref Range    Hemoglobin A1C 8.8 (H) 0.0 - 5.6 %       ASSESSMENT/PLAN:   (Z00.00) Routine general medical examination at a health care facility  (primary encounter diagnosis)  Comment:   Plan: REVIEW OF HEALTH MAINTENANCE PROTOCOL ORDERS            (Z23) Need for shingles vaccine  Comment:   Plan: ZOSTER VACCINE RECOMBINANT ADJUVANTED IM NJX            (Z23) Need for immunization against influenza  Comment:   Plan: INFLUENZA QUAD, RECOMBINANT, P-FREE (RIV4)         (FLUBLOK)            (E11.319) Type 2 diabetes mellitus with retinopathy, without long-term current use of insulin, macular edema presence unspecified, unspecified laterality, unspecified retinopathy severity (H)  Comment: Adjust medication based on A1c, low carb diet discussed, check sugars regularly, benefits of regular exercise discussed  Plan: HEMOGLOBIN A1C, FOOT EXAM            (E78.5) Hyperlipidemia LDL goal <100  Comment:   Plan: On Lipitor 20 mg daily, adjust as indicated    (E66.9) Obesity, Class I, BMI 30.0-34.9 (see actual BMI)  Comment:   Plan: Benefits of weight loss reviewed in detail, encouraged him to cut back on the carbohydrates in the diet, consume more fruits and vegetables, drink plenty of water, avoid fruit juices, sodas, get 150 min moderate exercise/week.  Recheck weight in 6 months.      (I10) Essential hypertension  Comment:   Plan: controlled, reviewed low salt diet, benefits of regular exercise, weight loss    Patient has been advised of split billing requirements and indicates understanding: Yes  COUNSELING:   Reviewed preventive health counseling,  "as reflected in patient instructions    Estimated body mass index is 29.8 kg/m  as calculated from the following:    Height as of this encounter: 1.676 m (5' 6\").    Weight as of this encounter: 83.7 kg (184 lb 9.6 oz).     Weight management plan: Discussed healthy diet and exercise guidelines    He reports that he has never smoked. He has never used smokeless tobacco.      Counseling Resources:  ATP IV Guidelines  Pooled Cohorts Equation Calculator  FRAX Risk Assessment  ICSI Preventive Guidelines  Dietary Guidelines for Americans, 2010  USDA's MyPlate  ASA Prophylaxis  Lung CA Screening    Nighat Joe, ABHISHEK CNP  [facility]    "

## 2021-09-20 NOTE — PATIENT INSTRUCTIONS
At [facility], we strive to deliver an exceptional experience to you, every time we see you. If you receive a survey, please complete it as we do value your feedback.  If you have MyChart, you can expect to receive results automatically within 24 hours of their completion.  Your provider will send a note interpreting your results as well.   If you do not have MyChart, you should receive your results in about a week by mail.    Your care team:                            Family Medicine Internal Medicine   MD Tobias Lu MD Shantel Branch-Fleming, MD Srinivasa Vaka, MD Katya Belousova, PAAlvaroC  Tiffany Peck, APRN CNP    Tan Ghosh, MD Pediatrics   Michi Bowman, PAJANEL Polk, CNP MD Conchis Deras APRN CNP   MD Casi Laird MD Deborah Mielke, MD Sushma Joe, APRN [facility]      Clinic hours: Monday - Thursday 7 am-6 pm; Fridays 7 am-5 pm.   Urgent care: Monday - Friday 10 am- 8 pm; Saturday and Sunday 9 am-5 pm.    Clinic: [phone]       [facility]: Monday - Thursday 8 am - 7 pm; Friday 8 am - 6 pm  [facility]: [phone]     Use [email or web address] for 24/7 diagnosis and treatment of dozens of conditions.  Preventive Health Recommendations  Male Ages 50 - 64    Yearly exam:             See your health care provider every year in order to  o   Review health changes.   o   Discuss preventive care.    o   Review your medicines if your doctor has prescribed any.     Have a cholesterol test every 5 years, or more frequently if you are at risk for high cholesterol/heart disease.     Have a diabetes test (fasting glucose) every three years. If you are at risk for diabetes, you should have this test more often.     Have a colonoscopy at age 50, or have a yearly FIT test (stool test). These exams will check for colon cancer.      Talk with your health care provider about  whether or not a prostate cancer screening test (PSA) is right for you.    You should be tested each year for STDs (sexually transmitted diseases), if you re at risk.     Shots: Get a flu shot each year. Get a tetanus shot every 10 years.     Nutrition:    Eat at least 5 servings of fruits and vegetables daily.     Eat whole-grain bread, whole-wheat pasta and brown rice instead of white grains and rice.     Get adequate Calcium and Vitamin D.     Lifestyle    Exercise for at least 150 minutes a week (30 minutes a day, 5 days a week). This will help you control your weight and prevent disease.     Limit alcohol to one drink per day.     No smoking.     Wear sunscreen to prevent skin cancer.     See your dentist every six months for an exam and cleaning.     See your eye doctor every 1 to 2 years.    Patient Education     Healthy Meals for Diabetes     A healthcare provider will help you develop a meal plan that fits your needs.   Ask your healthcare team to help you make a meal plan that fits your needs. Your meal plan tells you when to eat your meals and snacks, what kinds of foods to eat, and how much of each food to eat. You don t have to give up all the foods you like. But you do need to follow some guidelines.  Choose healthy carbohydrates  Starches, sugars, and fiber are all types of carbohydrates. Fiber can help lower your cholesterol and triglycerides. Fiber is also healthy for your heart. You should have 20 to 35 grams of total fiber each day. Fiber-rich foods include:    Whole-grain breads and cereals    Bulgur wheat    Brown rice   Whole-wheat pasta    Fruits and vegetables    Dry beans, and peas   Keep track of the amount of carbohydrates you eat. This can help you keep the right balance of physical activity and medicine. The amount of carbohydrates needed will vary for each person. It depends on many things such as your health, the medicines you take, and how active you are. Your healthcare team will  help you figure out the right amount of carbohydrates for you. You may start with around 45 to 60 grams of carbohydrates per meal, depending on your situation.   Here are some examples of foods containing about 15 grams of carbohydrates (1 serving of carbohydrates):    1/2 cup of canned or frozen fruit    A small piece of fresh fruit (4 ounces)    1 slice of bread    1/2 cup of oatmeal    1/3 cup of rice    4 to 6 crackers    1/2 English muffin    1/2 cup of black beans    1/4 of a large baked potato (3 ounces)    2/3 cup of plain fat-free yogurt    1 cup of soup    1/2 cup of casserole    6 chicken nuggets    2-inch-square brownie or cake without frosting    2 small cookies    1/2 cup of ice cream or sherbet  Choose healthy protein foods  Eating protein that is low in fat can help you control your weight. It also helps keep your heart healthy. Low-fat protein foods include:    Fish    Plant proteins, such as dry beans and peas, nuts, and soy products like tofu and soymilk    Lean meat with all visible fat removed    Poultry with the skin removed    Low-fat or nonfat milk, cheese, and yogurt  Limit unhealthy fats and sugar  Saturated and trans fats are unhealthy for your heart. They raise LDL (bad) cholesterol. Fat is also high in calories, so it can make you gain weight. To cut down on unhealthy fats and sugar, limit these foods:    Butter or margarine    Palm and palm kernel oils and coconut oil    Cream    Cheese    Hector    Lunch meats   Ice cream    Sweet bakery goods such as pies, muffins, and donuts    Jams and jellies    Candy bars    Regular sodas   How much to eat  The amount of food you eat affects your blood sugar. It also affects your weight. Your healthcare team will tell you how much of each type of food you should eat.    Use measuring cups and spoons and a food scale to measure serving sizes.    Learn what a correct serving size looks like on your plate. This will help when you are away from home  and can t measure your servings. For example, a serving of meat is about the palm of your hand.    Eat only the number of servings given on your meal plan for each food. Don t take seconds.    Learn to read food labels. Be sure to look at serving size, total carbohydrates, fiber, calories, sugar, and saturated and trans fats. Look for healthier alternatives to foods that have added sugar.    Plan ahead for parties so you can still have a good time without going overboard with unhealthy food choices. Set a good example yourself by bringing a healthy dish to pot lucks.   Choose healthy snacks  When it comes to snacks, we usually think about foods with added sugar and fats. But there are many other options for healthier snack choices. Here are a few snack ideas to choose from:  Snacks with less than 5 grams of carbohydrates    1 piece of string cheese    3 celery sticks plus 1 tablespoon of peanut butter    5 cherry tomatoes plus 1 tablespoon of ranch dressing    1 hard-boiled egg    1/4 cup of fresh blueberries     5 baby carrots    1 cup of light popcorn    1/2 cup of sugar-free gelatin    15 almonds  Snacks with about 10 to 20 grams of carbohydrates    1/3 cup of hummus plus 1 cup of fresh cut nonstarchy vegetables (carrots, green peppers, broccoli, celery, or a combination)    1/2 cup of fresh or canned fruit plus 1/4 cup of cottage cheese    1/2 cup of tuna salad with 4 crackers    2 rice cakes and a tablespoon of peanut butter    1 small apple or orange    3 cups light popcorn    1/2 of a turkey sandwich (1 slice of whole-wheat bread, 2 ounces of turkey, and mustard)  Portion sizes are important to controlling your blood sugar and staying at a healthy weight. Stock up on healthy snack items so you always have them on hand.  When to eat  Your meal plan will likely include breakfast, lunch, dinner, and some snacks.    Try to eat your meals and snacks at about the same times each day.    Eat all your meals and  snacks. Skipping a meal or snack can make your blood sugar drop too low. It can also cause you to eat too much at the next meal or snack. Then your blood sugar could get too high.  [place] last reviewed this educational content on 11/1/2018 2000-2021 [employer]. All rights reserved. This information is not intended as a substitute for professional medical care. Always follow your healthcare professional's instructions.

## 2021-09-21 ENCOUNTER — OFFICE VISIT (OUTPATIENT)
Dept: FAMILY MEDICINE | Facility: CLINIC | Age: 66
End: 2021-09-21
Payer: COMMERCIAL

## 2021-09-21 VITALS
SYSTOLIC BLOOD PRESSURE: 133 MMHG | OXYGEN SATURATION: 99 % | TEMPERATURE: 98 F | HEART RATE: 81 BPM | HEIGHT: 66 IN | WEIGHT: 184.6 LBS | DIASTOLIC BLOOD PRESSURE: 84 MMHG | BODY MASS INDEX: 29.67 KG/M2

## 2021-09-21 DIAGNOSIS — G89.29 CHRONIC PAIN OF LEFT KNEE: ICD-10-CM

## 2021-09-21 DIAGNOSIS — E66.811 OBESITY, CLASS I, BMI 30.0-34.9 (SEE ACTUAL BMI): ICD-10-CM

## 2021-09-21 DIAGNOSIS — Z23 NEED FOR SHINGLES VACCINE: ICD-10-CM

## 2021-09-21 DIAGNOSIS — M51.16 LUMBAR DISC HERNIATION WITH RADICULOPATHY: ICD-10-CM

## 2021-09-21 DIAGNOSIS — I10 ESSENTIAL HYPERTENSION: ICD-10-CM

## 2021-09-21 DIAGNOSIS — E11.319 TYPE 2 DIABETES MELLITUS WITH RETINOPATHY, WITHOUT LONG-TERM CURRENT USE OF INSULIN, MACULAR EDEMA PRESENCE UNSPECIFIED, UNSPECIFIED LATERALITY, UNSPECIFIED RETINOPATHY SEVERITY (H): ICD-10-CM

## 2021-09-21 DIAGNOSIS — Z00.00 ROUTINE GENERAL MEDICAL EXAMINATION AT A HEALTH CARE FACILITY: Primary | ICD-10-CM

## 2021-09-21 DIAGNOSIS — M17.12 ARTHRITIS OF LEFT KNEE: ICD-10-CM

## 2021-09-21 DIAGNOSIS — Z23 NEED FOR IMMUNIZATION AGAINST INFLUENZA: ICD-10-CM

## 2021-09-21 DIAGNOSIS — E78.5 HYPERLIPIDEMIA LDL GOAL <100: ICD-10-CM

## 2021-09-21 DIAGNOSIS — M25.562 CHRONIC PAIN OF LEFT KNEE: ICD-10-CM

## 2021-09-21 LAB — HBA1C MFR BLD: 8.8 % (ref 0–5.6)

## 2021-09-21 PROCEDURE — 99214 OFFICE O/P EST MOD 30 MIN: CPT | Mod: 25 | Performed by: NURSE PRACTITIONER

## 2021-09-21 PROCEDURE — 36415 COLL VENOUS BLD VENIPUNCTURE: CPT | Performed by: NURSE PRACTITIONER

## 2021-09-21 PROCEDURE — 90471 IMMUNIZATION ADMIN: CPT | Performed by: NURSE PRACTITIONER

## 2021-09-21 PROCEDURE — 90750 HZV VACC RECOMBINANT IM: CPT | Performed by: NURSE PRACTITIONER

## 2021-09-21 PROCEDURE — 99207 PR FOOT EXAM NO CHARGE: CPT | Mod: 25 | Performed by: NURSE PRACTITIONER

## 2021-09-21 PROCEDURE — 90682 RIV4 VACC RECOMBINANT DNA IM: CPT | Performed by: NURSE PRACTITIONER

## 2021-09-21 PROCEDURE — 90472 IMMUNIZATION ADMIN EACH ADD: CPT | Performed by: NURSE PRACTITIONER

## 2021-09-21 PROCEDURE — 83036 HEMOGLOBIN GLYCOSYLATED A1C: CPT | Performed by: NURSE PRACTITIONER

## 2021-09-21 PROCEDURE — 99396 PREV VISIT EST AGE 40-64: CPT | Mod: 25 | Performed by: NURSE PRACTITIONER

## 2021-09-21 RX ORDER — LISINOPRIL 40 MG/1
40 TABLET ORAL DAILY
Qty: 90 TABLET | Refills: 3 | Status: SHIPPED | OUTPATIENT
Start: 2021-09-21 | End: 2022-10-18

## 2021-09-21 RX ORDER — METFORMIN HCL 500 MG
TABLET, EXTENDED RELEASE 24 HR ORAL
Qty: 360 TABLET | Refills: 3 | Status: SHIPPED | OUTPATIENT
Start: 2021-09-21 | End: 2022-10-20

## 2021-09-21 RX ORDER — GABAPENTIN 300 MG/1
CAPSULE ORAL
Qty: 90 CAPSULE | Refills: 3 | Status: SHIPPED | OUTPATIENT
Start: 2021-09-21 | End: 2022-10-20

## 2021-09-21 RX ORDER — HYDROCHLOROTHIAZIDE 12.5 MG/1
12.5 TABLET ORAL DAILY
Qty: 90 TABLET | Refills: 3 | Status: ON HOLD | OUTPATIENT
Start: 2021-09-21 | End: 2022-10-07

## 2021-09-21 RX ORDER — GLIMEPIRIDE 4 MG/1
TABLET ORAL
Qty: 90 TABLET | Refills: 3 | Status: SHIPPED | OUTPATIENT
Start: 2021-09-21 | End: 2022-09-12

## 2021-09-21 ASSESSMENT — MIFFLIN-ST. JEOR: SCORE: 1570.09

## 2021-09-21 NOTE — PROGRESS NOTES
Prior to immunization administration, verified patients identity using patient s name and date of birth. Please see Immunization Activity for additional information.     Screening Questionnaire for Adult Immunization    Are you sick today?   No   Do you have allergies to medications, food, a vaccine component or latex?   No   Have you ever had a serious reaction after receiving a vaccination?   No   Do you have a long-term health problem with heart, lung, kidney, or metabolic disease (e.g., diabetes), asthma, a blood disorder, no spleen, complement component deficiency, a cochlear implant, or a spinal fluid leak?  Are you on long-term aspirin therapy?   No   Do you have cancer, leukemia, HIV/AIDS, or any other immune system problem?   No   Do you have a parent, brother, or sister with an immune system problem?   No   In the past 3 months, have you taken medications that affect  your immune system, such as prednisone, other steroids, or anticancer drugs; drugs for the treatment of rheumatoid arthritis, Crohn s disease, or psoriasis; or have you had radiation treatments?   No   Have you had a seizure, or a brain or other nervous system problem?   No   During the past year, have you received a transfusion of blood or blood    products, or been given immune (gamma) globulin or antiviral drug?   No   For women: Are you pregnant or is there a chance you could become       pregnant during the next month?   No   Have you received any vaccinations in the past 4 weeks?   No     Immunization questionnaire answers were all negative.        Per orders of Sushma Joe, injection of Shingrix and flublock given by Kay Lares. Patient instructed to remain in clinic for 15 minutes afterwards, and to report any adverse reaction to me immediately.       Screening performed by Kay Lares on 9/21/2021 at 9:37 AM.

## 2021-09-25 RX ORDER — DICLOFENAC SODIUM 75 MG/1
TABLET, DELAYED RELEASE ORAL
Qty: 40 TABLET | Refills: 0 | Status: SHIPPED | OUTPATIENT
Start: 2021-09-25 | End: 2021-10-14

## 2021-10-14 ENCOUNTER — TELEPHONE (OUTPATIENT)
Dept: FAMILY MEDICINE | Facility: CLINIC | Age: 66
End: 2021-10-14

## 2021-10-14 DIAGNOSIS — E11.319 TYPE 2 DIABETES MELLITUS WITH RETINOPATHY, WITHOUT LONG-TERM CURRENT USE OF INSULIN, MACULAR EDEMA PRESENCE UNSPECIFIED, UNSPECIFIED LATERALITY, UNSPECIFIED RETINOPATHY SEVERITY (H): ICD-10-CM

## 2021-12-16 ENCOUNTER — TELEPHONE (OUTPATIENT)
Dept: FAMILY MEDICINE | Facility: CLINIC | Age: 66
End: 2021-12-16
Payer: COMMERCIAL

## 2021-12-16 NOTE — TELEPHONE ENCOUNTER
Patient called stating he got a notification that his Januvia medication was refused, patient inquiring why it was refused. Advised patient that it was refused because it was sent too early. Patient has a 90 day supply which was sent on 10/14/2021. Patient verbalized understanding and agreed stating he has medication left. Patient was also notified that their aspirin prescription was sent to the pharmacy this morning. Patient requesting he get a refill on his Metformin as well.     Patient also inquiring about making an appointment for his fiance to schedule a COVID booster. Advised patient that they can call to schedule an appointment or can schedule an appointment online. Patient was warm transferred to scheduling to make an appointment.       Hilda Smith RN, BSN  [facility]

## 2022-01-10 DIAGNOSIS — E11.319 TYPE 2 DIABETES MELLITUS WITH RETINOPATHY, WITHOUT LONG-TERM CURRENT USE OF INSULIN, MACULAR EDEMA PRESENCE UNSPECIFIED, UNSPECIFIED LATERALITY, UNSPECIFIED RETINOPATHY SEVERITY (H): ICD-10-CM

## 2022-01-12 NOTE — TELEPHONE ENCOUNTER
"Requested Prescriptions   Pending Prescriptions Disp Refills    JANUVIA 100 MG tablet [Pharmacy Med Name: JANUVIA 100MG TABLETS] 90 tablet 0     Sig: TAKE 1 TABLET(100 MG) BY MOUTH DAILY        DPP4 Inhibitors Protocol Failed - 1/10/2022  4:01 AM        Failed - HgbA1C in past 3 or 6 months     If HgbA1C is 8 or greater, it needs to be on file within the past 3 months.  If less than 8, must be on file within the past 6 months.     Recent Labs   Lab Test 09/21/21  0943   A1C 8.8*             Passed - Medication is active on med list        Passed - Patient is age 18 or older        Passed - Normal serum creatinine in past 12 months     Recent Labs   Lab Test 04/02/21  0943   CR 0.90       Ok to refill medication if creatinine is low          Passed - Recent (6 mo) or future (30 days) visit within the authorizing provider's specialty     Patient had office visit in the last 6 months or has a visit in the next 30 days with authorizing provider.  See \"Patient Info\" tab in inbasket, or \"Choose Columns\" in Meds & Orders section of the refill encounter.                  "

## 2022-01-17 RX ORDER — SITAGLIPTIN 100 MG/1
TABLET, FILM COATED ORAL
Qty: 90 TABLET | Refills: 0 | Status: SHIPPED | OUTPATIENT
Start: 2022-01-17 | End: 2022-04-14

## 2022-01-25 ENCOUNTER — LAB (OUTPATIENT)
Dept: LAB | Facility: CLINIC | Age: 67
End: 2022-01-25
Payer: MEDICARE

## 2022-01-25 DIAGNOSIS — E11.319 TYPE 2 DIABETES MELLITUS WITH RETINOPATHY, WITHOUT LONG-TERM CURRENT USE OF INSULIN, MACULAR EDEMA PRESENCE UNSPECIFIED, UNSPECIFIED LATERALITY, UNSPECIFIED RETINOPATHY SEVERITY (H): ICD-10-CM

## 2022-01-25 LAB
BASOPHILS # BLD AUTO: 0.1 10E3/UL (ref 0–0.2)
BASOPHILS NFR BLD AUTO: 1 %
EOSINOPHIL # BLD AUTO: 0.1 10E3/UL (ref 0–0.7)
EOSINOPHIL NFR BLD AUTO: 1 %
ERYTHROCYTE [DISTWIDTH] IN BLOOD BY AUTOMATED COUNT: 16.6 % (ref 10–15)
HBA1C MFR BLD: 6.5 % (ref 0–5.6)
HCT VFR BLD AUTO: 39.9 % (ref 40–53)
HGB BLD-MCNC: 12.9 G/DL (ref 13.3–17.7)
IMM GRANULOCYTES # BLD: 0 10E3/UL
IMM GRANULOCYTES NFR BLD: 0 %
LYMPHOCYTES # BLD AUTO: 3.2 10E3/UL (ref 0.8–5.3)
LYMPHOCYTES NFR BLD AUTO: 28 %
MCH RBC QN AUTO: 25.1 PG (ref 26.5–33)
MCHC RBC AUTO-ENTMCNC: 32.3 G/DL (ref 31.5–36.5)
MCV RBC AUTO: 78 FL (ref 78–100)
MONOCYTES # BLD AUTO: 0.8 10E3/UL (ref 0–1.3)
MONOCYTES NFR BLD AUTO: 7 %
NEUTROPHILS # BLD AUTO: 7.4 10E3/UL (ref 1.6–8.3)
NEUTROPHILS NFR BLD AUTO: 64 %
PLATELET # BLD AUTO: 290 10E3/UL (ref 150–450)
RBC # BLD AUTO: 5.13 10E6/UL (ref 4.4–5.9)
WBC # BLD AUTO: 11.6 10E3/UL (ref 4–11)

## 2022-01-25 PROCEDURE — 83036 HEMOGLOBIN GLYCOSYLATED A1C: CPT

## 2022-01-25 PROCEDURE — 36415 COLL VENOUS BLD VENIPUNCTURE: CPT

## 2022-01-25 PROCEDURE — 85025 COMPLETE CBC W/AUTO DIFF WBC: CPT

## 2022-02-01 ENCOUNTER — OFFICE VISIT (OUTPATIENT)
Dept: FAMILY MEDICINE | Facility: CLINIC | Age: 67
End: 2022-02-01
Payer: MEDICARE

## 2022-02-01 VITALS
HEIGHT: 66 IN | RESPIRATION RATE: 16 BRPM | SYSTOLIC BLOOD PRESSURE: 110 MMHG | DIASTOLIC BLOOD PRESSURE: 69 MMHG | BODY MASS INDEX: 30.7 KG/M2 | TEMPERATURE: 98 F | WEIGHT: 191 LBS | HEART RATE: 79 BPM | OXYGEN SATURATION: 99 %

## 2022-02-01 DIAGNOSIS — B35.1 ONYCHOMYCOSIS: ICD-10-CM

## 2022-02-01 DIAGNOSIS — D50.9 IRON DEFICIENCY ANEMIA, UNSPECIFIED IRON DEFICIENCY ANEMIA TYPE: ICD-10-CM

## 2022-02-01 DIAGNOSIS — L60.0 INGROWING RIGHT GREAT TOENAIL: ICD-10-CM

## 2022-02-01 DIAGNOSIS — Z23 NEED FOR 23-POLYVALENT PNEUMOCOCCAL POLYSACCHARIDE VACCINE: ICD-10-CM

## 2022-02-01 DIAGNOSIS — M25.511 ACUTE PAIN OF RIGHT SHOULDER: ICD-10-CM

## 2022-02-01 DIAGNOSIS — E11.319 TYPE 2 DIABETES MELLITUS WITH RETINOPATHY, WITHOUT LONG-TERM CURRENT USE OF INSULIN, MACULAR EDEMA PRESENCE UNSPECIFIED, UNSPECIFIED LATERALITY, UNSPECIFIED RETINOPATHY SEVERITY (H): Primary | ICD-10-CM

## 2022-02-01 LAB
FERRITIN SERPL-MCNC: 4 NG/ML (ref 26–388)
IRON SATN MFR SERPL: 6 % (ref 15–46)
IRON SERPL-MCNC: 26 UG/DL (ref 35–180)
TIBC SERPL-MCNC: 411 UG/DL (ref 240–430)

## 2022-02-01 PROCEDURE — 90732 PPSV23 VACC 2 YRS+ SUBQ/IM: CPT | Performed by: NURSE PRACTITIONER

## 2022-02-01 PROCEDURE — G0009 ADMIN PNEUMOCOCCAL VACCINE: HCPCS | Performed by: NURSE PRACTITIONER

## 2022-02-01 PROCEDURE — 99214 OFFICE O/P EST MOD 30 MIN: CPT | Mod: 25 | Performed by: NURSE PRACTITIONER

## 2022-02-01 PROCEDURE — 83550 IRON BINDING TEST: CPT | Performed by: NURSE PRACTITIONER

## 2022-02-01 PROCEDURE — 82728 ASSAY OF FERRITIN: CPT | Performed by: NURSE PRACTITIONER

## 2022-02-01 PROCEDURE — 36415 COLL VENOUS BLD VENIPUNCTURE: CPT | Performed by: NURSE PRACTITIONER

## 2022-02-01 RX ORDER — CICLOPIROX 80 MG/ML
SOLUTION TOPICAL
Qty: 6.6 ML | Refills: 0 | Status: SHIPPED | OUTPATIENT
Start: 2022-02-01 | End: 2022-03-14

## 2022-02-01 ASSESSMENT — PAIN SCALES - GENERAL: PAINLEVEL: WORST PAIN (10)

## 2022-02-01 ASSESSMENT — MIFFLIN-ST. JEOR: SCORE: 1594.12

## 2022-02-01 NOTE — PATIENT INSTRUCTIONS
Patient Education     Shoulder Impingement Syndrome   The rotator cuff is a group of muscles and tendons that surround the shoulder joint. These muscles and tendons hold the arm in its joint. They help the shoulder move. The rotator cuff muscles and tendons can become irritated from repeated rubbing against the shoulder bone. This is called shoulder impingement syndrome or rotator cuff tendonitis.  If your case is mild, you may only need to rest the shoulder and then do certain exercises to strengthen the muscles. You can also take anti-inflammatory medicines. Steroid injections into the shoulder can ease inflammation. But you can have only a limited number of these. If the condition gets worse, your shoulder muscles may become thin and weak. This can lead to a rotator cuff tear.  Symptoms of shoulder impingement syndrome may include:    Shoulder pain that gets worse when you raise your arm overhead    Weakness of the shoulder muscles when you use your arm overhead    Popping and clicking when you move your shoulder    Shoulder pain that wakes you up at night, especially when you sleep on the affected shoulder    Sudden pain in your shoulder when you lift or reach    Pain that spreads from the front of the shoulder to the side of the arm  Home care  Follow these tips to take care of yourself at home:    Don't do activities that make your pain worse. These include raising your arms overhead, repeating the same motion over and over, or lifting heavy objects.    Don t hold your arm in one position for a long time. Keep it moving.    Put an ice pack on the sore area for 20 minutes every 1 to 2 hours for the first day. You can make an ice pack by putting ice cubes in a plastic bag. Wrap the bag in a thin towel before putting it on your shoulder. A frozen bag of peas or something similar can also be used as an ice pack. Don't place the ice pack directly on your skin. Place a towel between it and your skin. Use the ice  packs 3 to 4 times a day for the next 2 days. Continue using the ice to relieve the pain and swelling as needed.    You may take acetaminophen or ibuprofen to control pain, unless another medicine was prescribed. If prednisone was prescribed, don t take anti-inflammatory medicines. If you have chronic liver or kidney disease or ever had a stomach ulcer or gastrointestinal bleeding, talk with your doctor before using these medicines.    After your symptoms ease, you may get physical therapy or start a home exercise program. This can strengthen your shoulder muscles and help your range of motion. Talk with your doctor about what is best for your condition.  Follow-up care  Follow up with your healthcare provider, or as advised.  When to seek medical advice  Call your healthcare provider right away if any of these occur:    Shoulder pain that gets worse and wakes you up at night    Pain and weakness that gets worse when you reach up or raise your arms over your shoulders    Your shoulder or arm swells    Numbness, tingling, or pain that travels down the arm to the hand    Loss of shoulder strength    Fever or chills  [place] last reviewed this educational content on 11/1/2019 2000-2021 [employer]. All rights reserved. This information is not intended as a substitute for professional medical care. Always follow your healthcare professional's instructions.

## 2022-02-01 NOTE — PROGRESS NOTES
Assessment & Plan     Type 2 diabetes mellitus with retinopathy, without long-term current use of insulin, macular edema presence unspecified, unspecified laterality, unspecified retinopathy severity (H)  Due for eye exam, diabetes is well controlled.  - Adult Eye Referral    Ingrowing right great toenail  Referring to Podiatry, may need partial nail resection  - Orthopedic  Referral    Onychomycosis  Treating onychomycosis with Penlac, follow back 2 months.  - ciclopirox (PENLAC) 8 % external solution  Dispense: 6.6 mL; Refill: 0    Iron deficiency anemia, unspecified iron deficiency anemia type  Reviewed high iron containing foods,  Recommended iron supplement, recheck Hgb 2 months  - Ferritin  - Iron and iron binding capacity  - Ferritin  - Iron and iron binding capacity    Acute pain of right shoulder  Referring to physical therapy, may take Voltaren which he has for pain prn  - [facility] Referral    Need for 23-polyvalent pneumococcal polysaccharide vaccine    - Pneumococcal vaccine 23 valent PPSV23  (Pneumovax) [[id number]]      Ordering of each unique test  Prescription drug management  19 minutes spent on the date of the encounter doing chart review, history and exam, documentation and further activities per the note       Work on weight loss  Regular exercise  See Patient Instructions    Return in about 2 months (around 4/1/2022), or if symptoms worsen or fail to improve, for Follow up.    ABHISHEK [facility] SABI Chisholm is a 67 year old who presents for the following health issues     HPI     Diabetes Follow-up    How often are you checking your blood sugar? Two times daily  Blood sugar testing frequency justification:  Patient modifying lifestyle changes (diet, exercise) with blood sugars am-80-90's, afternoon 140-170  What time of day are you checking your blood sugars (select all that apply)?  Before meals  Have you had any blood  sugars above 200?  No  Have you had any blood sugars below 70?  No    What symptoms do you notice when your blood sugar is low?  Dizzy, Weak and Lethargy    What concerns do you have today about your diabetes? None     Do you have any of these symptoms? (Select all that apply)  No numbness or tingling in feet.  No redness, sores or blisters on feet.  No complaints of excessive thirst.  No reports of blurry vision.  No significant changes to weight.    Have you had a diabetic eye exam in the last 12 months? Yes- Date of last eye exam: [facility],  Location: .    He also has concern for ingrowing, painful  right  Great toenail which is thickened, discolored, difficult to trim.     Anemia-Hgb 12.9 1/25/22, microcytic.  He has changed his diet recently, is consuming less food but thinks he gets adequate protein, denies change in stools, no BRBPR, no dark/black, tarry stools, no  Easy bruising, bleeding from gums, hematuria, abdominal pain, nausea, vomiting.  No weight loss. He had normal colonoscopy 1/14/2016 with 10 year recall.    He also complains of right shoulder pain.  He took an airplane trip and noted pain immediately after putting his carry on in the overhead bin.  PMH significant for prior rotator cuff repair in 1992.  He has pain when lifting his arm overhead, with combing his hair and putting his briefcase on his shoulder, no weakness, numbness or tingling.      BP Readings from Last 2 Encounters:   02/01/22 110/69   09/21/21 133/84     Hemoglobin A1C POCT (%)   Date Value   04/02/2021 7.9 (H)   09/16/2020 6.6 (H)     Hemoglobin A1C (%)   Date Value   01/25/2022 6.5 (H)   09/21/2021 8.8 (H)     LDL Cholesterol Calculated (mg/dL)   Date Value   04/02/2021 49   12/30/2019 54           How many servings of fruits and vegetables do you eat daily?  2-3    On average, how many sweetened beverages do you drink each day (Examples: soda, juice, sweet tea, etc.  Do NOT count diet or artificially sweetened beverages)?    "0    How many days per week do you exercise enough to make your heart beat faster? 7    How many minutes a day do you exercise enough to make your heart beat faster? 30 - 60    How many days per week do you miss taking your medication? 0          Review of Systems   Constitutional, HEENT, cardiovascular, pulmonary, gi and gu systems are negative, except as otherwise noted.      Objective    /69 (BP Location: Left arm, Patient Position: Sitting, Cuff Size: Adult Regular)   Pulse 79   Temp 98  F (36.7  C) (Tympanic)   Resp 16   Ht 1.676 m (5' 6\")   Wt 86.6 kg (191 lb)   SpO2 99%   BMI 30.83 kg/m    Body mass index is 30.83 kg/m .  Physical Exam   GENERAL: healthy, alert and no distress  EYES: Eyes grossly normal to inspection, PERRL and conjunctivae and sclerae normal  HENT: ear canals and TM's normal, nose and mouth without ulcers or lesions  NECK: no adenopathy, no asymmetry, masses, or scars and thyroid normal to palpation  RESP: lungs clear to auscultation - no rales, rhonchi or wheezes  CV: regular rate and rhythm, normal S1 S2, no S3 or S4, no murmur, click or rub, no peripheral edema and peripheral pulses strong  ABDOMEN: soft, nontender, no hepatosplenomegaly, no masses and bowel sounds normal  MS: Right shoulder- pain with raising arm above shoulder level, TTP along posterior deltoid with arm flexion above head, otherwise normal ROM, normal strength, sensation, ,no gross musculoskeletal defects noted, no edema  SKIN: thickened black/yellow great right toenail with TTP alongnailfolds and tip of toe consistent with onychomycosis, no erythema or swelling/discharge, no suspicious lesions or rashes  NEURO: Normal strength and tone, mentation intact and speech normal  BACK: no CVA tenderness, no paralumbar tenderness  PSYCH: mentation appears normal, affect normal/bright  LYMPH: normal ant/post cervical, supraclavicular nodes    Results for orders placed or performed in visit on 02/01/22 (from the past " 24 hour(s))   Ferritin   Result Value Ref Range    Ferritin 4 (L) 26 - 388 ng/mL   Iron and iron binding capacity   Result Value Ref Range    Iron 26 (L) 35 - 180 ug/dL    Iron Binding Capacity 411 240 - 430 ug/dL    Iron Sat Index 6 (L) 15 - 46 %     Component      Latest Ref Rng & Units 1/25/2022   WBC      4.0 - 11.0 10e3/uL 11.6 (H)   RBC Count      4.40 - 5.90 10e6/uL 5.13   Hemoglobin      13.3 - 17.7 g/dL 12.9 (L)   Hematocrit      40.0 - 53.0 % 39.9 (L)   MCV      78 - 100 fL 78   MCH      26.5 - 33.0 pg 25.1 (L)   MCHC      31.5 - 36.5 g/dL 32.3   RDW      10.0 - 15.0 % 16.6 (H)   Platelet Count      150 - 450 10e3/uL 290   % Neutrophils      % 64   % Lymphocytes      % 28   % Monocytes      % 7   % Eosinophils      % 1   % Basophils      % 1   % Immature Granulocytes      % 0   Absolute Neutrophils      1.6 - 8.3 10e3/uL 7.4   Absolute Lymphocytes      0.8 - 5.3 10e3/uL 3.2   Absolute Monocytes      0.0 - 1.3 10e3/uL 0.8   Absolute Eosinophils      0.0 - 0.7 10e3/uL 0.1   Absolute Basophils      0.0 - 0.2 10e3/uL 0.1   Absolute Immature Granulocytes      <=0.4 10e3/uL 0.0   Hemoglobin A1C      0.0 - 5.6 % 6.5 (H)

## 2022-02-01 NOTE — NURSING NOTE
Prior to immunization administration, verified patients identity using patient s name and date of birth. Please see Immunization Activity for additional information.     Screening Questionnaire for Adult Immunization    Are you sick today?   No   Do you have allergies to medications, food, a vaccine component or latex?   No   Have you ever had a serious reaction after receiving a vaccination?   No   Do you have a long-term health problem with heart, lung, kidney, or metabolic disease (e.g., diabetes), asthma, a blood disorder, no spleen, complement component deficiency, a cochlear implant, or a spinal fluid leak?  Are you on long-term aspirin therapy?   No   Do you have cancer, leukemia, HIV/AIDS, or any other immune system problem?   No   Do you have a parent, brother, or sister with an immune system problem?   No   In the past 3 months, have you taken medications that affect  your immune system, such as prednisone, other steroids, or anticancer drugs; drugs for the treatment of rheumatoid arthritis, Crohn s disease, or psoriasis; or have you had radiation treatments?   No   Have you had a seizure, or a brain or other nervous system problem?   No   During the past year, have you received a transfusion of blood or blood    products, or been given immune (gamma) globulin or antiviral drug?   No   For women: Are you pregnant or is there a chance you could become       pregnant during the next month?   No   Have you received any vaccinations in the past 4 weeks?   No     Immunization questionnaire answers were all negative.        Per orders of Nighat Joe, injection of Pneumococcal 23 valent given by Kayleen Perez RN. Patient instructed to remain in clinic for 15 minutes afterwards, and to report any adverse reaction to me immediately.       Screening performed by Kayleen Perez RN on 2/1/2022 at 2:15 PM.

## 2022-02-02 RX ORDER — FERROUS GLUCONATE 324(38)MG
324 TABLET ORAL
Qty: 90 TABLET | Refills: 1 | Status: SHIPPED | OUTPATIENT
Start: 2022-02-02 | End: 2022-07-28

## 2022-02-02 NOTE — TELEPHONE ENCOUNTER
DIAGNOSIS: Right shoulder pain   APPOINTMENT DATE: 02/09/2022   NOTES STATUS DETAILS   OFFICE NOTE from referring provider N/A    OFFICE NOTE from other specialist N/A    DISCHARGE SUMMARY from hospital N/A    DISCHARGE REPORT from the ER N/A    OPERATIVE REPORT N/A    EMG report N/A    MEDICATION LIST Internal    MRI N/A    DEXA (osteoporosis/bone health) N/A    CT SCAN N/A    XRAYS (IMAGES & REPORTS) N/A

## 2022-02-03 ENCOUNTER — THERAPY VISIT (OUTPATIENT)
Dept: PHYSICAL THERAPY | Facility: CLINIC | Age: 67
End: 2022-02-03
Attending: NURSE PRACTITIONER
Payer: MEDICARE

## 2022-02-03 DIAGNOSIS — M25.511 ACUTE PAIN OF RIGHT SHOULDER: ICD-10-CM

## 2022-02-03 DIAGNOSIS — G89.29 CHRONIC RIGHT SHOULDER PAIN: ICD-10-CM

## 2022-02-03 DIAGNOSIS — M25.511 CHRONIC RIGHT SHOULDER PAIN: ICD-10-CM

## 2022-02-03 DIAGNOSIS — M25.511 RIGHT SHOULDER PAIN, UNSPECIFIED CHRONICITY: ICD-10-CM

## 2022-02-03 PROCEDURE — 97112 NEUROMUSCULAR REEDUCATION: CPT | Mod: GP | Performed by: PHYSICAL THERAPIST

## 2022-02-03 PROCEDURE — 97110 THERAPEUTIC EXERCISES: CPT | Mod: GP | Performed by: PHYSICAL THERAPIST

## 2022-02-03 PROCEDURE — 97161 PT EVAL LOW COMPLEX 20 MIN: CPT | Mod: GP | Performed by: PHYSICAL THERAPIST

## 2022-02-03 NOTE — PROGRESS NOTES
[facility]    OUTPATIENT Physical Therapy ORTHOPEDIC EVALUATION  PLAN OF TREATMENT FOR OUTPATIENT REHABILITATION  (COMPLETE FOR INITIAL CLAIMS ONLY)  Patient's Last Name, First Name, M.I.  YOB: 1955  Kathrine Isaac       Provider s Name:  TIMOTHY [facility]   Medical Record No.  [id number]   Start of Care Date:  02/03/22   Onset Date:   01/06/22   Type:     _X__PT   ___OT Medical Diagnosis:    Encounter Diagnoses   Name Primary?     Acute pain of right shoulder      Chronic right shoulder pain      Right shoulder pain, unspecified chronicity         Treatment Diagnosis:  R shoulder impingement        Goals:     02/03/22 0500   Body Part   Goals listed below are for R shoulder   Goal #1   Goal #1 reaching   Previous Functional Level No restrictions   Current Functional Level Cannot reach ;behind back;overhead;out to the side   Performance level PLs of 9-10/10, ROM limited d/t pain   STG Target Performance Reach ;overhead;out to the side   Performance level PLs no more than 5/10   Rationale for dressing;for bathing   Due date 02/24/22   LTG Target Performance Reach;overhead;out to the side   Performance Level PLs no more than 2/10 w/ROM equal to that of L; behind back w/PLs of 4/10   Rationale for dressing;for bathing  (perform household tasks safely)   Due date 03/24/22       Therapy Frequency:  1x/week for 6 weeks, then 2x/month for 1 month  Predicted Duration of Therapy Intervention:  10 weeks    Karen Wise, PT                 I CERTIFY THE NEED FOR THESE SERVICES FURNISHED UNDER        THIS PLAN OF TREATMENT AND WHILE UNDER MY CARE     (Physician attestation of this document indicates review and certification of the therapy plan).                       Certification Date From:  02/03/22   Certification Date To:  04/14/22    Referring Provider:  Nighat ENRIQUE  Thein    Initial Assessment        See Epic Evaluation SOC Date: 02/03/22

## 2022-02-03 NOTE — PROGRESS NOTES
Physical Therapy Initial Evaluation  Subjective:  The history is provided by the patient.   Patient Health History  Kathrine Isaac being seen for Right shoulder pain.     Problem began: 1/6/2022.   Problem occurred: lifted a heavy object   Pain is reported as 9/10 on pain scale.  General health as reported by patient is good.  Pertinent medical history includes: diabetes, osteoarthritis and high blood pressure.   Red flags:  None as reported by patient.  Medical allergies: none.   Surgeries include:  Orthopedic surgery. Other surgery history details: RCR.    Current medications:  Muscle relaxants, high blood pressure medication and other. Other medications details: diabetes meds.    Current occupation is .   Primary job tasks include:  Computer work and prolonged sitting.                  Therapist Generated HPI Evaluation  Problem details: Pt injured his R shoulder when he lifted a bag into the overhead compartment on the airplane. He was also carrying his laptop bag over the R shoulder, but has been doing that for years. He does note a R RCR surgery in 1992. He did consult with his PCP and was referred to PT..         Type of problem:  Right shoulder.    This is a new condition.  Condition occurred with:  Lifting.  Where condition occurred: in the community.  Patient reports pain:  Anterior and posterior.  Pain is described as sharp and stabbing and is intermittent.  Pain is the same all the time.  Since onset symptoms are unchanged.  Associated symptoms:  Loss of strength, painful arc and loss of motion/stiffness. Symptoms are exacerbated by lifting, using arm at shoulder level, using arm overhead, lying on extremity and using arm behind back  and relieved by rest, muscle relaxants and activity/movement.    Previous treatment includes surgery (in 1992). There was significant improvement following previous treatment.  Restrictions due to condition include:  Working in normal job without  restrictions.  Barriers include:  None as reported by patient.                        Objective:  Standing Alignment:    Cervical/Thoracic:  Forward head (moderate)  Shoulder/UE:  Rounded shoulders (moderate)                                       Shoulder Evaluation:  ROM:  AROM:    Flexion:  Left:  165 deg    Right:  150 deg, pain at 90 deg to end range    Abduction:  Left: WNL   Right:  135 deg w/pain from 90 deg to end range      External Rotation:  Left:  55 deg    Right:  35 deg w/pain            Extension/Internal Rotation:  Left:  Thumb to T8    Right:  Thumb to L3 w/pain+    PROM:    Flexion:  Right: 170 deg      Abduction:  Right:  165 deg      External Rotation:  Right:  60 deg                Pain: only end range pain with passive motions    Strength:    Flexion: Left:/5 strong/pain free   Pain:    Right: 4-/5      Pain:  +    Abduction:  Left:/5 Strong/pain free  Pain:    Right: 4-/5      Pain:+    Internal Rotation:  Left:/5  Strong/pain free    Pain:    Right: 5-/5      Pain:-  External Rotation:   Right:4-/5      Pain:++              Special Tests:      Right shoulder positive for the following special tests:Impingement  Right shoulder negative for the following special tests:Bursal; Rotator cuff tear and Acrimioclavicular  Palpation:      Right shoulder tenderness present at: Supraspinatus and Infraspinatus  Right shoulder tenderness not present at:Biceps; Deltoid or Upper Trap                                     General     ROS    Assessment/Plan:    Patient is a 67 year old male with right side shoulder complaints.    Patient has the following significant findings with corresponding treatment plan.                Diagnosis 1:  R shoulder impingement  Pain -  hot/cold therapy, US, self management, education and home program  Decreased ROM/flexibility - therapeutic exercise  Decreased strength - therapeutic exercise and therapeutic activities  Impaired muscle performance - neuro  re-education  Decreased function - therapeutic activities  Impaired posture - neuro re-education and therapeutic activities    Therapy Evaluation Codes:   1) History comprised of:   Personal factors that impact the plan of care:      Age, Past/current experiences and Time since onset of symptoms.    Comorbidity factors that impact the plan of care are:      Diabetes, High blood pressure and Osteoarthritis.     Medications impacting care: High blood pressure and Muscle relaxant.  2) Examination of Body Systems comprised of:   Body structures and functions that impact the plan of care:      Shoulder.   Activity limitations that impact the plan of care are:      Bathing, Dressing, Lifting, Laying down and Reaching.  3) Clinical presentation characteristics are:   Stable/Uncomplicated.  4) Decision-Making    Low complexity using standardized patient assessment instrument and/or measureable assessment of functional outcome.  Cumulative Therapy Evaluation is: Low complexity.    Previous and current functional limitations:  (See Goal Flow Sheet for this information)    Short term and Long term goals: (See Goal Flow Sheet for this information)     Communication ability:  Patient appears to be able to clearly communicate and understand verbal and written communication and follow directions correctly.  Treatment Explanation - The following has been discussed with the patient:   RX ordered/plan of care  Anticipated outcomes  Possible risks and side effects  This patient would benefit from PT intervention to resume normal activities.   Rehab potential is good.    Frequency:  1 X week, once daily  Duration:  for 6 weeks tapering to 2 X a month over 1 month  Discharge Plan:  Achieve all LTG.  Independent in home treatment program.  Reach maximal therapeutic benefit.    Please refer to the daily flowsheet for treatment today, total treatment time and time spent performing 1:1 timed codes.

## 2022-02-09 ENCOUNTER — ANCILLARY PROCEDURE (OUTPATIENT)
Dept: GENERAL RADIOLOGY | Facility: CLINIC | Age: 67
End: 2022-02-09
Attending: PREVENTIVE MEDICINE
Payer: MEDICARE

## 2022-02-09 ENCOUNTER — DOCUMENTATION ONLY (OUTPATIENT)
Dept: ORTHOPEDICS | Facility: CLINIC | Age: 67
End: 2022-02-09

## 2022-02-09 ENCOUNTER — OFFICE VISIT (OUTPATIENT)
Dept: PODIATRY | Facility: CLINIC | Age: 67
End: 2022-02-09
Payer: MEDICARE

## 2022-02-09 ENCOUNTER — OFFICE VISIT (OUTPATIENT)
Dept: ORTHOPEDICS | Facility: CLINIC | Age: 67
End: 2022-02-09
Payer: MEDICARE

## 2022-02-09 ENCOUNTER — PRE VISIT (OUTPATIENT)
Dept: ORTHOPEDICS | Facility: CLINIC | Age: 67
End: 2022-02-09
Payer: MEDICARE

## 2022-02-09 VITALS
SYSTOLIC BLOOD PRESSURE: 116 MMHG | WEIGHT: 190 LBS | DIASTOLIC BLOOD PRESSURE: 72 MMHG | BODY MASS INDEX: 30.67 KG/M2 | HEART RATE: 86 BPM

## 2022-02-09 DIAGNOSIS — M75.51 SUBACROMIAL BURSITIS OF RIGHT SHOULDER JOINT: ICD-10-CM

## 2022-02-09 DIAGNOSIS — L60.0 INGROWING RIGHT GREAT TOENAIL: ICD-10-CM

## 2022-02-09 DIAGNOSIS — M12.811 ROTATOR CUFF ARTHROPATHY OF RIGHT SHOULDER: ICD-10-CM

## 2022-02-09 DIAGNOSIS — M17.12 ARTHRITIS OF LEFT KNEE: Primary | ICD-10-CM

## 2022-02-09 DIAGNOSIS — M12.811 ROTATOR CUFF ARTHROPATHY OF RIGHT SHOULDER: Primary | ICD-10-CM

## 2022-02-09 PROCEDURE — 20610 DRAIN/INJ JOINT/BURSA W/O US: CPT | Mod: RT | Performed by: PREVENTIVE MEDICINE

## 2022-02-09 PROCEDURE — 11730 AVULSION NAIL PLATE SIMPLE 1: CPT | Mod: T5 | Performed by: PODIATRIST

## 2022-02-09 PROCEDURE — 99214 OFFICE O/P EST MOD 30 MIN: CPT | Mod: 25 | Performed by: PREVENTIVE MEDICINE

## 2022-02-09 PROCEDURE — 73030 X-RAY EXAM OF SHOULDER: CPT | Mod: RT | Performed by: RADIOLOGY

## 2022-02-09 RX ORDER — METHYLPREDNISOLONE ACETATE 40 MG/ML
40 INJECTION, SUSPENSION INTRA-ARTICULAR; INTRALESIONAL; INTRAMUSCULAR; SOFT TISSUE
Status: SHIPPED | OUTPATIENT
Start: 2022-02-09

## 2022-02-09 RX ADMIN — METHYLPREDNISOLONE ACETATE 40 MG: 40 INJECTION, SUSPENSION INTRA-ARTICULAR; INTRALESIONAL; INTRAMUSCULAR; SOFT TISSUE at 10:10

## 2022-02-09 NOTE — LETTER
2/9/2022         RE: Kathrine Isaac  [address] [facility] [address]        Dear Colleague,    Thank you for referring your patient, Kathrine Isaac, to the [facility]. Please see a copy of my visit note below.    Subjective:    Pt is seen today in consult from Sushma Joe w/ the c/c of a painful ingrown right great nail lateral border.  This has been problematic for 1 month(s).  negativehistory of drainage from the site. This is slowly getting worse.  Aggravated by activity and relieved by rest.  Has tried soaking which has not helped.   denies history of trauma to the area.  Denies fever and chill, denies numbness and tingling, denies erythema on dorsum of foot.   Patient has onychomycosis on this nail and recently started Penlac.  He has diabetes.  Denies numbness in toes or history of ulcer.    ROS: See above  Past Medical History:   Diagnosis Date     Arthritis      Diabetes (H)      Essential hypertension 10/28/2015       Past Surgical History:   Procedure Laterality Date     COLONOSCOPY N/A 1/14/2016    Procedure: COLONOSCOPY;  Surgeon: Ventura Monge MD;  Location: [facility] OR     LASIK BILATERAL       ROTATOR CUFF REPAIR RT/LT  1992     SURGICAL HISTORY OF -       uvula remove        Family History   Problem Relation Age of Onset     Diabetes Mother      Glaucoma No family hx of      Macular Degeneration No family hx of        Social History     Tobacco Use     Smoking status: Never Smoker     Smokeless tobacco: Never Used   Substance Use Topics     Alcohol use: Yes         Current Outpatient Medications:      alcohol swab prep pads, Use to swab area of injection/nkechi as directed., Disp: 100 each, Rfl: 3     ASPIRIN LOW DOSE 81 MG EC tablet, TAKE 1 TABLET BY MOUTH ONCE DAILY, Disp: 90 tablet, Rfl: 2     atorvastatin (LIPITOR) 20 MG tablet, Take 1 tablet (20 mg) by mouth daily, Disp: 90 tablet, Rfl: 3     blood glucose (ACCU-CHEK GUIDE) test strip, Use to  test blood sugar 2 times daily or as directed., Disp: 100 strip, Rfl: 11     blood glucose calibration (NO BRAND SPECIFIED) solution, To accompany: Blood Glucose Monitor Brands: per insurance., Disp: 1 Bottle, Rfl: 3     blood glucose monitoring (NO BRAND SPECIFIED) meter device kit, Use to test blood sugar 1 times daily or as directed. Preferred blood glucose meter/supplies to accompany: Monitor Brands: per insurance., Disp: 1 kit, Rfl: 0     ciclopirox (PENLAC) 8 % external solution, Apply to adjacent skin and affected nails daily.  Remove with alcohol every 7 days, then repeat., Disp: 6.6 mL, Rfl: 0     diclofenac (VOLTAREN) 75 MG EC tablet, TAKE 1 TABLET(75 MG) BY MOUTH TWICE DAILY AS NEEDED FOR MODERATE PAIN, Disp: 40 tablet, Rfl: 2     empagliflozin (JARDIANCE) 25 MG TABS tablet, Take 1 tablet (25 mg) by mouth daily, Disp: 90 tablet, Rfl: 3     ferrous gluconate (FERGON) 324 (38 Fe) MG tablet, Take 1 tablet (324 mg) by mouth daily (with breakfast), Disp: 90 tablet, Rfl: 1     gabapentin (NEURONTIN) 300 MG capsule, TAKE 1 CAPSULE BY MOUTH AT BEDTIME, Disp: 90 capsule, Rfl: 3     glimepiride (AMARYL) 4 MG tablet, TAKE ONE TABLET BY MOUTH EVERY MORNING BEFORE BREAKFAST, Disp: 90 tablet, Rfl: 3     hydrochlorothiazide (HYDRODIURIL) 12.5 MG tablet, Take 1 tablet (12.5 mg) by mouth daily, Disp: 90 tablet, Rfl: 3     JANUVIA 100 MG tablet, TAKE 1 TABLET(100 MG) BY MOUTH DAILY, Disp: 90 tablet, Rfl: 0     latanoprost (XALATAN) 0.005 % ophthalmic solution, Place 1 drop into both eyes daily, Disp: 2.5 mL, Rfl: 6     lisinopril (ZESTRIL) 40 MG tablet, Take 1 tablet (40 mg) by mouth daily, Disp: 90 tablet, Rfl: 3     metFORMIN (GLUCOPHAGE-XR) 500 MG 24 hr tablet, TAKE 4 TABLETS(2000 MG) BY MOUTH DAILY WITH DINNER, Disp: 360 tablet, Rfl: 3     methocarbamol (ROBAXIN) 500 MG tablet, Take 1 tablet (500 mg) by mouth 3 times daily as needed for muscle spasms, Disp: 30 tablet, Rfl: 1     ORDER FOR DME, SET TO LOCAL PRINT,,  Equipment being ordered: wrist quick fit [id number], Disp: 1 each, Rfl: 0     order for DME, Equipment being ordered: One touch lancets, test strips.  Patient to check sugars once times daily, 90 day supply for test strips and lancets, refill 3 times, Disp: 1 Device, Rfl: 0     order for DME, Equipment being ordered: Glucometer per insurance preference, lancets, test strips.  Patient to check sugars two times daily, 90 day supply for test strips and lancets, refill 3 times, Disp: 1 Device, Rfl: 11     order for DME, Equipment being ordered: Glucometer Accucheck Sulema, lancets, test strips.  Patient to check sugars bid times daily, 90 day supply for test strips and lancets, refill 6 times, Disp: 1 Device, Rfl: 0     order for DME, Equipment being ordered: lancets for one touch glucometer for once daily testing, Disp: 1 Box, Rfl: 6     thin (NO BRAND SPECIFIED) lancets, Use with lanceting device. To accompany: Blood Glucose Monitor Brands: per insurance., Disp: 200 each, Rfl: 6    Current Facility-Administered Medications:      cross-Linked Hyaluronate (GEL-ONE) injection PRSY 30 mg, 30 mg, , , Herman Lindquist MD, 30 mg at 08/25/21 0907     cross-Linked Hyaluronate (GEL-ONE) injection PRSY 30 mg, 30 mg, , , Herman Lindquist MD, 30 mg at 09/02/20 0916     triamcinolone (KENALOG-40) injection 40 mg, 40 mg, , , Herman Lindquist MD, 40 mg at 08/11/21 1045     triamcinolone (KENALOG-40) injection 40 mg, 40 mg, , , Herman Lindquist MD, 40 mg at 09/04/19 0902     No Known Allergies    /72   Pulse 86   Wt 86.2 kg (190 lb)   BMI 30.67 kg/m  .      Constitutional/ general:  Pt is in no apparent distress, appears well-nourished.  Cooperative with history and physical exam.     Psych:  The patient answered questions appropriately.  Normal affect.  Seems to have reasonable expectations, in terms of treatment.     Lungs:  Non labored breathing, non labored speech. No cough.  No audible wheezing.  Even, quiet breathing.       Vascular:  Pedal pulses are palpable bilaterally for both the DP and PT arteries.  CFT < 3 sec.  No ankle varicosities or edema.  Pedal hair growth noted.     Neuro:  Alert and oriented x 3. Coordinated gait.  Light touch sensation is intact    Derm: Normal texture and turgor.  No ecchymosis, or cyanosis.  Hair growth noted.  Right hallux nail somewhat mycotic.        Musculoskeletal:     Patient is ambulatory without an assistive device or brace.  No gross deformities.  Normal arch with weightbearing.  No forefoot or rear foot deformities noted.  MS 5/5 all compartments.  Normal ROM all fore foot and rearfoot joints.  No equinus.  right great toe nail lateral border shows soft tissue impingement with localized erythema.   negative active drainage/purulence at this time.  No sinus tracts.  No nailbed masses or exostosis.  No pain with range of motion of IPJ or MTPJ.  No ascending cellulitis.    ASSESSMENT:    Onychocryptosis with paronychia right toe.    Discussed etiology and treatment options in detail w/ the pt.  The potential causes and nature of an ingrown toenail were discussed with the patient.  We reviewed the natural history/prognosis of the condition and potential risks if no treatment is provided.      Treatment options discussed included conservative management (oral antibiotics, soaking of foot, adequate width shoes)  as well as surgical management (partial or total nail removal).  The pros and cons of both forms of treatment were reviewed.  Handout given to patient.      After thorough discussion and answering all questions, the patient elected to have nail avulsion.  Obtained consent, used 3cc of 1% lidocaine plain to block right first toe.  Sterile prep, then avulsed the affected border(s).  No evidence of deep abscess noted.  Pt tolerated procedure well.  Sterile bandage placed, gave wound care instruction.  Instructed patient on trimming nails correctly.  They will  avoid tight shoes.  Avoid pedicures.  Discussed permanent removal of border if chronic problem.  Patient will continue Penlac.  Return to clinic prn.  Thank you for allowing me participate in the care of this patient.        Dany Julian DPM, FACFAS          Again, thank you for allowing me to participate in the care of your patient.        Sincerely,        Dany Julian DPM

## 2022-02-09 NOTE — LETTER
2/9/2022         RE: Kathrine Isaac  [address] [facility] [address]        Dear Colleague,    Thank you for referring your patient, Kathrine Isaac, to the [facility] [place]. Please see a copy of my visit note below.          [facility]  2/9/2022    Kathrine Isaac's chief complaint for this visit includes:  Chief Complaint   Patient presents with     Consult     Right shoulder pain     PCP: Nighat Joe    Referring Provider:  No referring provider defined for this encounter.    There were no vitals taken for this visit.  Data Unavailable       Reason for visit:     What part of your body is injured / painful?  right shoulder    What caused the injury /pain? Lifted suitcase over shoulder and had pain    How long ago did your injury occur or pain begin? several months ago    What are your most bothersome symptoms? Pain, clicking and popping    How would you characterize your symptom?  sharp    What makes your symptoms better? Other: Therapy stared last week and it helped some.     What makes your symptoms worse? Movement and lifting and stretching    Have you been previously seen for this problem? No    Medical History:    Any recent changes to your medical history? No    Any new medication prescribed since last visit? No    Have you had surgery on this body part before? Yes Surgical Procedure: Rotator cuff repair 25 years ago    Social History:    Occupation: Real estate and mortgage    Handedness: Right    Exercise: Resistance    Review of Systems:    Do you have fever, chills, weight loss? No    Do you have any vision problems? No    Do you have any chest pain or edema? No    Do you have any shortness of breath or wheezing?  No    Do you have stomach problems? No    Do you have any urinary track issues? No    Do you have any numbness or focal weakness? No    Do you have diabetes? Yes, Metformin    Do you have problems with bleeding or  clotting? No    Do you have an rashes or other skin lesions? No       Large Joint Injection/Arthocentesis: R subacromial bursa    Date/Time: 2/9/2022 10:10 AM  Performed by: Herman Lindquist MD  Authorized by: Herman Lindquist MD     Indications:  Pain and diagnostic evaluation  Needle Size:  22 G  Guidance: landmark guided    Approach:  Anterolateral  Location:  Shoulder      Site:  R subacromial bursa  Medications:  40 mg methylPREDNISolone 40 MG/ML  Outcome:  Tolerated well, no immediate complications  Procedure discussed: discussed risks, benefits, and alternatives    Consent Given by:  Patient  Timeout: timeout called immediately prior to procedure    Prep: patient was prepped and draped in usual sterile fashion     NDC: 6101-4329-88          HISTORY OF PRESENT ILLNESS  Mr. Isaac is a pleasant 67 year old year old male who presents to clinic today with right shoulder pain  Kathrine explains that he has had for over a month since lifting a heavy bag overhead on airplane  Has tried some topical medications with little relief  History of rotator cuff surgery  Also his left knee is starting to hurt more again, he had gel one injection last August and had imrpovement for 5 months greater than 50% and has been doing PT exercises since then  Location: right shoulder, left knee  Quality:  achy pain    Severity: 6/10 at worst    Duration: see above  Timing: occurs intermittently  Context: occurs while exercising and lifting  Modifying factors:  resting and non-use makes it better, movement and use makes it worse  Associated signs & symptoms: no neck pain  MEDICAL HISTORY  Patient Active Problem List   Diagnosis     Glaucoma suspect     Type 2 diabetes mellitus with retinopathy, without long-term current use of insulin, macular edema presence unspecified, unspecified laterality, unspecified retinopathy severity (H)     Hyperlipidemia LDL goal <100     Essential hypertension     Advance care planning      Diverticulosis of large intestine without hemorrhage     Obesity, Class I, BMI 30.0-34.9 (see actual BMI)     Mechanical low back pain     Hx of LASIK     Right shoulder pain, unspecified chronicity       Current Outpatient Medications   Medication Sig Dispense Refill     alcohol swab prep pads Use to swab area of injection/nkechi as directed. 100 each 3     ASPIRIN LOW DOSE 81 MG EC tablet TAKE 1 TABLET BY MOUTH ONCE DAILY 90 tablet 2     atorvastatin (LIPITOR) 20 MG tablet Take 1 tablet (20 mg) by mouth daily 90 tablet 3     blood glucose (ACCU-CHEK GUIDE) test strip Use to test blood sugar 2 times daily or as directed. 100 strip 11     blood glucose calibration (NO BRAND SPECIFIED) solution To accompany: Blood Glucose Monitor Brands: per insurance. 1 Bottle 3     blood glucose monitoring (NO BRAND SPECIFIED) meter device kit Use to test blood sugar 1 times daily or as directed. Preferred blood glucose meter/supplies to accompany: Monitor Brands: per insurance. 1 kit 0     ciclopirox (PENLAC) 8 % external solution Apply to adjacent skin and affected nails daily.  Remove with alcohol every 7 days, then repeat. 6.6 mL 0     diclofenac (VOLTAREN) 75 MG EC tablet TAKE 1 TABLET(75 MG) BY MOUTH TWICE DAILY AS NEEDED FOR MODERATE PAIN 40 tablet 2     empagliflozin (JARDIANCE) 25 MG TABS tablet Take 1 tablet (25 mg) by mouth daily 90 tablet 3     ferrous gluconate (FERGON) 324 (38 Fe) MG tablet Take 1 tablet (324 mg) by mouth daily (with breakfast) 90 tablet 1     gabapentin (NEURONTIN) 300 MG capsule TAKE 1 CAPSULE BY MOUTH AT BEDTIME 90 capsule 3     glimepiride (AMARYL) 4 MG tablet TAKE ONE TABLET BY MOUTH EVERY MORNING BEFORE BREAKFAST 90 tablet 3     hydrochlorothiazide (HYDRODIURIL) 12.5 MG tablet Take 1 tablet (12.5 mg) by mouth daily 90 tablet 3     JANUVIA 100 MG tablet TAKE 1 TABLET(100 MG) BY MOUTH DAILY 90 tablet 0     latanoprost (XALATAN) 0.005 % ophthalmic solution Place 1 drop into both eyes daily 2.5 mL 6      lisinopril (ZESTRIL) 40 MG tablet Take 1 tablet (40 mg) by mouth daily 90 tablet 3     metFORMIN (GLUCOPHAGE-XR) 500 MG 24 hr tablet TAKE 4 TABLETS(2000 MG) BY MOUTH DAILY WITH DINNER 360 tablet 3     methocarbamol (ROBAXIN) 500 MG tablet Take 1 tablet (500 mg) by mouth 3 times daily as needed for muscle spasms 30 tablet 1     ORDER FOR DME, SET TO LOCAL PRINT, Equipment being ordered: wrist quick fit [id number] 1 each 0     order for DME Equipment being ordered: One touch lancets, test strips.  Patient to check sugars once times daily, 90 day supply for test strips and lancets, refill 3 times 1 Device 0     order for DME Equipment being ordered: Glucometer per insurance preference, lancets, test strips.  Patient to check sugars two times daily, 90 day supply for test strips and lancets, refill 3 times 1 Device 11     order for DME Equipment being ordered: Glucometer Accucheck Sulema, lancets, test strips.  Patient to check sugars bid times daily, 90 day supply for test strips and lancets, refill 6 times 1 Device 0     order for DME Equipment being ordered: lancets for one touch glucometer for once daily testing 1 Box 6     thin (NO BRAND SPECIFIED) lancets Use with lanceting device. To accompany: Blood Glucose Monitor Brands: per insurance. 200 each 6       No Known Allergies    Family History   Problem Relation Age of Onset     Diabetes Mother      Glaucoma No family hx of      Macular Degeneration No family hx of      Social History     Socioeconomic History     Marital status: Single     Spouse name: Not on file     Number of children: Not on file     Years of education: Not on file     Highest education level: Not on file   Occupational History     Not on file   Tobacco Use     Smoking status: Never Smoker     Smokeless tobacco: Never Used   Vaping Use     Vaping Use: Never used   Substance and Sexual Activity     Alcohol use: Yes     Drug use: No     Sexual activity: Not on file   Other Topics Concern      Parent/sibling w/ CABG, MI or angioplasty before 65F 55M? Not Asked   Social History Narrative     Not on file     Social Determinants of Health     Financial Resource Strain: Not on file   Food Insecurity: Not on file   Transportation Needs: Not on file   Physical Activity: Not on file   Stress: Not on file   Social Connections: Not on file   Intimate Partner Violence: Not on file   Housing Stability: Not on file       Additional medical/Social/Surgical histories reviewed in [facility] and updated as appropriate.     REVIEW OF SYSTEMS (2/9/2022)  10 point ROS of systems including Constitutional, Eyes, Respiratory, Cardiovascular, Gastroenterology, Genitourinary, Integumentary, Musculoskeletal, Psychiatric, Allergic/Immunologic were all negative except for pertinent positives noted in my HPI.     PHYSICAL EXAM  VSS  General  - normal appearance, in no obvious distress  HEENT  - conjunctivae not injected, moist mucous membranes, normocephalic/atraumatic head, ears normal appearance, no lesions, mouth normal appearance, no scars, normal dentition and teeth present  CV  - normal radial pulse  Pulm  - normal respiratory pattern, non-labored  Musculoskeletal - right shoulder  - inspection: normal bone and joint alignment, no obvious deformity, no scapular winging, no AC step-off  - palpation: mildly tender RC insertion, normal clavicle, non-tender AC  - ROM:  painful and limited flexion and ER at end range, limited IR  - strength: 5/5  strength, 5/5 in all shoulder planes  - special tests:  (-) Speed's  (+) Neer  (+) Hawkin's  (+) Eber's  (-) Powellton's  (-) apprehension  (-) subscap lift-off  Neuro  - no sensory or motor deficit, grossly normal coordination, normal muscle tone  Skin  - no ecchymosis, erythema, warmth, or induration, no obvious rash  Psych  - interactive, appropriate, normal mood and affect  Left knee: has pain with flexion, patellar compression, medial joint palpation  ASSESSMENT & PLAN  66 yo male with  right shoulder rotator cuff strain, subacromial bursitis and left knee arthritis, worsened    I independently reviewed the following imaging studies:  Xray shoulder: WNL  After a 20 minute discussion and examination, we decided to perform a same day injection for diagnostic and therapeutic purposes for shoulder  Given HEP  Start PT  Lidocaine patches PRN  F/u in 1 month  Discussed repeat Gel one injection as he had 6 months ago for knee arthritis, which helped improve symptoms until recently    Appropriate PPE was utilized for prevention of spread of Covid-19.  Herman Lindquist MD, CAM      PROCEDURE: right SHOULDER joint injection  The patient was apprised of the risks and the benefits of the procedure and consented and a written consent was signed by the patient.   The patient s shoulder was sterilely prepped with chloraprep.   40 mg of depo suspension was drawn up into a 5 mL syringe with 4 mL of 1% lidocaine   The patient was injected with a 1.5-inch 22-gauge needle at posterior gleno-humeral joint in the subacromial space  There were no complications. The patient tolerated the procedure well. There was minimal bleeding.   The patient was instructed to ice the shoulder upon leaving clinic and refrain from overuse over the next 3 days.   The patient was instructed to go to the emergency room with any usual pain, swelling, or redness occurred in the injected area.   The patient was given a followup appointment to evaluate response to the injection to their increased range of motion and reduction of pain.    followup PRN  Dr Herman Lindquist      Again, thank you for allowing me to participate in the care of your patient.        Sincerely,        Herman Lindquist MD

## 2022-02-09 NOTE — PROGRESS NOTES
HISTORY OF PRESENT ILLNESS  Mr. Isaac is a pleasant 67 year old year old male who presents to clinic today with right shoulder pain  Kathrine explains that he has had for over a month since lifting a heavy bag overhead on airplane  Has tried some topical medications with little relief  History of rotator cuff surgery  Also his left knee is starting to hurt more again, he had gel one injection last August and had imrpovement for 5 months greater than 50% and has been doing PT exercises since then  Location: right shoulder, left knee  Quality:  achy pain    Severity: 6/10 at worst    Duration: see above  Timing: occurs intermittently  Context: occurs while exercising and lifting  Modifying factors:  resting and non-use makes it better, movement and use makes it worse  Associated signs & symptoms: no neck pain  MEDICAL HISTORY  Patient Active Problem List   Diagnosis     Glaucoma suspect     Type 2 diabetes mellitus with retinopathy, without long-term current use of insulin, macular edema presence unspecified, unspecified laterality, unspecified retinopathy severity (H)     Hyperlipidemia LDL goal <100     Essential hypertension     Advance care planning     Diverticulosis of large intestine without hemorrhage     Obesity, Class I, BMI 30.0-34.9 (see actual BMI)     Mechanical low back pain     Hx of LASIK     Right shoulder pain, unspecified chronicity       Current Outpatient Medications   Medication Sig Dispense Refill     alcohol swab prep pads Use to swab area of injection/nkechi as directed. 100 each 3     ASPIRIN LOW DOSE 81 MG EC tablet TAKE 1 TABLET BY MOUTH ONCE DAILY 90 tablet 2     atorvastatin (LIPITOR) 20 MG tablet Take 1 tablet (20 mg) by mouth daily 90 tablet 3     blood glucose (ACCU-CHEK GUIDE) test strip Use to test blood sugar 2 times daily or as directed. 100 strip 11     blood glucose calibration (NO BRAND SPECIFIED) solution To accompany: Blood Glucose Monitor Brands: per insurance. 1 Bottle 3      blood glucose monitoring (NO BRAND SPECIFIED) meter device kit Use to test blood sugar 1 times daily or as directed. Preferred blood glucose meter/supplies to accompany: Monitor Brands: per insurance. 1 kit 0     ciclopirox (PENLAC) 8 % external solution Apply to adjacent skin and affected nails daily.  Remove with alcohol every 7 days, then repeat. 6.6 mL 0     diclofenac (VOLTAREN) 75 MG EC tablet TAKE 1 TABLET(75 MG) BY MOUTH TWICE DAILY AS NEEDED FOR MODERATE PAIN 40 tablet 2     empagliflozin (JARDIANCE) 25 MG TABS tablet Take 1 tablet (25 mg) by mouth daily 90 tablet 3     ferrous gluconate (FERGON) 324 (38 Fe) MG tablet Take 1 tablet (324 mg) by mouth daily (with breakfast) 90 tablet 1     gabapentin (NEURONTIN) 300 MG capsule TAKE 1 CAPSULE BY MOUTH AT BEDTIME 90 capsule 3     glimepiride (AMARYL) 4 MG tablet TAKE ONE TABLET BY MOUTH EVERY MORNING BEFORE BREAKFAST 90 tablet 3     hydrochlorothiazide (HYDRODIURIL) 12.5 MG tablet Take 1 tablet (12.5 mg) by mouth daily 90 tablet 3     JANUVIA 100 MG tablet TAKE 1 TABLET(100 MG) BY MOUTH DAILY 90 tablet 0     latanoprost (XALATAN) 0.005 % ophthalmic solution Place 1 drop into both eyes daily 2.5 mL 6     lisinopril (ZESTRIL) 40 MG tablet Take 1 tablet (40 mg) by mouth daily 90 tablet 3     metFORMIN (GLUCOPHAGE-XR) 500 MG 24 hr tablet TAKE 4 TABLETS(2000 MG) BY MOUTH DAILY WITH DINNER 360 tablet 3     methocarbamol (ROBAXIN) 500 MG tablet Take 1 tablet (500 mg) by mouth 3 times daily as needed for muscle spasms 30 tablet 1     ORDER FOR DME, SET TO LOCAL PRINT, Equipment being ordered: wrist quick fit [id number] 1 each 0     order for DME Equipment being ordered: One touch lancets, test strips.  Patient to check sugars once times daily, 90 day supply for test strips and lancets, refill 3 times 1 Device 0     order for DME Equipment being ordered: Glucometer per insurance preference, lancets, test strips.  Patient to check sugars two times daily, 90 day  supply for test strips and lancets, refill 3 times 1 Device 11     order for DME Equipment being ordered: Glucometer Accucheck Sulema, lancets, test strips.  Patient to check sugars bid times daily, 90 day supply for test strips and lancets, refill 6 times 1 Device 0     order for DME Equipment being ordered: lancets for one touch glucometer for once daily testing 1 Box 6     thin (NO BRAND SPECIFIED) lancets Use with lanceting device. To accompany: Blood Glucose Monitor Brands: per insurance. 200 each 6       No Known Allergies    Family History   Problem Relation Age of Onset     Diabetes Mother      Glaucoma No family hx of      Macular Degeneration No family hx of      Social History     Socioeconomic History     Marital status: Single     Spouse name: Not on file     Number of children: Not on file     Years of education: Not on file     Highest education level: Not on file   Occupational History     Not on file   Tobacco Use     Smoking status: Never Smoker     Smokeless tobacco: Never Used   Vaping Use     Vaping Use: Never used   Substance and Sexual Activity     Alcohol use: Yes     Drug use: No     Sexual activity: Not on file   Other Topics Concern     Parent/sibling w/ CABG, MI or angioplasty before 65F 55M? Not Asked   Social History Narrative     Not on file     Social Determinants of Health     Financial Resource Strain: Not on file   Food Insecurity: Not on file   Transportation Needs: Not on file   Physical Activity: Not on file   Stress: Not on file   Social Connections: Not on file   Intimate Partner Violence: Not on file   Housing Stability: Not on file       Additional medical/Social/Surgical histories reviewed in [facility] and updated as appropriate.     REVIEW OF SYSTEMS (2/9/2022)  10 point ROS of systems including Constitutional, Eyes, Respiratory, Cardiovascular, Gastroenterology, Genitourinary, Integumentary, Musculoskeletal, Psychiatric, Allergic/Immunologic were all negative except for  pertinent positives noted in my HPI.     PHYSICAL EXAM  VSS  General  - normal appearance, in no obvious distress  HEENT  - conjunctivae not injected, moist mucous membranes, normocephalic/atraumatic head, ears normal appearance, no lesions, mouth normal appearance, no scars, normal dentition and teeth present  CV  - normal radial pulse  Pulm  - normal respiratory pattern, non-labored  Musculoskeletal - right shoulder  - inspection: normal bone and joint alignment, no obvious deformity, no scapular winging, no AC step-off  - palpation: mildly tender RC insertion, normal clavicle, non-tender AC  - ROM:  painful and limited flexion and ER at end range, limited IR  - strength: 5/5  strength, 5/5 in all shoulder planes  - special tests:  (-) Speed's  (+) Neer  (+) Hawkin's  (+) Eber's  (-) Conifer's  (-) apprehension  (-) subscap lift-off  Neuro  - no sensory or motor deficit, grossly normal coordination, normal muscle tone  Skin  - no ecchymosis, erythema, warmth, or induration, no obvious rash  Psych  - interactive, appropriate, normal mood and affect  Left knee: has pain with flexion, patellar compression, medial joint palpation  ASSESSMENT & PLAN  68 yo male with right shoulder rotator cuff strain, subacromial bursitis and left knee arthritis, worsened    I independently reviewed the following imaging studies:  Xray shoulder: WNL  After a 20 minute discussion and examination, we decided to perform a same day injection for diagnostic and therapeutic purposes for shoulder  Given HEP  Start PT  Lidocaine patches PRN  F/u in 1 month  Discussed repeat Gel one injection as he had 6 months ago for knee arthritis, which helped improve symptoms until recently    Appropriate PPE was utilized for prevention of spread of Covid-19.  Herman Lindquist MD, CAQSM      PROCEDURE: right SHOULDER joint injection  The patient was apprised of the risks and the benefits of the procedure and consented and a written consent was signed by  the patient.   The patient s shoulder was sterilely prepped with chloraprep.   40 mg of depo suspension was drawn up into a 5 mL syringe with 4 mL of 1% lidocaine   The patient was injected with a 1.5-inch 22-gauge needle at posterior gleno-humeral joint in the subacromial space  There were no complications. The patient tolerated the procedure well. There was minimal bleeding.   The patient was instructed to ice the shoulder upon leaving clinic and refrain from overuse over the next 3 days.   The patient was instructed to go to the emergency room with any usual pain, swelling, or redness occurred in the injected area.   The patient was given a followup appointment to evaluate response to the injection to their increased range of motion and reduction of pain.    followup PRN  Dr Herman Lindquist

## 2022-02-09 NOTE — PROGRESS NOTES
Subjective:    Pt is seen today in consult from Sushma Joe w/ bharat c/c of a painful ingrown right great nail lateral border.  This has been problematic for 1 month(s).  negativehistory of drainage from the site. This is slowly getting worse.  Aggravated by activity and relieved by rest.  Has tried soaking which has not helped.   denies history of trauma to the area.  Denies fever and chill, denies numbness and tingling, denies erythema on dorsum of foot.   Patient has onychomycosis on this nail and recently started Penlac.  He has diabetes.  Denies numbness in toes or history of ulcer.    ROS: See above  Past Medical History:   Diagnosis Date     Arthritis      Diabetes (H)      Essential hypertension 10/28/2015       Past Surgical History:   Procedure Laterality Date     COLONOSCOPY N/A 1/14/2016    Procedure: COLONOSCOPY;  Surgeon: Ventura Monge MD;  Location: [facility] OR     LASIK BILATERAL       ROTATOR CUFF REPAIR RT/LT  1992     SURGICAL HISTORY OF -       uvula remove        Family History   Problem Relation Age of Onset     Diabetes Mother      Glaucoma No family hx of      Macular Degeneration No family hx of        Social History     Tobacco Use     Smoking status: Never Smoker     Smokeless tobacco: Never Used   Substance Use Topics     Alcohol use: Yes         Current Outpatient Medications:      alcohol swab prep pads, Use to swab area of injection/nkechi as directed., Disp: 100 each, Rfl: 3     ASPIRIN LOW DOSE 81 MG EC tablet, TAKE 1 TABLET BY MOUTH ONCE DAILY, Disp: 90 tablet, Rfl: 2     atorvastatin (LIPITOR) 20 MG tablet, Take 1 tablet (20 mg) by mouth daily, Disp: 90 tablet, Rfl: 3     blood glucose (ACCU-CHEK GUIDE) test strip, Use to test blood sugar 2 times daily or as directed., Disp: 100 strip, Rfl: 11     blood glucose calibration (NO BRAND SPECIFIED) solution, To accompany: Blood Glucose Monitor Brands: per insurance., Disp: 1 Bottle, Rfl: 3     blood glucose monitoring (NO BRAND  SPECIFIED) meter device kit, Use to test blood sugar 1 times daily or as directed. Preferred blood glucose meter/supplies to accompany: Monitor Brands: per insurance., Disp: 1 kit, Rfl: 0     ciclopirox (PENLAC) 8 % external solution, Apply to adjacent skin and affected nails daily.  Remove with alcohol every 7 days, then repeat., Disp: 6.6 mL, Rfl: 0     diclofenac (VOLTAREN) 75 MG EC tablet, TAKE 1 TABLET(75 MG) BY MOUTH TWICE DAILY AS NEEDED FOR MODERATE PAIN, Disp: 40 tablet, Rfl: 2     empagliflozin (JARDIANCE) 25 MG TABS tablet, Take 1 tablet (25 mg) by mouth daily, Disp: 90 tablet, Rfl: 3     ferrous gluconate (FERGON) 324 (38 Fe) MG tablet, Take 1 tablet (324 mg) by mouth daily (with breakfast), Disp: 90 tablet, Rfl: 1     gabapentin (NEURONTIN) 300 MG capsule, TAKE 1 CAPSULE BY MOUTH AT BEDTIME, Disp: 90 capsule, Rfl: 3     glimepiride (AMARYL) 4 MG tablet, TAKE ONE TABLET BY MOUTH EVERY MORNING BEFORE BREAKFAST, Disp: 90 tablet, Rfl: 3     hydrochlorothiazide (HYDRODIURIL) 12.5 MG tablet, Take 1 tablet (12.5 mg) by mouth daily, Disp: 90 tablet, Rfl: 3     JANUVIA 100 MG tablet, TAKE 1 TABLET(100 MG) BY MOUTH DAILY, Disp: 90 tablet, Rfl: 0     latanoprost (XALATAN) 0.005 % ophthalmic solution, Place 1 drop into both eyes daily, Disp: 2.5 mL, Rfl: 6     lisinopril (ZESTRIL) 40 MG tablet, Take 1 tablet (40 mg) by mouth daily, Disp: 90 tablet, Rfl: 3     metFORMIN (GLUCOPHAGE-XR) 500 MG 24 hr tablet, TAKE 4 TABLETS(2000 MG) BY MOUTH DAILY WITH DINNER, Disp: 360 tablet, Rfl: 3     methocarbamol (ROBAXIN) 500 MG tablet, Take 1 tablet (500 mg) by mouth 3 times daily as needed for muscle spasms, Disp: 30 tablet, Rfl: 1     ORDER FOR DME, SET TO LOCAL PRINT,, Equipment being ordered: wrist quick fit [id number], Disp: 1 each, Rfl: 0     order for DME, Equipment being ordered: One touch lancets, test strips.  Patient to check sugars once times daily, 90 day supply for test strips and lancets, refill 3 times, Disp: 1  Device, Rfl: 0     order for DME, Equipment being ordered: Glucometer per insurance preference, lancets, test strips.  Patient to check sugars two times daily, 90 day supply for test strips and lancets, refill 3 times, Disp: 1 Device, Rfl: 11     order for DME, Equipment being ordered: Glucometer Accucheck Sulema, lancets, test strips.  Patient to check sugars bid times daily, 90 day supply for test strips and lancets, refill 6 times, Disp: 1 Device, Rfl: 0     order for DME, Equipment being ordered: lancets for one touch glucometer for once daily testing, Disp: 1 Box, Rfl: 6     thin (NO BRAND SPECIFIED) lancets, Use with lanceting device. To accompany: Blood Glucose Monitor Brands: per insurance., Disp: 200 each, Rfl: 6    Current Facility-Administered Medications:      cross-Linked Hyaluronate (GEL-ONE) injection PRSY 30 mg, 30 mg, , , Herman Lindquist MD, 30 mg at 08/25/21 0907     cross-Linked Hyaluronate (GEL-ONE) injection PRSY 30 mg, 30 mg, , , Herman Lindquist MD, 30 mg at 09/02/20 0916     triamcinolone (KENALOG-40) injection 40 mg, 40 mg, , , Herman Lindquist MD, 40 mg at 08/11/21 1045     triamcinolone (KENALOG-40) injection 40 mg, 40 mg, , , Herman Lindquist MD, 40 mg at 09/04/19 0902     No Known Allergies    /72   Pulse 86   Wt 86.2 kg (190 lb)   BMI 30.67 kg/m  .      Constitutional/ general:  Pt is in no apparent distress, appears well-nourished.  Cooperative with history and physical exam.     Psych:  The patient answered questions appropriately.  Normal affect.  Seems to have reasonable expectations, in terms of treatment.     Lungs:  Non labored breathing, non labored speech. No cough.  No audible wheezing. Even, quiet breathing.       Vascular:  Pedal pulses are palpable bilaterally for both the DP and PT arteries.  CFT < 3 sec.  No ankle varicosities or edema.  Pedal hair growth noted.     Neuro:  Alert and oriented x 3. Coordinated gait.  Light touch sensation  is intact    Derm: Normal texture and turgor.  No ecchymosis, or cyanosis.  Hair growth noted.  Right hallux nail somewhat mycotic.        Musculoskeletal:     Patient is ambulatory without an assistive device or brace.  No gross deformities.  Normal arch with weightbearing.  No forefoot or rear foot deformities noted.  MS 5/5 all compartments.  Normal ROM all fore foot and rearfoot joints.  No equinus.  right great toe nail lateral border shows soft tissue impingement with localized erythema.   negative active drainage/purulence at this time.  No sinus tracts.  No nailbed masses or exostosis.  No pain with range of motion of IPJ or MTPJ.  No ascending cellulitis.    ASSESSMENT:    Onychocryptosis with paronychia right toe.    Discussed etiology and treatment options in detail w/ the pt.  The potential causes and nature of an ingrown toenail were discussed with the patient.  We reviewed the natural history/prognosis of the condition and potential risks if no treatment is provided.      Treatment options discussed included conservative management (oral antibiotics, soaking of foot, adequate width shoes)  as well as surgical management (partial or total nail removal).  The pros and cons of both forms of treatment were reviewed.  Handout given to patient.      After thorough discussion and answering all questions, the patient elected to have nail avulsion.  Obtained consent, used 3cc of 1% lidocaine plain to block right first toe.  Sterile prep, then avulsed the affected border(s).  No evidence of deep abscess noted.  Pt tolerated procedure well.  Sterile bandage placed, gave wound care instruction.  Instructed patient on trimming nails correctly.  They will avoid tight shoes.  Avoid pedicures.  Discussed permanent removal of border if chronic problem.  Patient will continue [facility].  Return to clinic prn.  Thank you for allowing me participate in the care of this patient.        Dany Julian, ELLA, FACFAS

## 2022-02-09 NOTE — PROGRESS NOTES
[facility]  2/9/2022    Kathrine Marlin's chief complaint for this visit includes:  Chief Complaint   Patient presents with     Consult     Right shoulder pain     PCP: Nighat Joe    Referring Provider:  No referring provider defined for this encounter.    There were no vitals taken for this visit.  Data Unavailable       Reason for visit:     What part of your body is injured / painful?  right shoulder    What caused the injury /pain? Lifted suitcase over shoulder and had pain    How long ago did your injury occur or pain begin? several months ago    What are your most bothersome symptoms? Pain, clicking and popping    How would you characterize your symptom?  sharp    What makes your symptoms better? Other: Therapy stared last week and it helped some.     What makes your symptoms worse? Movement and lifting and stretching    Have you been previously seen for this problem? No    Medical History:    Any recent changes to your medical history? No    Any new medication prescribed since last visit? No    Have you had surgery on this body part before? Yes Surgical Procedure: Rotator cuff repair 25 years ago    Social History:    Occupation: Real estate and mortgage    Handedness: Right    Exercise: Resistance    Review of Systems:    Do you have fever, chills, weight loss? No    Do you have any vision problems? No    Do you have any chest pain or edema? No    Do you have any shortness of breath or wheezing?  No    Do you have stomach problems? No    Do you have any urinary track issues? No    Do you have any numbness or focal weakness? No    Do you have diabetes? Yes, Metformin    Do you have problems with bleeding or clotting? No    Do you have an rashes or other skin lesions? No       Large Joint Injection/Arthocentesis: R subacromial bursa    Date/Time: 2/9/2022 10:10 AM  Performed by: Herman Lindquist MD  Authorized by: Herman Lindquist MD     Indications:  Pain and  diagnostic evaluation  Needle Size:  22 G  Guidance: landmark guided    Approach:  Anterolateral  Location:  Shoulder      Site:  R subacromial bursa  Medications:  40 mg methylPREDNISolone 40 MG/ML  Outcome:  Tolerated well, no immediate complications  Procedure discussed: discussed risks, benefits, and alternatives    Consent Given by:  Patient  Timeout: timeout called immediately prior to procedure    Prep: patient was prepped and draped in usual sterile fashion     NDC: 2671-5580-85

## 2022-02-09 NOTE — PATIENT INSTRUCTIONS
We wish you continued good healing. If you have any questions or concerns, please do not hesitate to contact us at  [phone]    [employer] (secure e-mail communication and access to your chart) to send a message or to make an appointment.    Please remember to call and schedule a follow up appointment if one was recommended at your earliest convenience.     PODIATRY CLINIC HOURS  TELEPHONE NUMBER    Dr. Dany LEALPALLISON [facility]        Clinics:  [place] CMA   Tuesday 1PM-6PM  [place]  Wednesday 745AM-330PM  [place]  Thursday/Friday 745AM-230PM  [place] APPOINTMENTS  [phone]    [place] APPOINTMENTS  [phone]          If you need a medication refill, please contact us you may need lab work and/or a follow up visit prior to your refill (i.e. Antifungal medications).    If MRI needed please call Imaging at [phone] or [phone]    HOW DO I GET MY KNEE SCOOTER? Knee scooters can be picked up at ANY Medical Supply stores with your knee scooter Prescription.  OR    Bring your signed prescription to an [facility] showroom.          INGROWN TOENAIL REMOVAL AFTERCARE       Go directly home and elevate the affected foot on one or two pillows for the remainder of the day/evening if possible. Your toe may stay numb anywhere from 2-8 hours.     Take Tylenol, ibuprofen or another anti-inflammatory as needed for pain.     That evening of the procedure,  you may remove the bandage.(you may soak it in warm soapy water ) After soaks, pat the area dry and then allow to airdry for a few minutes. Apply antibiotic ointment to the area and cover with  band-aid.    The following day. Find a shoe that is comfortable and minimizes the amount of rubbing on your toe, as this increases pain.    Dress with band-aids until no longer draining, typically 3 days.    Watch for any signs and symptoms of infection such  as: redness, red streaks going up the foot/leg, swelling, pus or foul odor. Fevers > 101     Please call with questions.    Dr. Dany Julian D.P.M FAC FAS

## 2022-02-11 ENCOUNTER — OFFICE VISIT (OUTPATIENT)
Dept: OPTOMETRY | Facility: CLINIC | Age: 67
End: 2022-02-11
Attending: NURSE PRACTITIONER
Payer: MEDICARE

## 2022-02-11 ENCOUNTER — PATIENT OUTREACH (OUTPATIENT)
Dept: GERIATRIC MEDICINE | Facility: CLINIC | Age: 67
End: 2022-02-11

## 2022-02-11 DIAGNOSIS — H52.4 PRESBYOPIA: ICD-10-CM

## 2022-02-11 DIAGNOSIS — H40.1131 PRIMARY OPEN ANGLE GLAUCOMA (POAG) OF BOTH EYES, MILD STAGE: ICD-10-CM

## 2022-02-11 DIAGNOSIS — E11.319 TYPE 2 DIABETES MELLITUS WITH RETINOPATHY, WITHOUT LONG-TERM CURRENT USE OF INSULIN, MACULAR EDEMA PRESENCE UNSPECIFIED, UNSPECIFIED LATERALITY, UNSPECIFIED RETINOPATHY SEVERITY (H): Primary | ICD-10-CM

## 2022-02-11 PROCEDURE — 92133 CPTRZD OPH DX IMG PST SGM ON: CPT | Performed by: OPTOMETRIST

## 2022-02-11 PROCEDURE — 92004 COMPRE OPH EXAM NEW PT 1/>: CPT | Performed by: OPTOMETRIST

## 2022-02-11 PROCEDURE — 92015 DETERMINE REFRACTIVE STATE: CPT | Mod: GY | Performed by: OPTOMETRIST

## 2022-02-11 RX ORDER — TIMOLOL MALEATE 5 MG/ML
1 SOLUTION/ DROPS OPHTHALMIC 2 TIMES DAILY
Qty: 10 ML | Refills: 4 | Status: SHIPPED | OUTPATIENT
Start: 2022-02-11 | End: 2022-10-03

## 2022-02-11 ASSESSMENT — PACHYMETRY
OD_CT(UM): 537
OS_CT(UM): 529

## 2022-02-11 ASSESSMENT — REFRACTION_MANIFEST
OD_CYLINDER: +0.50
OS_AXIS: 170
OS_SPHERE: +0.00
OD_AXIS: 065
OS_CYLINDER: +0.50
OS_ADD: +2.50
OD_ADD: +2.50
OD_SPHERE: +0.00

## 2022-02-11 ASSESSMENT — VISUAL ACUITY
OS_CC+: -2
CORRECTION_TYPE: GLASSES
METHOD: SNELLEN - LINEAR
OD_CC: 20/40
OD_CC+: -1
OS_CC: 20/40

## 2022-02-11 ASSESSMENT — TONOMETRY
OS_IOP_MMHG: 18
OD_IOP_MMHG: 20
IOP_METHOD: APPLANATION

## 2022-02-11 ASSESSMENT — REFRACTION_WEARINGRX
OS_CYLINDER: +0.50
OD_CYLINDER: +0.50
SPECS_TYPE: PAL
OS_SPHERE: +0.75
OD_SPHERE: +0.50
OD_AXIS: 066
OS_ADD: +2.50
OS_AXIS: 155
OD_ADD: +2.50

## 2022-02-11 ASSESSMENT — CONF VISUAL FIELD
METHOD: COUNTING FINGERS
OD_NORMAL: 1
OS_NORMAL: 1

## 2022-02-11 ASSESSMENT — EXTERNAL EXAM - LEFT EYE: OS_EXAM: NORMAL

## 2022-02-11 ASSESSMENT — CUP TO DISC RATIO
OD_RATIO: 0.75
OS_RATIO: 0.6

## 2022-02-11 ASSESSMENT — SLIT LAMP EXAM - LIDS
COMMENTS: SCALLOPED LID MARGINS
COMMENTS: SCALLOPED LID MARGINS

## 2022-02-11 ASSESSMENT — EXTERNAL EXAM - RIGHT EYE: OD_EXAM: NORMAL

## 2022-02-11 NOTE — PROGRESS NOTES
Assessment/Plan  (E11.319) Type 2 diabetes mellitus with retinopathy, without long-term current use of insulin, macular edema presence unspecified, unspecified laterality, unspecified retinopathy severity (H)  (primary encounter diagnosis)  Plan: Discussed findings with patient. Encouraged continued blood glucose, pressure, and lipid control. Patient should continue following recommendations of primary care provider. Patient should plan on returning to clinic annually for a dilated eye exam but was encouraged to return to clinic sooner with new flashes/floaters or other vision changes.     (H40.1131) Primary open angle glaucoma (POAG) of both eyes, mild stage  Comment:   -Progression noted OD>OS today since 2021 exam  -IOP today 20/18, TMax 26/20  -Patient reports good compliance with latanoprost but he did not take for about 1-2 months while on vacation  -No family history of glaucoma  -Slightly thin pachys  Plan: OCT Optic Nerve RNFL Optovue OU (both eyes),         timolol maleate (TIMOPTIC) 0.5 % ophthalmic         solution        Discussed findings with patient. Given high likelihood of progression in right eye, ok to start timolol in both eyes twice daily. Continue taking latanoprost nightly in both eyes. Emphasized importance of good compliance. Return in 6 weeks for IOP check with HVF 24-2.     (H52.4) Presbyopia  Plan: REFRACTION [[id number]]        Discussed findings with patient. New spectacle prescription dispensed to patient. Patient is welcome to return to clinic with prolonged adaptation difficulties.       Complete documentation of historical and exam elements from today's encounter can  be found in the full encounter summary report (not reduplicated in this progress  note). I personally obtained the chief complaint(s) and history of present illness. I  confirmed and edited as necessary the review of systems, past medical/surgical  history, family history, social history, and examination findings as  documented by  others; and I examined the patient myself. I personally reviewed the relevant tests,  images, and reports as documented above. I formulated and edited as necessary the  assessment and plan and discussed the findings and management plan with the  patient and family.    Ramos Verdin OD

## 2022-02-11 NOTE — PROGRESS NOTES
[facility] Care Coordination Contact    Member became effective with [facility] on 2/1/2022 with [facility]  Previous Health Plan: [facility]  Previous Care System: [facility]  Previous care coordinators name and number: n/a  PMAP last month  Waiver Type: N/A  Last MMIS Entry: Date n/a and Type n/a  MMIS visit date (and type) if different from above: n/a  Services Listed in MMIS:   UTF received: No: Requested on nothing requested.     Lydia Fritz  Case Management Specialist  [facility]  [phone]

## 2022-02-11 NOTE — LETTER
February 11, 2022    YADIRA LOPEZ  [address] [address] [address]    Dear  Yadira,    Welcome to [facility] s [place] [facility] ([facility] health program. My name is Lily Schultz RN. I am your [facility] care coordinator. You are eligible for Care Coordination through [facility] MSC+ Product.    Here is how Care Coordination works:    I will meet with you in person to determine your care coordination needs    We will develop a plan of care to meet your needs    We will create a service plan showing the services you will receive    We will talk about and coordinate any preventive care needs you have    I will call you soon to see how you are doing and determine what needs you may have. Our goal is to keep you as healthy and independent as possible.     Soon, you will receive a new [facility] member identification (ID) card from [facility]. When you receive it, please use this card along with your [place] Health Care Programs card and Prescription Drug Coverage Program card. When you receive, it please use this card where you get your health services. If you have Medicare, you will need to show your Medicare card when you get health services.    Being in the [place] [facility] (MSC+) Care Coordination program is voluntary and offered to you at no cost. If you ever wish to stop being in the Care Coordination program or have questions, call me at [phone]. If you reach my voice mail, leave a message and your phone number. If you are hearing impaired, please call the [place] Relay at 191 or [phone] (xfptyb-hd-gigqit relay service).    Sincerely,      Lily Schultz RN    E-mail: [email or web address]  Phone: [phone]      [employer]    [id number] accepted   (12/2019)

## 2022-02-11 NOTE — NURSING NOTE
Chief Complaints and History of Present Illnesses   Patient presents with     Glaucoma       Chief Complaint(s) and History of Present Illness(es)     Glaucoma     Laterality: both eyes    Compliance with Treatment: misses drops frequently              Comments     Patient here for yearly exam. Uses Latanoprost at bedtime each eye, last dose 8:00pm 2/11/22. States he sometimes will miss a couple of days in a row before using drop again. Feels glasses need updating, did not get new ones after last years exam. Patient is diabetic.     Lab Results       Component                Value               Date                       A1C                      6.5                 01/25/2022                 A1C                      8.8                 09/21/2021                 A1C                      7.9                 04/02/2021                 A1C                      6.6                 09/16/2020                 A1C                      6.7                 12/30/2019                 A1C                      9.2                 06/14/2019                 A1C                      7.4                 02/13/2019                            Dianne Diaz, COA

## 2022-02-16 NOTE — PROGRESS NOTES
2/21/2022 CC called the member at our arranged time to complete a telephonic and he was unavailable. He text me stating he would call later. Lily Schultz RN,TOVA  [facility] Case Coordinator   [phone]      2/17/2022 1400 Tc to member and to [facility] enrollment. The member was able to [facility] We scheduled an appt for 2/21/2022 to complete an HRA. TOVA Emery RN  [facility] Case Coordinator   [phone]        2/16/2021  1700  CC attempted to reach member for HRA. CC left a voicemail. TOVA Emery RN  [facility] Case Coordinator   [phone]    1730 Member called back and we discussed the changes in his insurance. The member is interested in the [facility] product. We scheduled an appt on 2/17/2022 to call [facility] enrollment. TOVA Emery RN  [facility] Case Coordinator   [phone]

## 2022-02-21 ENCOUNTER — PATIENT OUTREACH (OUTPATIENT)
Dept: GERIATRIC MEDICINE | Facility: CLINIC | Age: 67
End: 2022-02-21
Payer: MEDICARE

## 2022-02-22 ENCOUNTER — PATIENT OUTREACH (OUTPATIENT)
Dept: GERIATRIC MEDICINE | Facility: CLINIC | Age: 67
End: 2022-02-22
Payer: MEDICARE

## 2022-02-22 ASSESSMENT — ACTIVITIES OF DAILY LIVING (ADL): DEPENDENT_IADLS:: INDEPENDENT

## 2022-02-22 NOTE — PROGRESS NOTES
[facility] Care Coordination Contact    [facility] Home Visit Assessment     Home visit for Health Risk Assessment with Kathrine Isaac completed on February 22, 2022       Current living arrangement:: I live alone     Assessment completed with:: Patient    Current Care Plan  Member currently receiving the following home care services:     Member currently receiving the following community resources: [facility]    Medication Review  Medication reconciliation completed in Epic: Yes  Medication set-up & administration: Independent-does not set up.  Self-administers medications.  Medication Risk Assessment Medication (1 or more, place referral to MTM): Taking 1 or more high-risk medications for adults >65 years  MTM Referral Placed: No: Member declined    Mental/Behavioral Health   Depression Screening:   PHQ-2 Total Score (Adult) - Positive if 3 or more points; Administer PHQ-9 if positive: 0                Falls Assessment:   Fallen 2 or more times in the past year?: No        ADL/IADL Dependencies:   Dependent ADLs:: Independent  Dependent IADLs:: Independent    [facility] Plan sponsored benefits: Shared information re: Silver Sneakers/gym memberships, ASA, Calcium +D.    PCA Assessment completed at visit: Not Applicable     Elderly Waiver Eligibility: No-does not meet criteria    Care Plan & Recommendations: No Formal services except for Case Management at this time.     See [facility] for detailed assessment information.    Follow-Up Plan: Member informed of future contact, plan to f/u with member with a 6 month telephone assessment.  Contact information shared with member and family, encouraged member to call with any questions or concerns at any time.    [facility] care continuum providers: Please refer to Health Care Home on the [facility] Problem List to view this patient's [facility] Care Plan Summary.    Lily Schultz RN,BSN  [facility] Case Coordinator   [phone]

## 2022-02-23 ENCOUNTER — PATIENT OUTREACH (OUTPATIENT)
Dept: GERIATRIC MEDICINE | Facility: CLINIC | Age: 67
End: 2022-02-23
Payer: MEDICARE

## 2022-02-23 NOTE — LETTER
February 23, 2022    YADIRA LOPEZ  [address] [address] [address]        Dear Yadira:    At [facility], we are dedicated to improving your health and well-being. Enclosed is the Comprehensive Care Plan that we developed with you on 2/22/2022. Please review the Care Plan carefully.    As a reminder, some of the things we discussed at your visit include:    Your physical and mental health    Ways to reduce falls    Health care needs you may have    Don t forget to contact your care coordinator if you:    Have been hospitalized or plan to be hospitalized     Have had a fall     Have experienced a change in physical health    Are experiencing emotional problems     If you do not agree with your Care Plan, have questions about it, or have experienced a change in your needs, please call me at [phone]. If you are hearing impaired, please call the [place] Relay at 072 or [phone] (wtpgaz-dy-mqowjb relay service).    Sincerely,        Lily Schultz RN    E-mail: [email or web address]  Phone: [phone]      [facility]+[id number] [place] ([id number]     [id number] (11/18)

## 2022-02-23 NOTE — PROGRESS NOTES
[facility] Care Coordination Contact    Received after visit chart from care coordinator.  Completed following tasks: Mailed copy of care plan to client and mailed POC sig sheet w/SASE.  Mailed medication disposal info.   Lydia Fritz  Case Management Specialist  [facility]  [phone]

## 2022-03-08 PROBLEM — M25.511 RIGHT SHOULDER PAIN, UNSPECIFIED CHRONICITY: Status: RESOLVED | Noted: 2022-02-03 | Resolved: 2022-03-08

## 2022-03-09 DIAGNOSIS — B35.1 ONYCHOMYCOSIS: ICD-10-CM

## 2022-03-10 ENCOUNTER — PATIENT OUTREACH (OUTPATIENT)
Dept: GERIATRIC MEDICINE | Facility: CLINIC | Age: 67
End: 2022-03-10

## 2022-03-14 RX ORDER — CICLOPIROX 80 MG/ML
SOLUTION TOPICAL
Qty: 6.6 ML | Refills: 0 | Status: SHIPPED | OUTPATIENT
Start: 2022-03-14 | End: 2022-04-26

## 2022-03-22 ENCOUNTER — APPOINTMENT (OUTPATIENT)
Dept: OPTOMETRY | Facility: CLINIC | Age: 67
End: 2022-03-22
Payer: COMMERCIAL

## 2022-03-22 PROCEDURE — 92342 FIT SPECTACLES MULTIFOCAL: CPT | Performed by: OPTOMETRIST

## 2022-03-25 ENCOUNTER — OFFICE VISIT (OUTPATIENT)
Dept: OPTOMETRY | Facility: CLINIC | Age: 67
End: 2022-03-25
Payer: COMMERCIAL

## 2022-03-25 DIAGNOSIS — H40.1131 PRIMARY OPEN ANGLE GLAUCOMA (POAG) OF BOTH EYES, MILD STAGE: Primary | ICD-10-CM

## 2022-03-25 PROCEDURE — 92083 EXTENDED VISUAL FIELD XM: CPT | Performed by: OPTOMETRIST

## 2022-03-25 PROCEDURE — 92012 INTRM OPH EXAM EST PATIENT: CPT | Performed by: OPTOMETRIST

## 2022-03-25 ASSESSMENT — VISUAL ACUITY
OS_SC+: +1
METHOD: SNELLEN - LINEAR
OS_SC: 20/40
OD_SC: 20/40
OD_SC+: +1

## 2022-03-25 ASSESSMENT — EXTERNAL EXAM - RIGHT EYE: OD_EXAM: NORMAL

## 2022-03-25 ASSESSMENT — CONF VISUAL FIELD
OS_NORMAL: 1
OD_NORMAL: 1
METHOD: COUNTING FINGERS

## 2022-03-25 ASSESSMENT — TONOMETRY
IOP_METHOD: APPLANATION
OD_IOP_MMHG: 14
OS_IOP_MMHG: 13

## 2022-03-25 ASSESSMENT — CUP TO DISC RATIO
OS_RATIO: 0.6
OD_RATIO: 0.75

## 2022-03-25 ASSESSMENT — SLIT LAMP EXAM - LIDS
COMMENTS: SCALLOPED LID MARGINS
COMMENTS: SCALLOPED LID MARGINS

## 2022-03-25 ASSESSMENT — EXTERNAL EXAM - LEFT EYE: OS_EXAM: NORMAL

## 2022-03-25 NOTE — NURSING NOTE
Chief Complaints and History of Present Illnesses   Patient presents with     Glaucoma Follow Up     Chief Complaint(s) and History of Present Illness(es)     Glaucoma Follow Up     Laterality: both eyes    Associated symptoms: Negative for eye pain, floaters and flashes    Compliance with Treatment: always    Pain scale: 0/10              Comments     6 week follow up POAG, both eyes.  Pt denies any vision changes BE since last visit.  Ocular meds: Timolol BID BE & Latanoprost at bedtime BE    Parris Bazzi OT 9:13 AM March 25, 2022

## 2022-03-25 NOTE — PROGRESS NOTES
Assessment/Plan  (H40.1131) Primary open angle glaucoma (POAG) of both eyes, mild stage  Comment:   -HVF 24-2 today showed progression since 2021 exam consistent with RNFL OCT changes noted at last exam  -IOP today 14/13 (timolol and latanoprost), TMax 26/20, IOP at last exam 20/18 (latanoprost only)  -Slightly thin pachys  Plan: Discussed findings with patient. Good improvement in IOP noted today with addition of timolol. Continue latanoprost at bedtime in both eyes and timolol twice daily in both eyes. Return to clinic in 4 months for IOP check only.       Complete documentation of historical and exam elements from today's encounter can  be found in the full encounter summary report (not reduplicated in this progress  note). I personally obtained the chief complaint(s) and history of present illness. I  confirmed and edited as necessary the review of systems, past medical/surgical  history, family history, social history, and examination findings as documented by  others; and I examined the patient myself. I personally reviewed the relevant tests,  images, and reports as documented above. I formulated and edited as necessary the  assessment and plan and discussed the findings and management plan with the  patient and family.    Ramos Verdin, OD

## 2022-03-28 NOTE — PROGRESS NOTES
[facility] Care Coordination Contact      [facility] Health Plan or Product Change    CC received notification that member's health plan or health plan product changed from [facility] MSC+ to are [facility] effective 3/1/2022.  CC contacted member and discussed change and face-to-face visit was offered. Member declined need for home visit. CC reviewed previous Health Risk Assessment/LTCC and POC with member and no changes noted.    Called member and introduced self as member s new CC. Confirmed with member that the welcome letter with writer's name and contact information has been received.  Reviewed LTCC/Health Risk Assessment (HRA) and POC with member. No changes noted.  Transitional HRA completed. Care Plan Summary updated and reflects current services.  Required referral authorization information communicated to CMS: No  Writer reviewed the following with member:  ER visits: No  Hospitalizations: No  TCU stays: No  Significant health status changes: None  Falls/Injuries: No  ADL/IADL changes: No  Changes in services: No    Follow-Up Plan: Member informed of future contact, plan to f/u with member with at next regularly scheduled contact.  Contact information shared with member and family, encouraged member to call with any questions or concerns. Lily Schultz RN,BSN  [facility] Case Coordinator   [phone]

## 2022-04-23 ENCOUNTER — LAB (OUTPATIENT)
Dept: LAB | Facility: CLINIC | Age: 67
End: 2022-04-23
Payer: COMMERCIAL

## 2022-04-23 DIAGNOSIS — E11.319 TYPE 2 DIABETES MELLITUS WITH RETINOPATHY, WITHOUT LONG-TERM CURRENT USE OF INSULIN, MACULAR EDEMA PRESENCE UNSPECIFIED, UNSPECIFIED LATERALITY, UNSPECIFIED RETINOPATHY SEVERITY (H): ICD-10-CM

## 2022-04-23 DIAGNOSIS — E78.5 HYPERLIPIDEMIA LDL GOAL <100: ICD-10-CM

## 2022-04-23 PROCEDURE — 36415 COLL VENOUS BLD VENIPUNCTURE: CPT

## 2022-04-23 PROCEDURE — 84450 TRANSFERASE (AST) (SGOT): CPT

## 2022-04-23 PROCEDURE — 82043 UR ALBUMIN QUANTITATIVE: CPT

## 2022-04-23 PROCEDURE — 80048 BASIC METABOLIC PNL TOTAL CA: CPT

## 2022-04-23 PROCEDURE — 80061 LIPID PANEL: CPT

## 2022-04-25 LAB
ANION GAP SERPL CALCULATED.3IONS-SCNC: 8 MMOL/L (ref 3–14)
AST SERPL W P-5'-P-CCNC: 19 U/L (ref 0–45)
BUN SERPL-MCNC: 13 MG/DL (ref 7–30)
CALCIUM SERPL-MCNC: 9.4 MG/DL (ref 8.5–10.1)
CHLORIDE BLD-SCNC: 103 MMOL/L (ref 94–109)
CHOLEST SERPL-MCNC: 144 MG/DL
CO2 SERPL-SCNC: 22 MMOL/L (ref 20–32)
CREAT SERPL-MCNC: 0.87 MG/DL (ref 0.66–1.25)
CREAT UR-MCNC: 77 MG/DL
FASTING STATUS PATIENT QL REPORTED: YES
GFR SERPL CREATININE-BSD FRML MDRD: >90 ML/MIN/1.73M2
GLUCOSE BLD-MCNC: 109 MG/DL (ref 70–99)
HDLC SERPL-MCNC: 42 MG/DL
LDLC SERPL CALC-MCNC: 52 MG/DL
MICROALBUMIN UR-MCNC: 7 MG/L
MICROALBUMIN/CREAT UR: 9.09 MG/G CR (ref 0–17)
NONHDLC SERPL-MCNC: 102 MG/DL
POTASSIUM BLD-SCNC: 4.3 MMOL/L (ref 3.4–5.3)
SODIUM SERPL-SCNC: 133 MMOL/L (ref 133–144)
TRIGL SERPL-MCNC: 249 MG/DL

## 2022-04-26 ENCOUNTER — MYC REFILL (OUTPATIENT)
Dept: FAMILY MEDICINE | Facility: CLINIC | Age: 67
End: 2022-04-26

## 2022-04-26 DIAGNOSIS — B35.1 ONYCHOMYCOSIS: ICD-10-CM

## 2022-04-26 RX ORDER — CICLOPIROX 80 MG/ML
SOLUTION TOPICAL
Qty: 6.6 ML | Refills: 0 | Status: SHIPPED | OUTPATIENT
Start: 2022-04-26 | End: 2022-06-29

## 2022-04-26 NOTE — TELEPHONE ENCOUNTER
Prescription approved per [facility] Refill Protocol.  Kemi Arroyo RN, BSN   [facility]

## 2022-05-24 ENCOUNTER — OFFICE VISIT (OUTPATIENT)
Dept: ORTHOPEDICS | Facility: CLINIC | Age: 67
End: 2022-05-24
Payer: COMMERCIAL

## 2022-05-24 ENCOUNTER — ANCILLARY PROCEDURE (OUTPATIENT)
Dept: GENERAL RADIOLOGY | Facility: CLINIC | Age: 67
End: 2022-05-24
Attending: PREVENTIVE MEDICINE
Payer: COMMERCIAL

## 2022-05-24 DIAGNOSIS — M17.11 ARTHRITIS OF RIGHT KNEE: ICD-10-CM

## 2022-05-24 DIAGNOSIS — M17.12 ARTHRITIS OF LEFT KNEE: ICD-10-CM

## 2022-05-24 DIAGNOSIS — M17.12 ARTHRITIS OF LEFT KNEE: Primary | ICD-10-CM

## 2022-05-24 PROCEDURE — 73562 X-RAY EXAM OF KNEE 3: CPT | Mod: GC | Performed by: RADIOLOGY

## 2022-05-24 PROCEDURE — 20610 DRAIN/INJ JOINT/BURSA W/O US: CPT | Mod: LT | Performed by: PREVENTIVE MEDICINE

## 2022-05-24 PROCEDURE — 99214 OFFICE O/P EST MOD 30 MIN: CPT | Mod: 25 | Performed by: PREVENTIVE MEDICINE

## 2022-05-24 NOTE — PROGRESS NOTES
HISTORY OF PRESENT ILLNESS  Mr. Isaac is a pleasant 67 year old year old male who presents to clinic today with   Kathrine explains that both knees are bothering him  Wants to get his gel one for his left knee today  Location: bilateral knees, left worse  Quality:  achy pain    Severity: 6/10 at worst    Duration: worse over past few months  Timing: occurs intermittently  Context: occurs while exercising and lifting, walking, bending knees  Modifying factors:  resting and non-use makes it better, movement and use makes it worse  Associated signs & symptoms: some swelling in knees    MEDICAL HISTORY  Patient Active Problem List   Diagnosis     Glaucoma suspect     Type 2 diabetes mellitus with retinopathy, without long-term current use of insulin, macular edema presence unspecified, unspecified laterality, unspecified retinopathy severity (H)     Hyperlipidemia LDL goal <100     Essential hypertension     Advance care planning     Diverticulosis of large intestine without hemorrhage     Obesity, Class I, BMI 30.0-34.9 (see actual BMI)     Mechanical low back pain     Hx of LASIK       Current Outpatient Medications   Medication Sig Dispense Refill     ciclopirox (PENLAC) 8 % external solution Apply to adjacent skin and affected nails daily.  Remove with alcohol every 7 days, then repeat. 6.6 mL 0     alcohol swab prep pads Use to swab area of injection/nkechi as directed. 100 each 3     ASPIRIN LOW DOSE 81 MG EC tablet TAKE 1 TABLET BY MOUTH ONCE DAILY 90 tablet 2     atorvastatin (LIPITOR) 20 MG tablet Take 1 tablet (20 mg) by mouth daily 90 tablet 3     blood glucose (ACCU-CHEK GUIDE) test strip Use to test blood sugar 2 times daily or as directed. 100 strip 11     blood glucose calibration (NO BRAND SPECIFIED) solution To accompany: Blood Glucose Monitor Brands: per insurance. 1 Bottle 3     blood glucose monitoring (NO BRAND SPECIFIED) meter device kit Use to test blood sugar 1 times daily or as directed.  Preferred blood glucose meter/supplies to accompany: Monitor Brands: per insurance. 1 kit 0     diclofenac (VOLTAREN) 75 MG EC tablet TAKE 1 TABLET(75 MG) BY MOUTH TWICE DAILY AS NEEDED FOR MODERATE PAIN 40 tablet 2     empagliflozin (JARDIANCE) 25 MG TABS tablet Take 1 tablet (25 mg) by mouth daily 90 tablet 3     ferrous gluconate (FERGON) 324 (38 Fe) MG tablet Take 1 tablet (324 mg) by mouth daily (with breakfast) 90 tablet 1     gabapentin (NEURONTIN) 300 MG capsule TAKE 1 CAPSULE BY MOUTH AT BEDTIME 90 capsule 3     glimepiride (AMARYL) 4 MG tablet TAKE ONE TABLET BY MOUTH EVERY MORNING BEFORE BREAKFAST 90 tablet 3     hydrochlorothiazide (HYDRODIURIL) 12.5 MG tablet Take 1 tablet (12.5 mg) by mouth daily 90 tablet 3     JANUVIA 100 MG tablet TAKE 1 TABLET(100 MG) BY MOUTH DAILY 90 tablet 1     latanoprost (XALATAN) 0.005 % ophthalmic solution Place 1 drop into both eyes daily 2.5 mL 6     lisinopril (ZESTRIL) 40 MG tablet Take 1 tablet (40 mg) by mouth daily 90 tablet 3     metFORMIN (GLUCOPHAGE-XR) 500 MG 24 hr tablet TAKE 4 TABLETS(2000 MG) BY MOUTH DAILY WITH DINNER 360 tablet 3     methocarbamol (ROBAXIN) 500 MG tablet Take 1 tablet (500 mg) by mouth 3 times daily as needed for muscle spasms 30 tablet 1     ORDER FOR DME, SET TO LOCAL PRINT, Equipment being ordered: wrist quick fit [id number] 1 each 0     order for DME Equipment being ordered: One touch lancets, test strips.  Patient to check sugars once times daily, 90 day supply for test strips and lancets, refill 3 times 1 Device 0     order for DME Equipment being ordered: Glucometer per insurance preference, lancets, test strips.  Patient to check sugars two times daily, 90 day supply for test strips and lancets, refill 3 times 1 Device 11     order for DME Equipment being ordered: Glucometer Accucheck Sulema, lancets, test strips.  Patient to check sugars bid times daily, 90 day supply for test strips and lancets, refill 6 times 1 Device 0     order  for DME Equipment being ordered: lancets for one touch glucometer for once daily testing 1 Box 6     thin (NO BRAND SPECIFIED) lancets Use with lanceting device. To accompany: Blood Glucose Monitor Brands: per insurance. 200 each 6     timolol maleate (TIMOPTIC) 0.5 % ophthalmic solution Place 1 drop into both eyes 2 times daily 10 mL 4       No Known Allergies    Family History   Problem Relation Age of Onset     Diabetes Mother      Glaucoma No family hx of      Macular Degeneration No family hx of      Social History     Socioeconomic History     Marital status: Single   Tobacco Use     Smoking status: Never Smoker     Smokeless tobacco: Never Used   Vaping Use     Vaping Use: Never used   Substance and Sexual Activity     Alcohol use: Yes     Drug use: No       Additional medical/Social/Surgical histories reviewed in [facility] and updated as appropriate.     REVIEW OF SYSTEMS (5/24/2022)  10 point ROS of systems including Constitutional, Eyes, Respiratory, Cardiovascular, Gastroenterology, Genitourinary, Integumentary, Musculoskeletal, Psychiatric, Allergic/Immunologic were all negative except for pertinent positives noted in my HPI.     PHYSICAL EXAM  VSS  General  - normal appearance, in no obvious distress  HEENT  - conjunctivae not injected, moist mucous membranes, normocephalic/atraumatic head, ears normal appearance, no lesions, mouth normal appearance, no scars, normal dentition and teeth present  CV  - normal popliteal pulse  Pulm  - normal respiratory pattern, non-labored  Musculoskeletal - bilateral knee  - stance: non antalgic gait, genu varum  - inspection: NO generalized swelling, trace effusion  - palpation: medial joint line tenderness bilaterally, patella and patellar tendon non-tender, normal popliteal pulse  - ROM: 100 degrees flexion, 0 degrees extension, painful active ROM  - strength: 5/5 in flexion, 5/5 in extension  - neuro: no sensory or motor deficit  - special tests:  (-) Lachman  (-)  anterior drawer  (-) posterior drawer  (-) pivot shift  (-) Sierra  (-) Thessaly  (-) varus at 0 and 30 degrees flexion  (-) valgus at 0 and 30 degrees flexion  (+) Richard s compression test bilaterally  (-) patellar apprehension  Neuro  - no sensory or motor deficit, grossly normal coordination, normal muscle tone  Skin  - no ecchymosis, erythema, warmth, or induration, no obvious rash  Psych  - interactive, appropriate, normal mood and affect    ASSESSMENT & PLAN  68 yo male with bilateral knee arthritis, worse    I independently reviewed the following imaging studies:  Bilateral knee xrays: shows djd  Discussed and will consider right knee injection  After a 20 minute discussion and examination, we decided to perform a same day injection for therapeutic purposes for left knee with gel one  F/u 1 month and consider treatments for right knee as well  Cont. HEP and PT exercises   and prescribed medications refilled    Appropriate PPE was utilized for prevention of spread of Covid-19.  Herman Lindquist MD, CAQSM  PROCEDURE:       left     Knee joint injection   The patient was apprised of the risks and the benefits of the procedure and consented and a written consent was signed.   The patient s  KNEE was sterilely prepped with chloraprep.   The patient was injected with gel one syringe into the knee with a 1.5-inch 22-gauge needle at the_superiorlateral aspect of their knee joint  There were no complications. The patient tolerated the procedure well. There was minimal bleeding.   The patient was instructed to ice the knee upon leaving clinic and refrain from overuse over the next 3 days.   The patient was instructed to go to the emergency room with any usual pain, swelling, or redness occurred in the injected area.   The patient was given a followup appointment to evaluate response to the injection to their increased range of motion and reduction of pain.  Follow up PRN  Dr Herman Lindquist

## 2022-05-24 NOTE — NURSING NOTE
[facility] FOLLOW-UP VISIT 5/24/2022    Kathrine Isaac's chief complaint for this visit includes:  Chief Complaint   Patient presents with     RECHECK     Left knee injection      PCP: Nighat Joe    Referring Provider:  No referring provider defined for this encounter.    There were no vitals taken for this visit.  Data Unavailable       Interval History:     Follow up reason: left knee pain    Date last seen: 2/9/22    Following Therapeutic Plan: Yes     Pain: Unchanged    Function: Unchanged    What have you been doing since last vist: exercises, advil     Medical History:    Any recent changes to your medical history? No    Any new medication prescribed since last visit? No

## 2022-05-24 NOTE — PROGRESS NOTES
Large Joint Injection/Arthocentesis: L knee joint    Date/Time: 5/24/2022 10:06 AM  Performed by: Herman Lindquist MD  Authorized by: Herman Lindquist MD     Indications:  Pain and osteoarthritis  Needle Size:  22 G  Guidance: landmark guided    Approach:  Superolateral  Location:  Knee      Medications:  30 mg cross-Linked Hyaluronate 30 MG/3ML  Outcome:  Tolerated well, no immediate complications  Procedure discussed: discussed risks, benefits, and alternatives    Consent Given by:  Patient  Timeout: timeout called immediately prior to procedure    Prep: patient was prepped and draped in usual sterile fashion

## 2022-05-24 NOTE — LETTER
5/24/2022         RE: Kathrine Isaac  [address] [facility] [address]        Dear Colleague,    Thank you for referring your patient, Kathrine Isaac, to the [facility] [place]. Please see a copy of my visit note below.    Large Joint Injection/Arthocentesis: L knee joint    Date/Time: 5/24/2022 10:06 AM  Performed by: Herman Lindquist MD  Authorized by: Herman Lindquist MD     Indications:  Pain and osteoarthritis  Needle Size:  22 G  Guidance: landmark guided    Approach:  Superolateral  Location:  Knee      Medications:  30 mg cross-Linked Hyaluronate 30 MG/3ML  Outcome:  Tolerated well, no immediate complications  Procedure discussed: discussed risks, benefits, and alternatives    Consent Given by:  Patient  Timeout: timeout called immediately prior to procedure    Prep: patient was prepped and draped in usual sterile fashion            HISTORY OF PRESENT ILLNESS  Mr. Isaac is a pleasant 67 year old year old male who presents to clinic today with   Kathrine explains that both knees are bothering him  Wants to get his gel one for his left knee today  Location: bilateral knees, left worse  Quality:  achy pain    Severity: 6/10 at worst    Duration: worse over past few months  Timing: occurs intermittently  Context: occurs while exercising and lifting, walking, bending knees  Modifying factors:  resting and non-use makes it better, movement and use makes it worse  Associated signs & symptoms: some swelling in knees    MEDICAL HISTORY  Patient Active Problem List   Diagnosis     Glaucoma suspect     Type 2 diabetes mellitus with retinopathy, without long-term current use of insulin, macular edema presence unspecified, unspecified laterality, unspecified retinopathy severity (H)     Hyperlipidemia LDL goal <100     Essential hypertension     Advance care planning     Diverticulosis of large intestine without hemorrhage     Obesity, Class I, BMI  30.0-34.9 (see actual BMI)     Mechanical low back pain     Hx of LASIK       Current Outpatient Medications   Medication Sig Dispense Refill     ciclopirox (PENLAC) 8 % external solution Apply to adjacent skin and affected nails daily.  Remove with alcohol every 7 days, then repeat. 6.6 mL 0     alcohol swab prep pads Use to swab area of injection/nkechi as directed. 100 each 3     ASPIRIN LOW DOSE 81 MG EC tablet TAKE 1 TABLET BY MOUTH ONCE DAILY 90 tablet 2     atorvastatin (LIPITOR) 20 MG tablet Take 1 tablet (20 mg) by mouth daily 90 tablet 3     blood glucose (ACCU-CHEK GUIDE) test strip Use to test blood sugar 2 times daily or as directed. 100 strip 11     blood glucose calibration (NO BRAND SPECIFIED) solution To accompany: Blood Glucose Monitor Brands: per insurance. 1 Bottle 3     blood glucose monitoring (NO BRAND SPECIFIED) meter device kit Use to test blood sugar 1 times daily or as directed. Preferred blood glucose meter/supplies to accompany: Monitor Brands: per insurance. 1 kit 0     diclofenac (VOLTAREN) 75 MG EC tablet TAKE 1 TABLET(75 MG) BY MOUTH TWICE DAILY AS NEEDED FOR MODERATE PAIN 40 tablet 2     empagliflozin (JARDIANCE) 25 MG TABS tablet Take 1 tablet (25 mg) by mouth daily 90 tablet 3     ferrous gluconate (FERGON) 324 (38 Fe) MG tablet Take 1 tablet (324 mg) by mouth daily (with breakfast) 90 tablet 1     gabapentin (NEURONTIN) 300 MG capsule TAKE 1 CAPSULE BY MOUTH AT BEDTIME 90 capsule 3     glimepiride (AMARYL) 4 MG tablet TAKE ONE TABLET BY MOUTH EVERY MORNING BEFORE BREAKFAST 90 tablet 3     hydrochlorothiazide (HYDRODIURIL) 12.5 MG tablet Take 1 tablet (12.5 mg) by mouth daily 90 tablet 3     JANUVIA 100 MG tablet TAKE 1 TABLET(100 MG) BY MOUTH DAILY 90 tablet 1     latanoprost (XALATAN) 0.005 % ophthalmic solution Place 1 drop into both eyes daily 2.5 mL 6     lisinopril (ZESTRIL) 40 MG tablet Take 1 tablet (40 mg) by mouth daily 90 tablet 3     metFORMIN (GLUCOPHAGE-XR) 500 MG 24  hr tablet TAKE 4 TABLETS(2000 MG) BY MOUTH DAILY WITH DINNER 360 tablet 3     methocarbamol (ROBAXIN) 500 MG tablet Take 1 tablet (500 mg) by mouth 3 times daily as needed for muscle spasms 30 tablet 1     ORDER FOR DME, SET TO LOCAL PRINT, Equipment being ordered: wrist quick fit [id number] 1 each 0     order for DME Equipment being ordered: One touch lancets, test strips.  Patient to check sugars once times daily, 90 day supply for test strips and lancets, refill 3 times 1 Device 0     order for DME Equipment being ordered: Glucometer per insurance preference, lancets, test strips.  Patient to check sugars two times daily, 90 day supply for test strips and lancets, refill 3 times 1 Device 11     order for DME Equipment being ordered: Glucometer Accucheck Sulema, lancets, test strips.  Patient to check sugars bid times daily, 90 day supply for test strips and lancets, refill 6 times 1 Device 0     order for DME Equipment being ordered: lancets for one touch glucometer for once daily testing 1 Box 6     thin (NO BRAND SPECIFIED) lancets Use with lanceting device. To accompany: Blood Glucose Monitor Brands: per insurance. 200 each 6     timolol maleate (TIMOPTIC) 0.5 % ophthalmic solution Place 1 drop into both eyes 2 times daily 10 mL 4       No Known Allergies    Family History   Problem Relation Age of Onset     Diabetes Mother      Glaucoma No family hx of      Macular Degeneration No family hx of      Social History     Socioeconomic History     Marital status: Single   Tobacco Use     Smoking status: Never Smoker     Smokeless tobacco: Never Used   Vaping Use     Vaping Use: Never used   Substance and Sexual Activity     Alcohol use: Yes     Drug use: No       Additional medical/Social/Surgical histories reviewed in [facility] and updated as appropriate.     REVIEW OF SYSTEMS (5/24/2022)  10 point ROS of systems including Constitutional, Eyes, Respiratory, Cardiovascular, Gastroenterology, Genitourinary,  Integumentary, Musculoskeletal, Psychiatric, Allergic/Immunologic were all negative except for pertinent positives noted in my HPI.     PHYSICAL EXAM  VSS  General  - normal appearance, in no obvious distress  HEENT  - conjunctivae not injected, moist mucous membranes, normocephalic/atraumatic head, ears normal appearance, no lesions, mouth normal appearance, no scars, normal dentition and teeth present  CV  - normal popliteal pulse  Pulm  - normal respiratory pattern, non-labored  Musculoskeletal - bilateral knee  - stance: non antalgic gait, genu varum  - inspection: NO generalized swelling, trace effusion  - palpation: medial joint line tenderness bilaterally, patella and patellar tendon non-tender, normal popliteal pulse  - ROM: 100 degrees flexion, 0 degrees extension, painful active ROM  - strength: 5/5 in flexion, 5/5 in extension  - neuro: no sensory or motor deficit  - special tests:  (-) Lachman  (-) anterior drawer  (-) posterior drawer  (-) pivot shift  (-) Sierra  (-) Thessaly  (-) varus at 0 and 30 degrees flexion  (-) valgus at 0 and 30 degrees flexion  (+) Richard s compression test bilaterally  (-) patellar apprehension  Neuro  - no sensory or motor deficit, grossly normal coordination, normal muscle tone  Skin  - no ecchymosis, erythema, warmth, or induration, no obvious rash  Psych  - interactive, appropriate, normal mood and affect    ASSESSMENT & PLAN  68 yo male with bilateral knee arthritis, worse    I independently reviewed the following imaging studies:  Bilateral knee xrays: shows djd  Discussed and will consider right knee injection  After a 20 minute discussion and examination, we decided to perform a same day injection for therapeutic purposes for left knee with gel one  F/u 1 month and consider treatments for right knee as well  Cont. HEP and PT exercises   and prescribed medications refilled    Appropriate PPE was utilized for prevention of spread of Covid-19.  Herman Lindquist MD,  CAQSM  PROCEDURE:       right     Knee joint injection   The patient was apprised of the risks and the benefits of the procedure and consented and a written consent was signed.   The patient s  KNEE was sterilely prepped with chloraprep.   The patient was injected with gel one syringe into the knee with a 1.5-inch 22-gauge needle at the_superiorlateral aspect of their knee joint  There were no complications. The patient tolerated the procedure well. There was minimal bleeding.   The patient was instructed to ice the knee upon leaving clinic and refrain from overuse over the next 3 days.   The patient was instructed to go to the emergency room with any usual pain, swelling, or redness occurred in the injected area.   The patient was given a followup appointment to evaluate response to the injection to their increased range of motion and reduction of pain.  Follow up PRN  Dr Herman Lindquist          Again, thank you for allowing me to participate in the care of your patient.        Sincerely,        Herman Lindquist MD

## 2022-06-27 DIAGNOSIS — I10 ESSENTIAL HYPERTENSION: ICD-10-CM

## 2022-06-27 DIAGNOSIS — B35.1 ONYCHOMYCOSIS: ICD-10-CM

## 2022-06-28 DIAGNOSIS — E11.319 TYPE 2 DIABETES MELLITUS WITH RETINOPATHY, WITHOUT LONG-TERM CURRENT USE OF INSULIN, MACULAR EDEMA PRESENCE UNSPECIFIED, UNSPECIFIED LATERALITY, UNSPECIFIED RETINOPATHY SEVERITY (H): ICD-10-CM

## 2022-06-28 DIAGNOSIS — D50.9 IRON DEFICIENCY ANEMIA, UNSPECIFIED IRON DEFICIENCY ANEMIA TYPE: ICD-10-CM

## 2022-06-28 DIAGNOSIS — D64.9 ANEMIA, UNSPECIFIED TYPE: Primary | ICD-10-CM

## 2022-06-28 DIAGNOSIS — M51.16 LUMBAR DISC HERNIATION WITH RADICULOPATHY: ICD-10-CM

## 2022-06-28 DIAGNOSIS — M17.12 ARTHRITIS OF LEFT KNEE: ICD-10-CM

## 2022-06-29 RX ORDER — CICLOPIROX 80 MG/ML
SOLUTION TOPICAL
Qty: 6.6 ML | Refills: 0 | Status: SHIPPED | OUTPATIENT
Start: 2022-06-29 | End: 2022-10-20

## 2022-06-29 RX ORDER — LISINOPRIL 40 MG/1
TABLET ORAL
Qty: 90 TABLET | Refills: 3 | OUTPATIENT
Start: 2022-06-29

## 2022-06-29 NOTE — TELEPHONE ENCOUNTER
Routing refill request to provider for review/approval because:  Drug not on the [facility] refill protocol     Clarissa Castillo RN  [facility]

## 2022-07-05 RX ORDER — DICLOFENAC SODIUM 75 MG/1
TABLET, DELAYED RELEASE ORAL
Qty: 40 TABLET | Refills: 2 | Status: SHIPPED | OUTPATIENT
Start: 2022-07-05 | End: 2023-10-12

## 2022-07-05 RX ORDER — EMPAGLIFLOZIN 25 MG/1
TABLET, FILM COATED ORAL
Qty: 90 TABLET | Refills: 1 | Status: SHIPPED | OUTPATIENT
Start: 2022-07-05 | End: 2022-10-20

## 2022-07-22 ENCOUNTER — OFFICE VISIT (OUTPATIENT)
Dept: OPTOMETRY | Facility: CLINIC | Age: 67
End: 2022-07-22
Payer: COMMERCIAL

## 2022-07-22 DIAGNOSIS — H40.1131 PRIMARY OPEN ANGLE GLAUCOMA (POAG) OF BOTH EYES, MILD STAGE: Primary | ICD-10-CM

## 2022-07-22 DIAGNOSIS — H02.846 SWELLING OF EYELID, LEFT: ICD-10-CM

## 2022-07-22 PROCEDURE — 92012 INTRM OPH EXAM EST PATIENT: CPT | Performed by: OPTOMETRIST

## 2022-07-22 RX ORDER — LATANOPROST 50 UG/ML
1 SOLUTION/ DROPS OPHTHALMIC DAILY
Qty: 5 UNITS | Refills: 2 | Status: SHIPPED | OUTPATIENT
Start: 2022-07-22 | End: 2022-07-22

## 2022-07-22 RX ORDER — LATANOPROST 50 UG/ML
1 SOLUTION/ DROPS OPHTHALMIC DAILY
Qty: 2.5 ML | Refills: 11 | Status: SHIPPED | OUTPATIENT
Start: 2022-07-22 | End: 2023-06-27

## 2022-07-22 ASSESSMENT — REFRACTION_WEARINGRX
OS_CYLINDER: +0.50
OD_AXIS: 063
OD_ADD: +2.50
SPECS_TYPE: PAL
OS_ADD: +2.50
OS_AXIS: 178
OS_SPHERE: PLANO
OD_CYLINDER: +0.50
OD_SPHERE: PLANO

## 2022-07-22 ASSESSMENT — REFRACTION_MANIFEST
OD_SPHERE: PLANO
OS_ADD: +2.50
OS_AXIS: 161
OD_ADD: +2.50
OD_CYLINDER: +0.75
OD_AXIS: 166
OS_SPHERE: PLANO
OS_CYLINDER: +1.00

## 2022-07-22 ASSESSMENT — EXTERNAL EXAM - RIGHT EYE: OD_EXAM: NORMAL

## 2022-07-22 ASSESSMENT — TONOMETRY
IOP_METHOD: APPLANATION
OS_IOP_MMHG: 19
OD_IOP_MMHG: 19

## 2022-07-22 ASSESSMENT — SLIT LAMP EXAM - LIDS
COMMENTS: SCALLOPED LID MARGINS
COMMENTS: SCALLOPED LID MARGINS

## 2022-07-22 ASSESSMENT — CUP TO DISC RATIO
OS_RATIO: 0.6
OD_RATIO: 0.75

## 2022-07-22 ASSESSMENT — VISUAL ACUITY
METHOD: SNELLEN - LINEAR
CORRECTION_TYPE: GLASSES
OS_CC+: -1
OD_CC+: +2
OD_CC: 20/30
OS_CC: 20/50

## 2022-07-22 NOTE — PROGRESS NOTES
Assessment/Plan  H40.1131) Primary open angle glaucoma (POAG) of both eyes, mild stage  Comment:   -HVF 24-2 today showed progression since  exam consistent with RNFL OCT changes noted at last exam  -IOP today 19/19 (timolol only as latanoprost ), TMax 26/20, IOP at last exam 20/18 (latanoprost only)  -Slightly thin pachys  Plan: Discussed findings with patient. Refilled latanoprost for patient. Continue using latanoprost nightly both eyes and timolol twice daily in both eyes as before. Given previously good reduction in IOP with both drops, no indication to further adjust treatment today. Return in 4 months for IOP check.     (H02.846) Swelling of eyelid, left  Comment: Noted over the last couple of weeks, no pain or discharge   Plan: Recommend trying to alternate hot and cool compresses. Return to clinic if condition seems to worsen. Could consider plastics consult if this persists.     Complete documentation of historical and exam elements from today's encounter can  be found in the full encounter summary report (not reduplicated in this progress  note). I personally obtained the chief complaint(s) and history of present illness. I  confirmed and edited as necessary the review of systems, past medical/surgical  history, family history, social history, and examination findings as documented by  others; and I examined the patient myself. I personally reviewed the relevant tests,  images, and reports as documented above. I formulated and edited as necessary the  assessment and plan and discussed the findings and management plan with the  patient and family.    Ramos Verdin OD

## 2022-07-22 NOTE — NURSING NOTE
Chief Complaints and History of Present Illnesses   Patient presents with     Glaucoma Follow-Up     Chief Complaint(s) and History of Present Illness(es)     Glaucoma Follow-Up               Comments     He notes vision is a little blurry on the left eye. He struggling on the computer mainly with his current glasses.     He complains of swelling or bulging of his skin under the eyes, and check. He just noticed this 2 weeks ago. No pain.     Ocular meds: Timolol BID BE  He notes that he has not used Latanoprost in 6 weeks. He was told by the pharmacy that the RX was  and they were not able to fill it. Patient has tried to contact us through [employer], but has not heard anything back.                Cindy Mcdaniels COA  8:43 AM 2022

## 2022-07-27 ENCOUNTER — MYC MEDICAL ADVICE (OUTPATIENT)
Dept: FAMILY MEDICINE | Facility: CLINIC | Age: 67
End: 2022-07-27

## 2022-08-09 NOTE — TELEPHONE ENCOUNTER
OCCUPATIONAL THERAPY TREATMENT  Patient: Yordan Hawkins (77 y.o. male)  Date: 8/9/2022  Diagnosis: Dizziness [R42]  Syncope [R55] <principal problem not specified>      Precautions: fall risk, spinal precautions   Chart, occupational therapy assessment, plan of care, and goals were reviewed. ASSESSMENT  Pt continues with skilled OT services and is progressing towards goals. Pt received semi-supine in bed upon arrival, AXO x4 and agreeable to OT tx at this time. Functional Mobility and Transfers for ADLs:  Bed Mobility:  Rolling: Contact guard assistance; Additional time  Supine to Sit: Minimum assistance; Additional time  Sit to Supine: Minimum assistance; Additional time  Scooting: Stand-by assistance; Additional time (to [address])    Transfers:  Sit to Stand: Minimum assistance     Balance:  Sitting: Impaired; Without support  Sitting - Static: Fair (occasional)  Sitting - Dynamic: Fair (occasional)  Standing: Impaired; With support  Standing - Static: Fair;Constant support  Standing - Dynamic : Fair;Constant support    ADL Intervention:  Grooming  Grooming Assistance: Set-up; Stand-by assistance  Position Performed: Seated edge of bed  Brushing/Combing Hair: Set-up; Stand-by assistance    Upper Body [address]: Minimum  assistance (simulated UE thru sleeves)    Toileting  Toileting Assistance: Maximum assistance (simulated urinal at bed level)    Overall, pt continues to present with deficits in generalized strength/AROM, bed mobility, static/dynamic sitting and standing balance and functional activity tolerance during performance of ADLs/mobility; c/o 9/10 back pain during session today, however is able to recall 2/3 spinal precautions IND, perform log rolling/bed mobility in prep for EOB ADL with less assist this session, still requiring additional time s/t overall pain. Cues required for PLB with SPO2 89-93% on 3L. Pt continues to make steady progress towards noted OT goals.  Will continue Requested Prescriptions   Pending Prescriptions Disp Refills     gabapentin (NEURONTIN) 300 MG capsule 30 capsule 0     Sig: Take 1 capsule (300 mg) by mouth At Bedtime       There is no refill protocol information for this order        Last Written Prescription Date:  9/4/19  Last Fill Quantity: 30,  # refills: 0   Last Office Visit with G, P or [facility] prescribing provider:  6/14/19   Future Office Visit:    Next 5 appointments (look out 90 days)    Dec 30, 2019  9:40 AM CST  Office Visit with ABHISHEK Beauchamp CNP  [facility] ([facility]) [address] [place] [id number] [phone]               Noe Faarax  [facility]   to progress. Recommend d/c to IRF once medically appropriate. Other factors to consider for discharge: family support, DME, time since, onset, severity of deficits          PLAN :  Patient continues to benefit from skilled intervention to address the above impairments. Continue treatment per established plan of care. to address goals. Recommend with staff: Encourage UE HEP in prep for ADL    Recommend next OT session: LB dressing    Recommendation for discharge: (in order for the patient to meet his/her long term goals)  [address] [facility]     This discharge recommendation:  Has been made in collaboration with the attending provider and/or case management       SUBJECTIVE:   Patient stated I think I could sit up for a little bit.     OBJECTIVE DATA SUMMARY:   Cognitive/Behavioral Status:  Neurologic State: Alert; Restless  Orientation Level: Oriented X4  Cognition: Follows commands;Decreased command following             Functional Mobility and Transfers for ADLs:  Bed Mobility:  Rolling: Contact guard assistance; Additional time  Supine to Sit: Minimum assistance; Additional time  Sit to Supine: Minimum assistance; Additional time  Scooting: Stand-by assistance; Additional time (to [facility])    Transfers:  Sit to Stand: Minimum assistance          Balance:  Sitting: Impaired; Without support  Sitting - Static: Fair (occasional)  Sitting - Dynamic: Fair (occasional)  Standing: Impaired; With support  Standing - Static: Fair;Constant support  Standing - Dynamic : Fair;Constant support    ADL Intervention:       Grooming  Grooming Assistance: Set-up; Stand-by assistance  Position Performed: Seated edge of bed  Brushing/Combing Hair: Set-up; Stand-by assistance              Upper Body [address]: Minimum  assistance (simulated UE thru sleeves)         Toileting  Toileting Assistance: Maximum assistance (simulated urinal at bed level)         Therapeutic Exercises:   Pt would benefit from UE HEP to improve overall UE AROM/strength and can be further educated in next treatment session. Pain:  9/10 back and chest with coughing (RN made aware)    Activity Tolerance:   Fair and requires frequent rest breaks    After treatment patient left in no apparent distress:   Supine in bed, Call bell within reach, and Side rails x 3    COMMUNICATION/COLLABORATION:   The patients plan of care was discussed with: Registered nurse.      Percy Sierra  Time Calculation: 29 mins

## 2022-08-18 ENCOUNTER — PATIENT OUTREACH (OUTPATIENT)
Dept: GERIATRIC MEDICINE | Facility: CLINIC | Age: 67
End: 2022-08-18

## 2022-08-18 NOTE — PROGRESS NOTES
[facility] Care Coordination Contact      [facility] Six-Month Telephone Assessment    6 month telephone assessment completed on 8/18/2022.    ER visits: No  Hospitalizations: No  TCU stays: No  Significant health status changes: None  Falls/Injuries: No  ADL/IADL changes: No  Changes in services: No    Caregiver Assessment follow up:  n/a    Goals: See POC in chart for goal progress documentation.      Will see member in 6 months for an annual health risk assessment.   Encouraged member to call CC with any questions or concerns in the meantime.     Lily Schultz RN,BSN  [facility] Case Coordinator   [phone]

## 2022-08-22 ENCOUNTER — PATIENT OUTREACH (OUTPATIENT)
Dept: GERIATRIC MEDICINE | Facility: CLINIC | Age: 67
End: 2022-08-22

## 2022-08-26 ENCOUNTER — OFFICE VISIT (OUTPATIENT)
Dept: OPTOMETRY | Facility: CLINIC | Age: 67
End: 2022-08-26
Payer: COMMERCIAL

## 2022-08-26 DIAGNOSIS — H52.4 PRESBYOPIA: ICD-10-CM

## 2022-08-26 DIAGNOSIS — H25.13 SENILE NUCLEAR SCLEROSIS, BILATERAL: ICD-10-CM

## 2022-08-26 DIAGNOSIS — H40.1131 PRIMARY OPEN ANGLE GLAUCOMA (POAG) OF BOTH EYES, MILD STAGE: Primary | ICD-10-CM

## 2022-08-26 PROCEDURE — 92012 INTRM OPH EXAM EST PATIENT: CPT | Performed by: OPTOMETRIST

## 2022-08-26 ASSESSMENT — REFRACTION_WEARINGRX
OD_CYLINDER: +0.75
OS_ADD: +2.50
OD_AXIS: 167
OS_AXIS: 158
OD_SPHERE: PLANO
OD_ADD: +2.50
OS_CYLINDER: +1.00
OD_ADD: +2.50
OD_SPHERE: PLANO
SPECS_TYPE: PAL
OD_CYLINDER: +1.00
OS_SPHERE: PLANO
OD_AXIS: 169
SPECS_TYPE: PAL
OS_SPHERE: PLANO
OS_ADD: +2.50
OS_CYLINDER: +1.00
OS_AXIS: 164

## 2022-08-26 ASSESSMENT — VISUAL ACUITY
OS_CC+: -1
CORRECTION_TYPE: GLASSES
METHOD: SNELLEN - LINEAR
CORRECTION_TYPE: GLASSES
OS_CC: 20/40
OD_CC: 20/50
OD_CC+: -1
OD_CC: 20/50
OS_CC: 20/60
METHOD: SNELLEN - LINEAR

## 2022-08-26 ASSESSMENT — CONF VISUAL FIELD
METHOD: COUNTING FINGERS
OS_NORMAL: 1
OD_NORMAL: 1

## 2022-08-26 ASSESSMENT — REFRACTION_MANIFEST
OD_ADD: +2.50
OD_CYLINDER: +0.75
OD_SPHERE: PLANO
OS_CYLINDER: +1.50
OS_ADD: +2.50
OS_SPHERE: PLANO
OD_AXIS: 050
OS_AXIS: 166

## 2022-08-26 ASSESSMENT — SLIT LAMP EXAM - LIDS
COMMENTS: SCALLOPED LID MARGINS
COMMENTS: SCALLOPED LID MARGINS

## 2022-08-26 ASSESSMENT — TONOMETRY
OD_IOP_MMHG: 15
IOP_METHOD: TONOPEN
OS_IOP_MMHG: 15

## 2022-08-26 ASSESSMENT — CUP TO DISC RATIO
OS_RATIO: 0.6
OD_RATIO: 0.75

## 2022-08-26 ASSESSMENT — EXTERNAL EXAM - RIGHT EYE: OD_EXAM: NORMAL

## 2022-08-26 NOTE — NURSING NOTE
Chief Complaints and History of Present Illnesses   Patient presents with     RECHECK     Glasses RX Check.        Chief Complaint(s) and History of Present Illness(es)     RECHECK     Comments: Glasses RX Check.               Comments     Patient returning to have glasses RX rechecked. He got glasses after the exam and states that his vision is worse than with his previous RX.   Glasses were purchased at [facility]. He has not yet back to the optical shop after he got the glasses to get the lenses checked. Has two pairs of glasses and he is having the same issues with his vision in both of the glasses.   States he is getting double vision and everything is blurry.     DM2  Lab Results       Component                Value               Date                       A1C                      6.5                 01/25/2022                 A1C                      8.8                 09/21/2021                 A1C                      7.9                 04/02/2021                 A1C                      6.6                 09/16/2020                 A1C                      6.7                 12/30/2019                 A1C                      9.2                 06/14/2019                 A1C                      7.4                 02/13/2019                               Tato Stein, Ophthalmic Assistant

## 2022-08-26 NOTE — PROGRESS NOTES
Assessment/Plan  (H40.1131) Primary open angle glaucoma (POAG) of both eyes, mild stage  Comment:   -HVF 24-2 (7/2022) showed progression since 2021 exam consistent with RNFL OCT changes noted at last exam  -IOP today 15/15 TMax 26/20  -Slightly thin pachys  Plan: Continue latanoprost nightly and timolol twice daily. Plan on IOP check in 4 months.     (H25.13) Senile nuclear sclerosis, bilateral  Comment: Not visually significant  Plan: Monitor only for now.     (H52.4) Presbyopia  Plan: Rx updated and released to patient. Relatively minor changes to astigmatism in both eyes resulted in a significant visual improvement. Monitor.      Complete documentation of historical and exam elements from today's encounter can  be found in the full encounter summary report (not reduplicated in this progress  note). I personally obtained the chief complaint(s) and history of present illness. I  confirmed and edited as necessary the review of systems, past medical/surgical  history, family history, social history, and examination findings as documented by  others; and I examined the patient myself. I personally reviewed the relevant tests,  images, and reports as documented above. I formulated and edited as necessary the  assessment and plan and discussed the findings and management plan with the  patient and family.    Ramos Verdin OD

## 2022-09-09 DIAGNOSIS — E11.319 TYPE 2 DIABETES MELLITUS WITH RETINOPATHY, WITHOUT LONG-TERM CURRENT USE OF INSULIN, MACULAR EDEMA PRESENCE UNSPECIFIED, UNSPECIFIED LATERALITY, UNSPECIFIED RETINOPATHY SEVERITY (H): ICD-10-CM

## 2022-09-11 ENCOUNTER — HEALTH MAINTENANCE LETTER (OUTPATIENT)
Age: 67
End: 2022-09-11

## 2022-09-12 RX ORDER — GLIMEPIRIDE 4 MG/1
TABLET ORAL
Qty: 90 TABLET | Refills: 0 | Status: SHIPPED | OUTPATIENT
Start: 2022-09-12 | End: 2022-12-09

## 2022-09-12 NOTE — TELEPHONE ENCOUNTER
Labs were ordered in July and he has not yet completed them.  Needs labs and OV.  Nighat WEISS, CNP

## 2022-09-16 ENCOUNTER — OFFICE VISIT (OUTPATIENT)
Dept: ORTHOPEDICS | Facility: CLINIC | Age: 67
End: 2022-09-16
Payer: COMMERCIAL

## 2022-09-16 DIAGNOSIS — M54.50 CHRONIC RIGHT-SIDED LOW BACK PAIN WITHOUT SCIATICA: Primary | ICD-10-CM

## 2022-09-16 DIAGNOSIS — G89.29 CHRONIC RIGHT-SIDED LOW BACK PAIN WITHOUT SCIATICA: Primary | ICD-10-CM

## 2022-09-16 PROCEDURE — 99214 OFFICE O/P EST MOD 30 MIN: CPT | Performed by: FAMILY MEDICINE

## 2022-09-16 ASSESSMENT — PAIN SCALES - GENERAL: PAINLEVEL: SEVERE PAIN (7)

## 2022-09-16 NOTE — PROGRESS NOTES
CHIEF COMPLAINT:  Pain of the Lower Back, Pain of the Right Hip, and Pain of the Left Hip       HISTORY OF PRESENT ILLNESS  Mr. Isaac is a pleasant 67 year old male who presents to clinic today with pain in his low back.  Kathrine has a history of chronic low back pain, he has been keeping this at bay by implementing his known physical therapy exercises, which he does on a daily basis.  Unfortunately he recently bent over while at the gym and felt an immediate sensation in his low back.  He points to the bilateral lumbosacral spine.  This is tender to the touch, this is worse whenever he engages in certain activities.  He denies any rafael radicular symptoms, although he does feel pain that radiates slightly down his posterior lateral leg mostly on the right side.,        Additional history: as documented    MEDICAL HISTORY  Patient Active Problem List   Diagnosis     Glaucoma suspect     Type 2 diabetes mellitus with retinopathy, without long-term current use of insulin, macular edema presence unspecified, unspecified laterality, unspecified retinopathy severity (H)     Hyperlipidemia LDL goal <100     Essential hypertension     Advance care planning     Diverticulosis of large intestine without hemorrhage     Obesity, Class I, BMI 30.0-34.9 (see actual BMI)     Mechanical low back pain     Hx of LASIK       Current Outpatient Medications   Medication Sig Dispense Refill     alcohol swab prep pads Use to swab area of injection/nkechi as directed. 100 each 3     ASPIRIN LOW DOSE 81 MG EC tablet TAKE 1 TABLET BY MOUTH ONCE DAILY 90 tablet 2     atorvastatin (LIPITOR) 20 MG tablet Take 1 tablet (20 mg) by mouth daily 90 tablet 3     blood glucose (ACCU-CHEK GUIDE) test strip Use to test blood sugar 2 times daily or as directed. 100 strip 11     blood glucose calibration (NO BRAND SPECIFIED) solution To accompany: Blood Glucose Monitor Brands: per insurance. 1 Bottle 3     blood glucose monitoring (NO BRAND SPECIFIED)  meter device kit Use to test blood sugar 1 times daily or as directed. Preferred blood glucose meter/supplies to accompany: Monitor Brands: per insurance. 1 kit 0     ciclopirox (PENLAC) 8 % external solution APPLY TO ADJACENT SKIN AND AFFECTED NAILS DAILY. REMOVE WITH ALCOHOL EVERY 7 DAYS THEN REPEAT 6.6 mL 0     diclofenac (VOLTAREN) 75 MG EC tablet TAKE 1 TABLET(75 MG) BY MOUTH TWICE DAILY AS NEEDED FOR MODERATE PAIN 40 tablet 2     ferrous gluconate (FERGON) 324 (38 Fe) MG tablet TAKE 1 TABLET BY MOUTH DAILY WITH BREAKFAST 90 tablet 1     gabapentin (NEURONTIN) 300 MG capsule TAKE 1 CAPSULE BY MOUTH AT BEDTIME 90 capsule 3     glimepiride (AMARYL) 4 MG tablet TAKE 1 TABLET BY MOUTH EVERY MORNING BEFORE BREAKFAST 90 tablet 0     hydrochlorothiazide (HYDRODIURIL) 12.5 MG tablet Take 1 tablet (12.5 mg) by mouth daily 90 tablet 3     JANUVIA 100 MG tablet TAKE 1 TABLET(100 MG) BY MOUTH DAILY 90 tablet 1     JARDIANCE 25 MG TABS tablet TAKE 1 TABLET(25 MG) BY MOUTH DAILY 90 tablet 1     latanoprost (XALATAN) 0.005 % ophthalmic solution Place 1 drop into both eyes daily 2.5 mL 11     lisinopril (ZESTRIL) 40 MG tablet Take 1 tablet (40 mg) by mouth daily 90 tablet 3     metFORMIN (GLUCOPHAGE-XR) 500 MG 24 hr tablet TAKE 4 TABLETS(2000 MG) BY MOUTH DAILY WITH DINNER 360 tablet 3     methocarbamol (ROBAXIN) 500 MG tablet Take 1 tablet (500 mg) by mouth 3 times daily as needed for muscle spasms 30 tablet 1     ORDER FOR DME, SET TO LOCAL PRINT, Equipment being ordered: wrist quick fit [id number] 1 each 0     order for DME Equipment being ordered: One touch lancets, test strips.  Patient to check sugars once times daily, 90 day supply for test strips and lancets, refill 3 times 1 Device 0     order for DME Equipment being ordered: Glucometer per insurance preference, lancets, test strips.  Patient to check sugars two times daily, 90 day supply for test strips and lancets, refill 3 times 1 Device 11     order for DME  Equipment being ordered: Glucometer Accucheck Sulema, lancets, test strips.  Patient to check sugars bid times daily, 90 day supply for test strips and lancets, refill 6 times 1 Device 0     order for DME Equipment being ordered: lancets for one touch glucometer for once daily testing 1 Box 6     thin (NO BRAND SPECIFIED) lancets Use with lanceting device. To accompany: Blood Glucose Monitor Brands: per insurance. 200 each 6     timolol maleate (TIMOPTIC) 0.5 % ophthalmic solution Place 1 drop into both eyes 2 times daily 10 mL 4       No Known Allergies    Family History   Problem Relation Age of Onset     Diabetes Mother      Glaucoma No family hx of      Macular Degeneration No family hx of        Additional medical/Social/Surgical histories reviewed in EPIC and updated as appropriate.        PHYSICAL EXAM  General  - normal appearance, in no obvious distress  Musculoskeletal - lumbar spine  - stance: normal gait without limp  - inspection: normal bone and joint alignment, no obvious scoliosis  - palpation: no paravertebral or bony tenderness  - ROM: normal flexion extension, sidebending, rotation  - strength: lower extremities 5/5 in all planes  - special tests:  (-) slump test  Neuro  - patellar and Achilles DTRs 2+ bilaterally, grossly normal coordination, normal muscle tone             ASSESSMENT & PLAN  Mr. Isaac is a 67 year old male who presents to clinic today with pain in his low back that is acute on chronic.    I reviewed his previous x-ray and MRI in the room with him, his MRI does reveal degenerative changes throughout, along with a disc herniation at L4-5 results in moderate to severe bilateral neuroforaminal stenosis and moderate spinal canal narrowing.    Given his acute on chronic symptoms, and his relief with an injection in the past, I am ordering a repeat MRI.  I will get in touch with him with the results.  If amenable, we could consider an injection, as this has helped him in the  past.    It was a pleasure seeing Kathrine today.    Herman El DO, [facility]  Primary Care Sports Medicine      This note was constructed using Dragon dictation software, please excuse any minor errors in spelling, grammar, or syntax.

## 2022-09-16 NOTE — NURSING NOTE
Chief Complaint   Patient presents with     Lower Back - Pain     Right Hip - Pain     Left Hip - Pain       There were no vitals filed for this visit.    There is no height or weight on file to calculate BMI.      KEVIN Ramos NREMT

## 2022-09-16 NOTE — LETTER
9/16/2022         RE: Kathrine Isaac  [id number] [facility] [facility] [id number]        Dear Colleague,    Thank you for referring your patient, Kathrine Isaac, to the [facility] [place]. Please see a copy of my visit note below.    CHIEF COMPLAINT:  Pain of the Lower Back, Pain of the Right Hip, and Pain of the Left Hip       HISTORY OF PRESENT ILLNESS  Mr. Isaac is a pleasant 67 year old male who presents to clinic today with pain in his low back.  Kathrine has a history of chronic low back pain, he has been keeping this at bay by implementing his known physical therapy exercises, which he does on a daily basis.  Unfortunately he recently bent over while at the gym and felt an immediate sensation in his low back.  He points to the bilateral lumbosacral spine.  This is tender to the touch, this is worse whenever he engages in certain activities.  He denies any rafael radicular symptoms, although he does feel pain that radiates slightly down his posterior lateral leg mostly on the right side.,        Additional history: as documented    MEDICAL HISTORY  Patient Active Problem List   Diagnosis     Glaucoma suspect     Type 2 diabetes mellitus with retinopathy, without long-term current use of insulin, macular edema presence unspecified, unspecified laterality, unspecified retinopathy severity (H)     Hyperlipidemia LDL goal <100     Essential hypertension     Advance care planning     Diverticulosis of large intestine without hemorrhage     Obesity, Class I, BMI 30.0-34.9 (see actual BMI)     Mechanical low back pain     Hx of LASIK       Current Outpatient Medications   Medication Sig Dispense Refill     alcohol swab prep pads Use to swab area of injection/nkechi as directed. 100 each 3     ASPIRIN LOW DOSE 81 MG EC tablet TAKE 1 TABLET BY MOUTH ONCE DAILY 90 tablet 2     atorvastatin (LIPITOR) 20 MG tablet Take 1 tablet (20 mg) by mouth daily 90 tablet 3     blood  glucose (ACCU-CHEK GUIDE) test strip Use to test blood sugar 2 times daily or as directed. 100 strip 11     blood glucose calibration (NO BRAND SPECIFIED) solution To accompany: Blood Glucose Monitor Brands: per insurance. 1 Bottle 3     blood glucose monitoring (NO BRAND SPECIFIED) meter device kit Use to test blood sugar 1 times daily or as directed. Preferred blood glucose meter/supplies to accompany: Monitor Brands: per insurance. 1 kit 0     ciclopirox (PENLAC) 8 % external solution APPLY TO ADJACENT SKIN AND AFFECTED NAILS DAILY. REMOVE WITH ALCOHOL EVERY 7 DAYS THEN REPEAT 6.6 mL 0     diclofenac (VOLTAREN) 75 MG EC tablet TAKE 1 TABLET(75 MG) BY MOUTH TWICE DAILY AS NEEDED FOR MODERATE PAIN 40 tablet 2     ferrous gluconate (FERGON) 324 (38 Fe) MG tablet TAKE 1 TABLET BY MOUTH DAILY WITH BREAKFAST 90 tablet 1     gabapentin (NEURONTIN) 300 MG capsule TAKE 1 CAPSULE BY MOUTH AT BEDTIME 90 capsule 3     glimepiride (AMARYL) 4 MG tablet TAKE 1 TABLET BY MOUTH EVERY MORNING BEFORE BREAKFAST 90 tablet 0     hydrochlorothiazide (HYDRODIURIL) 12.5 MG tablet Take 1 tablet (12.5 mg) by mouth daily 90 tablet 3     JANUVIA 100 MG tablet TAKE 1 TABLET(100 MG) BY MOUTH DAILY 90 tablet 1     JARDIANCE 25 MG TABS tablet TAKE 1 TABLET(25 MG) BY MOUTH DAILY 90 tablet 1     latanoprost (XALATAN) 0.005 % ophthalmic solution Place 1 drop into both eyes daily 2.5 mL 11     lisinopril (ZESTRIL) 40 MG tablet Take 1 tablet (40 mg) by mouth daily 90 tablet 3     metFORMIN (GLUCOPHAGE-XR) 500 MG 24 hr tablet TAKE 4 TABLETS(2000 MG) BY MOUTH DAILY WITH DINNER 360 tablet 3     methocarbamol (ROBAXIN) 500 MG tablet Take 1 tablet (500 mg) by mouth 3 times daily as needed for muscle spasms 30 tablet 1     ORDER FOR DME, SET TO LOCAL PRINT, Equipment being ordered: wrist quick fit [id number] 1 each 0     order for DME Equipment being ordered: One touch lancets, test strips.  Patient to check sugars once times daily, 90 day supply for test  strips and lancets, refill 3 times 1 Device 0     order for DME Equipment being ordered: Glucometer per insurance preference, lancets, test strips.  Patient to check sugars two times daily, 90 day supply for test strips and lancets, refill 3 times 1 Device 11     order for DME Equipment being ordered: Glucometer Accucheck Sulema, lancets, test strips.  Patient to check sugars bid times daily, 90 day supply for test strips and lancets, refill 6 times 1 Device 0     order for DME Equipment being ordered: lancets for one touch glucometer for once daily testing 1 Box 6     thin (NO BRAND SPECIFIED) lancets Use with lanceting device. To accompany: Blood Glucose Monitor Brands: per insurance. 200 each 6     timolol maleate (TIMOPTIC) 0.5 % ophthalmic solution Place 1 drop into both eyes 2 times daily 10 mL 4       No Known Allergies    Family History   Problem Relation Age of Onset     Diabetes Mother      Glaucoma No family hx of      Macular Degeneration No family hx of        Additional medical/Social/Surgical histories reviewed in EPIC and updated as appropriate.        PHYSICAL EXAM  General  - normal appearance, in no obvious distress  Musculoskeletal - lumbar spine  - stance: normal gait without limp  - inspection: normal bone and joint alignment, no obvious scoliosis  - palpation: no paravertebral or bony tenderness  - ROM: normal flexion extension, sidebending, rotation  - strength: lower extremities 5/5 in all planes  - special tests:  (-) slump test  Neuro  - patellar and Achilles DTRs 2+ bilaterally, grossly normal coordination, normal muscle tone             ASSESSMENT & PLAN  Mr. Isaac is a 67 year old male who presents to clinic today with pain in his low back that is acute on chronic.    I reviewed his previous x-ray and MRI in the room with him, his MRI does reveal degenerative changes throughout, along with a disc herniation at L4-5 results in moderate to severe bilateral neuroforaminal stenosis and  moderate spinal canal narrowing.    Given his acute on chronic symptoms, and his relief with an injection in the past, I am ordering a repeat MRI.  I will get in touch with him with the results.  If amenable, we could consider an injection, as this has helped him in the past.    It was a pleasure seeing Kathrine today.    Herman El DO, [facility]  Primary Care Sports Medicine      This note was constructed using Dragon dictation software, please excuse any minor errors in spelling, grammar, or syntax.        Again, thank you for allowing me to participate in the care of your patient.        Sincerely,        Herman El DO

## 2022-09-23 ENCOUNTER — ANCILLARY PROCEDURE (OUTPATIENT)
Dept: MRI IMAGING | Facility: CLINIC | Age: 67
End: 2022-09-23
Attending: FAMILY MEDICINE
Payer: COMMERCIAL

## 2022-09-23 DIAGNOSIS — M54.50 CHRONIC RIGHT-SIDED LOW BACK PAIN WITHOUT SCIATICA: ICD-10-CM

## 2022-09-23 DIAGNOSIS — G89.29 CHRONIC RIGHT-SIDED LOW BACK PAIN WITHOUT SCIATICA: ICD-10-CM

## 2022-09-23 PROCEDURE — 72148 MRI LUMBAR SPINE W/O DYE: CPT | Mod: GC | Performed by: STUDENT IN AN ORGANIZED HEALTH CARE EDUCATION/TRAINING PROGRAM

## 2022-09-26 DIAGNOSIS — M54.16 LUMBAR RADICULITIS: ICD-10-CM

## 2022-09-26 DIAGNOSIS — G89.29 CHRONIC RIGHT-SIDED LOW BACK PAIN WITHOUT SCIATICA: Primary | ICD-10-CM

## 2022-09-26 DIAGNOSIS — M54.50 CHRONIC RIGHT-SIDED LOW BACK PAIN WITHOUT SCIATICA: Primary | ICD-10-CM

## 2022-10-01 DIAGNOSIS — H40.1131 PRIMARY OPEN ANGLE GLAUCOMA (POAG) OF BOTH EYES, MILD STAGE: ICD-10-CM

## 2022-10-04 RX ORDER — TIMOLOL MALEATE 5 MG/ML
1 SOLUTION/ DROPS OPHTHALMIC 2 TIMES DAILY
Qty: 10 ML | Refills: 3 | Status: SHIPPED | OUTPATIENT
Start: 2022-10-04 | End: 2023-06-27

## 2022-10-05 ENCOUNTER — TELEPHONE (OUTPATIENT)
Dept: MEDSURG UNIT | Facility: CLINIC | Age: 67
End: 2022-10-05

## 2022-10-07 ENCOUNTER — HOSPITAL ENCOUNTER (OUTPATIENT)
Facility: CLINIC | Age: 67
Discharge: HOME | End: 2022-10-07
Admitting: PHYSICIAN ASSISTANT
Payer: COMMERCIAL

## 2022-10-07 ENCOUNTER — HOSPITAL ENCOUNTER (OUTPATIENT)
Dept: GENERAL RADIOLOGY | Facility: CLINIC | Age: 67
Discharge: HOME | End: 2022-10-07
Attending: FAMILY MEDICINE
Payer: COMMERCIAL

## 2022-10-07 VITALS — HEART RATE: 69 BPM | OXYGEN SATURATION: 99 % | DIASTOLIC BLOOD PRESSURE: 69 MMHG | SYSTOLIC BLOOD PRESSURE: 120 MMHG

## 2022-10-07 VITALS
DIASTOLIC BLOOD PRESSURE: 72 MMHG | OXYGEN SATURATION: 99 % | RESPIRATION RATE: 16 BRPM | HEART RATE: 65 BPM | SYSTOLIC BLOOD PRESSURE: 119 MMHG

## 2022-10-07 DIAGNOSIS — E78.5 HYPERLIPIDEMIA LDL GOAL <100: ICD-10-CM

## 2022-10-07 DIAGNOSIS — M54.16 LUMBAR RADICULITIS: ICD-10-CM

## 2022-10-07 PROCEDURE — 999N000154 HC STATISTIC RADIOLOGY XRAY, US, CT, MAR, NM

## 2022-10-07 PROCEDURE — 250N000009 HC RX 250: Performed by: FAMILY MEDICINE

## 2022-10-07 PROCEDURE — 250N000011 HC RX IP 250 OP 636: Performed by: FAMILY MEDICINE

## 2022-10-07 PROCEDURE — 62323 NJX INTERLAMINAR LMBR/SAC: CPT

## 2022-10-07 PROCEDURE — 255N000002 HC RX 255 OP 636: Performed by: FAMILY MEDICINE

## 2022-10-07 PROCEDURE — 96372 THER/PROPH/DIAG INJ SC/IM: CPT | Performed by: FAMILY MEDICINE

## 2022-10-07 RX ORDER — IOPAMIDOL 408 MG/ML
10 INJECTION, SOLUTION INTRATHECAL ONCE
Status: COMPLETED | OUTPATIENT
Start: 2022-10-07 | End: 2022-10-07

## 2022-10-07 RX ORDER — DEXAMETHASONE SODIUM PHOSPHATE 10 MG/ML
10 INJECTION, SOLUTION INTRAMUSCULAR; INTRAVENOUS ONCE
Status: COMPLETED | OUTPATIENT
Start: 2022-10-07 | End: 2022-10-07

## 2022-10-07 RX ORDER — HYDROCHLOROTHIAZIDE 12.5 MG/1
TABLET ORAL
Qty: 90 TABLET | Refills: 0 | Status: SHIPPED | OUTPATIENT
Start: 2022-10-07 | End: 2022-10-20

## 2022-10-07 RX ORDER — DEXTROSE MONOHYDRATE 25 G/50ML
25-50 INJECTION, SOLUTION INTRAVENOUS
Status: DISCONTINUED | OUTPATIENT
Start: 2022-10-07 | End: 2022-10-07 | Stop reason: HOSPADM

## 2022-10-07 RX ORDER — LIDOCAINE HYDROCHLORIDE 10 MG/ML
30 INJECTION, SOLUTION EPIDURAL; INFILTRATION; INTRACAUDAL; PERINEURAL ONCE
Status: COMPLETED | OUTPATIENT
Start: 2022-10-07 | End: 2022-10-07

## 2022-10-07 RX ORDER — NICOTINE POLACRILEX 4 MG
15-30 LOZENGE BUCCAL
Status: DISCONTINUED | OUTPATIENT
Start: 2022-10-07 | End: 2022-10-07 | Stop reason: HOSPADM

## 2022-10-07 RX ORDER — LIDOCAINE HYDROCHLORIDE 10 MG/ML
5 INJECTION, SOLUTION EPIDURAL; INFILTRATION; INTRACAUDAL; PERINEURAL ONCE
Status: COMPLETED | OUTPATIENT
Start: 2022-10-07 | End: 2022-10-07

## 2022-10-07 RX ORDER — LIDOCAINE 40 MG/G
CREAM TOPICAL
Status: CANCELLED | OUTPATIENT
Start: 2022-10-07

## 2022-10-07 RX ADMIN — LIDOCAINE HYDROCHLORIDE 2.5 ML: 10 INJECTION, SOLUTION EPIDURAL; INFILTRATION; INTRACAUDAL; PERINEURAL at 09:33

## 2022-10-07 RX ADMIN — LIDOCAINE HYDROCHLORIDE 4 ML: 10 INJECTION, SOLUTION EPIDURAL; INFILTRATION; INTRACAUDAL; PERINEURAL at 09:53

## 2022-10-07 RX ADMIN — IOPAMIDOL 1.5 ML: 408 INJECTION, SOLUTION INTRATHECAL at 09:37

## 2022-10-07 RX ADMIN — DEXAMETHASONE SODIUM PHOSPHATE 20 MG: 10 INJECTION, SOLUTION INTRAMUSCULAR; INTRAVENOUS at 09:53

## 2022-10-07 ASSESSMENT — ACTIVITIES OF DAILY LIVING (ADL): ADLS_ACUITY_SCORE: 35

## 2022-10-07 NOTE — PROCEDURES
[facility]    Procedure: Right L4-5 and L5-S1 TFESI    Date/Time: 10/7/2022 10:01 AM  Performed by: Nj Newton PA-C  Authorized by: Nj Newton PA-C       UNIVERSAL PROTOCOL   Site Marked: Yes  Prior Images Obtained and Reviewed:  Yes  Required items: Required blood products, implants, devices and special equipment available    Patient identity confirmed:  Verbally with patient  Patient was reevaluated immediately before administering moderate or deep sedation or anesthesia  Confirmation Checklist:  Patient's identity using two indicators, relevant allergies, procedure was appropriate and matched the consent or emergent situation and correct equipment/implants were available  Time out: Immediately prior to the procedure a time out was called    Universal Protocol: the Joint Commission Universal Protocol was followed    Preparation: Patient was prepped and draped in usual sterile fashion       ANESTHESIA    Local Anesthetic: Lidocaine 1% without epinephrine      SEDATION    Patient Sedated: No    See dictated procedure note for full details.    PROCEDURE  Describe Procedure: Very tight foramen.  Would recommend right L4-L5 ILESI if another LADY required.  Patient Tolerance:  Patient tolerated the procedure well with no immediate complications  Length of time physician/provider present for 1:1 monitoring during sedation: 0

## 2022-10-07 NOTE — PROGRESS NOTES
Care Suites Post Procedure Note    Patient Information  Name: Kathrine Isaac  Age: 67 year old    Post Procedure  Time patient returned to Care Suites: 1000  Concerns/abnormal assessment: None at this time  If abnormal assessment, provider notified: N/A  Plan/Other: Monitor site and vitals.  Discharge via car with visitor    Bishop Naidu RN

## 2022-10-07 NOTE — DISCHARGE INSTRUCTIONS
Steroid Injection Discharge Instructions     After you go home:    You may resume your normal diet.    Care of Puncture Site:    If you have a bandaid on your puncture site, you may remove it the next morning  You may shower tomorrow  No bath tubs, whirlpools or swimming pool for at least 48 hours  Use ice packs as needed for discomfort     Activity:    Minimize your activity today. You may gradually resume your normal activity as tolerated  Avoid vigorous or strenuous activity until your symptoms improve or as directed by your doctor  Do NOT drive a vehicle for a few hours after the injection - or longer if you develop numbness in your arm or leg    Medicines:    You may resume all medications, including blood thinners  Resume your Warfarin/Coumadin at your regular dose today. Follow up with your provider to have your INR rechecked  Resume your Platelet Inhibitors and Aspirin tomorrow at your regular dose  For minor discomfort, you may take Acetaminophen (Tylenol) or Ibuprofen (Advil)    Pain:     You may experience increased or different pain over the next 24-48 hours  For the next 48 hrs - you may use ice packs for discomfort     Call your primary care doctor if:    You have severe pain that does not improve with pain medication  You have chills or a fever greater than 101 F (38 C)  The site is red, swollen, hot or tender  Increase in pain, weakness or numbness  New problems with your bowel or bladder  Any questions or concerns    What to watch for:    It can be normal to have some bruising or slight swelling at the puncture site.   After the procedure, you may have some new weakness or numbness down your arm/leg from the numbing medicine. This should resolve in a few hours.   You may feel some temporary relief from the numbing medicine, but that will wear off within a few hours.  Your symptoms may return to pre procedure level, or can even be worse for the first 1-2 days.  For many people, the steroid begins to  provide some relief within 2-3 days, but it can take up to 2 weeks to obtain the full results.  Some people will get lasting relief from a single injection. Others may require up to 3 injections to get results. If you have more than one steroid injection, they should be given 2 weeks apart.  If you have no improvement in your symptoms after two weeks, please contact the doctor who ordered this procedure to discuss the next steps.  Side effects of your steroid injection are mild and will go away in 2-3 days  Insomnia  Irritability  Flushed face  Water retention  Restlessness  Difficulty sleeping  Increased appetite  Increased blood sugar  If you are diabetic, monitor your blood sugar closely. Contact the provider who manages your diabetes to help you control your blood sugar if needed.    If you have questions or concerns call:                  [facility] Radiology Dept @ [phone]                                    between 8am-4:30pm Mon-Fri    If you have urgent questions outside of these normal business hours, please contact the [facility] on call doctor @ [phone]

## 2022-10-18 DIAGNOSIS — E11.319 TYPE 2 DIABETES MELLITUS WITH RETINOPATHY, WITHOUT LONG-TERM CURRENT USE OF INSULIN, MACULAR EDEMA PRESENCE UNSPECIFIED, UNSPECIFIED LATERALITY, UNSPECIFIED RETINOPATHY SEVERITY (H): ICD-10-CM

## 2022-10-18 DIAGNOSIS — I10 ESSENTIAL HYPERTENSION: ICD-10-CM

## 2022-10-18 RX ORDER — SITAGLIPTIN 100 MG/1
TABLET, FILM COATED ORAL
Qty: 90 TABLET | Refills: 0 | Status: SHIPPED | OUTPATIENT
Start: 2022-10-18 | End: 2022-10-20

## 2022-10-18 RX ORDER — LISINOPRIL 40 MG/1
TABLET ORAL
Qty: 90 TABLET | Refills: 0 | Status: SHIPPED | OUTPATIENT
Start: 2022-10-18 | End: 2022-10-20

## 2022-10-18 NOTE — TELEPHONE ENCOUNTER
Labs were ordered in July. He has appt scheduled  For 10/20/22. Will refill X 90 day sonly until he has completed labs.  Nighat WEISS, CNP

## 2022-10-20 ENCOUNTER — OFFICE VISIT (OUTPATIENT)
Dept: FAMILY MEDICINE | Facility: CLINIC | Age: 67
End: 2022-10-20
Payer: COMMERCIAL

## 2022-10-20 VITALS
HEART RATE: 69 BPM | SYSTOLIC BLOOD PRESSURE: 112 MMHG | TEMPERATURE: 97.3 F | HEIGHT: 67 IN | WEIGHT: 189.2 LBS | BODY MASS INDEX: 29.7 KG/M2 | OXYGEN SATURATION: 98 % | DIASTOLIC BLOOD PRESSURE: 71 MMHG | RESPIRATION RATE: 16 BRPM

## 2022-10-20 DIAGNOSIS — E11.319 TYPE 2 DIABETES MELLITUS WITH RETINOPATHY, WITHOUT LONG-TERM CURRENT USE OF INSULIN, MACULAR EDEMA PRESENCE UNSPECIFIED, UNSPECIFIED LATERALITY, UNSPECIFIED RETINOPATHY SEVERITY (H): ICD-10-CM

## 2022-10-20 DIAGNOSIS — B35.1 ONYCHOMYCOSIS: ICD-10-CM

## 2022-10-20 DIAGNOSIS — Z13.6 SCREENING FOR AAA (ABDOMINAL AORTIC ANEURYSM): ICD-10-CM

## 2022-10-20 DIAGNOSIS — D50.9 IRON DEFICIENCY ANEMIA, UNSPECIFIED IRON DEFICIENCY ANEMIA TYPE: ICD-10-CM

## 2022-10-20 DIAGNOSIS — Z00.00 MEDICARE ANNUAL WELLNESS VISIT, INITIAL: Primary | ICD-10-CM

## 2022-10-20 DIAGNOSIS — Z23 NEED FOR IMMUNIZATION AGAINST INFLUENZA: ICD-10-CM

## 2022-10-20 DIAGNOSIS — Z12.5 SCREENING FOR PROSTATE CANCER: ICD-10-CM

## 2022-10-20 DIAGNOSIS — M25.562 CHRONIC PAIN OF LEFT KNEE: ICD-10-CM

## 2022-10-20 DIAGNOSIS — M51.16 LUMBAR DISC HERNIATION WITH RADICULOPATHY: ICD-10-CM

## 2022-10-20 DIAGNOSIS — G89.29 CHRONIC PAIN OF LEFT KNEE: ICD-10-CM

## 2022-10-20 DIAGNOSIS — E78.5 HYPERLIPIDEMIA LDL GOAL <100: ICD-10-CM

## 2022-10-20 DIAGNOSIS — I10 ESSENTIAL HYPERTENSION: ICD-10-CM

## 2022-10-20 DIAGNOSIS — M17.12 ARTHRITIS OF LEFT KNEE: ICD-10-CM

## 2022-10-20 DIAGNOSIS — Z23 HIGH PRIORITY FOR 2019-NCOV VACCINE: ICD-10-CM

## 2022-10-20 DIAGNOSIS — E66.811 OBESITY, CLASS I, BMI 30.0-34.9 (SEE ACTUAL BMI): ICD-10-CM

## 2022-10-20 LAB
ERYTHROCYTE [DISTWIDTH] IN BLOOD BY AUTOMATED COUNT: 13.2 % (ref 10–15)
FERRITIN SERPL-MCNC: 33 NG/ML (ref 26–388)
HBA1C MFR BLD: 6.7 % (ref 0–5.6)
HCT VFR BLD AUTO: 45.9 % (ref 40–53)
HGB BLD-MCNC: 16 G/DL (ref 13.3–17.7)
MCH RBC QN AUTO: 30.2 PG (ref 26.5–33)
MCHC RBC AUTO-ENTMCNC: 34.9 G/DL (ref 31.5–36.5)
MCV RBC AUTO: 87 FL (ref 78–100)
PLATELET # BLD AUTO: 199 10E3/UL (ref 150–450)
PSA SERPL-MCNC: 2.08 UG/L (ref 0–4)
RBC # BLD AUTO: 5.29 10E6/UL (ref 4.4–5.9)
WBC # BLD AUTO: 11.5 10E3/UL (ref 4–11)

## 2022-10-20 PROCEDURE — 0124A COVID-19,PF,PFIZER BOOSTER BIVALENT: CPT | Performed by: NURSE PRACTITIONER

## 2022-10-20 PROCEDURE — 99214 OFFICE O/P EST MOD 30 MIN: CPT | Mod: 25 | Performed by: NURSE PRACTITIONER

## 2022-10-20 PROCEDURE — 91312 COVID-19,PF,PFIZER BOOSTER BIVALENT: CPT | Performed by: NURSE PRACTITIONER

## 2022-10-20 PROCEDURE — 36415 COLL VENOUS BLD VENIPUNCTURE: CPT | Performed by: NURSE PRACTITIONER

## 2022-10-20 PROCEDURE — 85027 COMPLETE CBC AUTOMATED: CPT | Performed by: NURSE PRACTITIONER

## 2022-10-20 PROCEDURE — 99397 PER PM REEVAL EST PAT 65+ YR: CPT | Mod: 25 | Performed by: NURSE PRACTITIONER

## 2022-10-20 PROCEDURE — 99207 PR FOOT EXAM NO CHARGE: CPT | Mod: 25 | Performed by: NURSE PRACTITIONER

## 2022-10-20 PROCEDURE — G0008 ADMIN INFLUENZA VIRUS VAC: HCPCS | Performed by: NURSE PRACTITIONER

## 2022-10-20 PROCEDURE — 82728 ASSAY OF FERRITIN: CPT | Performed by: NURSE PRACTITIONER

## 2022-10-20 PROCEDURE — G0103 PSA SCREENING: HCPCS | Performed by: NURSE PRACTITIONER

## 2022-10-20 PROCEDURE — 90662 IIV NO PRSV INCREASED AG IM: CPT | Performed by: NURSE PRACTITIONER

## 2022-10-20 PROCEDURE — 83036 HEMOGLOBIN GLYCOSYLATED A1C: CPT | Performed by: NURSE PRACTITIONER

## 2022-10-20 RX ORDER — METFORMIN HCL 500 MG
TABLET, EXTENDED RELEASE 24 HR ORAL
Qty: 360 TABLET | Refills: 3 | Status: SHIPPED | OUTPATIENT
Start: 2022-10-20 | End: 2022-12-16

## 2022-10-20 RX ORDER — HYDROCHLOROTHIAZIDE 12.5 MG/1
TABLET ORAL
Qty: 90 TABLET | Refills: 0 | Status: SHIPPED | OUTPATIENT
Start: 2022-10-20 | End: 2022-12-09

## 2022-10-20 RX ORDER — GABAPENTIN 300 MG/1
CAPSULE ORAL
Qty: 90 CAPSULE | Refills: 3 | Status: SHIPPED | OUTPATIENT
Start: 2022-10-20 | End: 2022-12-09

## 2022-10-20 RX ORDER — CICLOPIROX 80 MG/ML
SOLUTION TOPICAL
Qty: 6.6 ML | Refills: 0 | Status: SHIPPED | OUTPATIENT
Start: 2022-10-20 | End: 2022-12-15

## 2022-10-20 RX ORDER — LISINOPRIL 40 MG/1
40 TABLET ORAL DAILY
Qty: 90 TABLET | Refills: 3 | Status: SHIPPED | OUTPATIENT
Start: 2022-10-20 | End: 2023-02-08

## 2022-10-20 ASSESSMENT — PAIN SCALES - GENERAL: PAINLEVEL: EXTREME PAIN (9)

## 2022-10-20 NOTE — PATIENT INSTRUCTIONS
At [facility], we strive to deliver an exceptional experience to you, every time we see you. If you receive a survey, please complete it as we do value your feedback.  If you have MyChart, you can expect to receive results automatically within 24 hours of their completion.  Your provider will send a note interpreting your results as well.   If you do not have MyChart, you should receive your results in about a week by mail.    Your care team:                            Family Medicine Internal Medicine   MD Tobias Lu MD Shantel Branch-Fleming, MD Srinivasa Vaka, MD Katya Belousova, LAN KnappHillABHISHEK Mancilla CNP, MD Pediatrics   Michi Bowman, MD Renée Graham MD Amelia Massimini APRN CNP   Sushma Joe, APRN CNP MD Amanda Mcgowan MD          Clinic hours: Monday - Thursday 7 am-6 pm; Fridays 7 am-5 pm.   Urgent care: Monday - Friday 10 am- 8 pm; Saturday and Sunday 9 am-5 pm.    Clinic: [phone]       [facility]: Monday - Thursday 8 am - 7 pm; Friday 8 am - 6 pm  [facility]: [phone]    Patient Education   Personalized Prevention Plan  You are due for the preventive services outlined below.  Your care team is available to assist you in scheduling these services.  If you have already completed any of these items, please share that information with your care team to update in your medical record.  Health Maintenance Due   Topic Date Due     AORTIC ANEURYSM SCREENING (SYSTEM ASSIGNED)  Never done     COVID-19 Vaccine (4 - Booster for Pfizer series) 01/31/2022     A1C Lab  07/25/2022     Flu Vaccine (1) 09/01/2022     Diabetic Foot Exam  09/21/2022     ANNUAL REVIEW OF HM ORDERS  09/21/2022     Annual Wellness Visit  09/21/2022

## 2022-10-20 NOTE — PROGRESS NOTES
"  Assessment & Plan     Medicare annual wellness visit, initial    - REVIEW OF HEALTH MAINTENANCE PROTOCOL ORDERS    Type 2 diabetes mellitus with retinopathy, without long-term current use of insulin, macular edema presence unspecified, unspecified laterality, unspecified retinopathy severity (H)  Stable, well controlled, continue current management. Scheduled for eye exam in November.  - Hemoglobin A1c  - FOOT EXAM    Iron deficiency anemia, unspecified iron deficiency anemia type  Stable, no longer anemic, stop po iron, reviewed high iron containing foods, recheck labs if he develops new symptoms.  - Ferritin  - CBC with platelets    Essential hypertension  Well controlled    Hyperlipidemia LDL goal <100  Checking labs, continue Lipitor which he is tolerating well    Obesity, Class I, BMI 30.0-34.9 (see actual BMI)  Benefits of weight loss reviewed in detail, encouraged him to cut back on the carbohydrates in the diet, consume more fruits and vegetables, drink plenty of water, avoid fruit juices, sodas, get 150 min moderate exercise/week.  Recheck weight in 6 months.      Screening for prostate cancer    - PSA, screen    Screening for AAA (abdominal aortic aneurysm)    -  Aorta Medicare AAA Screening    Need for immunization against influenza    - INFLUENZA, QUAD, HIGH DOSE, PF, 65YR + (FLUZONE HD)  - ADMIN INFLUENZA (For MEDICARE Patients ONLY) []    High priority for 2019-nCoV vaccine    - COVID-19,PF,PFIZER BOOSTER BIVALENT 12+Yrs    Medications refilled.  Ordering of each unique test  Prescription drug management  27 minutes spent on the date of the encounter doing chart review, history and exam, documentation and further activities per the note       BMI:   Estimated body mass index is 30.08 kg/m  as calculated from the following:    Height as of this encounter: 1.689 m (5' 6.5\").    Weight as of this encounter: 85.8 kg (189 lb 3.2 oz).   Weight management plan: Discussed healthy diet and exercise " "guidelines    Work on weight loss  Regular exercise  See Patient Instructions    Return in about 6 months (around 4/20/2023), or if symptoms worsen or fail to improve, for Annual Wellness Visit, Follow up.    ABHISHEK [facility] SABI Chisholm is a 67 year old, presenting for the following health issues:  Hypertension, Lipids, Diabetes, and Medicare Visit      History of Present Illness       Reason for visit:  See Primary Care Doctor for regular blood work.He consumes 1 sweetened beverage(s) daily.He exercises with enough effort to increase his heart rate 60 or more minutes per day.  He exercises with enough effort to increase his heart rate 5 days per week.   He is taking medications regularly.       Annual Wellness Visit    Patient has been advised of split billing requirements and indicates understanding: Yes     Are you in the first 12 months of your Medicare Part B coverage?  Yes,  Visual Acuity:  Right Eye: 20/40   Left Eye: 20/40  Both Eyes: 20/40 W/ correction glasses     Physical Health:    In general, how would you rate your overall physical health? good    Outside of work, how many days during the week do you exercise?4-5 days/week    Outside of work, approximately how many minutes a day do you exercise?greater than 60 minutes    If you drink alcohol do you typically have >3 drinks per day or >7 drinks per week? No    Do you usually eat at least 4 servings of fruit and vegetables a day, include whole grains & fiber and avoid regularly eating high fat or \"junk\" foods? Yes    Do you have any problems taking medications regularly? No    Do you have any side effects from medications? none    Needs assistance for the following daily activities: no assistance needed    Which of the following safety concerns are present in your home?  none identified     Hearing impairment: No    In the past 6 months, have you been bothered by leaking of urine? " no    Mental Health:    In general, how would you rate your overall mental or emotional health? good  PHQ-2 Score:      Do you feel safe in your environment? Yes    Have you ever done Advance Care Planning? (For example, a Health Directive, POLST, or a discussion with a medical provider or your loved ones about your wishes)? No, advance care planning information given to patient to review.  Advanced care planning was discussed at today's visit.    Fall risk:  Fallen 2 or more times in the past year?: No  Any fall with injury in the past year?: No  click delete button to remove this line now  Cognitive Screenin) Repeat 3 items (Leader, Season, Table)    2) Clock draw: ABNORMAL . He misunderstood the requested time- normal clock  3) 3 item recall: Recalls 3 objects  Results: NORMAL clock, 1-2 items recalled:,    Mini-CogTM Copyright S Ayesha. Licensed by the author for use in [facility]; reprinted with permission (deidra@[email or web address]). All rights reserved.      Do you have sleep apnea, excessive snoring or daytime drowsiness?: yes    Current providers sharing in care for this patient include:   Patient Care Team:  Nighat Joe APRN CNP as PCP - General (Nurse Practitioner - Family)  Nighat Joe APRN CNP as Assigned PCP  Herman Lindquist MD as Assigned Musculoskeletal Provider  Lily Schultz RN as Lead Care Coordinator (Primary Care - CC)  Ramos Verdin OD as Assigned Surgical Provider    Patient has been advised of split billing requirements and indicates understanding: Yes     Diabetes Follow-up    How often are you checking your blood sugar? A few times a week  What time of day are you checking your blood sugars (select all that apply)?  Before meals  Have you had any blood sugars above 200?  No  Have you had any blood sugars below 70?  No    What symptoms do you notice when your blood sugar is low?  Shaky and perspirres    What concerns do you have today about your diabetes?  None     Do you have any of these symptoms? (Select all that apply)  No numbness or tingling in feet.  No redness, sores or blisters on feet.  No complaints of excessive thirst.  No reports of blurry vision.  No significant changes to weight.        Hyperlipidemia Follow-Up      Are you regularly taking any medication or supplement to lower your cholesterol?   Yes- Lipitor 20 mg daily    Are you having muscle aches or other side effects that you think could be caused by your cholesterol lowering medication?  No    Hypertension Follow-up      Do you check your blood pressure regularly outside of the clinic? Yes     Are you following a low salt diet? Yes    Are your blood pressures ever more than 140 on the top number (systolic) OR more   than 90 on the bottom number (diastolic), for example 140/90? No    BP Readings from Last 2 Encounters:   10/20/22 112/71   10/07/22 120/69     Hemoglobin A1C (%)   Date Value   10/20/2022 6.7 (H)   01/25/2022 6.5 (H)   04/02/2021 7.9 (H)   09/16/2020 6.6 (H)     LDL Cholesterol Calculated (mg/dL)   Date Value   04/23/2022 52   04/02/2021 49   12/30/2019 54     Anemia- on po iron daily. Last colonoscopy 1/14/2016, no rectal bleeding easy bruising, fatigue, JARVIS.      How many servings of fruits and vegetables do you eat daily?  4 or more    On average, how many sweetened beverages do you drink each day (Examples: soda, juice, sweet tea, etc.  Do NOT count diet or artificially sweetened beverages)?   0    How many days per week do you exercise enough to make your heart beat faster? 5    How many minutes a day do you exercise enough to make your heart beat faster? 60 or more    How many days per week do you miss taking your medication? 0      Review of Systems   Constitutional, HEENT, cardiovascular, pulmonary, gi and gu systems are negative, except as otherwise noted.      Objective    /71 (BP Location: Left arm, Patient Position: Chair, Cuff Size: Adult Regular)   Pulse 69    "Temp 97.3  F (36.3  C) (Tympanic)   Resp 16   Ht 1.689 m (5' 6.5\")   Wt 85.8 kg (189 lb 3.2 oz)   SpO2 98%   BMI 30.08 kg/m    Body mass index is 30.08 kg/m .  Physical Exam   GENERAL: healthy, alert and no distress  EYES: Eyes grossly normal to inspection, PERRL and conjunctivae and sclerae normal  HENT: ear canals and TM's normal, nose and mouth without ulcers or lesions  NECK: no adenopathy, no asymmetry, masses, or scars and thyroid normal to palpation  RESP: lungs clear to auscultation - no rales, rhonchi or wheezes  CV: regular rate and rhythm, normal S1 S2, no S3 or S4, no murmur, click or rub, no peripheral edema and peripheral pulses strong  ABDOMEN: soft, nontender, no hepatosplenomegaly, no masses and bowel sounds normal   (male): normal male genitalia without lesions or urethral discharge, no hernia  MS: no gross musculoskeletal defects noted, no edema  SKIN: no suspicious lesions or rashes  NEURO: Normal strength and tone, mentation intact and speech normal  PSYCH: mentation appears normal, affect normal/bright  LYMPH: no cervical, supraclavicular, axillary, or inguinal adenopathy  Diabetic foot exam: normal DP and PT pulses, no trophic changes or ulcerative lesions, normal sensory exam and normal monofilament exam    Results for orders placed or performed in visit on 10/20/22 (from the past 24 hour(s))   CBC with platelets   Result Value Ref Range    WBC Count 11.5 (H) 4.0 - 11.0 10e3/uL    RBC Count 5.29 4.40 - 5.90 10e6/uL    Hemoglobin 16.0 13.3 - 17.7 g/dL    Hematocrit 45.9 40.0 - 53.0 %    MCV 87 78 - 100 fL    MCH 30.2 26.5 - 33.0 pg    MCHC 34.9 31.5 - 36.5 g/dL    RDW 13.2 10.0 - 15.0 %    Platelet Count 199 150 - 450 10e3/uL   Hemoglobin A1c   Result Value Ref Range    Hemoglobin A1C 6.7 (H) 0.0 - 5.6 %             "

## 2022-10-21 ENCOUNTER — TELEPHONE (OUTPATIENT)
Dept: ORTHOPEDICS | Facility: CLINIC | Age: 67
End: 2022-10-21

## 2022-10-21 NOTE — TELEPHONE ENCOUNTER
[facility] Call Center    Phone Message    May a detailed message be left on voicemail: yes     Reason for Call: Other: Pt calling for an appt for gel inj but no avail until 11/07 pt is in a lot of pain and is asking if he can see Dr El for this inj pt has seen Dr El for hip pain but not for knee pain . Dr El has appt 11/01 and is currently scheduled . Please call pt to confirm if this is ok      Action Taken: Other: ortho mg sport    Travel Screening: Not Applicable

## 2022-10-25 ENCOUNTER — ANCILLARY PROCEDURE (OUTPATIENT)
Dept: ULTRASOUND IMAGING | Facility: CLINIC | Age: 67
End: 2022-10-25
Attending: NURSE PRACTITIONER
Payer: COMMERCIAL

## 2022-10-25 DIAGNOSIS — Z13.6 SCREENING FOR AAA (ABDOMINAL AORTIC ANEURYSM): ICD-10-CM

## 2022-10-25 PROCEDURE — 76706 US ABDL AORTA SCREEN AAA: CPT | Mod: TC | Performed by: RADIOLOGY

## 2022-10-26 ENCOUNTER — TELEPHONE (OUTPATIENT)
Dept: FAMILY MEDICINE | Facility: CLINIC | Age: 67
End: 2022-10-26

## 2022-10-26 DIAGNOSIS — E11.319 TYPE 2 DIABETES MELLITUS WITH RETINOPATHY, WITHOUT LONG-TERM CURRENT USE OF INSULIN, MACULAR EDEMA PRESENCE UNSPECIFIED, UNSPECIFIED LATERALITY, UNSPECIFIED RETINOPATHY SEVERITY (H): Primary | ICD-10-CM

## 2022-10-26 NOTE — TELEPHONE ENCOUNTER
Accu-Chek Guide test strips are non-formulary and not covered by insurance. Insurance will pay for and cover OneTouch products. If ok to switch, please send new scripts for OneTouch meter, test strips and lancets. If a prior authorization is needed for the Accu-Chek, please let me know. Thank you!

## 2022-10-27 RX ORDER — GLUCOSAMINE HCL/CHONDROITIN SU 500-400 MG
CAPSULE ORAL
Qty: 100 EACH | Refills: 3 | Status: SHIPPED | OUTPATIENT
Start: 2022-10-27 | End: 2023-10-12

## 2022-10-27 RX ORDER — LANCETS
EACH MISCELLANEOUS
Qty: 100 EACH | Refills: 6 | Status: SHIPPED | OUTPATIENT
Start: 2022-10-27 | End: 2023-06-19

## 2022-10-27 NOTE — TELEPHONE ENCOUNTER
Called and spoke to patient and gave him Sushma's message. Patient understands.  Maria Alejandra Tam MA  [facility]  2nd Floor  Primary Care

## 2022-10-27 NOTE — TELEPHONE ENCOUNTER
Wrote for One touch glucometer/supplies. Pls notify patient and find out if there is anything else he needs.    Nighat WEISS, CNP

## 2022-11-01 ENCOUNTER — OFFICE VISIT (OUTPATIENT)
Dept: ORTHOPEDICS | Facility: CLINIC | Age: 67
End: 2022-11-01
Payer: COMMERCIAL

## 2022-11-01 DIAGNOSIS — M17.0 BILATERAL PRIMARY OSTEOARTHRITIS OF KNEE: Primary | ICD-10-CM

## 2022-11-01 PROCEDURE — 20610 DRAIN/INJ JOINT/BURSA W/O US: CPT | Mod: 50 | Performed by: FAMILY MEDICINE

## 2022-11-01 RX ORDER — TRIAMCINOLONE ACETONIDE 40 MG/ML
40 INJECTION, SUSPENSION INTRA-ARTICULAR; INTRAMUSCULAR
Status: SHIPPED | OUTPATIENT
Start: 2022-11-01

## 2022-11-01 RX ADMIN — TRIAMCINOLONE ACETONIDE 40 MG: 40 INJECTION, SUSPENSION INTRA-ARTICULAR; INTRAMUSCULAR at 09:03

## 2022-11-01 ASSESSMENT — PAIN SCALES - GENERAL: PAINLEVEL: EXTREME PAIN (9)

## 2022-11-01 NOTE — NURSING NOTE
Chief Complaint   Patient presents with     Left Knee - Pain     Right Knee - Pain       There were no vitals filed for this visit.    There is no height or weight on file to calculate BMI.      KEVIN Ramos NREMT

## 2022-11-01 NOTE — LETTER
11/1/2022         RE: Kathrine Isaac  [address] [facility] [address]        Dear Colleague,    Thank you for referring your patient, Kathrine Isaac, to the [facility] [place]. Please see a copy of my visit note below.    HISTORY OF PRESENT ILLNESS  Mr. Isaac is a pleasant 67 year old male following up with bilateral knee pain.  Kathrine has a history of left knee pain, his right knee is also bothered him, although not to the same degree.  He is interested bilateral knee injections today.  He does have a history of bilateral knee osteoarthritis, he has tried physical therapy, corticosteroid injections, and gel injections in the past.  He has had some relief with hyaluronic acid injections as well.     PHYSICAL EXAM  General  - normal appearance, in no obvious distress  Musculoskeletal - right and left knee  - stance: mildly antalgic gait  - inspection: no effusions  - palpation: medial joint line tenderness bilaterally  - ROM: 120 degrees flexion, 0 degrees extension  - strength: 5/5 in flexion, 5/5 in extension  - special tests:  (-) Sierra  (-) varus at 0 and 30 degrees flexion  (-) valgus at 0 and 30 degrees flexion  Neuro  - no sensory or motor deficit, grossly normal coordination, normal muscle tone       ASSESSMENT & PLAN  Mr. Isaac is a 67 year old male following up with bilateral knee osteoarthritis    I did inject his knees with corticosteroid today.    We again visited hyaluronic acid and what this treatment entails.  I do think that this would be a reasonable option for him, especially given his success with this in the past.  We can start the preapproval process.    It was a pleasure seeing Kathrine.        Herman El DO, [facility]      This note was constructed using Dragon dictation software, please excuse any minor errors in spelling, grammar, or syntax.      Large Joint Injection/Arthocentesis: bilateral knee    Date/Time: 11/1/2022 9:03  AM  Performed by: Herman El DO  Authorized by: Herman El DO     Indications:  Pain  Needle Size:  22 G  Guidance: landmark guided    Approach:  Lateral  Location:  Knee  Laterality:  Bilateral      Medications (Right):  40 mg triamcinolone 40 MG/ML  Medications (Left):  40 mg triamcinolone 40 MG/ML  Outcome:  Tolerated well, no immediate complications  Procedure discussed: discussed risks, benefits, and alternatives    Consent Given by:  Patient  Timeout: timeout called immediately prior to procedure    Prep: patient was prepped and draped in usual sterile fashion         PROCEDURE    Knee Injections - Intraarticular    The patient was informed of the risks and the benefits of the procedure and a written consent was signed.    The patient s left knee was prepped with chlorhexidine in sterile fashion.   40 mg of triamcinolone suspension was drawn up into a 5 mL syringe with 4 mL of 1% lidocaine.  Injection was performed using substerile technique.  A 1.5-inch 22-gauge needle was used to enter the lateral aspect of the left knee.  Injection performed successfully without difficulty.  There were no complications. The patient tolerated the procedure well. There was negligible bleeding.     The patient's right knee was prepped with chlorhexidine in sterile fashion.   40 mg of triamcinolone suspension was drawn up into a 5 mL syringe with 4 mL of 1% lidocaine.  Injection was performed using substerile technique.  A 1.5-inch 22-gauge needle was used to enter the lateral aspect of the right knee.  Injection performed successfully without difficulty.  There were no complications. The patient tolerated the procedure well. There was negligible bleeding.                   Again, thank you for allowing me to participate in the care of your patient.        Sincerely,        Herman El DO

## 2022-11-01 NOTE — PROGRESS NOTES
HISTORY OF PRESENT ILLNESS  Mr. Isaac is a pleasant 67 year old male following up with bilateral knee pain.  Kathrine has a history of left knee pain, his right knee is also bothered him, although not to the same degree.  He is interested bilateral knee injections today.  He does have a history of bilateral knee osteoarthritis, he has tried physical therapy, corticosteroid injections, and gel injections in the past.  He has had some relief with hyaluronic acid injections as well.     PHYSICAL EXAM  General  - normal appearance, in no obvious distress  Musculoskeletal - right and left knee  - stance: mildly antalgic gait  - inspection: no effusions  - palpation: medial joint line tenderness bilaterally  - ROM: 120 degrees flexion, 0 degrees extension  - strength: 5/5 in flexion, 5/5 in extension  - special tests:  (-) Sierra  (-) varus at 0 and 30 degrees flexion  (-) valgus at 0 and 30 degrees flexion  Neuro  - no sensory or motor deficit, grossly normal coordination, normal muscle tone       ASSESSMENT & PLAN  Mr. Isaac is a 67 year old male following up with bilateral knee osteoarthritis    I did inject his knees with corticosteroid today.    We again visited hyaluronic acid and what this treatment entails.  I do think that this would be a reasonable option for him, especially given his success with this in the past.  We can start the preapproval process.    It was a pleasure seeing Kathrine.        Herman El DO, [facility]      This note was constructed using Dragon dictation software, please excuse any minor errors in spelling, grammar, or syntax.      Large Joint Injection/Arthocentesis: bilateral knee    Date/Time: 11/1/2022 9:03 AM  Performed by: Herman El DO  Authorized by: Herman El DO     Indications:  Pain  Needle Size:  22 G  Guidance: landmark guided    Approach:  Lateral  Location:  Knee  Laterality:  Bilateral      Medications (Right):  40 mg triamcinolone 40 MG/ML  Medications  (Left):  40 mg triamcinolone 40 MG/ML  Outcome:  Tolerated well, no immediate complications  Procedure discussed: discussed risks, benefits, and alternatives    Consent Given by:  Patient  Timeout: timeout called immediately prior to procedure    Prep: patient was prepped and draped in usual sterile fashion         PROCEDURE    Knee Injections - Intraarticular    The patient was informed of the risks and the benefits of the procedure and a written consent was signed.    The patient s left knee was prepped with chlorhexidine in sterile fashion.   40 mg of triamcinolone suspension was drawn up into a 5 mL syringe with 4 mL of 1% lidocaine.  Injection was performed using substerile technique.  A 1.5-inch 22-gauge needle was used to enter the lateral aspect of the left knee.  Injection performed successfully without difficulty.  There were no complications. The patient tolerated the procedure well. There was negligible bleeding.     The patient's right knee was prepped with chlorhexidine in sterile fashion.   40 mg of triamcinolone suspension was drawn up into a 5 mL syringe with 4 mL of 1% lidocaine.  Injection was performed using substerile technique.  A 1.5-inch 22-gauge needle was used to enter the lateral aspect of the right knee.  Injection performed successfully without difficulty.  There were no complications. The patient tolerated the procedure well. There was negligible bleeding.

## 2022-11-01 NOTE — NURSING NOTE
[facility]   ORTHOPEDICS & SPORTS MEDICINE  [address] [place] [address]  Dept: [phone]  ______________________________________________________________________________    Patient: Kathrine Isaac   : 1955   MRN: [id number]   2022    INVASIVE PROCEDURE SAFETY CHECKLIST    Date: 2022    Procedure: Bilateral knee injections  Patient Name: Kathrine Isaac  MRN: [id number]  YOB: 1955    Action: Complete sections as appropriate. Any discrepancy results in a HARD COPY until resolved.     PRE PROCEDURE:  Patient ID verified with 2 identifiers (name and  or MRN): Yes  Procedure and site verified with patient/designee (when able): Yes  Accurate consent documentation in medical record: Yes  H&P (or appropriate assessment) documented in medical record: Yes  H&P must be up to 20 days prior to procedure and updates within 24 hours of procedure as applicable: NA  Relevant diagnostic and radiology test results appropriately labeled and displayed as applicable: NA  Procedure site(s) marked with provider initials: NA    TIMEOUT:  Time-Out performed immediately prior to starting procedure, including verbal and active participation of all team members addressing the following:Yes  * Correct patient identify  * Confirmed that the correct side and site are marked  * An accurate procedure consent form  * Agreement on the procedure to be done  * Correct patient position  * Relevant images and results are properly labeled and appropriately displayed  * The need to administer antibiotics or fluids for irrigation purposes during the procedure as applicable   * Safety precautions based on patient history or medication use    DURING PROCEDURE: Verification of correct person, site, and procedures any time the responsibility for care of the patient is transferred to another member of the care team.       Prior to injection, verified patient identity using patient's name and date  of birth.  Due to injection administration, patient instructed to remain in clinic for 15 minutes  afterwards, and to report any adverse reaction to me immediately.    Joint injection was performed.      Drug Amount Wasted:  None.  Vial/Syringe: Single dose vial  Expiration Date:  7/31/2024      Richard Jackson, EMT  November 1, 2022

## 2022-11-02 ENCOUNTER — TELEPHONE (OUTPATIENT)
Dept: ORTHOPEDICS | Facility: CLINIC | Age: 67
End: 2022-11-02
Payer: COMMERCIAL

## 2022-11-02 DIAGNOSIS — M25.562 BILATERAL KNEE PAIN: Primary | ICD-10-CM

## 2022-11-02 DIAGNOSIS — M25.561 BILATERAL KNEE PAIN: Primary | ICD-10-CM

## 2022-11-04 ENCOUNTER — TELEPHONE (OUTPATIENT)
Dept: ORTHOPEDICS | Facility: CLINIC | Age: 67
End: 2022-11-04

## 2022-11-04 DIAGNOSIS — M17.0 BILATERAL PRIMARY OSTEOARTHRITIS OF KNEE: Primary | ICD-10-CM

## 2022-11-04 NOTE — TELEPHONE ENCOUNTER
Plan requires prior auth for gel-one and documentation that Euflexxa or Synvisc One were tried and failed. Please submit new order or advise.        Thank you,    ANA LAURA Landry  Financial Counselor  [facility]  [address] [place] [address]  [phone]

## 2022-12-07 ENCOUNTER — TELEPHONE (OUTPATIENT)
Dept: FAMILY MEDICINE | Facility: CLINIC | Age: 67
End: 2022-12-07

## 2022-12-07 NOTE — TELEPHONE ENCOUNTER
Patient calling requesting a refill for a cream he had used in the past for dry skin on the skin of his penis. RN notes no current medication order for any cream. RN advised that follow up appointment would be needed for a new prescription. Patient verbalized understanding. Appointment scheduled for tomorrow.    Filomena Castillo RN    [facility]

## 2022-12-08 ENCOUNTER — VIRTUAL VISIT (OUTPATIENT)
Dept: FAMILY MEDICINE | Facility: CLINIC | Age: 67
End: 2022-12-08
Payer: COMMERCIAL

## 2022-12-08 DIAGNOSIS — E11.319 TYPE 2 DIABETES MELLITUS WITH RETINOPATHY, WITHOUT LONG-TERM CURRENT USE OF INSULIN, MACULAR EDEMA PRESENCE UNSPECIFIED, UNSPECIFIED LATERALITY, UNSPECIFIED RETINOPATHY SEVERITY (H): ICD-10-CM

## 2022-12-08 DIAGNOSIS — N48.1 BALANITIS: ICD-10-CM

## 2022-12-08 PROCEDURE — 99213 OFFICE O/P EST LOW 20 MIN: CPT | Mod: 95 | Performed by: PHYSICIAN ASSISTANT

## 2022-12-08 RX ORDER — CLOTRIMAZOLE 1 %
CREAM (GRAM) TOPICAL 2 TIMES DAILY
Qty: 30 G | Refills: 0 | Status: SHIPPED | OUTPATIENT
Start: 2022-12-08

## 2022-12-08 NOTE — PROGRESS NOTES
Kathrine is a 67 year old who is being evaluated via a billable video visit.      How would you like to obtain your AVS? MyChart  If the video visit is dropped, the invitation should be resent by:   Will anyone else be joining your video visit? No      Answers for HPI/ROS submitted by the patient on 12/7/2022  How many servings of fruits and vegetables do you eat daily?: 4 or more  On average, how many sweetened beverages do you drink each day (Examples: soda, juice, sweet tea, etc.  Do NOT count diet or artificially sweetened beverages)?: 0  How many minutes a day do you exercise enough to make your heart beat faster?: 60 or more  How many days a week do you exercise enough to make your heart beat faster?: 5  How many days per week do you miss taking your medication?: 0  What is the reason for your visit today?: dry and cracking skin - penis, dryness cause skin to flake or crack open.  When did your symptoms begin?: More than a month  What are your symptoms?: itching, cracking and painful when touch, washed or urine.  How would you describe these symptoms?: Severe  Are your symptoms:: Staying the same  Have you had these symptoms before?: Yes  Have you tried or received treatment for these symptoms before?: Yes  Did that treatment work? : Yes  Please describe the treatment you had:: cream was prescribe more than a year ago  Is there anything that makes you feel worse?: No  Is there anything that makes you feel better?: No        Assessment & Plan     Balanitis  Treated in past with below agent- follow up with us in person if not improving   - clotrimazole (LOTRIMIN) 1 % external cream  Dispense: 30 g; Refill: 0    Type 2 diabetes mellitus with retinopathy, without long-term current use of insulin, macular edema presence unspecified, unspecified laterality, unspecified retinopathy severity (H)  A1C well controlled 6.7  jardiance could be contributory     Prescription drug management  16 minutes spent on the date of  the encounter doing chart review, history and exam, documentation and further activities per the note       Patient Instructions   Apply lotrimin cream to affected areas twice a day until symptoms resolved  Follow up in person if symptoms not improved in one week.  Send us a message if not improving and we will get you in.  Call your pharmacy regarding med refills. Looks like Sushma refilled medications at your last appointment       Return in about 19 weeks (around 4/20/2023), or if symptoms worsen or fail to improve, for in person.    Jackie Harris PA-C  [facility] SIGIFREDO Chisholm is a 67 year old, presenting for the following health issues:  No chief complaint on file.      History of Present Illness       Reason for visit:  Dry and cracking skin - penis, dryness cause skin to flake or crack open.  Symptom onset:  More than a month  Symptoms include:  Itching, cracking and painful when touch, washed or urine.  Symptom intensity:  Severe  Symptom progression:  Staying the same  Had these symptoms before:  Yes  Has tried/received treatment for these symptoms:  Yes  Previous treatment was successful:  Yes  Prior treatment description:  Cream was prescribe more than a year ago  What makes it worse:  No  What makes it better:  No    He eats 4 or more servings of fruits and vegetables daily.He consumes 0 sweetened beverage(s) daily.He exercises with enough effort to increase his heart rate 60 or more minutes per day.  He exercises with enough effort to increase his heart rate 5 days per week.   He is taking medications regularly.   patient unknown to me with history of diabetes, hypertension and obesity presents with penile rash  Is sexually active but not with current condition- not circumcised- skin is very tight- cracks open- doesn't bleed  i've used alcohol to wipe off.  Doesn't bleedt.  Has tried vaseline cream- similar symptoms in 2020 and as I reviewed chart looks like given  clotrimazole for balanitis.  Blood sugars are good. Range from 100-110  A1C 10/20/22 was 6.7   Wondering about many of his prescription refills and according to our records should have refills available.          Review of Systems   Constitutional, HEENT, cardiovascular, pulmonary, gi and gu systems are negative, except as otherwise noted.      Objective           Vitals:  No vitals were obtained today due to virtual visit.    Physical Exam   GENERAL: Healthy, alert and no distress  EYES: Eyes grossly normal to inspection.  No discharge or erythema, or obvious scleral/conjunctival abnormalities.  RESP: No audible wheeze, cough, or visible cyanosis.  No visible retractions or increased work of breathing.    SKIN: Visible skin clear. No significant rash, abnormal pigmentation or lesions.  NEURO: Cranial nerves grossly intact.  Mentation and speech appropriate for age.  PSYCH: Mentation appears normal, affect normal/bright, judgement and insight intact, normal speech and appearance well-groomed.                Video-Visit Details    Video Start Time: 103 pm     Type of service:  Video Visit    Video End Time:1:11 PM    Originating Location (pt. Location): Home        Distant Location (provider location):  Off-site    Platform used for Video Visit: CoreD[employer]

## 2022-12-08 NOTE — PATIENT INSTRUCTIONS
Apply lotrimin cream to affected areas twice a day until symptoms resolved  Follow up in person if symptoms not improved in one week.  Send us a message if not improving and we will get you in.  Call your pharmacy regarding med refills. Looks like Sushma refilled medications at your last appointment

## 2022-12-09 DIAGNOSIS — M25.562 CHRONIC PAIN OF LEFT KNEE: ICD-10-CM

## 2022-12-09 DIAGNOSIS — M51.16 LUMBAR DISC HERNIATION WITH RADICULOPATHY: ICD-10-CM

## 2022-12-09 DIAGNOSIS — E78.5 HYPERLIPIDEMIA LDL GOAL <100: ICD-10-CM

## 2022-12-09 DIAGNOSIS — G89.29 CHRONIC PAIN OF LEFT KNEE: ICD-10-CM

## 2022-12-09 DIAGNOSIS — E11.319 TYPE 2 DIABETES MELLITUS WITH RETINOPATHY, WITHOUT LONG-TERM CURRENT USE OF INSULIN, MACULAR EDEMA PRESENCE UNSPECIFIED, UNSPECIFIED LATERALITY, UNSPECIFIED RETINOPATHY SEVERITY (H): ICD-10-CM

## 2022-12-09 DIAGNOSIS — M17.12 ARTHRITIS OF LEFT KNEE: ICD-10-CM

## 2022-12-09 RX ORDER — HYDROCHLOROTHIAZIDE 12.5 MG/1
TABLET ORAL
Qty: 90 TABLET | Refills: 0 | Status: SHIPPED | OUTPATIENT
Start: 2022-12-09 | End: 2023-03-13

## 2022-12-09 RX ORDER — GLIMEPIRIDE 4 MG/1
TABLET ORAL
Qty: 90 TABLET | Refills: 0 | Status: SHIPPED | OUTPATIENT
Start: 2022-12-09 | End: 2023-03-10

## 2022-12-09 NOTE — TELEPHONE ENCOUNTER
Resending prescriptions pt is requesting below, as pharmacy did not receive them.     Karuna Carpenter RN  [facility]

## 2022-12-09 NOTE — TELEPHONE ENCOUNTER
Writer attempted to call pharmacy in regards to patient's message below. Patient is due for refill of glimepiride, but all other medications should have been sent on 10/20/22. Attempted to call pharmacy but they are currently closed. Will need to try again later.

## 2022-12-12 RX ORDER — GABAPENTIN 300 MG/1
CAPSULE ORAL
Qty: 90 CAPSULE | Refills: 2 | Status: SHIPPED | OUTPATIENT
Start: 2022-12-12 | End: 2023-08-23

## 2022-12-14 DIAGNOSIS — E11.319 TYPE 2 DIABETES MELLITUS WITH RETINOPATHY, WITHOUT LONG-TERM CURRENT USE OF INSULIN, MACULAR EDEMA PRESENCE UNSPECIFIED, UNSPECIFIED LATERALITY, UNSPECIFIED RETINOPATHY SEVERITY (H): ICD-10-CM

## 2022-12-14 DIAGNOSIS — B35.1 ONYCHOMYCOSIS: ICD-10-CM

## 2022-12-14 RX ORDER — METFORMIN HCL 500 MG
TABLET, EXTENDED RELEASE 24 HR ORAL
Qty: 360 TABLET | Refills: 3 | OUTPATIENT
Start: 2022-12-14

## 2022-12-15 RX ORDER — CICLOPIROX 80 MG/ML
SOLUTION TOPICAL
Qty: 6.6 ML | Refills: 2 | Status: SHIPPED | OUTPATIENT
Start: 2022-12-15 | End: 2023-12-07

## 2022-12-16 ENCOUNTER — OFFICE VISIT (OUTPATIENT)
Dept: OPTOMETRY | Facility: CLINIC | Age: 67
End: 2022-12-16
Payer: COMMERCIAL

## 2022-12-16 DIAGNOSIS — H40.1131 PRIMARY OPEN ANGLE GLAUCOMA (POAG) OF BOTH EYES, MILD STAGE: Primary | ICD-10-CM

## 2022-12-16 DIAGNOSIS — H25.13 SENILE NUCLEAR SCLEROSIS, BILATERAL: ICD-10-CM

## 2022-12-16 PROCEDURE — 92012 INTRM OPH EXAM EST PATIENT: CPT | Performed by: OPTOMETRIST

## 2022-12-16 ASSESSMENT — SLIT LAMP EXAM - LIDS
COMMENTS: SCALLOPED LID MARGINS
COMMENTS: SCALLOPED LID MARGINS

## 2022-12-16 ASSESSMENT — TONOMETRY
OS_IOP_MMHG: 13
IOP_METHOD: APPLANATION
OD_IOP_MMHG: 14

## 2022-12-16 ASSESSMENT — CUP TO DISC RATIO
OS_RATIO: 0.6
OD_RATIO: 0.75

## 2022-12-16 ASSESSMENT — CONF VISUAL FIELD
OS_INFERIOR_NASAL_RESTRICTION: 0
OD_NORMAL: 1
OS_NORMAL: 1
OS_INFERIOR_TEMPORAL_RESTRICTION: 0
OD_SUPERIOR_TEMPORAL_RESTRICTION: 0
OS_SUPERIOR_TEMPORAL_RESTRICTION: 0
OD_SUPERIOR_NASAL_RESTRICTION: 0
OD_INFERIOR_TEMPORAL_RESTRICTION: 0
OS_SUPERIOR_NASAL_RESTRICTION: 0
OD_INFERIOR_NASAL_RESTRICTION: 0

## 2022-12-16 ASSESSMENT — VISUAL ACUITY
OS_CC: 20/30
OD_CC: 20/25
CORRECTION_TYPE: GLASSES
METHOD: SNELLEN - LINEAR
OD_CC+: -2

## 2022-12-16 ASSESSMENT — EXTERNAL EXAM - RIGHT EYE: OD_EXAM: NORMAL

## 2022-12-16 NOTE — PROGRESS NOTES
Assessment/Plan  (H40.1131) Primary open angle glaucoma (POAG) of both eyes, mild stage  Comment:   -HVF 24-2 (7/2022) showed progression since 2021 exam consistent with RNFL OCT changes noted at last exam  -IOP today 14/13, TMax 26/20   -Slightly thin pachys  Plan: Continue latanoprost nightly and timolol twice daily. Plan on complete exam with dilation and RNFL OCT in 4 months. If stability is noted, could consider following every 6 months.     (H25.13) Senile nuclear sclerosis, bilateral  Comment: Not visually significant  Plan: Monitor only for now.         Complete documentation of historical and exam elements from today's encounter can  be found in the full encounter summary report (not reduplicated in this progress  note). I personally obtained the chief complaint(s) and history of present illness. I  confirmed and edited as necessary the review of systems, past medical/surgical  history, family history, social history, and examination findings as documented by  others; and I examined the patient myself. I personally reviewed the relevant tests,  images, and reports as documented above. I formulated and edited as necessary the  assessment and plan and discussed the findings and management plan with the  patient and family.    Ramos Verdin, OD

## 2022-12-16 NOTE — NURSING NOTE
Chief Complaints and History of Present Illnesses   Patient presents with     Glaucoma Follow-Up       Chief Complaint(s) and History of Present Illness(es)     Glaucoma Follow-Up            Laterality: both eyes          Comments    Patient here for 4 month IOP check. Using Latanoprost at bedtime each eye (9:00 pm 12/15/22) and Timolol BID each eye (9:00 pm 12/15/22). No problems/concerns.                 Dianne Diaz, COA

## 2022-12-19 ENCOUNTER — PATIENT OUTREACH (OUTPATIENT)
Dept: GERIATRIC MEDICINE | Facility: CLINIC | Age: 67
End: 2022-12-19

## 2022-12-19 NOTE — PROGRESS NOTES
Encounter opened due to Regulatory [employer] Update to open FVP Program.    Jazz Chou  Care Management Specialist   [facility]   [phone]

## 2022-12-19 NOTE — PROGRESS NOTES
Encounter opened due to Regulatory [employer] Update to close FVP Program.    Jazz Chou  Care Management Specialist   [facility]   [phone]

## 2023-01-02 ENCOUNTER — PATIENT OUTREACH (OUTPATIENT)
Dept: GERIATRIC MEDICINE | Facility: CLINIC | Age: 68
End: 2023-01-02

## 2023-01-02 NOTE — PROGRESS NOTES
[facility] Care Coordination Contact      [facility] Refusal Telephone Assessment    Member refused home visit HRA on 1/2/2023 (reason: member declined).    ER visits: No  Hospitalizations: No  Health concerns: none reported  Falls/Injuries: No  ADL/IADL Dependencies: None reported        Member currently receiving the following home care services:  None   Member currently receiving the following community resources: None   Informal support(s):  n/a    Advanced Care Planning discussion, complete code section.    [facility] sponsored benefits: Shared information re: Silver Sneakers/gym memberships, ASA, Calcium +D.    Follow-Up Plan: Member informed of future contact, plan to f/u with member with a 6 month telephone assessment and offer a home visit.  Contact information shared with member and family, encouraged member to call with any questions or concerns at any time.    This CC note routed to PCP.    Lily Schultz RN,BSN  [facility] Case Coordinator   [phone]

## 2023-01-02 NOTE — LETTER
January 10, 2023    YADIRA LOPEZ  [address] [address] [address]        Dear Yadira:    As a member of [facility], we offer a health risk assessment at no cost to you. I know you don't want to have the assessment right now. If you change your mind, please call me at the number below.    Who performs the health risk assessment?  A [facility] Care Coordinator performs the assessment. Our Care Coordinators can also help you understand your benefits. They can tell you about services to aid you at home, such as managing your care with your doctors if your health worsens.    Our Care Coordinators are here for you if you need:    Support for activities you used to do by yourself (including making meals, bathing, and paying bills)    Equipment for bathroom or home safety    Help finding a new place to live    Information on staying healthy, preventing falls and immunizations    Questions?  If you have questions, or you would like to do the assessment, call me at [phone]. TTY users call [phone]. I'm here from 8am to 5pm. I may reach out to you again soon.      Sincerely,        Lily Schultz RN    E-mail: [email or web address]  Phone: [phone]      [employer]    <[id number] accepted    [id number] (12/2021)  [id number]>

## 2023-01-02 NOTE — Clinical Note
FYI... Member declined health risk assessment. Lily Schultz RN,BSN [facility] Case Coordinator  [phone]

## 2023-01-10 NOTE — PROGRESS NOTES
"[facility] Care Coordination Contact    Per CC, mailed client a \"Refusal of Home Visit\" letter.      Jazz Chou  Care Management Specialist   [facility]   [phone]    "

## 2023-02-07 DIAGNOSIS — I10 ESSENTIAL HYPERTENSION: ICD-10-CM

## 2023-02-07 RX ORDER — LISINOPRIL 40 MG/1
TABLET ORAL
Qty: 90 TABLET | Refills: 3 | OUTPATIENT
Start: 2023-02-07

## 2023-02-07 NOTE — TELEPHONE ENCOUNTER
Refills remain on file. Refused.     Love Evans, RN, BSN, PHN  [facility] [place] [facility]

## 2023-02-08 RX ORDER — LISINOPRIL 40 MG/1
40 TABLET ORAL DAILY
Qty: 90 TABLET | Refills: 0 | Status: SHIPPED | OUTPATIENT
Start: 2023-02-08 | End: 2023-05-01

## 2023-02-08 NOTE — TELEPHONE ENCOUNTER
FYI - Status Update    Who is Calling: patient    Update: Patient would like to know why his medication is refused and why pharmacy has not yet received his refill order. Checked and saw that the medication order was sent but currently pending.    Does caller want a call/response back: Yes     Could we send this information to you in [employer] or would you prefer to receive a phone call?:   Patient would prefer a phone call   Okay to leave a detailed message?: Yes at Home number on file [phone] (home)

## 2023-02-08 NOTE — TELEPHONE ENCOUNTER
RN calling Joann to clarify prescription for lisinopril 40 mg tablet sent on 10/20/22 for 1 year supply.     Pharmacist states they did not receive this prescription. Rather, they received a prescription on 10/18/22 for 90 tabs with 0 refills.     Reordering prescription and routing to refill pool.     TOVA Pang, RN  [facility]

## 2023-02-08 NOTE — TELEPHONE ENCOUNTER
Prescription approved per [facility] Refill Protocol.    RN calling patient to update him on status of refill for lisinopril.    JACINDA PangN, RN  [facility]

## 2023-02-23 ENCOUNTER — PATIENT OUTREACH (OUTPATIENT)
Dept: GERIATRIC MEDICINE | Facility: CLINIC | Age: 68
End: 2023-02-23
Payer: COMMERCIAL

## 2023-02-23 NOTE — LETTER
February 23, 2023    YADIRA LOPEZ  [address] [address] [address]      Dear Yadira:    As a member of [facility] ([facility]), you are provided a care coordinator. I will be your new care coordinator as of 03/01/2023. I will be calling you soon to see how you are doing and determine your needs.    If you have any questions, please feel free to call me at [phone]. If you reach my voice mail, please leave a message and your phone number. If you are hearing impaired, please call the [place] [employer] at 386 or [phone] ([id number] relay service).    I look forward to speaking with you soon.    Sincerely,      Cynthia Pedraza RN, N  [phone]  [email or web address]      [facility] is a health plan that contracts with both Medicare and the [place] Medical Assistance (Medicaid) program to provide benefits of both programs to enrollees. Enrollment in [facility] depends on contract renewal.      [facility]  [id number] DHS Approved ([id number])  [id number] (11/18)

## 2023-02-23 NOTE — PROGRESS NOTES
[facility] Care Coordination Contact    Internal CC change effective 03/01/2023.  Mailed member CC Change letter.  Additional tasks to be completed by CMS include: update database & EPIC, enter CC Change in MMIS, and move member files on [employer] drive.

## 2023-03-09 DIAGNOSIS — E11.319 TYPE 2 DIABETES MELLITUS WITH RETINOPATHY, WITHOUT LONG-TERM CURRENT USE OF INSULIN, MACULAR EDEMA PRESENCE UNSPECIFIED, UNSPECIFIED LATERALITY, UNSPECIFIED RETINOPATHY SEVERITY (H): ICD-10-CM

## 2023-03-10 RX ORDER — GLIMEPIRIDE 4 MG/1
TABLET ORAL
Qty: 90 TABLET | Refills: 0 | Status: SHIPPED | OUTPATIENT
Start: 2023-03-10 | End: 2023-05-24

## 2023-04-06 DIAGNOSIS — I10 ESSENTIAL HYPERTENSION: Primary | ICD-10-CM

## 2023-04-06 DIAGNOSIS — E11.319 TYPE 2 DIABETES MELLITUS WITH RETINOPATHY, WITHOUT LONG-TERM CURRENT USE OF INSULIN, MACULAR EDEMA PRESENCE UNSPECIFIED, UNSPECIFIED LATERALITY, UNSPECIFIED RETINOPATHY SEVERITY (H): ICD-10-CM

## 2023-04-06 DIAGNOSIS — E78.5 HYPERLIPIDEMIA LDL GOAL <100: ICD-10-CM

## 2023-04-06 RX ORDER — ATORVASTATIN CALCIUM 20 MG/1
TABLET, FILM COATED ORAL
Qty: 90 TABLET | Refills: 3 | Status: SHIPPED | OUTPATIENT
Start: 2023-04-06 | End: 2023-04-06

## 2023-04-06 RX ORDER — ATORVASTATIN CALCIUM 20 MG/1
20 TABLET, FILM COATED ORAL DAILY
Qty: 90 TABLET | Refills: 0 | Status: SHIPPED | OUTPATIENT
Start: 2023-04-06 | End: 2023-10-12

## 2023-04-06 RX ORDER — EMPAGLIFLOZIN 25 MG/1
TABLET, FILM COATED ORAL
Qty: 90 TABLET | Refills: 3 | OUTPATIENT
Start: 2023-04-06 | End: 2023-04-06

## 2023-04-21 ENCOUNTER — OFFICE VISIT (OUTPATIENT)
Dept: OPTOMETRY | Facility: CLINIC | Age: 68
End: 2023-04-21
Payer: COMMERCIAL

## 2023-04-21 DIAGNOSIS — H25.13 SENILE NUCLEAR SCLEROSIS, BILATERAL: ICD-10-CM

## 2023-04-21 DIAGNOSIS — H40.1131 PRIMARY OPEN ANGLE GLAUCOMA (POAG) OF BOTH EYES, MILD STAGE: Primary | ICD-10-CM

## 2023-04-21 DIAGNOSIS — H52.4 PRESBYOPIA: ICD-10-CM

## 2023-04-21 PROCEDURE — 92015 DETERMINE REFRACTIVE STATE: CPT | Performed by: OPTOMETRIST

## 2023-04-21 PROCEDURE — 92014 COMPRE OPH EXAM EST PT 1/>: CPT | Performed by: OPTOMETRIST

## 2023-04-21 PROCEDURE — 92133 CPTRZD OPH DX IMG PST SGM ON: CPT | Performed by: OPTOMETRIST

## 2023-04-21 ASSESSMENT — CONF VISUAL FIELD
OS_SUPERIOR_NASAL_RESTRICTION: 0
OD_SUPERIOR_TEMPORAL_RESTRICTION: 0
OD_INFERIOR_NASAL_RESTRICTION: 0
OS_INFERIOR_TEMPORAL_RESTRICTION: 0
OS_SUPERIOR_TEMPORAL_RESTRICTION: 0
OD_SUPERIOR_NASAL_RESTRICTION: 0
OS_INFERIOR_NASAL_RESTRICTION: 0
OD_INFERIOR_TEMPORAL_RESTRICTION: 0
METHOD: COUNTING FINGERS
OS_NORMAL: 1
OD_NORMAL: 1

## 2023-04-21 ASSESSMENT — SLIT LAMP EXAM - LIDS
COMMENTS: SCALLOPED LID MARGINS
COMMENTS: SCALLOPED LID MARGINS

## 2023-04-21 ASSESSMENT — VISUAL ACUITY
CORRECTION_TYPE: GLASSES
METHOD: SNELLEN - LINEAR
OS_CC: 20/20
OD_CC: 20/40

## 2023-04-21 ASSESSMENT — REFRACTION_MANIFEST
OS_SPHERE: PLANO
OS_ADD: +2.50
OS_AXIS: 150
OD_CYLINDER: +0.75
OD_ADD: +2.50
OS_CYLINDER: +1.00
OD_AXIS: 050
OD_SPHERE: -0.25

## 2023-04-21 ASSESSMENT — REFRACTION_WEARINGRX
SPECS_TYPE: PAL
OS_SPHERE: PLANO
OD_ADD: +2.50
OS_CYLINDER: +1.50
OD_AXIS: 047
OD_CYLINDER: +0.75
OS_AXIS: 167
OS_ADD: +2.50
OD_SPHERE: PLANO

## 2023-04-21 ASSESSMENT — EXTERNAL EXAM - RIGHT EYE: OD_EXAM: NORMAL

## 2023-04-21 ASSESSMENT — CUP TO DISC RATIO
OD_RATIO: 0.75
OS_RATIO: 0.6

## 2023-04-21 ASSESSMENT — TONOMETRY
IOP_METHOD: APPLANATION
OS_IOP_MMHG: 16
OD_IOP_MMHG: 15

## 2023-04-21 NOTE — PROGRESS NOTES
Assessment/Plan  (H40.1131) Primary open angle glaucoma (POAG) of both eyes, mild stage  (primary encounter diagnosis)  -HVF 24-2 (7/2022) showed progression since 2021 exam   -IOP today 15/16, TMax 26/20   -Slightly thin pachys  -RNFL OCT today: Stable to 2022  Plan: Continue latanoprost nightly and timolol twice daily. Return to clinic in 6 months for IOP check and HVF 24-2.      (H25.13) Senile nuclear sclerosis, bilateral  Comment: Not visually significant  Plan: Monitor only for now.     (H52.4) Presbyopia  Comment: Only minor changes to Rx today.   Plan: REFRACTION [[id number]]        Discussed findings with patient. New spectacle prescription dispensed to patient. Patient is welcome to return to clinic with prolonged adaptation difficulties.       Complete documentation of historical and exam elements from today's encounter can  be found in the full encounter summary report (not reduplicated in this progress  note). I personally obtained the chief complaint(s) and history of present illness. I  confirmed and edited as necessary the review of systems, past medical/surgical  history, family history, social history, and examination findings as documented by  others; and I examined the patient myself. I personally reviewed the relevant tests,  images, and reports as documented above. I formulated and edited as necessary the  assessment and plan and discussed the findings and management plan with the  patient and family.    Ramos Verdin OD

## 2023-04-21 NOTE — NURSING NOTE
Chief Complaints and History of Present Illnesses   Patient presents with     COMPREHENSIVE EYE EXAM     Chief Complaint(s) and History of Present Illness(es)     COMPREHENSIVE EYE EXAM            Laterality: both eyes    Associated symptoms: Negative for eye pain, flashes and floaters    Treatments tried: eye drops          Comments    Kathrine Isaac is a 68 year old male who presents for a comprehensive exam. Since last exam, vision has slightly worsened. Compliant with drops. No flashes or floaters. No eye pain.     Lab Results       Component                Value               Date                       A1C                      6.7                 10/20/2022                 A1C                      6.5                 01/25/2022                 A1C                      8.8                 09/21/2021                 A1C                      7.9                 04/02/2021                 A1C                      6.6                 09/16/2020                 A1C                      6.7                 12/30/2019                 A1C                      9.2                 06/14/2019                 A1C                      7.4                 02/13/2019                Tono Amaral COT 8:09 AM April 21, 2023

## 2023-04-30 ENCOUNTER — HEALTH MAINTENANCE LETTER (OUTPATIENT)
Age: 68
End: 2023-04-30

## 2023-05-01 DIAGNOSIS — I10 ESSENTIAL HYPERTENSION: ICD-10-CM

## 2023-05-01 RX ORDER — LISINOPRIL 40 MG/1
TABLET ORAL
Qty: 90 TABLET | Refills: 0 | Status: SHIPPED | OUTPATIENT
Start: 2023-05-01 | End: 2023-08-07

## 2023-05-01 NOTE — TELEPHONE ENCOUNTER
Patient has an appointment already for 5/3/2023.  Maria Alejandra Tam MA  [facility]   Primary Care

## 2023-05-03 ENCOUNTER — LAB (OUTPATIENT)
Dept: LAB | Facility: CLINIC | Age: 68
End: 2023-05-03
Payer: COMMERCIAL

## 2023-05-03 ENCOUNTER — OFFICE VISIT (OUTPATIENT)
Dept: FAMILY MEDICINE | Facility: CLINIC | Age: 68
End: 2023-05-03
Payer: COMMERCIAL

## 2023-05-03 ENCOUNTER — MEDICAL CORRESPONDENCE (OUTPATIENT)
Dept: HEALTH INFORMATION MANAGEMENT | Facility: CLINIC | Age: 68
End: 2023-05-03

## 2023-05-03 VITALS
DIASTOLIC BLOOD PRESSURE: 70 MMHG | SYSTOLIC BLOOD PRESSURE: 108 MMHG | HEART RATE: 69 BPM | BODY MASS INDEX: 29.6 KG/M2 | WEIGHT: 188.6 LBS | TEMPERATURE: 96.9 F | HEIGHT: 67 IN | OXYGEN SATURATION: 98 % | RESPIRATION RATE: 16 BRPM

## 2023-05-03 DIAGNOSIS — R39.11 URINARY HESITANCY: ICD-10-CM

## 2023-05-03 DIAGNOSIS — Z12.5 SCREENING FOR PROSTATE CANCER: ICD-10-CM

## 2023-05-03 DIAGNOSIS — E78.5 HYPERLIPIDEMIA LDL GOAL <100: ICD-10-CM

## 2023-05-03 DIAGNOSIS — Z23 NEED FOR PNEUMOCOCCAL 20-VALENT CONJUGATE VACCINATION: ICD-10-CM

## 2023-05-03 DIAGNOSIS — R68.82 DECREASED SEX DRIVE: ICD-10-CM

## 2023-05-03 DIAGNOSIS — E66.3 OVERWEIGHT (BMI 25.0-29.9): ICD-10-CM

## 2023-05-03 DIAGNOSIS — E11.319 TYPE 2 DIABETES MELLITUS WITH RETINOPATHY, WITHOUT LONG-TERM CURRENT USE OF INSULIN, MACULAR EDEMA PRESENCE UNSPECIFIED, UNSPECIFIED LATERALITY, UNSPECIFIED RETINOPATHY SEVERITY (H): Primary | ICD-10-CM

## 2023-05-03 DIAGNOSIS — E11.319 TYPE 2 DIABETES MELLITUS WITH RETINOPATHY, WITHOUT LONG-TERM CURRENT USE OF INSULIN, MACULAR EDEMA PRESENCE UNSPECIFIED, UNSPECIFIED LATERALITY, UNSPECIFIED RETINOPATHY SEVERITY (H): ICD-10-CM

## 2023-05-03 DIAGNOSIS — N52.9 ERECTILE DYSFUNCTION, UNSPECIFIED ERECTILE DYSFUNCTION TYPE: ICD-10-CM

## 2023-05-03 DIAGNOSIS — I10 ESSENTIAL HYPERTENSION: ICD-10-CM

## 2023-05-03 LAB
ALBUMIN UR-MCNC: NEGATIVE MG/DL
ANION GAP SERPL CALCULATED.3IONS-SCNC: 7 MMOL/L (ref 3–14)
APPEARANCE UR: CLEAR
AST SERPL W P-5'-P-CCNC: 25 U/L (ref 0–45)
BILIRUB UR QL STRIP: NEGATIVE
BUN SERPL-MCNC: 16 MG/DL (ref 7–30)
CALCIUM SERPL-MCNC: 9.1 MG/DL (ref 8.5–10.1)
CHLORIDE BLD-SCNC: 101 MMOL/L (ref 94–109)
CHOLEST SERPL-MCNC: 123 MG/DL
CO2 SERPL-SCNC: 27 MMOL/L (ref 20–32)
COLOR UR AUTO: YELLOW
CREAT SERPL-MCNC: 1.15 MG/DL (ref 0.66–1.25)
FASTING STATUS PATIENT QL REPORTED: YES
GFR SERPL CREATININE-BSD FRML MDRD: 70 ML/MIN/1.73M2
GLUCOSE BLD-MCNC: 120 MG/DL (ref 70–99)
GLUCOSE UR STRIP-MCNC: >=1000 MG/DL
HBA1C MFR BLD: 7.1 % (ref 0–5.6)
HDLC SERPL-MCNC: 40 MG/DL
HGB UR QL STRIP: NEGATIVE
KETONES UR STRIP-MCNC: NEGATIVE MG/DL
LDLC SERPL CALC-MCNC: 30 MG/DL
LEUKOCYTE ESTERASE UR QL STRIP: NEGATIVE
NITRATE UR QL: NEGATIVE
NONHDLC SERPL-MCNC: 83 MG/DL
PH UR STRIP: 5.5 [PH] (ref 5–7)
POTASSIUM BLD-SCNC: 4.3 MMOL/L (ref 3.4–5.3)
PSA SERPL-MCNC: 1.81 UG/L (ref 0–4)
SODIUM SERPL-SCNC: 135 MMOL/L (ref 133–144)
SP GR UR STRIP: 1.01 (ref 1–1.03)
TRIGL SERPL-MCNC: 263 MG/DL
UROBILINOGEN UR STRIP-ACNC: 0.2 E.U./DL

## 2023-05-03 PROCEDURE — 84403 ASSAY OF TOTAL TESTOSTERONE: CPT

## 2023-05-03 PROCEDURE — 81003 URINALYSIS AUTO W/O SCOPE: CPT

## 2023-05-03 PROCEDURE — 84450 TRANSFERASE (AST) (SGOT): CPT

## 2023-05-03 PROCEDURE — 36415 COLL VENOUS BLD VENIPUNCTURE: CPT

## 2023-05-03 PROCEDURE — 82043 UR ALBUMIN QUANTITATIVE: CPT

## 2023-05-03 PROCEDURE — 99214 OFFICE O/P EST MOD 30 MIN: CPT | Mod: 25 | Performed by: NURSE PRACTITIONER

## 2023-05-03 PROCEDURE — 90677 PCV20 VACCINE IM: CPT | Performed by: NURSE PRACTITIONER

## 2023-05-03 PROCEDURE — 82570 ASSAY OF URINE CREATININE: CPT

## 2023-05-03 PROCEDURE — G0009 ADMIN PNEUMOCOCCAL VACCINE: HCPCS | Performed by: NURSE PRACTITIONER

## 2023-05-03 PROCEDURE — 83036 HEMOGLOBIN GLYCOSYLATED A1C: CPT

## 2023-05-03 PROCEDURE — G0103 PSA SCREENING: HCPCS

## 2023-05-03 PROCEDURE — 80061 LIPID PANEL: CPT

## 2023-05-03 PROCEDURE — 80048 BASIC METABOLIC PNL TOTAL CA: CPT

## 2023-05-03 RX ORDER — LANCETS
EACH MISCELLANEOUS
Qty: 100 EACH | Refills: 6 | Status: SHIPPED | OUTPATIENT
Start: 2023-05-03 | End: 2023-10-12

## 2023-05-03 RX ORDER — GLUCOSAMINE HCL/CHONDROITIN SU 500-400 MG
CAPSULE ORAL
Qty: 100 EACH | Refills: 3 | Status: SHIPPED | OUTPATIENT
Start: 2023-05-03 | End: 2024-09-30

## 2023-05-03 ASSESSMENT — PAIN SCALES - GENERAL: PAINLEVEL: NO PAIN (0)

## 2023-05-03 NOTE — PROGRESS NOTES
"  Assessment & Plan     Type 2 diabetes mellitus with retinopathy, without long-term current use of insulin, macular edema presence unspecified, unspecified laterality, unspecified retinopathy severity (H)  Needs a new glucometer, checking A1c, adjust medications as indicated  - blood glucose monitoring (NO BRAND SPECIFIED) meter device kit  Dispense: 1 kit; Refill: 0  - blood glucose (NO BRAND SPECIFIED) test strip  Dispense: 100 strip; Refill: 6  - blood glucose calibration (NO BRAND SPECIFIED) solution  Dispense: 1 each; Refill: 1  - thin (NO BRAND SPECIFIED) lancets  Dispense: 100 each; Refill: 6  - alcohol swab prep pads  Dispense: 100 each; Refill: 3    Overweight (BMI 25.0-29.9)  Benefits of weight loss reviewed in detail, encouraged him to cut back on the carbohydrates in the diet, consume more fruits and vegetables, drink plenty of water, avoid fruit juices, sodas, get 150 min moderate exercise/week.  Recheck weight in 6 months.      Hyperlipidemia LDL goal <100  Tolerating 20 mg Lipitor well, continue current management, reviewed low chol diet.    Essential hypertension  BP stable, continue current management    Urinary hesitancy  Checking UA, PSA  - UA Macroscopic with reflex to Microscopic and Culture    Decreased sex drive  Checking tabs, treat as indicated. He is still interested in having sex but. If labs are normal, could consider PDE inhibitor.  - PSA, screen  - Testosterone, total    Screening for prostate cancer    - PSA, screen    Need for pneumococcal 20-valent conjugate vaccination    - Pneumococcal 20 Valent Conjugate (PCV20)      Ordering of each unique test  Prescription drug management  29  minutes spent by me on the date of the encounter doing chart review, history and exam, documentation and further activities per the note       BMI:   Estimated body mass index is 29.98 kg/m  as calculated from the following:    Height as of this encounter: 1.689 m (5' 6.5\").    Weight as of this " encounter: 85.5 kg (188 lb 9.6 oz).       Regular exercise  See Patient Instructions    ABHISHEK Perry CNP  TIMOTHY [facility]    German Chisholm is a 68 year old, presenting for the following health issues:  Diabetes (Needs a new testing meter), Derm Problem (Redness in cheeks ), and Urinary Problem        5/3/2023     8:50 AM   Additional Questions   Roomed by [facility]     History of Present Illness       Reason for visit:  6 months health check upHe consumes 0 sweetened beverage(s) daily.He exercises with enough effort to increase his heart rate 60 or more minutes per day.  He exercises with enough effort to increase his heart rate 5 days per week.   He is taking medications regularly.       Diabetes Follow-up    How often are you checking your blood sugar? A few times a month  What time of day are you checking your blood sugars (select all that apply)?  Before and after meals  Have you had any blood sugars above 200?  No  Have you had any blood sugars below 70?  No    What symptoms do you notice when your blood sugar is low?  Dizzy    What concerns do you have today about your diabetes? None     Do you have any of these symptoms? (Select all that apply)  Weight gain  He also has concern for decreased sex drive over the last several months, also complains of urinary hesitancy, empties bladder completely, does not get up to void at night, no dribbling or decreased force of urine stream.     BP Readings from Last 2 Encounters:   05/03/23 108/70   10/20/22 112/71     Hemoglobin A1C (%)   Date Value   05/03/2023 7.1 (H)   10/20/2022 6.7 (H)   04/02/2021 7.9 (H)   09/16/2020 6.6 (H)     LDL Cholesterol Calculated (mg/dL)   Date Value   04/23/2022 52   04/02/2021 49   12/30/2019 54         Hyperlipidemia Follow-Up      Are you regularly taking any medication or supplement to lower your cholesterol?   Yes- Lipitor 20 mg daiily    Are you having muscle aches or other side effects that you  "think could be caused by your cholesterol lowering medication?  No    Hypertension Follow-up      Do you check your blood pressure regularly outside of the clinic? Yes     Are you following a low salt diet? Yes    Are your blood pressures ever more than 140 on the top number (systolic) OR more   than 90 on the bottom number (diastolic), for example 140/90? No      How many servings of fruits and vegetables do you eat daily?  4 or more    On average, how many sweetened beverages do you drink each day (Examples: soda, juice, sweet tea, etc.  Do NOT count diet or artificially sweetened beverages)?   0    How many days per week do you exercise enough to make your heart beat faster? 7    How many minutes a day do you exercise enough to make your heart beat faster? 60 or more    How many days per week do you miss taking your medication? 0        Review of Systems   Constitutional, HEENT, cardiovascular, pulmonary, gi and gu systems are negative, except as otherwise noted.      Objective    /70 (BP Location: Left arm, Patient Position: Sitting, Cuff Size: Adult Regular)   Pulse 69   Temp 96.9  F (36.1  C) (Tympanic)   Resp 16   Ht 1.689 m (5' 6.5\")   Wt 85.5 kg (188 lb 9.6 oz)   SpO2 98%   BMI 29.98 kg/m    Body mass index is 29.98 kg/m .  Physical Exam   GENERAL: healthy, alert and no distress  EYES: Eyes grossly normal to inspection, PERRL and conjunctivae and sclerae normal  HENT: ear canals and TM's normal, nose and mouth without ulcers or lesions  NECK: no adenopathy, no asymmetry, masses, or scars and thyroid normal to palpation  RESP: lungs clear to auscultation - no rales, rhonchi or wheezes  CV: regular rate and rhythm, normal S1 S2, no S3 or S4, no murmur, click or rub, no peripheral edema and peripheral pulses strong  ABDOMEN: soft, nontender, no hepatosplenomegaly, no masses and bowel sounds normal  MS: no gross musculoskeletal defects noted, no edema  SKIN: no suspicious lesions or rashes  NEURO: " Normal strength and tone, mentation intact and speech normal  BACK: no CVA tenderness, no paralumbar tenderness  PSYCH: mentation appears normal, affect normal/bright  LYMPH: normal ant/post cervical, supraclavicular nodes    Results for orders placed or performed in visit on 05/03/23 (from the past 24 hour(s))   Hemoglobin A1c   Result Value Ref Range    Hemoglobin A1C 7.1 (H) 0.0 - 5.6 %   UA Macroscopic with reflex to Microscopic and Culture    Specimen: Urine, Midstream   Result Value Ref Range    Color Urine Yellow Colorless, Straw, Light Yellow, Yellow    Appearance Urine Clear Clear    Glucose Urine >=1000 (A) Negative mg/dL    Bilirubin Urine Negative Negative    Ketones Urine Negative Negative mg/dL    Specific Gravity Urine 1.010 1.003 - 1.035    Blood Urine Negative Negative    pH Urine 5.5 5.0 - 7.0    Protein Albumin Urine Negative Negative mg/dL    Urobilinogen Urine 0.2 0.2, 1.0 E.U./dL    Nitrite Urine Negative Negative    Leukocyte Esterase Urine Negative Negative    Narrative    Microscopic not indicated

## 2023-05-03 NOTE — NURSING NOTE
Prior to immunization administration, verified patients identity using patient s name and date of birth. Please see Immunization Activity for additional information.     Screening Questionnaire for Adult Immunization    Are you sick today?   No   Do you have allergies to medications, food, a vaccine component or latex?   No   Have you ever had a serious reaction after receiving a vaccination?   No   Do you have a long-term health problem with heart, lung, kidney, or metabolic disease (e.g., diabetes), asthma, a blood disorder, no spleen, complement component deficiency, a cochlear implant, or a spinal fluid leak?  Are you on long-term aspirin therapy?   No   Do you have cancer, leukemia, HIV/AIDS, or any other immune system problem?   No   Do you have a parent, brother, or sister with an immune system problem?   No   In the past 3 months, have you taken medications that affect  your immune system, such as prednisone, other steroids, or anticancer drugs; drugs for the treatment of rheumatoid arthritis, Crohn s disease, or psoriasis; or have you had radiation treatments?   No   Have you had a seizure, or a brain or other nervous system problem?   No   During the past year, have you received a transfusion of blood or blood    products, or been given immune (gamma) globulin or antiviral drug?   No   For women: Are you pregnant or is there a chance you could become       pregnant during the next month?   No   Have you received any vaccinations in the past 4 weeks?   No     Immunization questionnaire answers were all negative.      Injection of PCV20 given by April Rolon RN. Patient instructed to remain in clinic for 15 minutes afterwards, and to report any adverse reactions.     Screening performed by April Rolon RN on 5/3/2023 at 9:33 AM.

## 2023-05-03 NOTE — PATIENT INSTRUCTIONS
At [facility], we strive to deliver an exceptional experience to you, every time we see you. If you receive a survey, please complete it as we do value your feedback.  If you have MyChart, you can expect to receive results automatically within 24 hours of their completion.  Your provider will send a note interpreting your results as well.   If you do not have MyChart, you should receive your results in about a week by mail.    Your care team:                            Family Medicine Internal Medicine   MD Tobias Lu MD Shantel Branch-Fleming, MD Srinivasa Vaka, MD Katya Belousova, PAABHISHEK Clements CNP, MD ([facility]   Michi Bowman, MD Renée Graham MD Amelia Massimini APRN NIGEL Joe APRN MD Amanda Negrete MD          Clinic hours: Monday - Thursday 7 am-6 pm; Fridays 7 am-5 pm.   Urgent care: Monday - Friday 10 am- 8 pm; Saturday and Sunday 9 am-5 pm.    Clinic: [phone]       [facility]: Monday - Thursday 8 am - 7 pm; Friday 8 am - 6 pm  [facility]: [phone]

## 2023-05-04 LAB
CREAT UR-MCNC: 100 MG/DL
MICROALBUMIN UR-MCNC: 7 MG/L
MICROALBUMIN/CREAT UR: 7 MG/G CR (ref 0–17)

## 2023-05-05 LAB — TESTOST SERPL-MCNC: 254 NG/DL (ref 240–950)

## 2023-05-06 RX ORDER — SILDENAFIL 50 MG/1
50 TABLET, FILM COATED ORAL DAILY PRN
Qty: 18 TABLET | Refills: 1 | Status: SHIPPED | OUTPATIENT
Start: 2023-05-06 | End: 2023-10-12

## 2023-05-24 DIAGNOSIS — E11.319 TYPE 2 DIABETES MELLITUS WITH RETINOPATHY, WITHOUT LONG-TERM CURRENT USE OF INSULIN, MACULAR EDEMA PRESENCE UNSPECIFIED, UNSPECIFIED LATERALITY, UNSPECIFIED RETINOPATHY SEVERITY (H): ICD-10-CM

## 2023-05-24 DIAGNOSIS — E78.5 HYPERLIPIDEMIA LDL GOAL <100: ICD-10-CM

## 2023-05-24 DIAGNOSIS — I10 ESSENTIAL HYPERTENSION: ICD-10-CM

## 2023-05-24 RX ORDER — HYDROCHLOROTHIAZIDE 12.5 MG/1
TABLET ORAL
Qty: 90 TABLET | Refills: 0 | Status: SHIPPED | OUTPATIENT
Start: 2023-05-24 | End: 2023-08-22

## 2023-05-24 RX ORDER — SITAGLIPTIN 100 MG/1
TABLET, FILM COATED ORAL
Qty: 90 TABLET | Refills: 1 | Status: SHIPPED | OUTPATIENT
Start: 2023-05-24 | End: 2023-10-12

## 2023-05-24 RX ORDER — EMPAGLIFLOZIN 25 MG/1
TABLET, FILM COATED ORAL
Qty: 90 TABLET | Refills: 0 | OUTPATIENT
Start: 2023-05-24

## 2023-05-24 RX ORDER — LISINOPRIL 40 MG/1
TABLET ORAL
Qty: 90 TABLET | Refills: 0 | OUTPATIENT
Start: 2023-05-24

## 2023-05-24 RX ORDER — GLIMEPIRIDE 4 MG/1
TABLET ORAL
Qty: 90 TABLET | Refills: 0 | Status: SHIPPED | OUTPATIENT
Start: 2023-05-24 | End: 2023-08-22

## 2023-05-24 RX ORDER — METFORMIN HCL 500 MG
TABLET, EXTENDED RELEASE 24 HR ORAL
Qty: 360 TABLET | Refills: 1 | Status: SHIPPED | OUTPATIENT
Start: 2023-05-24 | End: 2023-10-12

## 2023-06-27 DIAGNOSIS — H40.1131 PRIMARY OPEN ANGLE GLAUCOMA (POAG) OF BOTH EYES, MILD STAGE: ICD-10-CM

## 2023-06-27 RX ORDER — LATANOPROST 50 UG/ML
1 SOLUTION/ DROPS OPHTHALMIC DAILY
Qty: 2.5 ML | Refills: 5 | Status: SHIPPED | OUTPATIENT
Start: 2023-06-27 | End: 2023-11-24

## 2023-06-27 NOTE — TELEPHONE ENCOUNTER
timolol maleate (TIMOPTIC) 0.5 % ophthalmic solution      Last Written Prescription Date:  10-4-2022  Last Fill Quantity: 10 ml,   # refills: 3  Last Office Visit : 4-  Future Office visit:    10-

## 2023-06-28 RX ORDER — TIMOLOL MALEATE 5 MG/ML
1 SOLUTION/ DROPS OPHTHALMIC 2 TIMES DAILY
Qty: 10 ML | Refills: 1 | Status: SHIPPED | OUTPATIENT
Start: 2023-06-28 | End: 2023-10-03

## 2023-06-28 NOTE — TELEPHONE ENCOUNTER
Health Call Center    Phone Message    May a detailed message be left on voicemail: yes     Reason for Call: Medication Refill Request    Has the patient contacted the pharmacy for the refill? Yes   Name of medication being requested: Latanoprost  Provider who prescribed the medication: Dr. Verdin  Pharmacy: [id number] [place], [place] - [address] AT [address]   Date medication is needed: ASAP- pt has been out for 5 days.          Action Taken: Message routed to:  [facility] EYE    Travel Screening: Not Applicable

## 2023-07-28 ENCOUNTER — PATIENT OUTREACH (OUTPATIENT)
Dept: GERIATRIC MEDICINE | Facility: CLINIC | Age: 68
End: 2023-07-28

## 2023-07-28 NOTE — PROGRESS NOTES
"[facility] Care Coordination Contact    Per CC, mailed client an \"Unable to Contact\" letter.  Mailed [employer] leave behind doc.       Jazz Chou  Care Management Specialist   [facility]   [phone]    "

## 2023-07-28 NOTE — LETTER
July 28, 2023      YADIRA LOPEZ  [address] [address] [address]        Dear Yadira:     I m your care coordinator. I ve been unable to reach you by phone. I am writing to ask you or an authorized representative to call me at [phone]. If you reach my voicemail, please leave a message with your daytime telephone number. . Include a date and time that I can call you. If you are hearing impaired, call the [place] Relay at 812 or [phone] ([id number] relay service).     The reason I am trying to reach you is:    [] To schedule an assessment  [x] For your six (6)-month check-in  [] Other:      Please call me as soon as you receive this letter. I look forward to speaking with you.    Sincerely,      Cynthia Pedraza RN, N  [phone]  [email or web address]      [facility] ([facility]) is a health plan that contracts with both Medicare and the [place] Medical Assistance (Medicaid) program to provide benefits of both programs to enrollees. Enrollment in [facility] depends on contract renewal.    [id number] accepted  [id number]                                                                         A (08/2022)

## 2023-08-02 ENCOUNTER — PATIENT OUTREACH (OUTPATIENT)
Dept: GERIATRIC MEDICINE | Facility: CLINIC | Age: 68
End: 2023-08-02

## 2023-08-02 ENCOUNTER — OFFICE VISIT (OUTPATIENT)
Dept: PODIATRY | Facility: CLINIC | Age: 68
End: 2023-08-02
Payer: COMMERCIAL

## 2023-08-02 VITALS — DIASTOLIC BLOOD PRESSURE: 78 MMHG | HEART RATE: 70 BPM | SYSTOLIC BLOOD PRESSURE: 120 MMHG

## 2023-08-02 DIAGNOSIS — E11.319 TYPE 2 DIABETES MELLITUS WITH RETINOPATHY, WITHOUT LONG-TERM CURRENT USE OF INSULIN, MACULAR EDEMA PRESENCE UNSPECIFIED, UNSPECIFIED LATERALITY, UNSPECIFIED RETINOPATHY SEVERITY (H): ICD-10-CM

## 2023-08-02 DIAGNOSIS — L60.0 INGROWING RIGHT GREAT TOENAIL: Primary | ICD-10-CM

## 2023-08-02 PROCEDURE — 11750 EXCISION NAIL&NAIL MATRIX: CPT | Mod: TA | Performed by: PODIATRIST

## 2023-08-02 NOTE — PROGRESS NOTES
[facility] Care Coordination Contact    Called member to complete six month assessment. Care Coordinator has made 4 actionable attempts.     Cynthia Pedraza RN PHN  [facility]  [phone]

## 2023-08-02 NOTE — LETTER
8/2/2023         RE: Kathrine Isaac  [address] [address] [address]        Dear Colleague,    Thank you for referring your patient, Kathrine Isaac, to the [facility]. Please see a copy of my visit note below.    Subjective:    Pt is seen today  w/ the c/c of a painful ingrown right great nail lateral border.  This has been problematic recently.  positivehistory of drainage from the site. This is slowly getting worse.  Aggravated by activity and relieved by rest.  Patient had temporary nail removal done here February 2022.  He has diabetes mellitus.    ROS:  see above       No Known Allergies    Current Outpatient Medications   Medication Sig Dispense Refill     alcohol swab prep pads Use to swab area of injection/nkechi as directed. 100 each 3     alcohol swab prep pads Use to swab area of injection/nkechi as directed. 100 each 3     aspirin (ASPIRIN LOW DOSE) 81 MG EC tablet Take 1 tablet (81 mg) by mouth daily 90 tablet 2     atorvastatin (LIPITOR) 20 MG tablet Take 1 tablet (20 mg) by mouth daily 90 tablet 0     blood glucose (ACCU-CHEK GUIDE) test strip USE TO TEST BLOOD SUGAR TWICE DAILY OR AS DIRECTED 100 strip 2     blood glucose (NO BRAND SPECIFIED) test strip Use to test blood sugar 1 times daily or as directed. To accompany: Blood Glucose Monitor Brands: per insurance. 100 strip 6     blood glucose (NO BRAND SPECIFIED) test strip Use to test blood sugar 2 times daily or as directed. To accompany: Blood Glucose Monitor Brands: per insurance.One touch is reportedly covered by insurance 100 strip 6     blood glucose calibration (NO BRAND SPECIFIED) solution To accompany: Blood Glucose Monitor Brands: per insurance. 1 each 1     blood glucose calibration (NO BRAND SPECIFIED) solution To accompany: Blood Glucose Monitor Brands: per insurance.One touch is reportedly covered by insurance 1 each 1     blood glucose calibration (NO BRAND SPECIFIED) solution To  accompany: Blood Glucose Monitor Brands: per insurance. 1 Bottle 3     blood glucose monitoring (NO BRAND SPECIFIED) meter device kit Use to test blood sugar 1 times daily or as directed. Preferred blood glucose meter OR supplies to accompany: Blood Glucose Monitor Brands: per insurance. 1 kit 0     blood glucose monitoring (NO BRAND SPECIFIED) meter device kit Use to test blood sugar 2 times daily or as directed. Preferred blood glucose meter OR supplies to accompany: Blood Glucose Monitor Brands: per insurance. One touch is reportedly covered. 1 kit 0     blood glucose monitoring (NO BRAND SPECIFIED) meter device kit Use to test blood sugar 1 times daily or as directed. Preferred blood glucose meter/supplies to accompany: Monitor Brands: per insurance. 1 kit 0     ciclopirox (PENLAC) 8 % external solution APPLY TO ADJACENT SKIN AND AFFECTED NAILS DAILY. REMOVE WITH ALCHOL EVERY 7 DAYS. THEN REPEAT 6.6 mL 2     clotrimazole (LOTRIMIN) 1 % external cream Apply topically 2 times daily 30 g 0     diclofenac (VOLTAREN) 75 MG EC tablet TAKE 1 TABLET(75 MG) BY MOUTH TWICE DAILY AS NEEDED FOR MODERATE PAIN 40 tablet 2     empagliflozin (JARDIANCE) 25 MG TABS tablet Take 1 tablet (25 mg) by mouth daily 90 tablet 0     gabapentin (NEURONTIN) 300 MG capsule TAKE 1 CAPSULE BY MOUTH AT BEDTIME 90 capsule 2     glimepiride (AMARYL) 4 MG tablet TAKE 1 TABLET BY MOUTH EVERY MORNING BEFORE BREAKFAST 90 tablet 0     hydrochlorothiazide (HYDRODIURIL) 12.5 MG tablet TAKE 1 TABLET(12.5 MG) BY MOUTH DAILY 90 tablet 0     JANUVIA 100 MG tablet TAKE 1 TABLET(100 MG) BY MOUTH DAILY 90 tablet 1     Lancets (ONETOUCH DELICA PLUS [id number]) MISC USE WITH LANCETING DEVICE TWICE DAILY 100 each 1     latanoprost (XALATAN) 0.005 % ophthalmic solution Place 1 drop into both eyes daily 2.5 mL 5     lisinopril (ZESTRIL) 40 MG tablet TAKE 1 TABLET(40 MG) BY MOUTH DAILY 90 tablet 0     metFORMIN (GLUCOPHAGE XR) 500 MG 24 hr tablet TAKE 4 TABLETS(2000  MG) BY MOUTH DAILY WITH DINNER 360 tablet 1     methocarbamol (ROBAXIN) 500 MG tablet Take 1 tablet (500 mg) by mouth 3 times daily as needed for muscle spasms 30 tablet 1     ORDER FOR DME, SET TO LOCAL PRINT, Equipment being ordered: wrist quick fit [id number] 1 each 0     order for DME Equipment being ordered: One touch lancets, test strips.  Patient to check sugars once times daily, 90 day supply for test strips and lancets, refill 3 times 1 Device 0     order for DME Equipment being ordered: Glucometer per insurance preference, lancets, test strips.  Patient to check sugars two times daily, 90 day supply for test strips and lancets, refill 3 times 1 Device 11     order for DME Equipment being ordered: Glucometer Accucheck Sulema, lancets, test strips.  Patient to check sugars bid times daily, 90 day supply for test strips and lancets, refill 6 times 1 Device 0     order for DME Equipment being ordered: lancets for one touch glucometer for once daily testing 1 Box 6     sildenafil (VIAGRA) 50 MG tablet Take 1 tablet (50 mg) by mouth daily as needed (ed) 18 tablet 1     thin (NO BRAND SPECIFIED) lancets Use with lanceting device. To accompany: Blood Glucose Monitor Brands: per insurance. 100 each 6     thin (NO BRAND SPECIFIED) lancets Use with lanceting device. To accompany: Blood Glucose Monitor Brands: per insurance. 200 each 6     timolol maleate (TIMOPTIC) 0.5 % ophthalmic solution Place 1 drop into both eyes 2 times daily 10 mL 1       Patient Active Problem List   Diagnosis     Glaucoma suspect     Type 2 diabetes mellitus with retinopathy, without long-term current use of insulin, macular edema presence unspecified, unspecified laterality, unspecified retinopathy severity (H)     Hyperlipidemia LDL goal <100     Essential hypertension     Advance care planning     Diverticulosis of large intestine without hemorrhage     Overweight (BMI 25.0-29.9)     Mechanical low back pain     Hx of LASIK       Past  Medical History:   Diagnosis Date     Arthritis      Diabetes (H)      Essential hypertension 10/28/2015       Past Surgical History:   Procedure Laterality Date     COLONOSCOPY N/A 1/14/2016    Procedure: COLONOSCOPY;  Surgeon: Ventura Monge MD;  Location: [facility] OR     LASIK BILATERAL       ROTATOR CUFF REPAIR RT/LT  1992     SURGICAL HISTORY OF -       uvula remove        Family History   Problem Relation Age of Onset     Diabetes Mother      Glaucoma No family hx of      Macular Degeneration No family hx of        Social History     Tobacco Use     Smoking status: Never     Smokeless tobacco: Never   Substance Use Topics     Alcohol use: Yes         Exam:    Vitals: /78   Pulse 70   BMI: There is no height or weight on file to calculate BMI.  Height: Data Unavailable    Constitutional/ general:  Pt is in no apparent distress, appears well-nourished.  Cooperative with history and physical exam.     Psych:  The patient answered questions appropriately.  Normal affect.  Seems to have reasonable expectations, in terms of treatment.     Lungs:  Non labored breathing, non labored speech. No cough.  No audible wheezing. Even, quiet breathing.       Vascular:  positive pedal pulses bilaterally for both the DP and PT arteries.  CFT < 3 sec.  negative ankle edema.  positive pedal hair growth.    Neuro:  Alert and oriented x 3. Coordinated gait.  Light touch sensation is intact     Derm: Normal texture and turgor.  No erythema, ecchymosis, or cyanosis.      Musculoskeletal:    right great toe nail lateral border shows soft tissue impingement with localized erythema.   negative active drainage/purulence at this time.  No sinus tracts.  No nailbed masses or exostosis.  No pain with range of motion of IPJ or MTPJ.  No ascending cellulitis.      Component Ref Range & Units 3 mo ago  (5/3/23) 9 mo ago  (10/20/22) 1 yr ago  (1/25/22) 1 yr ago  (9/21/21) 2 yr ago  (4/2/21) 2 yr ago  (9/16/20) 3 yr ago  (12/30/19)      Hemoglobin A1C 0.0 - 5.6 % 7.1 High  6.7 High  CM 6.5 High  CM 8.8 High  CM 7.9 High  R, CM 6.6 High  R, CM 6.7 High  R,       ASSESSMENT:    Onychocryptosis with paronychia right great lateral border.  Diabetes mellitus    Discussed etiology and treatment options in detail w/ the pt.  The potential causes and nature of an ingrown toenail were discussed with the patient.  We reviewed the natural history/prognosis of the condition and potential risks if no treatment is provided.      After thorough discussion and answering all questions, the patient elected to have permanent removal of affected nail border.  Risk complications and efficacy discussed with patient.  Obtained consent, used 3cc of 1% lidocaine plain to block right great toe.  Sterile prep, then avulsed the affected border.  No evidence of deep abscess noted.  Phenol was applied times 3 at 3o second intervals with curettage in between and then alcohol rinse.  Pt tolerated procedure well.  Sterile bandage placed, gave wound care instruction.  Return to clinic prn    Dany Julian DPM, FACFAS        Again, thank you for allowing me to participate in the care of your patient.        Sincerely,        Dany Julian DPM

## 2023-08-02 NOTE — PATIENT INSTRUCTIONS
We wish you continued good healing. If you have any questions or concerns, please do not hesitate to contact us at  [phone]    [employer] (secure e-mail communication and access to your chart) to send a message or to make an appointment.    Please remember to call and schedule a follow up appointment if one was recommended at your earliest convenience.     PODIATRY CLINIC HOURS  TELEPHONE NUMBER    Dr. Dany LEALPALLISON FACFAS        Clinics:  [facility], JACOB [place], ATTILA [facility]  Tuesday 1PM-6PM  [place]  Wednesday 745AM-330PM  [place]  Thursday/Friday 745AM-230PM  [place]   1st and 3rd Mondays  845-430 PM   [facility] APPOINTMENTS  [phone]    [place] APPOINTMENTS  [phone]    [place] APPOINTMENTS  [phone]        If you need a medication refill, please contact us you may need lab work and/or a follow up visit prior to your refill (i.e. Antifungal medications).  If MRI needed please call Imaging at [phone]   HOW DO I GET MY KNEE SCOOTER? Knee scooters can be picked up at ANY Medical Supply stores with your knee scooter Prescription.  OR  Bring your signed prescription to an [facility] showroom.  Call or bring in your Orthotics order to any Orthotics locations. Or call [phone]         [facility] TOENAIL REMOVAL AFTERCARE ~PERMANENT NAIL REMOVAL    Go directly home and elevate the affected foot for the remainder of the day/evening if possible. Your toe may stay numb anywhere from 2-8 hours.   For pain take Tylenol, ibuprofen or another anti-inflammatory as needed for pain. You may apply ice on top of your foot behind the base of the toes, BUT, NOT ON IT.  That evening of the procedure, or morning after procedure.  you may remove the bandage. Began soaking foot three times a day (10-15 min each time) in lukewarm water with a pinch of salt. Epson or table salt   You may have  to do this  for 6-8 weeks if Phenol was used during the procedure.  After soaks, pat the area dry. Apply antibiotic ointment to the area and cover with a band-aid.  The following day. Find a shoe that is comfortable and minimizes the amount of rubbing on your toe, as this increases pain.   Dress with band-aids until no longer draining. Those that have had the phenol procedure, the toe will drain longer and will look like it is infected because it is a chemical burn.    Watch for any signs and symptoms of infection such as: redness, red streaks going up the foot/leg, swelling, pus or foul odor. Fevers > 101   Please call with questions.    Dr. Dany Jluian D.P.M FAC FAS

## 2023-08-02 NOTE — PROGRESS NOTES
Subjective:    Pt is seen today  w/ the c/c of a painful ingrown right great nail lateral border.  This has been problematic recently.  positivehistory of drainage from the site. This is slowly getting worse.  Aggravated by activity and relieved by rest.  Patient had temporary nail removal done here February 2022.  He has diabetes mellitus.    ROS:  see above       No Known Allergies    Current Outpatient Medications   Medication Sig Dispense Refill    alcohol swab prep pads Use to swab area of injection/nkechi as directed. 100 each 3    alcohol swab prep pads Use to swab area of injection/nkechi as directed. 100 each 3    aspirin (ASPIRIN LOW DOSE) 81 MG EC tablet Take 1 tablet (81 mg) by mouth daily 90 tablet 2    atorvastatin (LIPITOR) 20 MG tablet Take 1 tablet (20 mg) by mouth daily 90 tablet 0    blood glucose (ACCU-CHEK GUIDE) test strip USE TO TEST BLOOD SUGAR TWICE DAILY OR AS DIRECTED 100 strip 2    blood glucose (NO BRAND SPECIFIED) test strip Use to test blood sugar 1 times daily or as directed. To accompany: Blood Glucose Monitor Brands: per insurance. 100 strip 6    blood glucose (NO BRAND SPECIFIED) test strip Use to test blood sugar 2 times daily or as directed. To accompany: Blood Glucose Monitor Brands: per insurance.One touch is reportedly covered by insurance 100 strip 6    blood glucose calibration (NO BRAND SPECIFIED) solution To accompany: Blood Glucose Monitor Brands: per insurance. 1 each 1    blood glucose calibration (NO BRAND SPECIFIED) solution To accompany: Blood Glucose Monitor Brands: per insurance.One touch is reportedly covered by insurance 1 each 1    blood glucose calibration (NO BRAND SPECIFIED) solution To accompany: Blood Glucose Monitor Brands: per insurance. 1 Bottle 3    blood glucose monitoring (NO BRAND SPECIFIED) meter device kit Use to test blood sugar 1 times daily or as directed. Preferred blood glucose meter OR supplies to accompany: Blood Glucose Monitor Brands: per  insurance. 1 kit 0    blood glucose monitoring (NO BRAND SPECIFIED) meter device kit Use to test blood sugar 2 times daily or as directed. Preferred blood glucose meter OR supplies to accompany: Blood Glucose Monitor Brands: per insurance. One touch is reportedly covered. 1 kit 0    blood glucose monitoring (NO BRAND SPECIFIED) meter device kit Use to test blood sugar 1 times daily or as directed. Preferred blood glucose meter/supplies to accompany: Monitor Brands: per insurance. 1 kit 0    ciclopirox (PENLAC) 8 % external solution APPLY TO ADJACENT SKIN AND AFFECTED NAILS DAILY. REMOVE WITH ALCHOL EVERY 7 DAYS. THEN REPEAT 6.6 mL 2    clotrimazole (LOTRIMIN) 1 % external cream Apply topically 2 times daily 30 g 0    diclofenac (VOLTAREN) 75 MG EC tablet TAKE 1 TABLET(75 MG) BY MOUTH TWICE DAILY AS NEEDED FOR MODERATE PAIN 40 tablet 2    empagliflozin (JARDIANCE) 25 MG TABS tablet Take 1 tablet (25 mg) by mouth daily 90 tablet 0    gabapentin (NEURONTIN) 300 MG capsule TAKE 1 CAPSULE BY MOUTH AT BEDTIME 90 capsule 2    glimepiride (AMARYL) 4 MG tablet TAKE 1 TABLET BY MOUTH EVERY MORNING BEFORE BREAKFAST 90 tablet 0    hydrochlorothiazide (HYDRODIURIL) 12.5 MG tablet TAKE 1 TABLET(12.5 MG) BY MOUTH DAILY 90 tablet 0    JANUVIA 100 MG tablet TAKE 1 TABLET(100 MG) BY MOUTH DAILY 90 tablet 1    Lancets (ONETOUCH DELICA PLUS [id number]) MISC USE WITH LANCETING DEVICE TWICE DAILY 100 each 1    latanoprost (XALATAN) 0.005 % ophthalmic solution Place 1 drop into both eyes daily 2.5 mL 5    lisinopril (ZESTRIL) 40 MG tablet TAKE 1 TABLET(40 MG) BY MOUTH DAILY 90 tablet 0    metFORMIN (GLUCOPHAGE XR) 500 MG 24 hr tablet TAKE 4 TABLETS(2000 MG) BY MOUTH DAILY WITH DINNER 360 tablet 1    methocarbamol (ROBAXIN) 500 MG tablet Take 1 tablet (500 mg) by mouth 3 times daily as needed for muscle spasms 30 tablet 1    ORDER FOR DME, SET TO LOCAL PRINT, Equipment being ordered: wrist quick fit [id number] 1 each 0    order for DME  Equipment being ordered: One touch lancets, test strips.  Patient to check sugars once times daily, 90 day supply for test strips and lancets, refill 3 times 1 Device 0    order for DME Equipment being ordered: Glucometer per insurance preference, lancets, test strips.  Patient to check sugars two times daily, 90 day supply for test strips and lancets, refill 3 times 1 Device 11    order for DME Equipment being ordered: Glucometer Accucheck Sulema, lancets, test strips.  Patient to check sugars bid times daily, 90 day supply for test strips and lancets, refill 6 times 1 Device 0    order for DME Equipment being ordered: lancets for one touch glucometer for once daily testing 1 Box 6    sildenafil (VIAGRA) 50 MG tablet Take 1 tablet (50 mg) by mouth daily as needed (ed) 18 tablet 1    thin (NO BRAND SPECIFIED) lancets Use with lanceting device. To accompany: Blood Glucose Monitor Brands: per insurance. 100 each 6    thin (NO BRAND SPECIFIED) lancets Use with lanceting device. To accompany: Blood Glucose Monitor Brands: per insurance. 200 each 6    timolol maleate (TIMOPTIC) 0.5 % ophthalmic solution Place 1 drop into both eyes 2 times daily 10 mL 1       Patient Active Problem List   Diagnosis    Glaucoma suspect    Type 2 diabetes mellitus with retinopathy, without long-term current use of insulin, macular edema presence unspecified, unspecified laterality, unspecified retinopathy severity (H)    Hyperlipidemia LDL goal <100    Essential hypertension    Advance care planning    Diverticulosis of large intestine without hemorrhage    Overweight (BMI 25.0-29.9)    Mechanical low back pain    Hx of LASIK       Past Medical History:   Diagnosis Date    Arthritis     Diabetes (H)     Essential hypertension 10/28/2015       Past Surgical History:   Procedure Laterality Date    COLONOSCOPY N/A 1/14/2016    Procedure: COLONOSCOPY;  Surgeon: Ventura Monge MD;  Location: [facility] OR    LASIK BILATERAL      ROTATOR CUFF  REPAIR RT/LT  1992    SURGICAL HISTORY OF -       uvula remove        Family History   Problem Relation Age of Onset    Diabetes Mother     Glaucoma No family hx of     Macular Degeneration No family hx of        Social History     Tobacco Use    Smoking status: Never    Smokeless tobacco: Never   Substance Use Topics    Alcohol use: Yes         Exam:    Vitals: /78   Pulse 70   BMI: There is no height or weight on file to calculate BMI.  Height: Data Unavailable    Constitutional/ general:  Pt is in no apparent distress, appears well-nourished.  Cooperative with history and physical exam.     Psych:  The patient answered questions appropriately.  Normal affect.  Seems to have reasonable expectations, in terms of treatment.     Lungs:  Non labored breathing, non labored speech. No cough.  No audible wheezing. Even, quiet breathing.       Vascular:  positive pedal pulses bilaterally for both the DP and PT arteries.  CFT < 3 sec.  negative ankle edema.  positive pedal hair growth.    Neuro:  Alert and oriented x 3. Coordinated gait.  Light touch sensation is intact     Derm: Normal texture and turgor.  No erythema, ecchymosis, or cyanosis.      Musculoskeletal:    right great toe nail lateral border shows soft tissue impingement with localized erythema.   negative active drainage/purulence at this time.  No sinus tracts.  No nailbed masses or exostosis.  No pain with range of motion of IPJ or MTPJ.  No ascending cellulitis.      Component Ref Range & Units 3 mo ago  (5/3/23) 9 mo ago  (10/20/22) 1 yr ago  (1/25/22) 1 yr ago  (9/21/21) 2 yr ago  (4/2/21) 2 yr ago  (9/16/20) 3 yr ago  (12/30/19)     Hemoglobin A1C 0.0 - 5.6 % 7.1 High  6.7 High  CM 6.5 High  CM 8.8 High  CM 7.9 High  R, CM 6.6 High  R, CM 6.7 High  R,       ASSESSMENT:    Onychocryptosis with paronychia right great lateral border.  Diabetes mellitus    Discussed etiology and treatment options in detail w/ the pt.  The potential causes and nature  of an ingrown toenail were discussed with the patient.  We reviewed the natural history/prognosis of the condition and potential risks if no treatment is provided.      After thorough discussion and answering all questions, the patient elected to have permanent removal of affected nail border.  Risk complications and efficacy discussed with patient.  Obtained consent, used 3cc of 1% lidocaine plain to block right great toe.  Sterile prep, then avulsed the affected border.  No evidence of deep abscess noted.  Phenol was applied times 3 at 3o second intervals with curettage in between and then alcohol rinse.  Pt tolerated procedure well.  Sterile bandage placed, gave wound care instruction.  Return to clinic prn    Dany Julian DPM, FACFAS

## 2023-08-07 DIAGNOSIS — I10 ESSENTIAL HYPERTENSION: ICD-10-CM

## 2023-08-07 RX ORDER — LISINOPRIL 40 MG/1
TABLET ORAL
Qty: 90 TABLET | Refills: 0 | Status: SHIPPED | OUTPATIENT
Start: 2023-08-07 | End: 2023-10-12

## 2023-08-22 DIAGNOSIS — M25.562 CHRONIC PAIN OF LEFT KNEE: ICD-10-CM

## 2023-08-22 DIAGNOSIS — G89.29 CHRONIC PAIN OF LEFT KNEE: ICD-10-CM

## 2023-08-22 DIAGNOSIS — M17.12 ARTHRITIS OF LEFT KNEE: ICD-10-CM

## 2023-08-22 DIAGNOSIS — E11.319 TYPE 2 DIABETES MELLITUS WITH RETINOPATHY, WITHOUT LONG-TERM CURRENT USE OF INSULIN, MACULAR EDEMA PRESENCE UNSPECIFIED, UNSPECIFIED LATERALITY, UNSPECIFIED RETINOPATHY SEVERITY (H): ICD-10-CM

## 2023-08-22 DIAGNOSIS — I10 ESSENTIAL HYPERTENSION: ICD-10-CM

## 2023-08-22 DIAGNOSIS — E78.5 HYPERLIPIDEMIA LDL GOAL <100: ICD-10-CM

## 2023-08-22 DIAGNOSIS — M51.16 LUMBAR DISC HERNIATION WITH RADICULOPATHY: ICD-10-CM

## 2023-08-22 RX ORDER — HYDROCHLOROTHIAZIDE 12.5 MG/1
TABLET ORAL
Qty: 90 TABLET | Refills: 1 | Status: SHIPPED | OUTPATIENT
Start: 2023-08-22 | End: 2023-12-07

## 2023-08-22 RX ORDER — ASPIRIN 81 MG/1
81 TABLET, COATED ORAL DAILY
Qty: 90 TABLET | Refills: 1 | Status: SHIPPED | OUTPATIENT
Start: 2023-08-22 | End: 2024-02-19

## 2023-08-22 RX ORDER — LISINOPRIL 40 MG/1
TABLET ORAL
Qty: 90 TABLET | Refills: 0 | OUTPATIENT
Start: 2023-08-22

## 2023-08-22 RX ORDER — GLIMEPIRIDE 4 MG/1
TABLET ORAL
Qty: 90 TABLET | Refills: 0 | Status: SHIPPED | OUTPATIENT
Start: 2023-08-22 | End: 2023-10-12

## 2023-08-23 RX ORDER — GABAPENTIN 300 MG/1
CAPSULE ORAL
Qty: 90 CAPSULE | Refills: 2 | Status: SHIPPED | OUTPATIENT
Start: 2023-08-23 | End: 2023-12-07

## 2023-08-24 ENCOUNTER — OFFICE VISIT (OUTPATIENT)
Dept: ORTHOPEDICS | Facility: CLINIC | Age: 68
End: 2023-08-24
Payer: COMMERCIAL

## 2023-08-24 DIAGNOSIS — M17.0 BILATERAL PRIMARY OSTEOARTHRITIS OF KNEE: Primary | ICD-10-CM

## 2023-08-24 PROCEDURE — 20610 DRAIN/INJ JOINT/BURSA W/O US: CPT | Mod: 50 | Performed by: FAMILY MEDICINE

## 2023-08-24 ASSESSMENT — PAIN SCALES - GENERAL: PAINLEVEL: MILD PAIN (2)

## 2023-08-24 NOTE — LETTER
8/24/2023         RE: Kathrine Isaac  [address] [facility] [id number]        Dear Colleague,    Thank you for referring your patient, Kathrine Isaac, to the [facility] [place]. Please see a copy of my visit note below.    HISTORY OF PRESENT ILLNESS  Mr. Isaac is a pleasant 68 year old male following up with bilateral knee osteoarthritis.  Kathrine has been experiencing worsening knee pain over the past year.  He is interested in interventional treatment for his knee.  He has had corticosteroid injections, physical therapy, and has tried multiple different analgesics in the past.     PHYSICAL EXAM  General  - normal appearance, in no obvious distress  Musculoskeletal - right and left knee  - stance: mildly antalgic gait  - palpation: no joint line tenderness  Neuro  - no sensory or motor deficit, grossly normal coordination, normal muscle tone       ASSESSMENT & PLAN  Mr. Isaac is a 68 year old male following up with bilateral knee osteoarthritis.    We revisited options for his knees including hyaluronic acid treatment, which he is interested in.  We did perform this today.    If this does not help him over the coming weeks we should follow-up, if this is the case we can consider other options for his knee treatment.    It was a pleasure seeing Kathrine.        Herman El DO, CAQSM      This note was constructed using Dragon dictation software, please excuse any minor errors in spelling, grammar, or syntax.    Large Joint Injection/Arthocentesis: bilateral knee    Date/Time: 8/24/2023 9:59 AM    Performed by: Herman El DO  Authorized by: Herman El DO    Indications:  Pain  Needle Size:  22 G  Guidance: landmark guided    Approach:  Lateral  Location:  Knee  Laterality:  Bilateral      Medications (Right):  48 mg hylan 48 MG/6ML  Medications (Left):  48 mg hylan 48 MG/6ML  Outcome:  Tolerated well, no immediate complications  Procedure  discussed: discussed risks, benefits, and alternatives    Consent Given by:  Patient  Timeout: timeout called immediately prior to procedure    Prep: patient was prepped and draped in usual sterile fashion       Knee Injections with Hyaluronic Acid - Ultrasound Guided    The patient was informed of the risks and the benefits of the procedure and a written consent was signed.    The patient's left knee was prepped with chlorhexidine in sterile fashion.   Synvisc One removed from packaging and inspected for integrity.  Local anesthesia was performed using 3 cc of 1% lidocaine without epinephrine on a 27-gauge needle.  Hyaluronic acid injection was then performed using sterile technique.  Under ultrasound guidance a 1.5-inch 22-gauge needle was used to enter the superolateral aspect of the knee.  Needle placement was visualized and documented with ultrasound.  Ultrasound visualization was necessary due to decreased joint space in the setting of osteoarthritis.  Images were permanently stored for the patient's record.r    The patient's right knee was prepped with chlorhexidine in sterile fashion.   Synvisc One removed from packaging and inspected for integrity.  Local anesthesia was performed using 3 cc of 1% lidocaine without epinephrine on a 27-gauge needle.  Hyaluronic acid injection was then performed using sterile technique.  Under ultrasound guidance a 1.5-inch 22-gauge needle was used to enter the superolateral aspect of the knee.  Needle placement was visualized and documented with ultrasound.  Ultrasound visualization was necessary due to decreased joint space in the setting of osteoarthritis.  Images were permanently stored for the patient's record.    There were no complications. The patient tolerated the procedure well. There was negligible bleeding.                  Again, thank you for allowing me to participate in the care of your patient.        Sincerely,        Herman El DO

## 2023-08-24 NOTE — NURSING NOTE
[facility]   ORTHOPEDICS & SPORTS MEDICINE  [address] [place] [address]  Dept: [phone]  ______________________________________________________________________________    Patient: Kathrine Isaac   : 1955   MRN: [id number]   2023    INVASIVE PROCEDURE SAFETY CHECKLIST    Date: 2023   Procedure:Bilateral knee injections  Patient Name: Kathrine Isaac  MRN: [id number]  YOB: 1955    Action: Complete sections as appropriate. Any discrepancy results in a HARD COPY until resolved.     PRE PROCEDURE:  Patient ID verified with 2 identifiers (name and  or MRN): Yes  Procedure and site verified with patient/designee (when able): Yes  Accurate consent documentation in medical record: Yes  H&P (or appropriate assessment) documented in medical record: Yes  H&P must be up to 20 days prior to procedure and updates within 24 hours of procedure as applicable: NA  Relevant diagnostic and radiology test results appropriately labeled and displayed as applicable: NA  Procedure site(s) marked with provider initials: NA    TIMEOUT:  Time-Out performed immediately prior to starting procedure, including verbal and active participation of all team members addressing the following:Yes  * Correct patient identify  * Confirmed that the correct side and site are marked  * An accurate procedure consent form  * Agreement on the procedure to be done  * Correct patient position  * Relevant images and results are properly labeled and appropriately displayed  * The need to administer antibiotics or fluids for irrigation purposes during the procedure as applicable   * Safety precautions based on patient history or medication use    DURING PROCEDURE: Verification of correct person, site, and procedures any time the responsibility for care of the patient is transferred to another member of the care team.       Prior to injection, verified patient identity using patient's name and date of  birth.  Due to injection administration, patient instructed to remain in clinic for 15 minutes  afterwards, and to report any adverse reaction to me immediately.    Joint injection was performed.      Drug Amount Wasted:  None.  Vial/Syringe: Syringe  Expiration Date:  2/28/2026      Richard Jackson, EMT  August 24, 2023

## 2023-08-24 NOTE — PROGRESS NOTES
HISTORY OF PRESENT ILLNESS  Mr. Isaac is a pleasant 68 year old male following up with bilateral knee osteoarthritis.  Kathrine has been experiencing worsening knee pain over the past year.  He is interested in interventional treatment for his knee.  He has had corticosteroid injections, physical therapy, and has tried multiple different analgesics in the past.     PHYSICAL EXAM  General  - normal appearance, in no obvious distress  Musculoskeletal - right and left knee  - stance: mildly antalgic gait  - palpation: no joint line tenderness  Neuro  - no sensory or motor deficit, grossly normal coordination, normal muscle tone       ASSESSMENT & PLAN  Mr. Isaac is a 68 year old male following up with bilateral knee osteoarthritis.    We revisited options for his knees including hyaluronic acid treatment, which he is interested in.  We did perform this today.    If this does not help him over the coming weeks we should follow-up, if this is the case we can consider other options for his knee treatment.    It was a pleasure seeing Kathrine.        Herman El DO, CAQSM      This note was constructed using Dragon dictation software, please excuse any minor errors in spelling, grammar, or syntax.    Large Joint Injection/Arthocentesis: bilateral knee    Date/Time: 8/24/2023 9:59 AM    Performed by: Herman El DO  Authorized by: Herman El DO    Indications:  Pain  Needle Size:  22 G  Guidance: landmark guided    Approach:  Lateral  Location:  Knee  Laterality:  Bilateral      Medications (Right):  48 mg hylan 48 MG/6ML  Medications (Left):  48 mg hylan 48 MG/6ML  Outcome:  Tolerated well, no immediate complications  Procedure discussed: discussed risks, benefits, and alternatives    Consent Given by:  Patient  Timeout: timeout called immediately prior to procedure    Prep: patient was prepped and draped in usual sterile fashion       Knee Injections with Hyaluronic Acid - Ultrasound Guided    The patient  was informed of the risks and the benefits of the procedure and a written consent was signed.    The patient's left knee was prepped with chlorhexidine in sterile fashion.   Synvisc One removed from packaging and inspected for integrity.  Local anesthesia was performed using 3 cc of 1% lidocaine without epinephrine on a 27-gauge needle.  Hyaluronic acid injection was then performed using sterile technique.  Under ultrasound guidance a 1.5-inch 22-gauge needle was used to enter the superolateral aspect of the knee.  Needle placement was visualized and documented with ultrasound.  Ultrasound visualization was necessary due to decreased joint space in the setting of osteoarthritis.  Images were permanently stored for the patient's record.r    The patient's right knee was prepped with chlorhexidine in sterile fashion.   Synvisc One removed from packaging and inspected for integrity.  Local anesthesia was performed using 3 cc of 1% lidocaine without epinephrine on a 27-gauge needle.  Hyaluronic acid injection was then performed using sterile technique.  Under ultrasound guidance a 1.5-inch 22-gauge needle was used to enter the superolateral aspect of the knee.  Needle placement was visualized and documented with ultrasound.  Ultrasound visualization was necessary due to decreased joint space in the setting of osteoarthritis.  Images were permanently stored for the patient's record.    There were no complications. The patient tolerated the procedure well. There was negligible bleeding.

## 2023-08-24 NOTE — NURSING NOTE
Chief Complaint   Patient presents with    Left Knee - Pain, Follow Up    Right Knee - Follow Up       There were no vitals filed for this visit.    There is no height or weight on file to calculate BMI.      KEVIN Ramos NREMT

## 2023-09-20 ENCOUNTER — PATIENT OUTREACH (OUTPATIENT)
Dept: CARE COORDINATION | Facility: CLINIC | Age: 68
End: 2023-09-20

## 2023-10-01 DIAGNOSIS — H40.1131 PRIMARY OPEN ANGLE GLAUCOMA (POAG) OF BOTH EYES, MILD STAGE: ICD-10-CM

## 2023-10-03 RX ORDER — TIMOLOL MALEATE 5 MG/ML
1 SOLUTION/ DROPS OPHTHALMIC 2 TIMES DAILY
Qty: 10 ML | Refills: 0 | Status: SHIPPED | OUTPATIENT
Start: 2023-10-03 | End: 2023-11-27

## 2023-10-03 NOTE — TELEPHONE ENCOUNTER
TIMOLOL MALEATE 0.5% OPHTH SOLN 5ML   Last Written Prescription Date:   6/28/2023  Last Fill Quantity: 10,   # refills: 1  Last Office Visit :  4/21/2023  Future Office visit:  10/27/2023      Ramos Verdin, OD  Optometry     Assessment/Plan  (H40.1131) Primary open angle glaucoma (POAG) of both eyes, mild stage  (primary encounter diagnosis)  -HVF 24-2 (7/2022) showed progression since 2021 exam   -IOP today 15/16, TMax 26/20   -Slightly thin pachys  -RNFL OCT today: Stable to 2022  Plan: Continue latanoprost nightly and timolol twice daily. Return to clinic in 6 months for IOP check and HVF 24-2.      (H25.13) Senile nuclear sclerosis, bilateral  Comment: Not visually significant  Plan: Monitor only for now.      (H52.4) Presbyopia  Comment: Only minor changes to Rx today.   Plan: REFRACTION [[id number]]        Discussed findings with patient. New spectacle prescription dispensed to patient. Patient is welcome to return to clinic with prolonged adaptation difficulties.        10 mL, Sent to pharm to cover Pt until visit the end of October.     Helen Aj RN  Central Triage Red Flags/Med Refills

## 2023-10-04 ENCOUNTER — PATIENT OUTREACH (OUTPATIENT)
Dept: CARE COORDINATION | Facility: CLINIC | Age: 68
End: 2023-10-04

## 2023-10-12 ENCOUNTER — OFFICE VISIT (OUTPATIENT)
Dept: FAMILY MEDICINE | Facility: CLINIC | Age: 68
End: 2023-10-12
Payer: COMMERCIAL

## 2023-10-12 VITALS
SYSTOLIC BLOOD PRESSURE: 112 MMHG | HEIGHT: 67 IN | BODY MASS INDEX: 29.41 KG/M2 | HEART RATE: 71 BPM | WEIGHT: 187.4 LBS | TEMPERATURE: 97.3 F | RESPIRATION RATE: 20 BRPM | DIASTOLIC BLOOD PRESSURE: 76 MMHG | OXYGEN SATURATION: 98 %

## 2023-10-12 DIAGNOSIS — Z23 NEED FOR COVID-19 VACCINE: ICD-10-CM

## 2023-10-12 DIAGNOSIS — D50.9 IRON DEFICIENCY ANEMIA, UNSPECIFIED IRON DEFICIENCY ANEMIA TYPE: ICD-10-CM

## 2023-10-12 DIAGNOSIS — E78.5 HYPERLIPIDEMIA LDL GOAL <100: ICD-10-CM

## 2023-10-12 DIAGNOSIS — E11.319 TYPE 2 DIABETES MELLITUS WITH RETINOPATHY, WITHOUT LONG-TERM CURRENT USE OF INSULIN, MACULAR EDEMA PRESENCE UNSPECIFIED, UNSPECIFIED LATERALITY, UNSPECIFIED RETINOPATHY SEVERITY (H): Primary | ICD-10-CM

## 2023-10-12 DIAGNOSIS — I10 ESSENTIAL HYPERTENSION: ICD-10-CM

## 2023-10-12 DIAGNOSIS — N52.9 ERECTILE DYSFUNCTION, UNSPECIFIED ERECTILE DYSFUNCTION TYPE: ICD-10-CM

## 2023-10-12 DIAGNOSIS — Z23 NEED FOR IMMUNIZATION AGAINST INFLUENZA: ICD-10-CM

## 2023-10-12 DIAGNOSIS — Z23 NEED FOR PROPHYLACTIC VACCINATION AGAINST HEPATITIS B VIRUS: ICD-10-CM

## 2023-10-12 DIAGNOSIS — B35.1 ONYCHOMYCOSIS: ICD-10-CM

## 2023-10-12 DIAGNOSIS — Z29.11 NEED FOR VACCINATION AGAINST RESPIRATORY SYNCYTIAL VIRUS: ICD-10-CM

## 2023-10-12 LAB
BASO+EOS+MONOS # BLD AUTO: NORMAL 10*3/UL
BASO+EOS+MONOS NFR BLD AUTO: NORMAL %
BASOPHILS # BLD AUTO: 0.1 10E3/UL (ref 0–0.2)
BASOPHILS NFR BLD AUTO: 1 %
EOSINOPHIL # BLD AUTO: 0.1 10E3/UL (ref 0–0.7)
EOSINOPHIL NFR BLD AUTO: 1 %
ERYTHROCYTE [DISTWIDTH] IN BLOOD BY AUTOMATED COUNT: 13.1 % (ref 10–15)
FERRITIN SERPL-MCNC: 34 NG/ML (ref 31–409)
HBA1C MFR BLD: 7 % (ref 0–5.6)
HCT VFR BLD AUTO: 46.4 % (ref 40–53)
HGB BLD-MCNC: 15.9 G/DL (ref 13.3–17.7)
IMM GRANULOCYTES # BLD: 0 10E3/UL
IMM GRANULOCYTES NFR BLD: 0 %
LYMPHOCYTES # BLD AUTO: 3 10E3/UL (ref 0.8–5.3)
LYMPHOCYTES NFR BLD AUTO: 30 %
MCH RBC QN AUTO: 29.9 PG (ref 26.5–33)
MCHC RBC AUTO-ENTMCNC: 34.3 G/DL (ref 31.5–36.5)
MCV RBC AUTO: 87 FL (ref 78–100)
MONOCYTES # BLD AUTO: 0.7 10E3/UL (ref 0–1.3)
MONOCYTES NFR BLD AUTO: 7 %
NEUTROPHILS # BLD AUTO: 6.2 10E3/UL (ref 1.6–8.3)
NEUTROPHILS NFR BLD AUTO: 61 %
PLATELET # BLD AUTO: 216 10E3/UL (ref 150–450)
RBC # BLD AUTO: 5.32 10E6/UL (ref 4.4–5.9)
WBC # BLD AUTO: 10.1 10E3/UL (ref 4–11)

## 2023-10-12 PROCEDURE — 91320 SARSCV2 VAC 30MCG TRS-SUC IM: CPT | Performed by: NURSE PRACTITIONER

## 2023-10-12 PROCEDURE — 82728 ASSAY OF FERRITIN: CPT | Performed by: NURSE PRACTITIONER

## 2023-10-12 PROCEDURE — 90480 ADMN SARSCOV2 VAC 1/ONLY CMP: CPT | Performed by: NURSE PRACTITIONER

## 2023-10-12 PROCEDURE — 36415 COLL VENOUS BLD VENIPUNCTURE: CPT | Performed by: NURSE PRACTITIONER

## 2023-10-12 PROCEDURE — 99214 OFFICE O/P EST MOD 30 MIN: CPT | Mod: 25 | Performed by: NURSE PRACTITIONER

## 2023-10-12 PROCEDURE — G0008 ADMIN INFLUENZA VIRUS VAC: HCPCS | Performed by: NURSE PRACTITIONER

## 2023-10-12 PROCEDURE — 90662 IIV NO PRSV INCREASED AG IM: CPT | Performed by: NURSE PRACTITIONER

## 2023-10-12 PROCEDURE — 85025 COMPLETE CBC W/AUTO DIFF WBC: CPT | Performed by: NURSE PRACTITIONER

## 2023-10-12 PROCEDURE — 83036 HEMOGLOBIN GLYCOSYLATED A1C: CPT | Performed by: NURSE PRACTITIONER

## 2023-10-12 RX ORDER — CICLOPIROX 80 MG/ML
SOLUTION TOPICAL
Qty: 6.6 ML | Refills: 11 | Status: SHIPPED | OUTPATIENT
Start: 2023-10-12

## 2023-10-12 RX ORDER — LISINOPRIL 40 MG/1
40 TABLET ORAL DAILY
Qty: 90 TABLET | Refills: 3 | Status: SHIPPED | OUTPATIENT
Start: 2023-10-12

## 2023-10-12 RX ORDER — SILDENAFIL 50 MG/1
50 TABLET, FILM COATED ORAL DAILY PRN
Qty: 18 TABLET | Refills: 1 | Status: SHIPPED | OUTPATIENT
Start: 2023-10-12

## 2023-10-12 RX ORDER — GLIMEPIRIDE 4 MG/1
TABLET ORAL
Qty: 90 TABLET | Refills: 3 | Status: SHIPPED | OUTPATIENT
Start: 2023-10-12 | End: 2024-06-19

## 2023-10-12 RX ORDER — RESPIRATORY SYNCYTIAL VIRUS VACCINE 120MCG/0.5
0.5 KIT INTRAMUSCULAR ONCE
Qty: 1 EACH | Refills: 0 | Status: SHIPPED | OUTPATIENT
Start: 2023-10-12 | End: 2023-10-12

## 2023-10-12 RX ORDER — ATORVASTATIN CALCIUM 20 MG/1
20 TABLET, FILM COATED ORAL DAILY
Qty: 90 TABLET | Refills: 2 | Status: SHIPPED | OUTPATIENT
Start: 2023-10-12

## 2023-10-12 RX ORDER — METFORMIN HCL 500 MG
TABLET, EXTENDED RELEASE 24 HR ORAL
Qty: 360 TABLET | Refills: 3 | Status: SHIPPED | OUTPATIENT
Start: 2023-10-12

## 2023-10-12 ASSESSMENT — PAIN SCALES - GENERAL: PAINLEVEL: NO PAIN (0)

## 2023-10-12 NOTE — NURSING NOTE
Patient Quality Outreach    Patient is due for the following:   Physical Annual Wellness Visit    Next Steps:   Patient was scheduled for December 7, 2023    Type of outreach:    Spoke with patient at appointment      Questions for provider review:    None           Maritza Ho MA

## 2023-10-12 NOTE — PROGRESS NOTES
Assessment & Plan     Type 2 diabetes mellitus with retinopathy, without long-term current use of insulin, macular edema presence unspecified, unspecified laterality, unspecified retinopathy severity (H)  Well controlled, continue current management.  - REVIEW OF HEALTH MAINTENANCE PROTOCOL ORDERS  - Hemoglobin A1c  - empagliflozin (JARDIANCE) 25 MG TABS tablet  Dispense: 90 tablet; Refill: 3  - glimepiride (AMARYL) 4 MG tablet  Dispense: 90 tablet; Refill: 3  - sitagliptin (JANUVIA) 100 MG tablet  Dispense: 90 tablet; Refill: 1  - metFORMIN (GLUCOPHAGE XR) 500 MG 24 hr tablet  Dispense: 360 tablet; Refill: 3  - Hemoglobin A1c    Essential hypertension  Stable. Continue current medications, low alt diet, regular exercise  - lisinopril (ZESTRIL) 40 MG tablet  Dispense: 90 tablet; Refill: 3    Hyperlipidemia LDL goal <100  Stable, on Lipitor, continue low chol/carb diet, regular exercise,  - atorvastatin (LIPITOR) 20 MG tablet  Dispense: 90 tablet; Refill: 2    Iron deficiency anemia, unspecified iron deficiency anemia type  CBC is normal. Continue with heart healthy diet.  - CBC with Platelets & Differential  - Ferritin  - CBC with Platelets & Differential  - Ferritin    Erectile dysfunction, unspecified erectile dysfunction type  Refilled viagra- states he never got his prescription when it was written before  - sildenafil (VIAGRA) 50 MG tablet  Dispense: 18 tablet; Refill: 1    Onychomycosis  Refilled Penlac for bilatera; great toe onychomycosis, home cares reviewed.   - ciclopirox (PENLAC) 8 % external solution  Dispense: 6.6 mL; Refill: 11  Need for prophylactic vaccination against hepatitis B virus  Will get at outside pharmacu    Need for vaccination against respiratory syncytial virus  Will get at outside pharmacy  - respiratory syncytial virus vaccine, bivalent (ABRYSVO) injection  Dispense: 1 each; Refill: 0    Need for COVID-19 vaccine    - COVID-19 12+ (2023-24) (PFIZER)    Need for immunization against  influenza    - INFLUENZA VACCINE 65+ (FLUZONE HD)      Ordering of each unique test  Prescription drug management  24 minutes spent by me on the date of the encounter doing chart review, history and exam, documentation and further activities per the note       Work on weight loss  Regular exercise  See Patient Instructions    ABHISHEK Perry CNP  [facility]RID Chisholm is a 68 year old, presenting for the following health issues:  Hypertension and Diabetes        10/12/2023     8:36 AM   Additional Questions   Roomed by leonie         10/12/2023     8:36 AM   Patient Reported Additional Medications   Patient reports taking the following new medications none       History of Present Illness       Reason for visit:  A1C and 6 months check up, plus flue shot & new Covid buster    He eats 4 or more servings of fruits and vegetables daily.He consumes 0 sweetened beverage(s) daily.He exercises with enough effort to increase his heart rate 60 or more minutes per day.  He exercises with enough effort to increase his heart rate 6 days per week.   He is taking medications regularly.         Diabetes Follow-up    How often are you checking your blood sugar? A few times a week  What time of day are you checking your blood sugars (select all that apply)?  Before meals and After meals  Have you had any blood sugars above 200?  No  Have you had any blood sugars below 70?  No  What symptoms do you notice when your blood sugar is low?  None  What concerns do you have today about your diabetes? None   Do you have any of these symptoms? (Select all that apply)  No numbness or tingling in feet.  No redness, sores or blisters on feet.  No complaints of excessive thirst.  No reports of blurry vision.  No significant changes to weight.      BP Readings from Last 2 Encounters:   10/12/23 112/76   08/02/23 120/78     Hemoglobin A1C (%)   Date Value   10/12/2023 7.0 (H)   05/03/2023 7.1 (H)  "  04/02/2021 7.9 (H)   09/16/2020 6.6 (H)     LDL Cholesterol Calculated (mg/dL)   Date Value   05/03/2023 30   04/23/2022 52   04/02/2021 49   12/30/2019 54         Hypertension Follow-up    Do you check your blood pressure regularly outside of the clinic? Yes   Are you following a low salt diet? Yes  Are your blood pressures ever more than 140 on the top number (systolic) OR more   than 90 on the bottom number (diastolic), for example 140/90? No      SYLVIE-  not taking iron presently, no fatigue, weakness, palpitations, decrease in exercise tolerance. He consumes a lot of fruits and veg, especially deep green leafy veg.       Review of Systems   Constitutional, HEENT, cardiovascular, pulmonary, gi and gu systems are negative, except as otherwise noted.      Objective    /76 (BP Location: Left arm, Patient Position: Sitting, Cuff Size: Adult Regular)   Pulse 71   Temp 97.3  F (36.3  C) (Tympanic)   Resp 20   Ht 1.689 m (5' 6.5\")   Wt 85 kg (187 lb 6.4 oz)   SpO2 98%   BMI 29.79 kg/m    Body mass index is 29.79 kg/m .  Physical Exam   GENERAL: healthy, alert and no distress  EYES: Eyes grossly normal to inspection, PERRL and conjunctivae and sclerae normal  HENT: ear canals and TM's normal, nose and mouth without ulcers or lesions  NECK: no adenopathy, no asymmetry, masses, or scars and thyroid normal to palpation  RESP: lungs clear to auscultation - no rales, rhonchi or wheezes  CV: regular rate and rhythm, normal S1 S2, no S3 or S4, no murmur, click or rub, no peripheral edema and peripheral pulses strong  ABDOMEN: soft, nontender, no hepatosplenomegaly, no masses and bowel sounds normal  MS: no gross musculoskeletal defects noted, no edema  SKIN: no suspicious lesions or rashes  NEURO: Normal strength and tone, mentation intact and speech normal  BACK: no CVA tenderness, no paralumbar tenderness  PSYCH: mentation appears normal, affect normal/bright  LYMPH: normal ant/post cervical, supraclavicular " nodes  inguinal: no adenopathy  Diabetic foot exam: normal DP and PT pulses, no trophic changes or ulcerative lesions, normal sensory exam, normal monofilament exam, and onychomycosis greqt toenails    Results for orders placed or performed in visit on 10/12/23 (from the past 24 hour(s))   CBC with Platelets & Differential    Narrative    The following orders were created for panel order CBC with Platelets & Differential.  Procedure                               Abnormality         Status                     ---------                               -----------         ------                     CBC with platelets and d...[[id number]]                      Final result                 Please view results for these tests on the individual orders.   Hemoglobin A1c   Result Value Ref Range    Hemoglobin A1C 7.0 (H) 0.0 - 5.6 %   CBC with platelets and differential   Result Value Ref Range    WBC Count 10.1 4.0 - 11.0 10e3/uL    RBC Count 5.32 4.40 - 5.90 10e6/uL    Hemoglobin 15.9 13.3 - 17.7 g/dL    Hematocrit 46.4 40.0 - 53.0 %    MCV 87 78 - 100 fL    MCH 29.9 26.5 - 33.0 pg    MCHC 34.3 31.5 - 36.5 g/dL    RDW 13.1 10.0 - 15.0 %    Platelet Count 216 150 - 450 10e3/uL    % Neutrophils 61 %    % Lymphocytes 30 %    % Monocytes 7 %    Mids % (Monos, Eos, Basos)      % Eosinophils 1 %    % Basophils 1 %    % Immature Granulocytes 0 %    Absolute Neutrophils 6.2 1.6 - 8.3 10e3/uL    Absolute Lymphocytes 3.0 0.8 - 5.3 10e3/uL    Absolute Monocytes 0.7 0.0 - 1.3 10e3/uL    Mids Abs (Monos, Eos, Basos)      Absolute Eosinophils 0.1 0.0 - 0.7 10e3/uL    Absolute Basophils 0.1 0.0 - 0.2 10e3/uL    Absolute Immature Granulocytes 0.0 <=0.4 10e3/uL

## 2023-10-16 ENCOUNTER — MYC MEDICAL ADVICE (OUTPATIENT)
Dept: FAMILY MEDICINE | Facility: CLINIC | Age: 68
End: 2023-10-16

## 2023-10-16 DIAGNOSIS — N52.9 ERECTILE DYSFUNCTION, UNSPECIFIED ERECTILE DYSFUNCTION TYPE: ICD-10-CM

## 2023-10-17 RX ORDER — SILDENAFIL 50 MG/1
50 TABLET, FILM COATED ORAL DAILY PRN
Qty: 18 TABLET | Refills: 1 | OUTPATIENT
Start: 2023-10-17

## 2023-10-27 ENCOUNTER — OFFICE VISIT (OUTPATIENT)
Dept: OPHTHALMOLOGY | Facility: CLINIC | Age: 68
End: 2023-10-27
Payer: COMMERCIAL

## 2023-10-27 ENCOUNTER — TRANSFERRED RECORDS (OUTPATIENT)
Dept: OPHTHALMOLOGY | Facility: CLINIC | Age: 68
End: 2023-10-27

## 2023-10-27 DIAGNOSIS — H40.1131 PRIMARY OPEN ANGLE GLAUCOMA (POAG) OF BOTH EYES, MILD STAGE: Primary | ICD-10-CM

## 2023-10-27 PROCEDURE — 92083 EXTENDED VISUAL FIELD XM: CPT | Performed by: OPTOMETRIST

## 2023-10-27 PROCEDURE — 92012 INTRM OPH EXAM EST PATIENT: CPT | Performed by: OPTOMETRIST

## 2023-10-27 ASSESSMENT — REFRACTION_WEARINGRX
OD_AXIS: 050
OD_SPHERE: -0.25
OS_SPHERE: PLANO
OD_ADD: +2.50
OS_ADD: +2.50
OD_CYLINDER: +0.75
OS_CYLINDER: +1.00
OS_AXIS: 150

## 2023-10-27 ASSESSMENT — EXTERNAL EXAM - RIGHT EYE: OD_EXAM: NORMAL

## 2023-10-27 ASSESSMENT — CONF VISUAL FIELD
METHOD: COUNTING FINGERS
OS_NORMAL: 1
OD_SUPERIOR_NASAL_RESTRICTION: 0
OS_INFERIOR_TEMPORAL_RESTRICTION: 0
OD_SUPERIOR_TEMPORAL_RESTRICTION: 0
OS_SUPERIOR_NASAL_RESTRICTION: 0
OS_SUPERIOR_TEMPORAL_RESTRICTION: 0
OS_INFERIOR_NASAL_RESTRICTION: 0
OD_INFERIOR_TEMPORAL_RESTRICTION: 0
OD_NORMAL: 1
OD_INFERIOR_NASAL_RESTRICTION: 0

## 2023-10-27 ASSESSMENT — VISUAL ACUITY
OS_CC+: -1
OD_CC+: -1
OS_CC: 20/25
OD_CC: 20/25
METHOD: SNELLEN - LINEAR
CORRECTION_TYPE: GLASSES

## 2023-10-27 ASSESSMENT — TONOMETRY
OS_IOP_MMHG: 13
IOP_METHOD: ICARE
OD_IOP_MMHG: 15

## 2023-10-27 ASSESSMENT — CUP TO DISC RATIO
OS_RATIO: 0.6
OD_RATIO: 0.75

## 2023-10-27 ASSESSMENT — SLIT LAMP EXAM - LIDS
COMMENTS: SCALLOPED LID MARGINS
COMMENTS: SCALLOPED LID MARGINS

## 2023-10-27 NOTE — PROGRESS NOTES
Assessment/Plan  (H40.1131) Primary open angle glaucoma (POAG) of both eyes, mild stage  (primary encounter diagnosis)  -HVF 24-2 today shows stable defect. OD: inferior nasal step, OS: WNL  -IOP today 15/13, TMax 26/20   -Slightly thin pachys  -RNFL OCT (4/2023) Stable to 2022  Plan: Continue latanoprost nightly and timolol twice daily. Return to clinic in 6 months for complete exam and RNFL OCT.       Complete documentation of historical and exam elements from today's encounter can  be found in the full encounter summary report (not reduplicated in this progress  note). I personally obtained the chief complaint(s) and history of present illness. I  confirmed and edited as necessary the review of systems, past medical/surgical  history, family history, social history, and examination findings as documented by  others; and I examined the patient myself. I personally reviewed the relevant tests,  images, and reports as documented above. I formulated and edited as necessary the  assessment and plan and discussed the findings and management plan with the  patient and family.    Ramos Verdin, OD

## 2023-10-27 NOTE — NURSING NOTE
Chief Complaints and History of Present Illnesses   Patient presents with    Glaucoma Follow-Up     Chief Complaint(s) and History of Present Illness(es)       Glaucoma Follow-Up              Laterality: both eyes    Associated symptoms: Negative for eye pain, flashes and floaters    Compliance with Treatment: always              Comments    Here for follow up. VA is about the same. Compliant with timolol and latanoprost. No eye pain. No flashes or floaters.    Tono LEO 8:10 AM October 27, 2023

## 2023-11-20 DIAGNOSIS — H40.1131 PRIMARY OPEN ANGLE GLAUCOMA (POAG) OF BOTH EYES, MILD STAGE: ICD-10-CM

## 2023-11-23 DIAGNOSIS — H40.1131 PRIMARY OPEN ANGLE GLAUCOMA (POAG) OF BOTH EYES, MILD STAGE: ICD-10-CM

## 2023-11-24 RX ORDER — LATANOPROST 50 UG/ML
1 SOLUTION/ DROPS OPHTHALMIC DAILY
Qty: 2.5 ML | Refills: 5 | Status: SHIPPED | OUTPATIENT
Start: 2023-11-24 | End: 2024-06-14

## 2023-11-24 NOTE — TELEPHONE ENCOUNTER
LATANOPROST 0.005% OPHTH SOLN 2.5ML   Last Written Prescription Date:  6/27/2023  Last Fill Quantity: 2.5,   # refills: 5  Last Office Visit : 10/27/2023  Future Office visit:  5/3/2024  Assessment/Plan  (H40.1131) Primary open angle glaucoma (POAG) of both eyes, mild stage  (primary encounter diagnosis)  -HVF 24-2 today shows stable defect. OD: inferior nasal step, OS: WNL  -IOP today 15/13, TMax 26/20   -Slightly thin pachys  -RNFL OCT (4/2023) Stable to 2022  Plan: Continue latanoprost nightly and timolol twice daily. Return to clinic in 6 months for complete exam and RNFL OCT.     2.5 mL, 5 Refills sent to meek Aj RN  Central Triage Red Flags/Med Refills

## 2023-11-27 RX ORDER — TIMOLOL MALEATE 5 MG/ML
1 SOLUTION/ DROPS OPHTHALMIC 2 TIMES DAILY
Qty: 10 ML | Refills: 0 | Status: SHIPPED | OUTPATIENT
Start: 2023-11-27 | End: 2024-01-12

## 2023-11-30 ASSESSMENT — ENCOUNTER SYMPTOMS
DIZZINESS: 0
FREQUENCY: 0
SHORTNESS OF BREATH: 0
HEARTBURN: 0
SORE THROAT: 0
HEADACHES: 0
ABDOMINAL PAIN: 0
ARTHRALGIAS: 0
WEAKNESS: 0
HEMATURIA: 0
COUGH: 0
JOINT SWELLING: 0
NAUSEA: 0
DYSURIA: 0
CHILLS: 0
PALPITATIONS: 0
DIARRHEA: 0
MYALGIAS: 0
CONSTIPATION: 0
FEVER: 0
HEMATOCHEZIA: 0
NERVOUS/ANXIOUS: 0
PARESTHESIAS: 0
EYE PAIN: 0

## 2023-11-30 ASSESSMENT — ACTIVITIES OF DAILY LIVING (ADL): CURRENT_FUNCTION: NO ASSISTANCE NEEDED

## 2023-11-30 NOTE — COMMUNITY RESOURCES LIST (ENGLISH)
11/30/2023   [facility]  N/A  For questions about this resource list or additional care needs, please contact your primary care clinic or care manager.  Phone: [phone]   Email: N/A   Address: [facility] [place] [address]   Hours: N/A        Hotlines and Helplines       Hotline - Housing crisis  1  [facility] Distance: 12.09 miles      Phone/Virtual   [address] [place] [address]  Language: English  Hours: Mon - Sun Open 24 Hours  Fees: Free   Phone: (647) 612-4845 Email: info@saintolaf.org Website: http://www.saintolaf.org/     2  [facility] Distance: 12.8 miles      Phone/Virtual   [address] [place] [address]  Language: English  Hours: Mon - Sun Open 24 Hours   Phone: (192) 319-7477 Email: info@FND.Beetailer Website: http://www.FND.org          Housing       Coordinated Entry access point  3  [facility] Distance: 5.74 miles      Phone/Virtual   [address] [place] [address]  Language: English  Hours: Mon - Fri 8:30 AM - 12:00 PM , Mon - Fri 1:00 PM - 4:00 PM  Fees: Free   Phone: (344) 356-7078 Ext.2 Email: darling@[facility].Tamion.org Website: https://www.Tamionusa.org/usn/     4  [facility] ([facility] Distance: 11.52 miles      In-Person, Phone/Virtual   [address] [place] [address]  Language: English, Equatorial Guinean  Hours: Mon - Fri 10:00 AM - 5:30 PM , Mon - Sun 7:30 PM - 10:20 PM , Sat - Sun 1:00 PM - 5:30 PM  Fees: Free   Phone: (971) 348-2470 Email: info@[employer]Beetailer Website: https://www.[employer]org/our-programs/adult-shelter-connect-mendenhall-shelter/     Drop-in center or day shelter  5  [facility] Distance: 11.31 miles      In-Person   [address] [place] [address]  Language: English, Hmong, Marshallese, Equatorial Guinean  Hours: Mon - Thu 8:30 AM - 4:30 PM , Sat - Sun 9:00 AM - 12:00 PM   Fees: Free   Phone: (693) 315-8845 Email: info@[employer].Jampp Website: https://Ourcast.org/     6  [facility] Distance: 12.73 miles      In-Person   [address] [place] [address]  Language: English, Samoan, Nigerian  Hours: Mon - Sat 7:00 AM - 3:00 PM  Fees: Free, Self Pay   Phone: (484) 607-2446 Email: info@[employer] Website: https://www.Gild.Jampp/locations/opportunity-[place]/     Housing search assistance  7  [facility] ([facility] Distance: 4.11 miles      In-Person   [address] [place] [address]  Language: English  Hours: Mon - Fri 8:00 AM - 4:00 PM  Fees: Free   Phone: (793) 203-4680 Email: info@[employer] Website: https://[employer]/     8  [facility]ighborhood [facility] [employer] [facility] [place] [facility] Distance: 5.45 miles      Phone/Virtual   [address] [place] [address]  Language: English, Nigerian  Hours: Mon - Fri 9:00 AM - 5:00 PM  Fees: Free   Phone: (600) 179-2647 Email: services@[employer] Website: https://www.[employer]     Shelter for families  9  [facility] Distance: 17.79 miles      In-Person   [address] [place] [address]  Language: English  Hours: Mon - Fri 3:00 PM - 9:00 AM , Sat - Sun Open 24 Hours  Fees: Free   Phone: (721) 424-5881 Ext.1 Website: https://www.saintandrews.org/2020/07/03/emergency-family-shelter/     Shelter for individuals  10  [facility] [place] [facility] Distance: 11.56 miles      In-Person   [address] [place] [address]  Language: English  Hours: Mon - Sun 5:00 PM - 10:00 AM  Fees: Free, Self Pay   Phone: (545) 869-9300 Email: info@Gild.org Website: https://www.Gild.org/locations/higher-ground-shelter/     11  [facility] Distance: 11.68 miles       In-Person   [address] [place] [address]  Language: English  Hours: Mon - Fri 4:00 PM - 9:00 AM  Fees: Free   Phone: (929) 925-3370 Email: yenni@[facility].[employer]wardly.[employer] Website: https://centralusa.Artwardly.org/northern/[facility]/          Important Numbers & Websites       Emergency Services   911  [facility] Services   311  Poison Control   (606) 944-9778  Suicide Prevention Lifeline   (432) 973-7254 (TALK)  Child Abuse Hotline   (831) 380-1369 (4-A-Child)  Sexual Assault Hotline   (732) 561-4947 (HOPE)  National Runaway Safeline   (338) 594-3501 (RUNAWAY)  All-Options Talkline   (501) 393-8684  Substance Abuse Referral   (520) 240-8721 (HELP)

## 2023-12-05 ENCOUNTER — PATIENT OUTREACH (OUTPATIENT)
Dept: GERIATRIC MEDICINE | Facility: CLINIC | Age: 68
End: 2023-12-05

## 2023-12-05 NOTE — PROGRESS NOTES
[facility] Care Coordination Contact    Called member to schedule annual HRA home visit. HRA has been scheduled for 12/19/2023 at 10:00am.    Cynthia Pedraza RN PHN  Care Coordinator  [facility]  [phone]

## 2023-12-07 ENCOUNTER — OFFICE VISIT (OUTPATIENT)
Dept: FAMILY MEDICINE | Facility: CLINIC | Age: 68
End: 2023-12-07
Payer: COMMERCIAL

## 2023-12-07 VITALS
WEIGHT: 185 LBS | HEART RATE: 70 BPM | SYSTOLIC BLOOD PRESSURE: 108 MMHG | HEIGHT: 65 IN | DIASTOLIC BLOOD PRESSURE: 73 MMHG | OXYGEN SATURATION: 98 % | TEMPERATURE: 96.9 F | RESPIRATION RATE: 18 BRPM | BODY MASS INDEX: 30.82 KG/M2

## 2023-12-07 DIAGNOSIS — Z29.11 NEED FOR PROPHYLACTIC VACCINATION AND INOCULATION AGAINST RESPIRATORY SYNCYTIAL VIRUS (RSV): ICD-10-CM

## 2023-12-07 DIAGNOSIS — Z00.00 ENCOUNTER FOR MEDICARE ANNUAL WELLNESS EXAM: Primary | ICD-10-CM

## 2023-12-07 DIAGNOSIS — E78.5 HYPERLIPIDEMIA LDL GOAL <100: ICD-10-CM

## 2023-12-07 DIAGNOSIS — I10 ESSENTIAL HYPERTENSION: ICD-10-CM

## 2023-12-07 DIAGNOSIS — G89.29 CHRONIC PAIN OF LEFT KNEE: ICD-10-CM

## 2023-12-07 DIAGNOSIS — E66.3 OVERWEIGHT (BMI 25.0-29.9): ICD-10-CM

## 2023-12-07 DIAGNOSIS — M17.12 ARTHRITIS OF LEFT KNEE: ICD-10-CM

## 2023-12-07 DIAGNOSIS — M25.562 CHRONIC PAIN OF LEFT KNEE: ICD-10-CM

## 2023-12-07 DIAGNOSIS — M51.16 LUMBAR DISC HERNIATION WITH RADICULOPATHY: ICD-10-CM

## 2023-12-07 DIAGNOSIS — E11.319 TYPE 2 DIABETES MELLITUS WITH RETINOPATHY, WITHOUT LONG-TERM CURRENT USE OF INSULIN, MACULAR EDEMA PRESENCE UNSPECIFIED, UNSPECIFIED LATERALITY, UNSPECIFIED RETINOPATHY SEVERITY (H): ICD-10-CM

## 2023-12-07 PROCEDURE — 99214 OFFICE O/P EST MOD 30 MIN: CPT | Mod: 25 | Performed by: NURSE PRACTITIONER

## 2023-12-07 PROCEDURE — G0438 PPPS, INITIAL VISIT: HCPCS | Performed by: NURSE PRACTITIONER

## 2023-12-07 RX ORDER — LANCETS 33 GAUGE
1 EACH MISCELLANEOUS 2 TIMES DAILY
Qty: 100 EACH | Refills: 3 | Status: SHIPPED | OUTPATIENT
Start: 2023-12-07 | End: 2024-06-24

## 2023-12-07 RX ORDER — GABAPENTIN 300 MG/1
CAPSULE ORAL
Qty: 90 CAPSULE | Refills: 2 | Status: SHIPPED | OUTPATIENT
Start: 2023-12-07

## 2023-12-07 RX ORDER — HYDROCHLOROTHIAZIDE 12.5 MG/1
TABLET ORAL
Qty: 90 TABLET | Refills: 1 | Status: SHIPPED | OUTPATIENT
Start: 2023-12-07 | End: 2024-09-30

## 2023-12-07 ASSESSMENT — ENCOUNTER SYMPTOMS
PALPITATIONS: 0
PARESTHESIAS: 0
FREQUENCY: 0
CONSTIPATION: 0
DIZZINESS: 0
EYE PAIN: 0
HEMATURIA: 0
MYALGIAS: 0
DYSURIA: 0
ABDOMINAL PAIN: 0
COUGH: 0
DIARRHEA: 0
ARTHRALGIAS: 0
NERVOUS/ANXIOUS: 0
JOINT SWELLING: 0
HEMATOCHEZIA: 0
SHORTNESS OF BREATH: 0
SORE THROAT: 0
HEADACHES: 0
WEAKNESS: 0
CHILLS: 0
HEARTBURN: 0
FEVER: 0
NAUSEA: 0

## 2023-12-07 ASSESSMENT — PAIN SCALES - GENERAL: PAINLEVEL: MILD PAIN (3)

## 2023-12-07 ASSESSMENT — ACTIVITIES OF DAILY LIVING (ADL): CURRENT_FUNCTION: NO ASSISTANCE NEEDED

## 2023-12-07 NOTE — COMMUNITY RESOURCES LIST (ENGLISH)
12/07/2023   [facility]  N/A  For questions about this resource list or additional care needs, please contact your primary care clinic or care manager.  Phone: [phone]   Email: N/A   Address: [facility] [place] [address]   Hours: N/A        Hotlines and Helplines       Hotline - Housing crisis  1  [facility] Distance: 12.09 miles      Phone/Virtual   [address] [place] [address]  Language: English  Hours: Mon - Sun Open 24 Hours  Fees: Free   Phone: (249) 277-9108 Email: info@saintolaf.org Website: http://www.saintolaf.org/     2  [facility] Distance: 12.8 miles      Phone/Virtual   [address] [place] [address]  Language: English  Hours: Mon - Sun Open 24 Hours   Phone: (178) 186-9021 Email: info@[employer].Strobe Website: http://www.[employer].org          Housing       Coordinated Entry access point  3  [facility]  [facility] Distance: 5.74 miles      Phone/Virtual   [address] [place] [address]  Language: English  Hours: Mon - Fri 8:30 AM - 12:00 PM , Mon - Fri 1:00 PM - 4:00 PM  Fees: Free   Phone: (623) 553-6181 Ext.2 Email: darling@[facility].Orthomimetics.org Website: https://www.Orthomimeticsusa.org/usn/     4  [facility] ([facility]) Distance: 11.52 miles      In-Person, Phone/Virtual   [address] [place] [address]  Language: English, Emirati  Hours: Mon - Fri 10:00 AM - 5:30 PM , Mon - Sun 7:30 PM - 10:20 PM , Sat - Sun 1:00 PM - 5:30 PM  Fees: Free   Phone: (385) 431-1418 Email: info@[employer].Strobe Website: https://www.Diagnovus.org/our-programs/adult-shelter-connect-mendenhall-shelter/     Drop-in center or day shelter  5  [facility] Distance: 11.31 miles      In-Person   [address] [place] [address]  Language: English, Hmong, Kittitian, Emirati  Hours: Mon - Thu 8:30 AM - 4:30 PM , Sat - Sun 9:00 AM - 12:00 PM   Fees: Free   Phone: (165) 514-4775 Email: info@Heartscape.Audiosocket Website: https://Heartscape.org/     6  [facility] and [facility] Distance: 12.73 miles      In-Person   [address] [place] [address]  Language: English, Cuban, Gabonese  Hours: Mon - Sat 7:00 AM - 3:00 PM  Fees: Free, Self Pay   Phone: (702) 948-3073 Email: info@[employer] Website: https://www.Symcircle.Audiosocket/locations/opportunity-[place]/     Housing search assistance  7  [facility] ([facility] Distance: 4.11 miles      In-Person   [address] [place] [address]  Language: English  Hours: Mon - Fri 8:00 AM - 4:00 PM  Fees: Free   Phone: (281) 955-4651 Email: info@[employer] Website: https://[employer]/     8  Neighborhood Assistance [employer] [facility] [place] [employer]) Distance: 5.45 miles      Phone/Virtual   [address] [place] [address]  Language: English, Gabonese  Hours: Mon - Fri 9:00 AM - 5:00 PM  Fees: Free   Phone: (317) 783-5201 Email: services@[employer] Website: https://www.[employer]dCat Consulting     Shelter for families  9  [facility] Distance: 17.79 miles      In-Person   [address] [place] [address]  Language: English  Hours: Mon - Fri 3:00 PM - 9:00 AM , Sat - Sun Open 24 Hours  Fees: Free   Phone: (437) 723-4472 Ext.1 Website: https://www.saintandrews.org/2020/07/03/emergency-family-shelter/     Shelter for individuals  10  [facility] and [place] [facility] Distance: 11.56 miles      In-Person   [address] [place] [address]  Language: English  Hours: Mon - Sun 5:00 PM - 10:00 AM  Fees: Free, Self Pay   Phone: (136) 617-6952 Email: info@Symcircle.org Website: https://www.Symcircle.org/locations/higher-ground-shelter/     11  [facility] Distance: 11.68 miles       In-Person   [address] [place] [address]  Language: English  Hours: Mon - Fri 4:00 PM - 9:00 AM  Fees: Free   Phone: (757) 110-9549 Email: yenni@[facility].[employer] Capture.Memoright Website: https://centralusa.Notch Wear[employer] Movement Capture.org/northern/[facility] Center[facility]/          Important Numbers & Websites       Emergency Services   911  [facility] Services   311  Poison Control   (600) 166-4670  Suicide Prevention Lifeline   (968) 488-4370 (TALK)  Child Abuse Hotline   (475) 599-8429 (4-A-Child)  Sexual Assault Hotline   (996) 470-7888 (HOPE)  National Runaway Safeline   (189) 258-2017 (RUNAWAY)  All-Options Talkline   (916) 636-4451  Substance Abuse Referral   (114) 700-3827 (HELP)

## 2023-12-07 NOTE — PROGRESS NOTES
"SUBJECTIVE:   Kathrine is a 68 year old, presenting for the following:  Physical        12/7/2023     7:49 AM   Additional Questions   Roomed by navarro   Accompanied by self       Are you in the first 12 months of your Medicare coverage?  No    Healthy Habits:     In general, how would you rate your overall health?  Good    Frequency of exercise:  4-5 days/week    Duration of exercise:  Greater than 60 minutes    Do you usually eat at least 4 servings of fruit and vegetables a day, include whole grains    & fiber and avoid regularly eating high fat or \"junk\" foods?  No    Taking medications regularly:  Yes    Barriers to taking medications:  None    Medication side effects:  Not applicable    Ability to successfully perform activities of daily living:  No assistance needed    Home Safety:  No safety concerns identified    Hearing Impairment:  No hearing concerns    In the past 6 months, have you been bothered by leaking of urine?  No    In general, how would you rate your overall mental or emotional health?  Good    Additional concerns today:  No      Today's PHQ-2 Score:       12/7/2023     7:39 AM   PHQ-2 ( 1999 Pfizer)   Q1: Little interest or pleasure in doing things 0   Q2: Feeling down, depressed or hopeless 0   PHQ-2 Score 0   Q1: Little interest or pleasure in doing things Not at all   Q2: Feeling down, depressed or hopeless Not at all   PHQ-2 Score 0           Have you ever done Advance Care Planning? (For example, a Health Directive, POLST, or a discussion with a medical provider or your loved ones about your wishes): No, advance care planning information given to patient to review.  Patient declined advance care planning discussion at this time.    Fall risk  Fallen 2 or more times in the past year?: No  Any fall with injury in the past year?: No    Cognitive Screening   1) Repeat 3 items (Leader, Season, Table)    2) Clock draw: ABNORMAL hands in wrong place   3) 3 item recall: Recalls 2 objects "   Results: ABNORMAL clock, 1-2 items recalled: PROBABLE COGNITIVE IMPAIRMENT, **INFORM PROVIDER**    Mini-CogTM Copyright DARNELL Hodge. Licensed by the author for use in [facility]; reprinted with permission (deidra@[facility]). All rights reserved.      Do you have sleep apnea, excessive snoring or daytime drowsiness? : no    Reviewed and updated as needed this visit by clinical staff     Meds              Reviewed and updated as needed this visit by Provider                 Social History     Tobacco Use     Smoking status: Never     Smokeless tobacco: Never   Substance Use Topics     Alcohol use: Yes             11/30/2023    10:39 AM   Alcohol Use   Prescreen: >3 drinks/day or >7 drinks/week? No     Do you have a current opioid prescription? No  Do you use any other controlled substances or medications that are not prescribed by a provider? None        Diabetes Follow-up    How often are you checking your blood sugar? A few times a week  What time of day are you checking your blood sugars (select all that apply)?  Before meals  Have you had any blood sugars above 200?  No  Have you had any blood sugars below 70?  No  What symptoms do you notice when your blood sugar is low?  Dizzy  What concerns do you have today about your diabetes? None   Do you have any of these symptoms? (Select all that apply)  No numbness or tingling in feet.  No redness, sores or blisters on feet.  No complaints of excessive thirst.  No reports of blurry vision.  No significant changes to weight.          Hyperlipidemia Follow-Up    Are you regularly taking any medication or supplement to lower your cholesterol?   Yes- Lipitor 20 mg daily  Are you having muscle aches or other side effects that you think could be caused by your cholesterol lowering medication?  No    Hypertension Follow-up    Do you check your blood pressure regularly outside of the clinic? Yes   Are you following a low salt diet? Yes  Are your blood pressures ever  more than 140 on the top number (systolic) OR more   than 90 on the bottom number (diastolic), for example 140/90? No    BP Readings from Last 2 Encounters:   10/12/23 112/76   08/02/23 120/78     Hemoglobin A1C (%)   Date Value   10/12/2023 7.0 (H)   05/03/2023 7.1 (H)   04/02/2021 7.9 (H)   09/16/2020 6.6 (H)     LDL Cholesterol Calculated (mg/dL)   Date Value   05/03/2023 30   04/23/2022 52   04/02/2021 49   12/30/2019 54       Current providers sharing in care for this patient include:   Patient Care Team:  Nighat Joe APRN CNP as PCP - General (Nurse Practitioner - Family)  Nighat Joe APRN CNP as Assigned PCP  Ramos Verdin OD as Assigned Surgical Provider  Cynthia Pedraza RN as Lead Care Coordinator    The following health maintenance items are reviewed in Epic and correct as of today:  Health Maintenance   Topic Date Due     RSV VACCINE (Pregnancy & 60+) (1 - 1-dose 60+ series) Never done     A1C  04/12/2024     BMP  05/03/2024     LIPID  05/03/2024     MICROALBUMIN  05/03/2024     CBC W/DIFFERENTIAL  10/12/2024     DIABETIC FOOT EXAM  10/12/2024     ANNUAL REVIEW OF HM ORDERS  10/12/2024     EYE EXAM  10/27/2024     MEDICARE ANNUAL WELLNESS VISIT  12/07/2024     FALL RISK ASSESSMENT  12/07/2024     DTAP/TDAP/TD IMMUNIZATION (2 - Td or Tdap) 05/15/2025     COLORECTAL CANCER SCREENING  01/14/2026     ADVANCE CARE PLANNING  12/07/2028     HEPATITIS C SCREENING  Completed     PHQ-2 (once per calendar year)  Completed     INFLUENZA VACCINE  Completed     Pneumococcal Vaccine: 65+ Years  Completed     ZOSTER IMMUNIZATION  Completed     AORTIC ANEURYSM SCREENING (SYSTEM ASSIGNED)  Completed     COVID-19 Vaccine  Completed     IPV IMMUNIZATION  Aged Out     HPV IMMUNIZATION  Aged Out     MENINGITIS IMMUNIZATION  Aged Out     RSV MONOCLONAL ANTIBODY  Aged Out       Review of Systems   Constitutional:  Negative for chills and fever.   HENT:  Negative for congestion, ear pain, hearing loss and  "sore throat.    Eyes:  Negative for pain and visual disturbance.   Respiratory:  Negative for cough and shortness of breath.    Cardiovascular:  Negative for chest pain, palpitations and peripheral edema.   Gastrointestinal:  Negative for abdominal pain, constipation, diarrhea, heartburn, hematochezia and nausea.   Genitourinary:  Positive for impotence. Negative for dysuria, frequency, genital sores, hematuria, penile discharge and urgency.   Musculoskeletal:  Negative for arthralgias, joint swelling and myalgias.   Skin:  Negative for rash.   Neurological:  Negative for dizziness, weakness, headaches and paresthesias.   Psychiatric/Behavioral:  Negative for mood changes. The patient is not nervous/anxious.        OBJECTIVE:   There were no vitals taken for this visit. Estimated body mass index is 29.79 kg/m  as calculated from the following:    Height as of 10/12/23: 1.689 m (5' 6.5\").    Weight as of 10/12/23: 85 kg (187 lb 6.4 oz).  Physical Exam  GENERAL: healthy, alert and no distress  EYES: Eyes grossly normal to inspection, PERRL and conjunctivae and sclerae normal  HENT: ear canals and TM's normal, nose and mouth without ulcers or lesions  NECK: no adenopathy, no asymmetry, masses, or scars and thyroid normal to palpation  RESP: lungs clear to auscultation - no rales, rhonchi or wheezes  CV: regular rate and rhythm, normal S1 S2, no S3 or S4, no murmur, click or rub, no peripheral edema and peripheral pulses strong  ABDOMEN: soft, nontender, no hepatosplenomegaly, no masses and bowel sounds normal  MS: no gross musculoskeletal defects noted, no edema  SKIN: no suspicious lesions or rashes  NEURO: Normal strength and tone, mentation intact and speech normal  PSYCH: mentation appears normal, affect normal/bright  LYMPH: no cervical, supraclavicular, axillary, or inguinal adenopathy    Diagnostic Test Results:  Labs reviewed in Epic  Component      Latest Ref Rng 10/12/2023  9:38 AM   WBC      4.0 - 11.0 " 10e3/uL 10.1    RBC Count      4.40 - 5.90 10e6/uL 5.32    Hemoglobin      13.3 - 17.7 g/dL 15.9    Hematocrit      40.0 - 53.0 % 46.4    MCV      78 - 100 fL 87    MCH      26.5 - 33.0 pg 29.9    MCHC      31.5 - 36.5 g/dL 34.3    RDW      10.0 - 15.0 % 13.1    Platelet Count      150 - 450 10e3/uL 216    % Neutrophils      % 61    % Lymphocytes      % 30    % Monocytes      % 7    % Eosinophils      % 1    % Basophils      % 1    % Immature Granulocytes      % 0    Absolute Neutrophils      1.6 - 8.3 10e3/uL 6.2    Absolute Lymphocytes      0.8 - 5.3 10e3/uL 3.0    Absolute Monocytes      0.0 - 1.3 10e3/uL 0.7    Absolute Eosinophils      0.0 - 0.7 10e3/uL 0.1    Absolute Basophils      0.0 - 0.2 10e3/uL 0.1    Absolute Immature Granulocytes      <=0.4 10e3/uL 0.0    Hemoglobin A1C      0.0 - 5.6 % 7.0 (H)    Ferritin      31 - 409 ng/mL 34       Legend:  (H) High  Component      Latest Ref Rng 5/3/2023  8:38 AM   Cholesterol      <200 mg/dL 123    Triglycerides      <150 mg/dL 263 (H)    HDL Cholesterol      >=40 mg/dL 40    LDL Cholesterol Calculated      <=100 mg/dL 30    Non HDL Cholesterol      <130 mg/dL 83    Patient Fasting? Yes    Creatinine Urine      mg/dL 100    Albumin Urine mg/L      mg/L 7    Albumin Urine mg/g Cr      0.00 - 17.00 mg/g Cr 7.00    AST      0 - 45 U/L 25    PSA      0.00 - 4.00 ug/L 1.81       Legend:  (H) High    ASSESSMENT / PLAN:   (Z00.00) Encounter for Medicare annual wellness exam  (primary encounter diagnosis)  Comment:   Plan:     (E11.319) Type 2 diabetes mellitus with retinopathy, without long-term current use of insulin, macular edema presence unspecified, unspecified laterality, unspecified retinopathy severity (H)  Comment: controlled,  Plan:  continue current management    (I10) Essential hypertension  Comment: controlled  Plan: continue Zestril and HCTZ    (E78.5) Hyperlipidemia LDL goal <100  Comment:   Plan: Elevaed triglycerides and he has changed his diet, recheck  "lipids in 6 months, continue Lipitor    (E66.3) Overweight (BMI 25.0-29.9)  Comment:   Plan: Benefits of weight loss reviewed in detail, encouraged him to cut back on the carbohydrates in the diet, consume more fruits and vegetables, drink plenty of water, avoid fruit juices, sodas, get 150 min moderate exercise/week.  Recheck weight in 6 months.      (Z29.11) Need for prophylactic vaccination and inoculation against respiratory syncytial virus (RSV)  Comment:   Plan:     Patient has been advised of split billing requirements and indicates understanding: Yes      COUNSELING:  Reviewed preventive health counseling, as reflected in patient instructions      BMI:   Estimated body mass index is 29.79 kg/m  as calculated from the following:    Height as of 10/12/23: 1.689 m (5' 6.5\").    Weight as of 10/12/23: 85 kg (187 lb 6.4 oz).   Weight management plan: Discussed healthy diet and exercise guidelines      He reports that he has never smoked. He has never used smokeless tobacco.      Appropriate preventive services were discussed with this patient, including applicable screening as appropriate for fall prevention, nutrition, physical activity, Tobacco-use cessation, weight loss and cognition.  Checklist reviewing preventive services available has been given to the patient.    Reviewed patients plan of care and provided an AVS. The Basic Care Plan (routine screening as documented in Health Maintenance) for Kathrine meets the Care Plan requirement. This Care Plan has been established and reviewed with the Patient.          ABHISHEK Perry CNP  [facility]    Identified Health Risks:  I have reviewed Opioid Use Disorder and Substance Use Disorder risk factors and made any needed referrals.   "

## 2023-12-07 NOTE — PATIENT INSTRUCTIONS
Patient Education   Personalized Prevention Plan  You are due for the preventive services outlined below.  Your care team is available to assist you in scheduling these services.  If you have already completed any of these items, please share that information with your care team to update in your medical record.  Health Maintenance Due   Topic Date Due     RSV VACCINE (Pregnancy & 60+) (1 - 1-dose 60+ series) Never done     Annual Wellness Visit  10/20/2023

## 2023-12-07 NOTE — COMMUNITY RESOURCES LIST (ENGLISH)
12/07/2023   [facility]  N/A  For questions about this resource list or additional care needs, please contact your primary care clinic or care manager.  Phone: [phone]   Email: N/A   Address: [facility] [place] [address]   Hours: N/A        Hotlines and Helplines       Hotline - Housing crisis  1  [facility] Distance: 12.09 miles      Phone/Virtual   [address] [place] [address]  Language: English  Hours: Mon - Sun Open 24 Hours  Fees: Free   Phone: (983) 627-8396 Email: info@[employer]ol[employer].org Website: http://www.saintolaf.org/     2  [facility] Distance: 12.8 miles      Phone/Virtual   [address] [place] [address]  Language: English  Hours: Mon - Sun Open 24 Hours   Phone: (797) 884-3571 Email: info@[employer] Website: http://www.[employer].org          Housing       Coordinated Entry access point  3  [facility] Distance: 5.74 miles      Phone/Virtual   [address] [place] [address]  Language: English  Hours: Mon - Fri 8:30 AM - 12:00 PM , Mon - Fri 1:00 PM - 4:00 PM  Fees: Free   Phone: (675) 680-2273 Ext.2 Email: darling@[facility].[employer].org Website: https://www.[employer]usa.org/usn/     4  [facility] Distance: 11.52 miles      In-Person, Phone/Virtual   [address] [place] [address]  Language: English, Bolivian  Hours: Mon - Fri 10:00 AM - 5:30 PM , Mon - Sun 7:30 PM - 10:20 PM , Sat - Sun 1:00 PM - 5:30 PM  Fees: Free   Phone: (261) 544-9338 Email: info@[employer] Website: https://www.[employer].org/our-programs/adult-shelter-connect-[place]-shelter/     Drop-in center or day shelter  5  [facility] Distance: 11.31 miles      In-Person   [address] [place] [address]  Language: English, Hmong, Mauritian, Bolivian  Hours: Mon - Thu 8:30 AM - 4:30 PM , Sat - Sun 9:00 AM - 12:00 PM   Fees: Free   Phone: (857) 808-7637 Email: info@[employer] Website: https://[employer].org/     6  [facility] Distance: 12.73 miles      In-Person   [address] [place] [address]  Language: English, Algerian, Malian  Hours: Mon - Sat 7:00 AM - 3:00 PM  Fees: Free, Self Pay   Phone: (204) 598-7463 Email: info@[employer] Website: https://www.[employer]/locations/opportunity-[place]/     Housing search assistance  7  [facility] Distance: 4.11 miles      In-Person   [address] [place] [address]  Language: English  Hours: Mon - Fri 8:00 AM - 4:00 PM  Fees: Free   Phone: (251) 808-3699 Email: info@[employer] Website: https://[employer]/     8  [facility] Distance: 5.45 miles      Phone/Virtual   [address] [place] [address]  Language: English, Malian  Hours: Mon - Fri 9:00 AM - 5:00 PM  Fees: Free   Phone: (213) 206-2656 Email: services@[employer] Website: https://www.[employer]     Shelter for families  9  [facility] Distance: 17.79 miles      In-Person   [address] [place] [address]  Language: English  Hours: Mon - Fri 3:00 PM - 9:00 AM , Sat - Sun Open 24 Hours  Fees: Free   Phone: (513) 683-2556 Ext.1 Website: https://www.saintandrews.org/2020/07/03/emergency-family-shelter/     Shelter for individuals  10  [facility] Distance: 11.56 miles      In-Person   [address] [place] [address]  Language: English  Hours: Mon - Sun 5:00 PM - 10:00 AM  Fees: Free, Self Pay   Phone: (574) 820-5936 Email: info@[employer].org Website: https://www.[employer].org/locations/higher-ground-shelter/     11  [facility] Distance: 11.68 miles       In-Person   [address] [place] [address]  Language: English  Hours: Mon - Fri 4:00 PM - 9:00 AM  Fees: Free   Phone: (495) 204-2692 Email: yenni@[facility].[employer] Website: https://centralusa.[employer].org/northern/[facility]/          Important Numbers & Websites       Emergency Services   911  [facility] Services   311  Poison Control   (341) 539-5395  Suicide Prevention Lifeline   (547) 906-7028 (TALK)  Child Abuse Hotline   (781) 216-2584 (4-A-Child)  Sexual Assault Hotline   (810) 583-4324 (HOPE)  National Runaway Safeline   (368) 162-2822 (RUNAWAY)  All-Options Talkline   (490) 151-5892  Substance Abuse Referral   (613) 288-2295 (HELP)

## 2023-12-19 ENCOUNTER — PATIENT OUTREACH (OUTPATIENT)
Dept: GERIATRIC MEDICINE | Facility: CLINIC | Age: 68
End: 2023-12-19

## 2023-12-19 ASSESSMENT — ACTIVITIES OF DAILY LIVING (ADL): DEPENDENT_IADLS:: INDEPENDENT

## 2023-12-19 ASSESSMENT — LIFESTYLE VARIABLES
SKIP TO QUESTIONS 9-10: 1
AUDIT-C TOTAL SCORE: 0
AUDIT-C TOTAL SCORE: 0
SKIP TO QUESTIONS 9-10: 1

## 2023-12-19 NOTE — Clinical Note
Hello- you are receiving the message because this member is on the state program MSHO/MSC+. As the Care Coordinator we are required to notify the Physician when we complete an annual assessment for this program. If you have further questions please contact me.  Cynthia Pedraza RN [facility] Care Coordinator [phone]

## 2023-12-22 ENCOUNTER — PATIENT OUTREACH (OUTPATIENT)
Dept: GERIATRIC MEDICINE | Facility: CLINIC | Age: 68
End: 2023-12-22

## 2023-12-22 NOTE — PROGRESS NOTES
[facility] Care Coordination Contact    Received after visit chart from care coordinator.  Completed following tasks: Mailed copy of care plan/support plan to member and Mailed Safe Medication Disposal   Mailed list of chiropractors per CC.     Jazz Chou  Care Management Specialist   [facility]   [phone]

## 2023-12-22 NOTE — LETTER
December 22, 2023      YADIRA LOPEZ  [address] [facility] [address]      Dear Yadira:    At [facility], we re dedicated to improving your health and wellness. Enclosed is the Care Plan developed with you on 12/19/2023. Please review the Care Plan carefully.    As a reminder, during your visit we talked about:  Ways to manage your physical and mental health  Using health care to maintain and improve your health   Your preventive care needs     Remember to contact your care coordinator if you:  Are hospitalized, or plan to be hospitalized   Have a fall    Have a change in your physical or mental health  Need help finding support or services    If you have questions, or don t agree with your Care Plan, call me at [phone]. You can also call me if your needs change. TTY users, call the [place] Relay at (729) or 1-181.763.6894 (vvdlol-kt-xnxnjf relay service).    Sincerely,      Cynthia Pedraza RN, PHN  [phone]  [email or web address]      [facility] (\[facility] [place] [facility]\"") is a health plan that contracts with both Medicare and the [place] Medical Assistance (Medicaid) program to provide benefits of both programs to enrollees. Enrollment in [facility] depends on contract renewal.    [id number] accepted    [id number] (07/2022)

## 2023-12-22 NOTE — PROGRESS NOTES
[facility] Care Coordination Contact    [facility] Home Visit Assessment     Home visit for Health Risk Assessment with Kathrine Isaac completed on December 19, 2023    Type of residence:: Private home - stairs  Current living arrangement:: I live in a private home with family     Assessment completed with:: Patient    Current Care Plan  Member currently receiving the following home care services:     Member currently receiving the following community resources: None      Medication Review  Medication reconciliation completed in Epic: Yes  Medication set-up & administration: Independent-does not set up.  Self-administers medications.  Medication Risk Assessment Medication (1 or more, place referral to MTM): N/A: No risk factors identified  MTM Referral Placed: No: No risk factors idenified    Mental/Behavioral Health   Depression Screening:   PHQ-2 Total Score (Adult) - Positive if 3 or more points; Administer PHQ-9 if positive: 0       Mental health DX:: No        Falls Assessment:   Fallen 2 or more times in the past year?: No   Any fall with injury in the past year?: No    ADL/IADL Dependencies:   Dependent ADLs:: Independent  Dependent IADLs:: Independent    Health Plan sponsored benefits: [facility]: Shared information regarding [employer] Program. Reviewed preventative health screening and health plan supplemental benefits/incentives. Reviewed medication disposal form.    PCA Assessment completed at visit: Not Applicable     Elderly Waiver Eligibility: No-does not meet criteria    Care Plan & Recommendations: Kathrine is independent with ADLs and IADLs. He works part time doing clerical work. He goes to the gym every day. He reports that staying active helps to keep his pain from becoming overpowering. He is open to trying chiropractic care in 2024 as this benefit will be added to his insurance. Care Coordinator will send him a list of in-network providers.     See [facility] for detailed  assessment information.    Follow-Up Plan: Member informed of future contact, plan to f/u with member with a 6 month telephone assessment.  Contact information shared with member and family, encouraged member to call with any questions or concerns at any time.    [place] care continuum providers: Please see Snapshot and Care Management Flowsheets for Specific details of care plan.    This CC note routed to PCP, Nighat Joe RN PHN  Care Coordinator  [facility]  [phone]

## 2024-01-12 DIAGNOSIS — H40.1131 PRIMARY OPEN ANGLE GLAUCOMA (POAG) OF BOTH EYES, MILD STAGE: ICD-10-CM

## 2024-01-12 RX ORDER — TIMOLOL MALEATE 5 MG/ML
1 SOLUTION/ DROPS OPHTHALMIC 2 TIMES DAILY
Qty: 10 ML | Refills: 4 | Status: SHIPPED | OUTPATIENT
Start: 2024-01-12 | End: 2024-09-18

## 2024-01-12 NOTE — TELEPHONE ENCOUNTER
TIMOLOL MALEATE 0.5% OPHTH SOLN 5ML   Last Written Prescription Date:  11/27/2023  Last Fill Quantity: 10,   # refills: 0  Last Office Visit : 10/27/2023  Future Office visit:  5/3/2024    Routing refill request to provider for review/approval because:  Refer to Provider for review and refills per Providers orders for Pt care.     Helen Aj RN  Central Triage Red Flags/Med Refills

## 2024-02-18 DIAGNOSIS — E11.319 TYPE 2 DIABETES MELLITUS WITH RETINOPATHY, WITHOUT LONG-TERM CURRENT USE OF INSULIN, MACULAR EDEMA PRESENCE UNSPECIFIED, UNSPECIFIED LATERALITY, UNSPECIFIED RETINOPATHY SEVERITY (H): ICD-10-CM

## 2024-02-19 RX ORDER — ASPIRIN 81 MG/1
81 TABLET, COATED ORAL DAILY
Qty: 90 TABLET | Refills: 2 | Status: SHIPPED | OUTPATIENT
Start: 2024-02-19

## 2024-03-07 DIAGNOSIS — E11.319 TYPE 2 DIABETES MELLITUS WITH RETINOPATHY, WITHOUT LONG-TERM CURRENT USE OF INSULIN, MACULAR EDEMA PRESENCE UNSPECIFIED, UNSPECIFIED LATERALITY, UNSPECIFIED RETINOPATHY SEVERITY (H): ICD-10-CM

## 2024-03-07 RX ORDER — BLOOD-GLUCOSE METER
EACH MISCELLANEOUS
Qty: 1 KIT | Refills: 0 | Status: SHIPPED | OUTPATIENT
Start: 2024-03-07

## 2024-04-02 ENCOUNTER — TRANSFERRED RECORDS (OUTPATIENT)
Dept: HEALTH INFORMATION MANAGEMENT | Facility: CLINIC | Age: 69
End: 2024-04-02

## 2024-04-28 ENCOUNTER — HEALTH MAINTENANCE LETTER (OUTPATIENT)
Age: 69
End: 2024-04-28

## 2024-05-03 ENCOUNTER — TELEPHONE (OUTPATIENT)
Dept: OPHTHALMOLOGY | Facility: CLINIC | Age: 69
End: 2024-05-03

## 2024-05-03 ENCOUNTER — OFFICE VISIT (OUTPATIENT)
Dept: OPHTHALMOLOGY | Facility: CLINIC | Age: 69
End: 2024-05-03
Payer: COMMERCIAL

## 2024-05-03 DIAGNOSIS — H52.4 PRESBYOPIA: ICD-10-CM

## 2024-05-03 DIAGNOSIS — H40.1131 PRIMARY OPEN ANGLE GLAUCOMA (POAG) OF BOTH EYES, MILD STAGE: ICD-10-CM

## 2024-05-03 DIAGNOSIS — E11.9 TYPE 2 DIABETES MELLITUS WITHOUT RETINOPATHY (H): Primary | ICD-10-CM

## 2024-05-03 DIAGNOSIS — H25.13 SENILE NUCLEAR SCLEROSIS, BILATERAL: ICD-10-CM

## 2024-05-03 PROCEDURE — 92133 CPTRZD OPH DX IMG PST SGM ON: CPT | Performed by: OPTOMETRIST

## 2024-05-03 PROCEDURE — 92014 COMPRE OPH EXAM EST PT 1/>: CPT | Performed by: OPTOMETRIST

## 2024-05-03 PROCEDURE — 92015 DETERMINE REFRACTIVE STATE: CPT | Performed by: OPTOMETRIST

## 2024-05-03 ASSESSMENT — CONF VISUAL FIELD
OD_SUPERIOR_NASAL_RESTRICTION: 0
OD_NORMAL: 1
OS_NORMAL: 1
OS_INFERIOR_NASAL_RESTRICTION: 0
OS_SUPERIOR_NASAL_RESTRICTION: 0
OS_INFERIOR_TEMPORAL_RESTRICTION: 0
OD_SUPERIOR_TEMPORAL_RESTRICTION: 0
OD_INFERIOR_TEMPORAL_RESTRICTION: 0
METHOD: COUNTING FINGERS
OS_SUPERIOR_TEMPORAL_RESTRICTION: 0
OD_INFERIOR_NASAL_RESTRICTION: 0

## 2024-05-03 ASSESSMENT — REFRACTION_MANIFEST
OD_CYLINDER: +1.00
OS_AXIS: 150
OS_ADD: +2.50
OD_ADD: +2.50
OS_SPHERE: -1.00
OD_AXIS: 035
OD_SPHERE: -1.00
OS_CYLINDER: +2.00
METHOD_AUTOREFRACTION: 1

## 2024-05-03 ASSESSMENT — SLIT LAMP EXAM - LIDS
COMMENTS: SCALLOPED LID MARGINS
COMMENTS: SCALLOPED LID MARGINS

## 2024-05-03 ASSESSMENT — TONOMETRY
IOP_METHOD: TONOPEN
OD_IOP_MMHG: 18
OS_IOP_MMHG: 17

## 2024-05-03 ASSESSMENT — CUP TO DISC RATIO
OS_RATIO: 0.6
OD_RATIO: 0.75

## 2024-05-03 ASSESSMENT — VISUAL ACUITY
OD_SC: 20/20
OD_SC+: -3
OS_SC+: -3
OS_SC: 20/25
METHOD: SNELLEN - LINEAR

## 2024-05-03 ASSESSMENT — EXTERNAL EXAM - RIGHT EYE: OD_EXAM: NORMAL

## 2024-05-03 ASSESSMENT — EXTERNAL EXAM - LEFT EYE: OS_EXAM: NORMAL

## 2024-05-03 NOTE — TELEPHONE ENCOUNTER
Left Voicemail (1st Attempt) for the patient to call back and schedule the following:    Appointment type: return  Provider: dr. perez  Return date: 5/3/2025  Specialty phone number: [phone]   Additonal Notes: Return in about 1 year (around 5/3/2025) for Comprehensive Exam, OCT.     Lilia frey Complex   Orthopedics, Podiatry, Sports Medicine, Ent ,Eye , Audiology, Adult Endocrine & Diabetes, Nutrition & Medication Therapy Management Specialties   [facility]

## 2024-05-03 NOTE — PROGRESS NOTES
Assessment/Plan  (E11.9) Type 2 diabetes mellitus without retinopathy (H)  (primary encounter diagnosis)  Plan: Discussed findings with patient. Encouraged continued blood glucose, pressure, and lipid control. Patient should continue following recommendations of primary care provider. Patient should plan on returning to clinic annually for a dilated eye exam but was encouraged to return to clinic sooner with new flashes/floaters or other vision changes.     (H40.1131) Primary open angle glaucoma (POAG) of both eyes, mild stage  -HVF 24-2 (10/23) showed stable defect. OD: inferior nasal step, OS: WNL  -IOP today 18/17 (Latanoprost nightly, timolol twice daily), TMax 26/20   -Slightly thin pachys  -RNFL OCT today stable to 2023  Plan: OCT Optic Nerve Volume Spectralis OU (both eyes)        Continue latanoprost nightly both eyes and timolol twice daily both eyes. Patient is getting referred for cataract evaluation, so will recommend following with this provider in at least 1 year for repeat RNFL OCT.    (H25.13) Senile nuclear sclerosis, bilateral  Comment: Visually significant- very difficult night driving. Acuity does improve with glasses, but clinical appearance suggests that progression is likely in the next year or two.   Plan: Recommend cataract evaluation with Dr. Zuluaga given impact on patient's ADLs .    (H52.4) Presbyopia  Plan: REFRACTION [[id number]]        Rx updated for patient. Advised patient that Rx will change if he is approved for cataract surgery.       Complete documentation of historical and exam elements from today's encounter can  be found in the full encounter summary report (not reduplicated in this progress  note). I personally obtained the chief complaint(s) and history of present illness. I  confirmed and edited as necessary the review of systems, past medical/surgical  history, family history, social history, and examination findings as documented by  others; and I examined the patient  myself. I personally reviewed the relevant tests,  images, and reports as documented above. I formulated and edited as necessary the  assessment and plan and discussed the findings and management plan with the  patient and family.    Ramos Verdin OD

## 2024-05-16 ENCOUNTER — APPOINTMENT (OUTPATIENT)
Dept: OPTOMETRY | Facility: CLINIC | Age: 69
End: 2024-05-16
Payer: COMMERCIAL

## 2024-05-16 PROCEDURE — 92342 FIT SPECTACLES MULTIFOCAL: CPT | Performed by: OPTOMETRIST

## 2024-05-26 DIAGNOSIS — E78.5 HYPERLIPIDEMIA LDL GOAL <100: Primary | ICD-10-CM

## 2024-05-26 DIAGNOSIS — E11.319 TYPE 2 DIABETES MELLITUS WITH RETINOPATHY, WITHOUT LONG-TERM CURRENT USE OF INSULIN, MACULAR EDEMA PRESENCE UNSPECIFIED, UNSPECIFIED LATERALITY, UNSPECIFIED RETINOPATHY SEVERITY (H): ICD-10-CM

## 2024-05-28 DIAGNOSIS — E11.319 TYPE 2 DIABETES MELLITUS WITH RETINOPATHY, WITHOUT LONG-TERM CURRENT USE OF INSULIN, MACULAR EDEMA PRESENCE UNSPECIFIED, UNSPECIFIED LATERALITY, UNSPECIFIED RETINOPATHY SEVERITY (H): ICD-10-CM

## 2024-05-28 RX ORDER — BLOOD SUGAR DIAGNOSTIC
STRIP MISCELLANEOUS
Qty: 100 STRIP | Refills: 1 | Status: SHIPPED | OUTPATIENT
Start: 2024-05-28

## 2024-05-28 NOTE — TELEPHONE ENCOUNTER
Patient is out of medication now.   Can this be filled?  Please advise    Thank you,  Anjelica MEEHAN    [phone]

## 2024-05-29 RX ORDER — SITAGLIPTIN 100 MG/1
100 TABLET, FILM COATED ORAL DAILY
Qty: 90 TABLET | Refills: 0 | Status: SHIPPED | OUTPATIENT
Start: 2024-05-29 | End: 2024-08-26

## 2024-06-04 DIAGNOSIS — M17.0 BILATERAL PRIMARY OSTEOARTHRITIS OF KNEE: Primary | ICD-10-CM

## 2024-06-10 ENCOUNTER — OFFICE VISIT (OUTPATIENT)
Dept: ORTHOPEDICS | Facility: CLINIC | Age: 69
End: 2024-06-10
Payer: COMMERCIAL

## 2024-06-10 DIAGNOSIS — M17.0 BILATERAL PRIMARY OSTEOARTHRITIS OF KNEE: Primary | ICD-10-CM

## 2024-06-10 PROCEDURE — 20610 DRAIN/INJ JOINT/BURSA W/O US: CPT | Mod: 50 | Performed by: FAMILY MEDICINE

## 2024-06-10 RX ORDER — HYALURONATE SODIUM 10 MG/ML
20 SYRINGE (ML) INTRAARTICULAR
Status: SHIPPED | OUTPATIENT
Start: 2024-06-10

## 2024-06-10 RX ADMIN — Medication 20 MG: at 09:32

## 2024-06-10 ASSESSMENT — PAIN SCALES - GENERAL: PAINLEVEL: EXTREME PAIN (9)

## 2024-06-10 NOTE — LETTER
6/10/2024      Kathrine Isaac  [address] [facility] [address]      Dear Colleague,    Thank you for referring your patient, Kathrine Isaac, to the [facility] [place]. Please see a copy of my visit note below.    HISTORY OF PRESENT ILLNESS  Mr. Isaac is a pleasant 69 year old male following up with bilateral knee osteoarthritis.  Kathrine has been experiencing worsening knee pain in both knees over the past many months.  He last saw me in August, I injected his knee with hyaluronic acid at that visit.  This was a Synvisc One injection that unfortunately did not give him nearly as much as relief as Gel One had in the past.  \    PHYSICAL EXAM  General  - normal appearance, in no obvious distress  Musculoskeletal -right and left knee knee  - stance: mildly antalgic gait  - inspection: trace effusion in right knee  - palpation: medial joint line tenderness and right knee  - ROM: 120 degrees flexion, 0 degrees extension  - strength: 5/5 in flexion, 5/5 in extension  - special tests:  (-) Sierra  (-) varus at 0 and 30 degrees flexion  (-) valgus at 0 and 30 degrees flexion  Neuro  - no sensory or motor deficit, grossly normal coordination, normal muscle tone       ASSESSMENT & PLAN  Mr. Isaac is a 69 year old male following up with bilateral knee osteoarthritis.    We revisited treatment options for his knee from a nonoperative perspective.  He is very familiar with this treatment algorithm given our discussions in the past.    Through shared decision making we did decide to move forward with hyaluronic acid treatment again today.  This is something we could consider performing again in the future on an as helpful basis, no sooner than 6 months from now.  However, if this does not help him significantly we could consider a corticosteroid injection in the near future as well.    It was a pleasure seeing Kathrine.        Herman El, , CAQSM      This note was  constructed using Dragon dictation software, please excuse any minor errors in spelling, grammar, or syntax.      Large Joint Injection/Arthocentesis: bilateral knee    Date/Time: 6/10/2024 9:32 AM    Performed by: Herman El DO  Authorized by: Herman El DO    Indications:  Pain  Needle Size:  22 G  Guidance: landmark guided    Approach:  Lateral  Location:  Knee  Laterality:  Bilateral      Medications (Right):  20 mg sodium hyaluronate 20 MG/2ML  Medications (Left):  20 mg sodium hyaluronate 20 MG/2ML  Outcome:  Tolerated well, no immediate complications  Procedure discussed: discussed risks, benefits, and alternatives    Consent Given by:  Patient  Timeout: timeout called immediately prior to procedure    Prep: patient was prepped and draped in usual sterile fashion         Knee Injection with Hyaluronic Acid    The patient was informed of the risks and the benefits of the procedure and a written consent was signed.    The patient's left knee was prepped with chlorhexidine in sterile fashion.   Euflexxa solution removed from packaging and inspected for consistency with regards to volume and packaging standard.  Injection was performed using sterile technique.  A 1.5-inch 22-gauge needle was used to enter the lateral aspect of the knee.  Injection performed without difficulty.    The patient's right knee was prepped with chlorhexidine in sterile fashion.   Euflexxa solution removed from packaging and inspected for consistency with regards to volume and packaging standard.  Injection was performed using sterile technique.  A 1.5-inch 22-gauge needle was used to enter the lateral aspect of the knee.  Injection performed without difficulty.    There were no complications. The patient tolerated the procedure well. There was negligible bleeding.   The patient was instructed to ice the knee upon leaving clinic and refrain from overuse over the next 3 days.   The patient was instructed to call or go to the  emergency room with any unusual pain, swelling, redness, or if otherwise concerned.                Again, thank you for allowing me to participate in the care of your patient.        Sincerely,        Herman El, DO

## 2024-06-10 NOTE — PROGRESS NOTES
HISTORY OF PRESENT ILLNESS  Mr. Isaac is a pleasant 69 year old male following up with bilateral knee osteoarthritis.  Kathrine has been experiencing worsening knee pain in both knees over the past many months.  He last saw me in August, I injected his knee with hyaluronic acid at that visit.  This was a Synvisc One injection that unfortunately did not give him nearly as much as relief as Gel One had in the past.  \    PHYSICAL EXAM  General  - normal appearance, in no obvious distress  Musculoskeletal -right and left knee knee  - stance: mildly antalgic gait  - inspection: trace effusion in right knee  - palpation: medial joint line tenderness and right knee  - ROM: 120 degrees flexion, 0 degrees extension  - strength: 5/5 in flexion, 5/5 in extension  - special tests:  (-) Sierra  (-) varus at 0 and 30 degrees flexion  (-) valgus at 0 and 30 degrees flexion  Neuro  - no sensory or motor deficit, grossly normal coordination, normal muscle tone       ASSESSMENT & PLAN  Mr. Isaac is a 69 year old male following up with bilateral knee osteoarthritis.    We revisited treatment options for his knee from a nonoperative perspective.  He is very familiar with this treatment algorithm given our discussions in the past.    Through shared decision making we did decide to move forward with hyaluronic acid treatment again today.  This is something we could consider performing again in the future on an as helpful basis, no sooner than 6 months from now.  However, if this does not help him significantly we could consider a corticosteroid injection in the near future as well.    It was a pleasure seeing Kathrine.        Herman El DO, CAQSM      This note was constructed using Dragon dictation software, please excuse any minor errors in spelling, grammar, or syntax.      Large Joint Injection/Arthocentesis: bilateral knee    Date/Time: 6/10/2024 9:32 AM    Performed by: Herman El DO  Authorized by: Herman El,  DO    Indications:  Pain  Needle Size:  22 G  Guidance: landmark guided    Approach:  Lateral  Location:  Knee  Laterality:  Bilateral      Medications (Right):  20 mg sodium hyaluronate 20 MG/2ML  Medications (Left):  20 mg sodium hyaluronate 20 MG/2ML  Outcome:  Tolerated well, no immediate complications  Procedure discussed: discussed risks, benefits, and alternatives    Consent Given by:  Patient  Timeout: timeout called immediately prior to procedure    Prep: patient was prepped and draped in usual sterile fashion         Knee Injection with Hyaluronic Acid    The patient was informed of the risks and the benefits of the procedure and a written consent was signed.    The patient's left knee was prepped with chlorhexidine in sterile fashion.   Euflexxa solution removed from packaging and inspected for consistency with regards to volume and packaging standard.  Injection was performed using sterile technique.  A 1.5-inch 22-gauge needle was used to enter the lateral aspect of the knee.  Injection performed without difficulty.    The patient's right knee was prepped with chlorhexidine in sterile fashion.   Euflexxa solution removed from packaging and inspected for consistency with regards to volume and packaging standard.  Injection was performed using sterile technique.  A 1.5-inch 22-gauge needle was used to enter the lateral aspect of the knee.  Injection performed without difficulty.    There were no complications. The patient tolerated the procedure well. There was negligible bleeding.   The patient was instructed to ice the knee upon leaving clinic and refrain from overuse over the next 3 days.   The patient was instructed to call or go to the emergency room with any unusual pain, swelling, redness, or if otherwise concerned.

## 2024-06-10 NOTE — NURSING NOTE
Chief Complaint   Patient presents with    Left Knee - Pain, Follow Up    Right Knee - Pain, Follow Up       There were no vitals filed for this visit.    There is no height or weight on file to calculate BMI.      KEVIN Ramos NREMT                       Consent: The patient's consent was obtained including but not limited to risks of crusting, scabbing, blistering, scarring, darker or lighter pigmentary change, recurrence, incomplete removal and infection. Render Post-Care Instructions In Note?: no Post-Care Instructions: I reviewed with the patient in detail post-care instructions. Patient is to wear sunprotection, and avoid picking at any of the treated lesions. Pt may apply Vaseline to crusted or scabbing areas. Show Applicator Variable?: Yes Detail Level: Detailed Duration Of Freeze Thaw-Cycle (Seconds): 0

## 2024-06-10 NOTE — NURSING NOTE
[facility]   ORTHOPEDICS & SPORTS MEDICINE  [address] [place] [address]  Dept: [phone]  ______________________________________________________________________________    Patient: Kathrine Isaac   : 1955   MRN: [id number]   Dulce Maria 10, 2024    INVASIVE PROCEDURE SAFETY CHECKLIST    Date: 6/10/2024   Procedure: Bilateral knee injection  Patient Name: Kathrine Isaac  MRN: [id number]  YOB: 1955    Action: Complete sections as appropriate. Any discrepancy results in a HARD COPY until resolved.     PRE PROCEDURE:  Patient ID verified with 2 identifiers (name and  or MRN): Yes  Procedure and site verified with patient/designee (when able): Yes  Accurate consent documentation in medical record: Yes  H&P (or appropriate assessment) documented in medical record: Yes  H&P must be up to 20 days prior to procedure and updates within 24 hours of procedure as applicable: NA  Relevant diagnostic and radiology test results appropriately labeled and displayed as applicable: NA  Procedure site(s) marked with provider initials: NA    TIMEOUT:  Time-Out performed immediately prior to starting procedure, including verbal and active participation of all team members addressing the following:Yes  * Correct patient identify  * Confirmed that the correct side and site are marked  * An accurate procedure consent form  * Agreement on the procedure to be done  * Correct patient position  * Relevant images and results are properly labeled and appropriately displayed  * The need to administer antibiotics or fluids for irrigation purposes during the procedure as applicable   * Safety precautions based on patient history or medication use    DURING PROCEDURE: Verification of correct person, site, and procedures any time the responsibility for care of the patient is transferred to another member of the care team.       Prior to injection, verified patient identity using patient's name and date of  birth.  Due to injection administration, patient instructed to remain in clinic for 15 minutes  afterwards, and to report any adverse reaction to me immediately.    Joint injection was performed.      Drug Amount Wasted:  None.  Vial/Syringe: Syringe  Expiration Date:  5/6/2025      Richard Jackson, EMT  Dulce Maria 10, 2024

## 2024-06-11 ENCOUNTER — OFFICE VISIT (OUTPATIENT)
Dept: OPHTHALMOLOGY | Facility: CLINIC | Age: 69
End: 2024-06-11
Payer: COMMERCIAL

## 2024-06-11 DIAGNOSIS — H52.203 MYOPIA OF BOTH EYES WITH ASTIGMATISM AND PRESBYOPIA: ICD-10-CM

## 2024-06-11 DIAGNOSIS — Z98.890 H/O LASER ASSISTED IN SITU KERATOMILEUSIS: ICD-10-CM

## 2024-06-11 DIAGNOSIS — H40.1131 PRIMARY OPEN ANGLE GLAUCOMA (POAG) OF BOTH EYES, MILD STAGE: ICD-10-CM

## 2024-06-11 DIAGNOSIS — H52.4 MYOPIA OF BOTH EYES WITH ASTIGMATISM AND PRESBYOPIA: ICD-10-CM

## 2024-06-11 DIAGNOSIS — H52.13 MYOPIA OF BOTH EYES WITH ASTIGMATISM AND PRESBYOPIA: ICD-10-CM

## 2024-06-11 DIAGNOSIS — E11.9 TYPE 2 DIABETES MELLITUS WITHOUT RETINOPATHY (H): ICD-10-CM

## 2024-06-11 DIAGNOSIS — H25.813 COMBINED FORMS OF AGE-RELATED CATARACT OF BOTH EYES: Primary | ICD-10-CM

## 2024-06-11 PROCEDURE — 99204 OFFICE O/P NEW MOD 45 MIN: CPT | Performed by: OPHTHALMOLOGY

## 2024-06-11 PROCEDURE — 76519 ECHO EXAM OF EYE: CPT | Mod: LT | Performed by: OPHTHALMOLOGY

## 2024-06-11 PROCEDURE — 92020 GONIOSCOPY: CPT | Mod: 59 | Performed by: OPHTHALMOLOGY

## 2024-06-11 ASSESSMENT — SLIT LAMP EXAM - LIDS
COMMENTS: SCALLOPED LID MARGINS
COMMENTS: SCALLOPED LID MARGINS

## 2024-06-11 ASSESSMENT — GONIOSCOPY
OS_SUPERIOR: D10F 3+PTM
OS_NASAL: D10F 3+PTM
OD_SUPERIOR: D10F 3+PTM
OS_TEMPORAL: D10F 3+PTM
OD_INFERIOR: D10F 3+PTM
OD_TEMPORAL: D10F 3+PTM
OS_INFERIOR: D10F 3+PTM
METHOD: SUSSMAN, FOUR MIRROR
OD_NASAL: D10F 3+PTM

## 2024-06-11 ASSESSMENT — CONF VISUAL FIELD
OS_INFERIOR_NASAL_RESTRICTION: 0
METHOD: COUNTING FINGERS
OD_INFERIOR_TEMPORAL_RESTRICTION: 0
OS_NORMAL: 1
OD_INFERIOR_NASAL_RESTRICTION: 0
OS_SUPERIOR_TEMPORAL_RESTRICTION: 0
OD_SUPERIOR_NASAL_RESTRICTION: 0
OD_NORMAL: 1
OS_SUPERIOR_NASAL_RESTRICTION: 0
OD_SUPERIOR_TEMPORAL_RESTRICTION: 0
OS_INFERIOR_TEMPORAL_RESTRICTION: 0

## 2024-06-11 ASSESSMENT — REFRACTION_MANIFEST
OD_SPHERE: -1.00
OS_AXIS: 155
OD_CYLINDER: +1.00
OS_CYLINDER: +2.00
OD_AXIS: 040
OS_SPHERE: -1.50
OS_ADD: +2.50
OD_ADD: +2.50

## 2024-06-11 ASSESSMENT — VISUAL ACUITY
OS_SC: 20/40
OD_SC+: -2
OD_BAT_HIGH: 20/30
OS_SC+: -2
OD_SC: 20/20
METHOD_MR: DX PURPOSES
METHOD: SNELLEN - LINEAR
OS_BAT_HIGH: 20/70

## 2024-06-11 ASSESSMENT — TONOMETRY
OD_IOP_MMHG: 14
IOP_METHOD: TONOPEN
OS_IOP_MMHG: 12

## 2024-06-11 ASSESSMENT — EXTERNAL EXAM - LEFT EYE: OS_EXAM: NORMAL

## 2024-06-11 ASSESSMENT — EXTERNAL EXAM - RIGHT EYE: OD_EXAM: NORMAL

## 2024-06-11 ASSESSMENT — PACHYMETRY
OD_CT(UM): 537
OS_CT(UM): 529

## 2024-06-11 ASSESSMENT — CUP TO DISC RATIO
OS_RATIO: 0.75
OD_RATIO: 0.75

## 2024-06-11 NOTE — PROGRESS NOTES
HPI       Cataract Evaluation    In both eyes.  Associated symptoms include blurred vision, glare and a need for brighter lights.  Treatments tried include eye drops.             Comments    Here for cataracts evaluation. VA is gradually decreasing. Night driving and glare is bothersome. Uses glaucoma drops as instructed. No pain. No flashes or floaters.    Tono Amaral COT 10:17 AM June 11, 2024             Last edited by Tono Amaral on 6/11/2024 10:18 AM.         Review of systems for the eyes was negative other than the pertinent positives/negatives listed in the HPI.      Assessment & Plan    HPI:  Kathrine Isaac is a 69 year old male with history of T2DM, HLD, HTN, ED, Primary open angle glaucoma (POAG), ?hyperopic laser in situ keratomileusis (LASIK) presents for cataract evaluation from Dr. Verdin. Has noted steadily decreasing vision and glare for years.      POHx: ?hyperopic laser in situ keratomileusis (LASIK), poag  PMHx: T2DM, HLD, HTN, ED  Current Medications:   Current Outpatient Medications   Medication Sig Dispense Refill    alcohol swab prep pads Use to swab area of injection/nkechi as directed. 100 each 3    aspirin (ASPIRIN LOW DOSE) 81 MG EC tablet TAKE 1 TABLET(81 MG) BY MOUTH DAILY 90 tablet 2    atorvastatin (LIPITOR) 20 MG tablet Take 1 tablet (20 mg) by mouth daily 90 tablet 2    blood glucose (ONETOUCH ULTRA) test strip USE TO TEST BLOOD SUGAR DAILY OR AS DIRECTED 100 strip 1    blood glucose calibration (NO BRAND SPECIFIED) solution To accompany: Blood Glucose Monitor Brands: per insurance. 1 each 1    blood glucose calibration (NO BRAND SPECIFIED) solution To accompany: Blood Glucose Monitor Brands: per insurance.One touch is reportedly covered by insurance 1 each 1    blood glucose monitoring (ONE TOUCH ULTRA 2) meter device kit USE TO TEST BLOOD SUGAR DAILY OR AS DIRECTED 1 kit 0    ciclopirox (PENLAC) 8 % external solution Apply to adjacent skin and affected nails daily.  Remove with alcohol  every 7 days, then repeat. 6.6 mL 11    clotrimazole (LOTRIMIN) 1 % external cream Apply topically 2 times daily 30 g 0    empagliflozin (JARDIANCE) 25 MG TABS tablet Take 1 tablet (25 mg) by mouth daily 90 tablet 3    gabapentin (NEURONTIN) 300 MG capsule TAKE 1 CAPSULE BY MOUTH AT BEDTIME 90 capsule 2    glimepiride (AMARYL) 4 MG tablet TAKE 1 TABLET BY MOUTH EVERY MORNING BEFORE BREAKFAST 90 tablet 3    hydrochlorothiazide (HYDRODIURIL) 12.5 MG tablet TAKE 1 TABLET(12.5 MG) BY MOUTH DAILY 90 tablet 1    Lancets (ONETOUCH DELICA PLUS [id number]) MISC 1 Device by In Vitro route 2 times daily Use with lancing device twice daily 100 each 3    latanoprost (XALATAN) 0.005 % ophthalmic solution Place 1 drop into both eyes daily 2.5 mL 5    lisinopril (ZESTRIL) 40 MG tablet Take 1 tablet (40 mg) by mouth daily 90 tablet 3    metFORMIN (GLUCOPHAGE XR) 500 MG 24 hr tablet TAKE 4 TABLETS(2000 MG) BY MOUTH DAILY WITH DINNER 360 tablet 3    sildenafil (VIAGRA) 50 MG tablet Take 1 tablet (50 mg) by mouth daily as needed (ed) 18 tablet 1    sitagliptin (JANUVIA) 100 MG tablet TAKE 1 TABLET(100 MG) BY MOUTH DAILY 90 tablet 0    timolol maleate (TIMOPTIC) 0.5 % ophthalmic solution Place 1 drop into both eyes 2 times daily 10 mL 4     Current Facility-Administered Medications   Medication Dose Route Frequency Provider Last Rate Last Admin    cross-Linked Hyaluronate (GEL-ONE) injection PRSY 30 mg  30 mg   Herman Lindquist MD   30 mg at 05/24/22 1006    cross-Linked Hyaluronate (GEL-ONE) injection PRSY 30 mg  30 mg   Herman Lindquist MD   30 mg at 08/25/21 0907    cross-Linked Hyaluronate (GEL-ONE) injection PRSY 30 mg  30 mg   Herman Lindquist MD   30 mg at 09/02/20 0916    hylan (SYNVISC ONE) injection 48 mg  48 mg   Herman El DO   48 mg at 08/24/23 0959    hylan (SYNVISC ONE) injection 48 mg  48 mg   Herman El DO   48 mg at 08/24/23 0959    methylPREDNISolone (DEPO-MEDROL) injection 40 mg  40 mg    Herman Lindquist MD   40 mg at 02/09/22 1010    sodium hyaluronate (EUFLEXXA) injection 20 mg  20 mg      20 mg at 06/10/24 0932    sodium hyaluronate (EUFLEXXA) injection 20 mg  20 mg      20 mg at 06/10/24 0932    triamcinolone (KENALOG-40) injection 40 mg  40 mg   Herman El, DO   40 mg at 11/01/22 0903    triamcinolone (KENALOG-40) injection 40 mg  40 mg   Herman El, DO   40 mg at 11/01/22 0903    triamcinolone (KENALOG-40) injection 40 mg  40 mg   Herman Lindquist MD   40 mg at 08/11/21 1045    triamcinolone (KENALOG-40) injection 40 mg  40 mg   Herman Lindquist MD   40 mg at 09/04/19 0902     FHx: denies family history of ocular conditions   PSHx: hyperopic laser in situ keratomileusis (LASIK) 1998       Current Eye Medications:  Timolol twice a day  Latanoprost at bedtime both eyes     Assessment & Plan:  (H25.813) Combined forms of age-related cataract of both eyes  (primary encounter diagnosis)  Special equipment/needs:  Eye: left  Anesthesia:topical  Dilates to: 6.5  Iris expansion:  unlikely  Pseudoexfoliation: No  Trypan Blue: No  Trauma: No    Able lay to flat: Yes  Blood Thinner: Yes ASA 81  Tamsulosin: No  DM: Yes  Guttae: No    Dominant Eye: right    Plan: [place] right eye, toric plano left eye     Cataract is believed to be significantly contributing to patient's visual impairment. Cataract surgery recommended and expected to improve vision. Vision is not correctable by glasses or other nonsurgical measures. R/B/A were discussed with the patient. These included, but are not limited to: bleeding, infection, loss of vision, loss of the eye, need for more surgery, glaucoma, retinal detachment, need for glasses, corneal edema. The patient voiced understanding and wishes to proceed. All lens options were discussed with the patient including monofocal lenses, multifocal lenses, and toric lenses. The risks and benefits of each were discussed, including halos, glare, and  possible need for glasses. No guarantees about vision after surgery were given. The patient voiced understanding and wishes to proceed    Proceed with CE/IOL left eye toric followed by right eye .      iStent inject at time of Cataract extraction/intraocular lens      (H40.1131) Primary open angle glaucoma (POAG) of both eyes, mild stage  On timolol and latanoprost  Recommend iStent inject at time of Cataract extraction/intraocular lens     (E11.9) Type 2 diabetes mellitus without retinopathy (H)  Continue annual exams with Dr. Verdin    (H52.13,  H52.203,  H52.4) Myopia of both eyes with astigmatism and presbyopia  Hold pending Cataract extraction/intraocular lens     (Z98.890) H/O laser assisted in situ keratomileusis        Return for POD0.        Primo Zuluaga MD     Attending Physician Attestation:  Complete documentation of historical and exam elements from today's encounter can be found in the full encounter summary report (not reduplicated in this progress note).  I personally obtained the chief complaint(s) and history of present illness.  I confirmed and edited as necessary the review of systems, past medical/surgical history, family history, social history, and examination findings as documented by others; and I examined the patient myself.  I personally reviewed the relevant tests, images, and reports as documented above.  I formulated and edited as necessary the assessment and plan and discussed the findings and management plan with the patient and family. - Primo Zuluaga MD

## 2024-06-14 DIAGNOSIS — H40.1131 PRIMARY OPEN ANGLE GLAUCOMA (POAG) OF BOTH EYES, MILD STAGE: ICD-10-CM

## 2024-06-14 RX ORDER — LATANOPROST 50 UG/ML
SOLUTION/ DROPS OPHTHALMIC
Qty: 2.5 ML | Refills: 5 | Status: SHIPPED | OUTPATIENT
Start: 2024-06-14

## 2024-06-18 ENCOUNTER — OFFICE VISIT (OUTPATIENT)
Dept: ORTHOPEDICS | Facility: CLINIC | Age: 69
End: 2024-06-18
Payer: COMMERCIAL

## 2024-06-18 DIAGNOSIS — M17.0 BILATERAL PRIMARY OSTEOARTHRITIS OF KNEE: Primary | ICD-10-CM

## 2024-06-18 PROCEDURE — 20610 DRAIN/INJ JOINT/BURSA W/O US: CPT | Mod: 50 | Performed by: FAMILY MEDICINE

## 2024-06-18 RX ORDER — HYALURONATE SODIUM 10 MG/ML
20 SYRINGE (ML) INTRAARTICULAR
Status: SHIPPED | OUTPATIENT
Start: 2024-06-18

## 2024-06-18 RX ADMIN — Medication 20 MG: at 08:38

## 2024-06-18 ASSESSMENT — PAIN SCALES - GENERAL: PAINLEVEL: MODERATE PAIN (5)

## 2024-06-18 NOTE — PROGRESS NOTES
Kathrine is here for his second of 3 Euflexxa injections.  He has bilateral knee osteoarthritis.      Large Joint Injection/Arthocentesis: bilateral knee    Date/Time: 6/18/2024 8:38 AM    Performed by: Herman El DO  Authorized by: Herman El DO    Indications:  Pain  Needle Size:  22 G  Guidance: landmark guided    Approach:  Lateral  Location:  Knee  Laterality:  Bilateral      Medications (Right):  20 mg sodium hyaluronate 20 MG/2ML  Medications (Left):  20 mg sodium hyaluronate 20 MG/2ML  Outcome:  Tolerated well, no immediate complications  Procedure discussed: discussed risks, benefits, and alternatives    Consent Given by:  Patient  Timeout: timeout called immediately prior to procedure    Prep: patient was prepped and draped in usual sterile fashion         Knee Injection with Hyaluronic Acid    The patient was informed of the risks and the benefits of the procedure and a written consent was signed.    The patient's left knee was prepped with chlorhexidine in sterile fashion.   Euflexxa solution removed from packaging and inspected for consistency with regards to volume and packaging standard.  Injection was performed using sterile technique.  A 1.5-inch 22-gauge needle was used to enter the lateral aspect of the knee.  Injection performed without difficulty.  The patient's right knee was prepped with chlorhexidine in sterile fashion.   Euflexxa solution removed from packaging and inspected for consistency with regards to volume and packaging standard.  Injection was performed using sterile technique.  A 1.5-inch 22-gauge needle was used to enter the lateral aspect of the knee.  Injection performed without difficulty.    There were no complications. The patient tolerated the procedure well. There was negligible bleeding.   The patient was instructed to ice the knee upon leaving clinic and refrain from overuse over the next 3 days.   The patient was instructed to call or go to the emergency room  with any unusual pain, swelling, redness, or if otherwise concerned.

## 2024-06-18 NOTE — NURSING NOTE
Chief Complaint   Patient presents with    Left Knee - Pain, Follow Up    Right Knee - Pain, Follow Up       There were no vitals filed for this visit.    There is no height or weight on file to calculate BMI.      KEVIN Ramos NREMT

## 2024-06-18 NOTE — LETTER
6/18/2024      Kathrine Isaac  [address] [facility] [address]      Dear Colleague,    Thank you for referring your patient, Kathrine Isaac, to the [facility] SP[facility]TS [facility]INE [facility]INIC [place]. Please see a copy of my visit note below.    Kathrine is here for his second of 3 Euflexxa injections.  He has bilateral knee osteoarthritis.      Large Joint Injection/Arthocentesis: bilateral knee    Date/Time: 6/18/2024 8:38 AM    Performed by: Herman El DO  Authorized by: Herman El DO    Indications:  Pain  Needle Size:  22 G  Guidance: landmark guided    Approach:  Lateral  Location:  Knee  Laterality:  Bilateral      Medications (Right):  20 mg sodium hyaluronate 20 MG/2ML  Medications (Left):  20 mg sodium hyaluronate 20 MG/2ML  Outcome:  Tolerated well, no immediate complications  Procedure discussed: discussed risks, benefits, and alternatives    Consent Given by:  Patient  Timeout: timeout called immediately prior to procedure    Prep: patient was prepped and draped in usual sterile fashion         Knee Injection with Hyaluronic Acid    The patient was informed of the risks and the benefits of the procedure and a written consent was signed.    The patient's left knee was prepped with chlorhexidine in sterile fashion.   Euflexxa solution removed from packaging and inspected for consistency with regards to volume and packaging standard.  Injection was performed using sterile technique.  A 1.5-inch 22-gauge needle was used to enter the lateral aspect of the knee.  Injection performed without difficulty.  The patient's right knee was prepped with chlorhexidine in sterile fashion.   Euflexxa solution removed from packaging and inspected for consistency with regards to volume and packaging standard.  Injection was performed using sterile technique.  A 1.5-inch 22-gauge needle was used to enter the lateral aspect of the knee.  Injection performed without difficulty.    There were no  complications. The patient tolerated the procedure well. There was negligible bleeding.   The patient was instructed to ice the knee upon leaving clinic and refrain from overuse over the next 3 days.   The patient was instructed to call or go to the emergency room with any unusual pain, swelling, redness, or if otherwise concerned.              Again, thank you for allowing me to participate in the care of your patient.        Sincerely,        Herman El, DO

## 2024-06-18 NOTE — NURSING NOTE
[facility]   ORTHOPEDICS & SPORTS MEDICINE  [address] [place] [address]  Dept: [phone]  ______________________________________________________________________________    Patient: Kathrine Isaac   : 1955   MRN: [id number]   2024    INVASIVE PROCEDURE SAFETY CHECKLIST    Date:  2024   Procedure: Bilateral knee injection  Patient Name: Kathrine Isaac  MRN: [id number]  YOB: 1955    Action: Complete sections as appropriate. Any discrepancy results in a HARD COPY until resolved.     PRE PROCEDURE:  Patient ID verified with 2 identifiers (name and  or MRN): Yes  Procedure and site verified with patient/designee (when able): Yes  Accurate consent documentation in medical record: Yes  H&P (or appropriate assessment) documented in medical record: Yes  H&P must be up to 20 days prior to procedure and updates within 24 hours of procedure as applicable: NA  Relevant diagnostic and radiology test results appropriately labeled and displayed as applicable: NA  Procedure site(s) marked with provider initials: NA    TIMEOUT:  Time-Out performed immediately prior to starting procedure, including verbal and active participation of all team members addressing the following:Yes  * Correct patient identify  * Confirmed that the correct side and site are marked  * An accurate procedure consent form  * Agreement on the procedure to be done  * Correct patient position  * Relevant images and results are properly labeled and appropriately displayed  * The need to administer antibiotics or fluids for irrigation purposes during the procedure as applicable   * Safety precautions based on patient history or medication use    DURING PROCEDURE: Verification of correct person, site, and procedures any time the responsibility for care of the patient is transferred to another member of the care team.       Prior to injection, verified patient identity using patient's name and date of  birth.  Due to injection administration, patient instructed to remain in clinic for 15 minutes  afterwards, and to report any adverse reaction to me immediately.    Joint injection was performed.      Drug Amount Wasted:  None.  Vial/Syringe: Syringe  Expiration Date:  5/6/2025      Richard Jackson, EMT  June 18, 2024

## 2024-06-19 ENCOUNTER — OFFICE VISIT (OUTPATIENT)
Dept: FAMILY MEDICINE | Facility: CLINIC | Age: 69
End: 2024-06-19
Payer: COMMERCIAL

## 2024-06-19 VITALS
BODY MASS INDEX: 29.51 KG/M2 | RESPIRATION RATE: 18 BRPM | OXYGEN SATURATION: 97 % | TEMPERATURE: 98.2 F | HEIGHT: 67 IN | HEART RATE: 67 BPM | WEIGHT: 188 LBS | SYSTOLIC BLOOD PRESSURE: 106 MMHG | DIASTOLIC BLOOD PRESSURE: 66 MMHG

## 2024-06-19 DIAGNOSIS — E78.5 HYPERLIPIDEMIA LDL GOAL <100: ICD-10-CM

## 2024-06-19 DIAGNOSIS — E66.3 OVERWEIGHT (BMI 25.0-29.9): ICD-10-CM

## 2024-06-19 DIAGNOSIS — Z29.11 NEED FOR VACCINATION AGAINST RESPIRATORY SYNCYTIAL VIRUS: ICD-10-CM

## 2024-06-19 DIAGNOSIS — Z12.5 SCREENING FOR PROSTATE CANCER: ICD-10-CM

## 2024-06-19 DIAGNOSIS — E11.319 TYPE 2 DIABETES MELLITUS WITH RETINOPATHY, WITHOUT LONG-TERM CURRENT USE OF INSULIN, MACULAR EDEMA PRESENCE UNSPECIFIED, UNSPECIFIED LATERALITY, UNSPECIFIED RETINOPATHY SEVERITY (H): Primary | ICD-10-CM

## 2024-06-19 DIAGNOSIS — I10 ESSENTIAL HYPERTENSION: ICD-10-CM

## 2024-06-19 LAB
ANION GAP SERPL CALCULATED.3IONS-SCNC: 10 MMOL/L (ref 7–15)
BUN SERPL-MCNC: 14.3 MG/DL (ref 8–23)
CALCIUM SERPL-MCNC: 9.1 MG/DL (ref 8.8–10.2)
CHLORIDE SERPL-SCNC: 101 MMOL/L (ref 98–107)
CHOLEST SERPL-MCNC: 110 MG/DL
CREAT SERPL-MCNC: 0.97 MG/DL (ref 0.67–1.17)
CREAT UR-MCNC: 48.2 MG/DL
DEPRECATED HCO3 PLAS-SCNC: 23 MMOL/L (ref 22–29)
EGFRCR SERPLBLD CKD-EPI 2021: 86 ML/MIN/1.73M2
FASTING STATUS PATIENT QL REPORTED: YES
FASTING STATUS PATIENT QL REPORTED: YES
GLUCOSE SERPL-MCNC: 134 MG/DL (ref 70–99)
HBA1C MFR BLD: 7.6 % (ref 0–5.6)
HDLC SERPL-MCNC: 37 MG/DL
LDLC SERPL CALC-MCNC: 39 MG/DL
MICROALBUMIN UR-MCNC: <12 MG/L
MICROALBUMIN/CREAT UR: NORMAL MG/G{CREAT}
NONHDLC SERPL-MCNC: 73 MG/DL
POTASSIUM SERPL-SCNC: 4.2 MMOL/L (ref 3.4–5.3)
PSA SERPL DL<=0.01 NG/ML-MCNC: 1.44 NG/ML (ref 0–4.5)
SODIUM SERPL-SCNC: 134 MMOL/L (ref 135–145)
TRIGL SERPL-MCNC: 170 MG/DL

## 2024-06-19 PROCEDURE — 80061 LIPID PANEL: CPT | Performed by: NURSE PRACTITIONER

## 2024-06-19 PROCEDURE — 80048 BASIC METABOLIC PNL TOTAL CA: CPT | Performed by: NURSE PRACTITIONER

## 2024-06-19 PROCEDURE — 83036 HEMOGLOBIN GLYCOSYLATED A1C: CPT | Performed by: NURSE PRACTITIONER

## 2024-06-19 PROCEDURE — 36415 COLL VENOUS BLD VENIPUNCTURE: CPT | Performed by: NURSE PRACTITIONER

## 2024-06-19 PROCEDURE — 82570 ASSAY OF URINE CREATININE: CPT | Performed by: NURSE PRACTITIONER

## 2024-06-19 PROCEDURE — G0103 PSA SCREENING: HCPCS | Performed by: NURSE PRACTITIONER

## 2024-06-19 PROCEDURE — G2211 COMPLEX E/M VISIT ADD ON: HCPCS | Performed by: NURSE PRACTITIONER

## 2024-06-19 PROCEDURE — 82043 UR ALBUMIN QUANTITATIVE: CPT | Performed by: NURSE PRACTITIONER

## 2024-06-19 PROCEDURE — 99214 OFFICE O/P EST MOD 30 MIN: CPT | Performed by: NURSE PRACTITIONER

## 2024-06-19 RX ORDER — GLIMEPIRIDE 2 MG/1
2 TABLET ORAL
Qty: 60 TABLET | Refills: 0 | Status: SHIPPED | OUTPATIENT
Start: 2024-06-19 | End: 2024-07-29

## 2024-06-19 RX ORDER — RESPIRATORY SYNCYTIAL VIRUS VACCINE 120MCG/0.5
0.5 KIT INTRAMUSCULAR ONCE
Qty: 1 EACH | Refills: 0 | Status: SHIPPED | OUTPATIENT
Start: 2024-06-19 | End: 2024-06-19

## 2024-06-19 ASSESSMENT — PAIN SCALES - GENERAL: PAINLEVEL: NO PAIN (0)

## 2024-06-19 NOTE — PROGRESS NOTES
"  Assessment & Plan     Type 2 diabetes mellitus with retinopathy, without long-term current use of insulin, macular edema presence unspecified, unspecified laterality, unspecified retinopathy severity (H)  Adding Ozempic for improved glycemic control as well as weight loss, no personal/FH MEN-2 or medullary thyroid cancer, no hx pancreatitis. Reviewed dosing/administration, potential side effects, indications for return to clinic. Will decrease glimepiride to 2 mg daily with the addition of Ozempic, goal will be to get him off Glimepiride completely. Continue to work on weight loss, regular exercise.  - Hemoglobin A1c  - Basic metabolic panel  (Ca, Cl, CO2, Creat, Gluc, K, Na, BUN)  - semaglutide (OZEMPIC) 2 MG/3ML pen  Dispense: 3 mL; Refill: 0    Overweight (BMI 25.0-29.9)  Benefits of weight loss reviewed in detail, encouraged him to cut back on the carbohydrates in the diet, consume more fruits and vegetables, drink plenty of water, avoid fruit juices, sodas, get 150 min moderate exercise/week.  Recheck weight in 1 month.   - semaglutide (OZEMPIC) 2 MG/3ML pen  Dispense: 3 mL; Refill: 0    Essential hypertension  Well controlled, continue current mangement    Hyperlipidemia LDL goal <100  Due for FLP, adjust statin as indicated.  - Lipid panel reflex to direct LDL Fasting    Screening for prostate cancer    - PSA, screen    Need for vaccination against respiratory syncytial virus    - respiratory syncytial virus vaccine, bivalent (ABRYSVO) injection  Dispense: 1 each; Refill: 0            BMI  Estimated body mass index is 29.89 kg/m  as calculated from the following:    Height as of this encounter: 1.689 m (5' 6.5\").    Weight as of this encounter: 85.3 kg (188 lb).   Weight management plan: Discussed healthy diet and exercise guidelines      Work on weight loss  Regular exercise  See Patient Instructions    German Chisholm is a 69 year old, presenting for the following health issues:  Diabetes (Has some " urination frequency )    History of Present Illness       Diabetes:   He presents for follow up of diabetes.  He is checking home blood glucose a few times a month.   He checks blood glucose before and after meals.  Blood glucose is never over 200 and never under 70. He is aware of hypoglycemia symptoms including weakness.    He has no concerns regarding his diabetes at this time.  He is having numbness in feet and blurry vision.        He consumes 0 sweetened beverage(s) daily.He exercises with enough effort to increase his heart rate 30 to 60 minutes per day.  He exercises with enough effort to increase his heart rate 5 days per week.   He is taking medications regularly.       Diabetes Follow-up    How often are you checking your blood sugar? A few times a week  What time of day are you checking your blood sugars (select all that apply)?  Before and after meals  Have you had any blood sugars above 200?  No  Have you had any blood sugars below 70?  No  What symptoms do you notice when your blood sugar is low?  None  What concerns do you have today about your diabetes? None   Do you have any of these symptoms? (Select all that apply)  Has some numbness in feet but not all the time      BP Readings from Last 2 Encounters:   06/19/24 106/66   12/07/23 108/73     Hemoglobin A1C (%)   Date Value   06/19/2024 7.6 (H)   10/12/2023 7.0 (H)   04/02/2021 7.9 (H)   09/16/2020 6.6 (H)     LDL Cholesterol Calculated (mg/dL)   Date Value   05/03/2023 30   04/23/2022 52   04/02/2021 49   12/30/2019 54       How many servings of fruits and vegetables do you eat daily?  4 or more  On average, how many sweetened beverages do you drink each day (Examples: soda, juice, sweet tea, etc.  Do NOT count diet or artificially sweetened beverages)?   1  How many days per week do you exercise enough to make your heart beat faster? 5  How many minutes a day do you exercise enough to make your heart beat faster? 60 or more  How many days per  "week do you miss taking your medication? 0  Hyperlipidemia Follow-Up    Are you regularly taking any medication or supplement to lower your cholesterol?   Yes- Lipitor 20 mg daily  Are you having muscle aches or other side effects that you think could be caused by your cholesterol lowering medication?  No          Objective    /66 (BP Location: Left arm, Patient Position: Sitting, Cuff Size: Adult Regular)   Pulse 67   Temp 98.2  F (36.8  C) (Oral)   Resp 18   Ht 1.689 m (5' 6.5\")   Wt 85.3 kg (188 lb)   SpO2 97%   BMI 29.89 kg/m    Body mass index is 29.89 kg/m .  Physical Exam   GENERAL: alert and no distress  EYES: Eyes grossly normal to inspection, PERRL and conjunctivae and sclerae normal  HENT: ear canals and TM's normal, nose and mouth without ulcers or lesions  NECK: no adenopathy, no asymmetry, masses, or scars  RESP: lungs clear to auscultation - no rales, rhonchi or wheezes  CV: regular rate and rhythm, normal S1 S2, no S3 or S4, no murmur, click or rub, no peripheral edema  ABDOMEN: soft, nontender, no hepatosplenomegaly, no masses and bowel sounds normal  MS: no gross musculoskeletal defects noted, no edema  SKIN: no suspicious lesions or rashes  NEURO: Normal strength and tone, mentation intact and speech normal  BACK: no CVA tenderness, no paralumbar tenderness  PSYCH: mentation appears normal, affect normal/bright  LYMPH: normal ant/post cervical, supraclavicular nodes  inguinal: no adenopathy    Results for orders placed or performed in visit on 06/19/24 (from the past 24 hour(s))   Hemoglobin A1c   Result Value Ref Range    Hemoglobin A1C 7.6 (H) 0.0 - 5.6 %           Signed Electronically by: ABHISHEK Perry CNP    "

## 2024-06-22 DIAGNOSIS — E11.319 TYPE 2 DIABETES MELLITUS WITH RETINOPATHY, WITHOUT LONG-TERM CURRENT USE OF INSULIN, MACULAR EDEMA PRESENCE UNSPECIFIED, UNSPECIFIED LATERALITY, UNSPECIFIED RETINOPATHY SEVERITY (H): ICD-10-CM

## 2024-06-24 RX ORDER — LANCETS 33 GAUGE
EACH MISCELLANEOUS 2 TIMES DAILY
Qty: 100 EACH | Refills: 0 | Status: SHIPPED | OUTPATIENT
Start: 2024-06-24 | End: 2024-08-12

## 2024-06-25 ENCOUNTER — OFFICE VISIT (OUTPATIENT)
Dept: ORTHOPEDICS | Facility: CLINIC | Age: 69
End: 2024-06-25
Payer: COMMERCIAL

## 2024-06-25 DIAGNOSIS — M17.0 BILATERAL PRIMARY OSTEOARTHRITIS OF KNEE: Primary | ICD-10-CM

## 2024-06-25 PROCEDURE — 20610 DRAIN/INJ JOINT/BURSA W/O US: CPT | Mod: 50 | Performed by: FAMILY MEDICINE

## 2024-06-25 RX ORDER — HYALURONATE SODIUM 10 MG/ML
20 SYRINGE (ML) INTRAARTICULAR
Status: SHIPPED | OUTPATIENT
Start: 2024-06-25

## 2024-06-25 RX ADMIN — Medication 20 MG: at 08:40

## 2024-06-25 ASSESSMENT — PAIN SCALES - GENERAL: PAINLEVEL: MODERATE PAIN (4)

## 2024-06-25 NOTE — NURSING NOTE
[facility]   ORTHOPEDICS & SPORTS MEDICINE  [address] [place] [address]  Dept: [phone]  ______________________________________________________________________________    Patient: Kathrine Isaac   : 1955   MRN: [id number]   2024    INVASIVE PROCEDURE SAFETY CHECKLIST    Date: 2024   Procedure: Bilateral knee injection  Patient Name: Kathrine Isaac  MRN: [id number]  YOB: 1955    Action: Complete sections as appropriate. Any discrepancy results in a HARD COPY until resolved.     PRE PROCEDURE:  Patient ID verified with 2 identifiers (name and  or MRN): Yes  Procedure and site verified with patient/designee (when able): Yes  Accurate consent documentation in medical record: Yes  H&P (or appropriate assessment) documented in medical record: Yes  H&P must be up to 20 days prior to procedure and updates within 24 hours of procedure as applicable: NA  Relevant diagnostic and radiology test results appropriately labeled and displayed as applicable: NA  Procedure site(s) marked with provider initials: NA    TIMEOUT:  Time-Out performed immediately prior to starting procedure, including verbal and active participation of all team members addressing the following:Yes  * Correct patient identify  * Confirmed that the correct side and site are marked  * An accurate procedure consent form  * Agreement on the procedure to be done  * Correct patient position  * Relevant images and results are properly labeled and appropriately displayed  * The need to administer antibiotics or fluids for irrigation purposes during the procedure as applicable   * Safety precautions based on patient history or medication use    DURING PROCEDURE: Verification of correct person, site, and procedures any time the responsibility for care of the patient is transferred to another member of the care team.       Prior to injection, verified patient identity using patient's name and date of  birth.  Due to injection administration, patient instructed to remain in clinic for 15 minutes  afterwards, and to report any adverse reaction to me immediately.    Joint injection was performed.      Drug Amount Wasted:  None.  Vial/Syringe: Syringe  Expiration Date:  5/6/2025      Richard Jackson, EMT  June 25, 2024

## 2024-06-25 NOTE — PROGRESS NOTES
Large Joint Injection/Arthocentesis: bilateral knee    Date/Time: 6/25/2024 8:40 AM    Performed by: Herman El DO  Authorized by: Herman El DO    Indications:  Pain  Needle Size:  22 G  Guidance: landmark guided    Approach:  Lateral  Location:  Knee  Laterality:  Bilateral      Medications (Right):  20 mg sodium hyaluronate 20 MG/2ML  Medications (Left):  20 mg sodium hyaluronate 20 MG/2ML  Outcome:  Tolerated well, no immediate complications  Procedure discussed: discussed risks, benefits, and alternatives    Consent Given by:  Patient  Timeout: timeout called immediately prior to procedure    Prep: patient was prepped and draped in usual sterile fashion         Knee Injection with Hyaluronic Acid    The patient was informed of the risks and the benefits of the procedure and a written consent was signed.    The patient's left knee was prepped with chlorhexidine in sterile fashion.   Euflexxa solution removed from packaging and inspected for consistency with regards to volume and packaging standard.  Injection was performed using sterile technique.  A 1.5-inch 22-gauge needle was used to enter the lateral aspect of the knee.  Injection performed without difficulty.    The patient's right knee was prepped with chlorhexidine in sterile fashion.   Euflexxa solution removed from packaging and inspected for consistency with regards to volume and packaging standard.  Injection was performed using sterile technique.  A 1.5-inch 22-gauge needle was used to enter the lateral aspect of the knee.  Injection performed without difficulty.    There were no complications. The patient tolerated the procedure well. There was negligible bleeding.   The patient was instructed to ice the knee upon leaving clinic and refrain from overuse over the next 3 days.   The patient was instructed to call or go to the emergency room with any unusual pain, swelling, redness, or if otherwise concerned.

## 2024-06-25 NOTE — LETTER
6/25/2024      Kathrine Isaac  [address] [facility] [address]      Dear Colleague,    Thank you for referring your patient, Kathrine Isaac, to the [facility] SPORTS MEDICINE [facility]IN[facility] [place]. Please see a copy of my visit note below.    Kathrine has bilateral knee osteoarthritis.  He is here for his 3rd of 3 Euflexxa injections.                  Large Joint Injection/Arthocentesis: bilateral knee    Date/Time: 6/25/2024 8:40 AM    Performed by: Herman El DO  Authorized by: Herman El DO    Indications:  Pain  Needle Size:  22 G  Guidance: landmark guided    Approach:  Lateral  Location:  Knee  Laterality:  Bilateral      Medications (Right):  20 mg sodium hyaluronate 20 MG/2ML  Medications (Left):  20 mg sodium hyaluronate 20 MG/2ML  Outcome:  Tolerated well, no immediate complications  Procedure discussed: discussed risks, benefits, and alternatives    Consent Given by:  Patient  Timeout: timeout called immediately prior to procedure    Prep: patient was prepped and draped in usual sterile fashion         Knee Injection with Hyaluronic Acid    The patient was informed of the risks and the benefits of the procedure and a written consent was signed.    The patient's left knee was prepped with chlorhexidine in sterile fashion.   Euflexxa solution removed from packaging and inspected for consistency with regards to volume and packaging standard.  Injection was performed using sterile technique.  A 1.5-inch 22-gauge needle was used to enter the lateral aspect of the knee.  Injection performed without difficulty.    The patient's right knee was prepped with chlorhexidine in sterile fashion.   Euflexxa solution removed from packaging and inspected for consistency with regards to volume and packaging standard.  Injection was performed using sterile technique.  A 1.5-inch 22-gauge needle was used to enter the lateral aspect of the knee.  Injection performed without  difficulty.    There were no complications. The patient tolerated the procedure well. There was negligible bleeding.   The patient was instructed to ice the knee upon leaving clinic and refrain from overuse over the next 3 days.   The patient was instructed to call or go to the emergency room with any unusual pain, swelling, redness, or if otherwise concerned.                Again, thank you for allowing me to participate in the care of your patient.        Sincerely,        Herman El DO

## 2024-06-25 NOTE — NURSING NOTE
Chief Complaint   Patient presents with    Left Knee - Pain, Follow Up     Euflexxa #3    Right Knee - Pain, Follow Up     Euflexxa #3       There were no vitals filed for this visit.    There is no height or weight on file to calculate BMI.      KEVIN Ramos NREMT

## 2024-07-01 ENCOUNTER — PATIENT OUTREACH (OUTPATIENT)
Dept: GERIATRIC MEDICINE | Facility: CLINIC | Age: 69
End: 2024-07-01
Payer: COMMERCIAL

## 2024-07-01 NOTE — PROGRESS NOTES
[facility] Care Coordination Contact    Care Coordinator made 4 attempts to contact member to complete six month assessment.     Cynthia Pedraza RN PHN  Care Coordinator  [facility]  O: [phone]  C:[phone]  F:[phone]

## 2024-07-01 NOTE — LETTER
July 1, 2024      YADIRA LOPEZ  [address] [facility] [address]        Dear Yadira:     I m your care coordinator. I ve been unable to reach you by phone. I am writing to ask you or an authorized representative to call me at [phone]. If you reach my voicemail, please leave a message with your daytime telephone number. . Include a date and time that I can call you. If you are hearing impaired, call the [place] [employer] at 194 or [phone] ([id number] relay service).     The reason I am trying to reach you is:    [] To schedule an assessment  [x] For your six (6)-month check-in  [] Other:      Please call me as soon as you receive this letter. I look forward to speaking with you.    Sincerely,      Cynthia Pedraza RN, N  [phone]  [email or web address]      [facility] ([facility]-[employer]) is a health plan that contracts with both Medicare and the [place] Medical Assistance (Medicaid) program to provide benefits of both programs to enrollees. Enrollment in [facility] depends on contract renewal.    [id number] accepted  [id number]                                                                         A (08/2022)

## 2024-07-01 NOTE — PROGRESS NOTES
"Per CC, mailed client an \"Unable to Contact\" letter.    Enedelia Montero  Case Management Specialist   [facility]  [phone]    "

## 2024-07-23 ENCOUNTER — TELEPHONE (OUTPATIENT)
Dept: FAMILY MEDICINE | Facility: CLINIC | Age: 69
End: 2024-07-23
Payer: COMMERCIAL

## 2024-07-23 DIAGNOSIS — E11.319 TYPE 2 DIABETES MELLITUS WITH RETINOPATHY, WITHOUT LONG-TERM CURRENT USE OF INSULIN, MACULAR EDEMA PRESENCE UNSPECIFIED, UNSPECIFIED LATERALITY, UNSPECIFIED RETINOPATHY SEVERITY (H): Primary | ICD-10-CM

## 2024-07-23 NOTE — TELEPHONE ENCOUNTER
Pt was started in June 19 and started on Ozempic 0.25 mg every 7 days.  He is out of medication now. He called and made a follow up appointment 8/8/24.  He is not having any side effects.  He has lost 3 lbs so far.  His blood sugars are in range.     1)  Pt asking if he needs an increase in dose?    2)  Can he have a refill?    Cristina MONACON, RN

## 2024-07-29 DIAGNOSIS — E11.319 TYPE 2 DIABETES MELLITUS WITH RETINOPATHY, WITHOUT LONG-TERM CURRENT USE OF INSULIN, MACULAR EDEMA PRESENCE UNSPECIFIED, UNSPECIFIED LATERALITY, UNSPECIFIED RETINOPATHY SEVERITY (H): ICD-10-CM

## 2024-07-29 RX ORDER — GLIMEPIRIDE 2 MG/1
2 TABLET ORAL
Qty: 90 TABLET | Refills: 0 | Status: SHIPPED | OUTPATIENT
Start: 2024-07-29

## 2024-08-08 ENCOUNTER — OFFICE VISIT (OUTPATIENT)
Dept: FAMILY MEDICINE | Facility: CLINIC | Age: 69
End: 2024-08-08
Payer: COMMERCIAL

## 2024-08-08 VITALS
DIASTOLIC BLOOD PRESSURE: 68 MMHG | RESPIRATION RATE: 18 BRPM | TEMPERATURE: 96.9 F | WEIGHT: 180 LBS | HEIGHT: 67 IN | OXYGEN SATURATION: 99 % | BODY MASS INDEX: 28.25 KG/M2 | HEART RATE: 72 BPM | SYSTOLIC BLOOD PRESSURE: 100 MMHG

## 2024-08-08 DIAGNOSIS — E11.319 TYPE 2 DIABETES MELLITUS WITH RETINOPATHY, WITHOUT LONG-TERM CURRENT USE OF INSULIN, MACULAR EDEMA PRESENCE UNSPECIFIED, UNSPECIFIED LATERALITY, UNSPECIFIED RETINOPATHY SEVERITY (H): Primary | ICD-10-CM

## 2024-08-08 DIAGNOSIS — E78.5 HYPERLIPIDEMIA LDL GOAL <100: ICD-10-CM

## 2024-08-08 DIAGNOSIS — I10 ESSENTIAL HYPERTENSION: ICD-10-CM

## 2024-08-08 DIAGNOSIS — E66.3 OVERWEIGHT (BMI 25.0-29.9): ICD-10-CM

## 2024-08-08 LAB
HBA1C MFR BLD: 7 % (ref 0–5.6)
HOLD SPECIMEN: NORMAL

## 2024-08-08 PROCEDURE — 36415 COLL VENOUS BLD VENIPUNCTURE: CPT | Performed by: NURSE PRACTITIONER

## 2024-08-08 PROCEDURE — 99214 OFFICE O/P EST MOD 30 MIN: CPT | Performed by: NURSE PRACTITIONER

## 2024-08-08 PROCEDURE — G2211 COMPLEX E/M VISIT ADD ON: HCPCS | Performed by: NURSE PRACTITIONER

## 2024-08-08 PROCEDURE — 83036 HEMOGLOBIN GLYCOSYLATED A1C: CPT | Performed by: NURSE PRACTITIONER

## 2024-08-08 ASSESSMENT — PAIN SCALES - GENERAL: PAINLEVEL: MODERATE PAIN (5)

## 2024-08-08 NOTE — PROGRESS NOTES
Assessment & Plan     Type 2 diabetes mellitus with retinopathy, without long-term current use of insulin, macular edema presence unspecified, unspecified laterality, unspecified retinopathy severity (H)  Well controlled A1c 7.0. continue on current medications, stable on Ozempic 0-6/17/24 mg weekly, has lost 8# since June.  - Hemoglobin A1c  - Hemoglobin A1c    Overweight (BMI 25.0-29.9)  Benefits of weight loss reviewed in detail, encouraged him to cut back on the carbohydrates in the diet, consume more fruits and vegetables, drink plenty of water, avoid fruit juices, sodas, get 150 min moderate exercise/week. Stable on ozempic, continues at 0.5 mg weekly. Recheck weight in 6 months.      Hyperlipidemia LDL goal <100  Stable on Lipitor, low chol diet and getting regular exercise    Essential hypertension  Controlled, continue current mqanagement              Work on weight loss  Regular exercise  See Patient Instructions    German Chisholm is a 69 year old, presenting for the following health issues:  Diabetes and Lipids      8/8/2024     8:40 AM   Additional Questions   Roomed by navarro   Accompanied by self     History of Present Illness       Diabetes:   He presents for follow up of diabetes.  He is checking home blood glucose a few times a month.   He checks blood glucose before and after meals.  Blood glucose is never over 200 and never under 70. He is aware of hypoglycemia symptoms including weakness.    He has no concerns regarding his diabetes at this time.  He is having numbness in feet.            He eats 4 or more servings of fruits and vegetables daily.He consumes 0 sweetened beverage(s) daily.He exercises with enough effort to increase his heart rate 60 or more minutes per day.  He exercises with enough effort to increase his heart rate 5 days per week.   He is taking medications regularly.     He continues on 0.5 mg Ozempic weekly and is tolerating well, is exercising regularly and is lifting  "weights as well.      Hyperlipidemia Follow-Up    Are you regularly taking any medication or supplement to lower your cholesterol?   Yes- Lipitor 20 mg daily  Are you having muscle aches or other side effects that you think could be caused by your cholesterol lowering medication?  No    Hypertension Follow-up    Do you check your blood pressure regularly outside of the clinic? Yes   Are you following a low salt diet? Yes  Are your blood pressures ever more than 140 on the top number (systolic) OR more   than 90 on the bottom number (diastolic), for example 140/90? No  How many servings of fruits and vegetables do you eat daily?  2-3  On average, how many sweetened beverages do you drink each day (Examples: soda, juice, sweet tea, etc.  Do NOT count diet or artificially sweetened beverages)?   0  How many days per week do you exercise enough to make your heart beat faster? 5  How many minutes a day do you exercise enough to make your heart beat faster? 30 - 60  How many days per week do you miss taking your medication? 0            Objective    /68 (BP Location: Left arm, Patient Position: Chair, Cuff Size: Adult Regular)   Pulse 72   Temp 96.9  F (36.1  C) (Temporal)   Resp 18   Ht 1.689 m (5' 6.5\")   Wt 81.6 kg (180 lb)   SpO2 99%   BMI 28.62 kg/m    Body mass index is 28.62 kg/m .  Physical Exam   GENERAL: alert and no distress  EYES: Eyes grossly normal to inspection, PERRL and conjunctivae and sclerae normal  HENT: ear canals and TM's normal, nose and mouth without ulcers or lesions  NECK: no adenopathy, no asymmetry, masses, or scars  RESP: lungs clear to auscultation - no rales, rhonchi or wheezes  CV: regular rate and rhythm, normal S1 S2, no S3 or S4, no murmur, click or rub, no peripheral edema  ABDOMEN: soft, nontender, no hepatosplenomegaly, no masses and bowel sounds normal  MS: no gross musculoskeletal defects noted, no edema  SKIN: no suspicious lesions or rashes  BACK: no CVA tenderness, " no paralumbar tenderness  PSYCH: mentation appears normal, affect normal/bright  LYMPH: normal ant/post cervical, supraclavicular nodes    Results for orders placed or performed in visit on 08/08/24 (from the past 24 hour(s))   Extra Tube    Narrative    The following orders were created for panel order Extra Tube.  Procedure                               Abnormality         Status                     ---------                               -----------         ------                     Extra Purple Top Tube[[id number]]                            In process                   Please view results for these tests on the individual orders.   Hemoglobin A1c   Result Value Ref Range    Hemoglobin A1C 7.0 (H) 0.0 - 5.6 %           Signed Electronically by: ABHISHEK Perry CNP

## 2024-08-12 DIAGNOSIS — E11.319 TYPE 2 DIABETES MELLITUS WITH RETINOPATHY, WITHOUT LONG-TERM CURRENT USE OF INSULIN, MACULAR EDEMA PRESENCE UNSPECIFIED, UNSPECIFIED LATERALITY, UNSPECIFIED RETINOPATHY SEVERITY (H): ICD-10-CM

## 2024-08-12 RX ORDER — LANCETS 33 GAUGE
EACH MISCELLANEOUS 2 TIMES DAILY
Qty: 200 EACH | Refills: 1 | Status: SHIPPED | OUTPATIENT
Start: 2024-08-12

## 2024-08-20 ENCOUNTER — MYC MEDICAL ADVICE (OUTPATIENT)
Dept: FAMILY MEDICINE | Facility: CLINIC | Age: 69
End: 2024-08-20
Payer: COMMERCIAL

## 2024-08-20 DIAGNOSIS — E11.319 TYPE 2 DIABETES MELLITUS WITH RETINOPATHY, WITHOUT LONG-TERM CURRENT USE OF INSULIN, MACULAR EDEMA PRESENCE UNSPECIFIED, UNSPECIFIED LATERALITY, UNSPECIFIED RETINOPATHY SEVERITY (H): ICD-10-CM

## 2024-08-20 RX ORDER — GLIMEPIRIDE 2 MG/1
2 TABLET ORAL
Qty: 90 TABLET | Refills: 0 | OUTPATIENT
Start: 2024-08-20

## 2024-08-20 NOTE — TELEPHONE ENCOUNTER
Medication Question or Refill        What medication are you calling about (include dose and sig)?: glimepiride (AMARYL) 2 MG tablet     Preferred Pharmacy:    [id number] - [place], [place] - [address] [facility] [address] [place]  [address] [facility] [address]  Phone: [phone] Fax: [phone]      Controlled Substance Agreement on file:   CSA -- Patient Level:    CSA: None found at the patient level.       Who prescribed the medication?: Nighat Joe, ABHISHEK CNP     Do you need a refill? Yes    Patient offered an appointment? No    Do you have any questions or concerns?  No      Could we send this information to you in [employer] or would you prefer to receive a phone call?:   Patient would like to be contacted via [employer]

## 2024-08-21 NOTE — TELEPHONE ENCOUNTER
Patient calling stating that he only has a few days left of glimepiride (AMARYL) 2 MG tablet.    RN reviewed chart and saw we sent 90 tablets on 07/29. Patient states pharmacy has nothing on file.    RN called pharmacy and they have that refill on file. Pharmacy states last time patient picked up this med was on 06/19. They will be getting the refill from 07/29 ready for patient today.     No further questions or concerns.     April Giordano RN  [facility]

## 2024-08-25 DIAGNOSIS — E11.319 TYPE 2 DIABETES MELLITUS WITH RETINOPATHY, WITHOUT LONG-TERM CURRENT USE OF INSULIN, MACULAR EDEMA PRESENCE UNSPECIFIED, UNSPECIFIED LATERALITY, UNSPECIFIED RETINOPATHY SEVERITY (H): ICD-10-CM

## 2024-08-26 RX ORDER — SITAGLIPTIN 100 MG/1
100 TABLET, FILM COATED ORAL DAILY
Qty: 90 TABLET | Refills: 0 | Status: SHIPPED | OUTPATIENT
Start: 2024-08-26

## 2024-08-30 DIAGNOSIS — E11.319 TYPE 2 DIABETES MELLITUS WITH RETINOPATHY, WITHOUT LONG-TERM CURRENT USE OF INSULIN, MACULAR EDEMA PRESENCE UNSPECIFIED, UNSPECIFIED LATERALITY, UNSPECIFIED RETINOPATHY SEVERITY (H): ICD-10-CM

## 2024-08-30 RX ORDER — SEMAGLUTIDE 0.68 MG/ML
INJECTION, SOLUTION SUBCUTANEOUS
Qty: 3 ML | Refills: 0 | Status: SHIPPED | OUTPATIENT
Start: 2024-08-30 | End: 2024-09-30

## 2024-09-04 ENCOUNTER — PATIENT OUTREACH (OUTPATIENT)
Dept: GERIATRIC MEDICINE | Facility: CLINIC | Age: 69
End: 2024-09-04
Payer: COMMERCIAL

## 2024-09-04 NOTE — PROGRESS NOTES
[facility] Care Coordination Contact    No longer active with [facility] case management effective 5/31/24.  Reason for community disenrollment: MATT Pedraza RN PHN  Care Coordinator  [facility]  O: [phone]  C:[phone]  F:[phone]

## 2024-09-18 DIAGNOSIS — H40.1131 PRIMARY OPEN ANGLE GLAUCOMA (POAG) OF BOTH EYES, MILD STAGE: ICD-10-CM

## 2024-09-18 RX ORDER — TIMOLOL MALEATE 5 MG/ML
1 SOLUTION/ DROPS OPHTHALMIC 2 TIMES DAILY
Qty: 10 ML | Refills: 4 | Status: SHIPPED | OUTPATIENT
Start: 2024-09-18

## 2024-09-24 DIAGNOSIS — E11.319 TYPE 2 DIABETES MELLITUS WITH RETINOPATHY, WITHOUT LONG-TERM CURRENT USE OF INSULIN, MACULAR EDEMA PRESENCE UNSPECIFIED, UNSPECIFIED LATERALITY, UNSPECIFIED RETINOPATHY SEVERITY (H): ICD-10-CM

## 2024-09-28 DIAGNOSIS — I10 ESSENTIAL HYPERTENSION: Primary | ICD-10-CM

## 2024-09-28 DIAGNOSIS — E78.5 HYPERLIPIDEMIA LDL GOAL <100: ICD-10-CM

## 2024-09-28 DIAGNOSIS — E11.319 TYPE 2 DIABETES MELLITUS WITH RETINOPATHY, WITHOUT LONG-TERM CURRENT USE OF INSULIN, MACULAR EDEMA PRESENCE UNSPECIFIED, UNSPECIFIED LATERALITY, UNSPECIFIED RETINOPATHY SEVERITY (H): ICD-10-CM

## 2024-09-30 RX ORDER — ISOPROPYL ALCOHOL 0.75 G/1
SWAB TOPICAL
Qty: 100 EACH | Refills: 1 | Status: SHIPPED | OUTPATIENT
Start: 2024-09-30

## 2024-09-30 RX ORDER — SEMAGLUTIDE 0.68 MG/ML
INJECTION, SOLUTION SUBCUTANEOUS
Qty: 3 ML | Refills: 1 | Status: SHIPPED | OUTPATIENT
Start: 2024-09-30

## 2024-09-30 RX ORDER — ASPIRIN 81 MG/1
81 TABLET, COATED ORAL DAILY
Qty: 90 TABLET | Refills: 2 | OUTPATIENT
Start: 2024-09-30

## 2024-09-30 RX ORDER — HYDROCHLOROTHIAZIDE 12.5 MG/1
TABLET ORAL
Qty: 90 TABLET | Refills: 1 | Status: SHIPPED | OUTPATIENT
Start: 2024-09-30

## 2024-10-21 DIAGNOSIS — E78.5 HYPERLIPIDEMIA LDL GOAL <100: ICD-10-CM

## 2024-10-21 RX ORDER — ATORVASTATIN CALCIUM 20 MG/1
20 TABLET, FILM COATED ORAL DAILY
Qty: 90 TABLET | Refills: 0 | Status: SHIPPED | OUTPATIENT
Start: 2024-10-21

## 2024-10-26 DIAGNOSIS — I10 ESSENTIAL HYPERTENSION: ICD-10-CM

## 2024-10-27 DIAGNOSIS — B35.1 ONYCHOMYCOSIS: ICD-10-CM

## 2024-10-27 DIAGNOSIS — E11.319 TYPE 2 DIABETES MELLITUS WITH RETINOPATHY, WITHOUT LONG-TERM CURRENT USE OF INSULIN, MACULAR EDEMA PRESENCE UNSPECIFIED, UNSPECIFIED LATERALITY, UNSPECIFIED RETINOPATHY SEVERITY (H): ICD-10-CM

## 2024-10-28 RX ORDER — LISINOPRIL 40 MG/1
40 TABLET ORAL DAILY
Qty: 90 TABLET | Refills: 2 | Status: SHIPPED | OUTPATIENT
Start: 2024-10-28

## 2024-10-28 RX ORDER — CICLOPIROX 80 MG/ML
SOLUTION TOPICAL
Qty: 6.6 ML | Refills: 11 | Status: SHIPPED | OUTPATIENT
Start: 2024-10-28

## 2024-10-28 RX ORDER — GLIMEPIRIDE 2 MG/1
2 TABLET ORAL
Qty: 90 TABLET | Refills: 0 | Status: SHIPPED | OUTPATIENT
Start: 2024-10-28

## 2024-11-01 DIAGNOSIS — E11.319 TYPE 2 DIABETES MELLITUS WITH RETINOPATHY, WITHOUT LONG-TERM CURRENT USE OF INSULIN, MACULAR EDEMA PRESENCE UNSPECIFIED, UNSPECIFIED LATERALITY, UNSPECIFIED RETINOPATHY SEVERITY (H): ICD-10-CM

## 2024-11-01 RX ORDER — SITAGLIPTIN 100 MG/1
100 TABLET, FILM COATED ORAL DAILY
Qty: 90 TABLET | Refills: 0 | Status: SHIPPED | OUTPATIENT
Start: 2024-11-01

## 2024-11-04 DIAGNOSIS — H40.1131 PRIMARY OPEN ANGLE GLAUCOMA (POAG) OF BOTH EYES, MILD STAGE: ICD-10-CM

## 2024-11-04 RX ORDER — LATANOPROST 50 UG/ML
SOLUTION/ DROPS OPHTHALMIC
Qty: 2.5 ML | Refills: 5 | Status: SHIPPED | OUTPATIENT
Start: 2024-11-04

## 2024-11-14 DIAGNOSIS — E78.5 HYPERLIPIDEMIA LDL GOAL <100: ICD-10-CM

## 2024-11-14 RX ORDER — ATORVASTATIN CALCIUM 20 MG/1
20 TABLET, FILM COATED ORAL DAILY
Qty: 90 TABLET | Refills: 0 | OUTPATIENT
Start: 2024-11-14

## 2024-11-25 DIAGNOSIS — E11.319 TYPE 2 DIABETES MELLITUS WITH RETINOPATHY, WITHOUT LONG-TERM CURRENT USE OF INSULIN, MACULAR EDEMA PRESENCE UNSPECIFIED, UNSPECIFIED LATERALITY, UNSPECIFIED RETINOPATHY SEVERITY (H): ICD-10-CM

## 2024-11-25 RX ORDER — SEMAGLUTIDE 0.68 MG/ML
INJECTION, SOLUTION SUBCUTANEOUS
Qty: 3 ML | Refills: 0 | Status: SHIPPED | OUTPATIENT
Start: 2024-11-25

## 2024-11-25 RX ORDER — BLOOD-GLUCOSE METER
EACH MISCELLANEOUS
Qty: 1 KIT | Refills: 1 | Status: SHIPPED | OUTPATIENT
Start: 2024-11-25

## 2024-12-12 DIAGNOSIS — M51.16 LUMBAR DISC HERNIATION WITH RADICULOPATHY: ICD-10-CM

## 2024-12-12 DIAGNOSIS — M25.562 CHRONIC PAIN OF LEFT KNEE: ICD-10-CM

## 2024-12-12 DIAGNOSIS — M17.12 ARTHRITIS OF LEFT KNEE: ICD-10-CM

## 2024-12-12 DIAGNOSIS — G89.29 CHRONIC PAIN OF LEFT KNEE: ICD-10-CM

## 2024-12-12 DIAGNOSIS — E11.319 TYPE 2 DIABETES MELLITUS WITH RETINOPATHY, WITHOUT LONG-TERM CURRENT USE OF INSULIN, MACULAR EDEMA PRESENCE UNSPECIFIED, UNSPECIFIED LATERALITY, UNSPECIFIED RETINOPATHY SEVERITY (H): ICD-10-CM

## 2024-12-12 RX ORDER — BLOOD SUGAR DIAGNOSTIC
STRIP MISCELLANEOUS
Qty: 100 STRIP | Refills: 1 | Status: SHIPPED | OUTPATIENT
Start: 2024-12-12

## 2024-12-12 RX ORDER — GABAPENTIN 300 MG/1
CAPSULE ORAL
Qty: 90 CAPSULE | Refills: 2 | Status: SHIPPED | OUTPATIENT
Start: 2024-12-12

## 2024-12-15 SDOH — HEALTH STABILITY: PHYSICAL HEALTH: ON AVERAGE, HOW MANY DAYS PER WEEK DO YOU ENGAGE IN MODERATE TO STRENUOUS EXERCISE (LIKE A BRISK WALK)?: 7 DAYS

## 2024-12-15 SDOH — HEALTH STABILITY: PHYSICAL HEALTH: ON AVERAGE, HOW MANY MINUTES DO YOU ENGAGE IN EXERCISE AT THIS LEVEL?: 60 MIN

## 2024-12-15 ASSESSMENT — SOCIAL DETERMINANTS OF HEALTH (SDOH): HOW OFTEN DO YOU GET TOGETHER WITH FRIENDS OR RELATIVES?: ONCE A WEEK

## 2024-12-17 ENCOUNTER — OFFICE VISIT (OUTPATIENT)
Dept: FAMILY MEDICINE | Facility: CLINIC | Age: 69
End: 2024-12-17
Attending: NURSE PRACTITIONER
Payer: COMMERCIAL

## 2024-12-17 VITALS
TEMPERATURE: 97.5 F | HEIGHT: 66 IN | DIASTOLIC BLOOD PRESSURE: 77 MMHG | HEART RATE: 77 BPM | BODY MASS INDEX: 28.32 KG/M2 | RESPIRATION RATE: 20 BRPM | SYSTOLIC BLOOD PRESSURE: 116 MMHG | OXYGEN SATURATION: 100 % | WEIGHT: 176.2 LBS

## 2024-12-17 DIAGNOSIS — E11.319 TYPE 2 DIABETES MELLITUS WITH RETINOPATHY, WITHOUT LONG-TERM CURRENT USE OF INSULIN, MACULAR EDEMA PRESENCE UNSPECIFIED, UNSPECIFIED LATERALITY, UNSPECIFIED RETINOPATHY SEVERITY (H): ICD-10-CM

## 2024-12-17 DIAGNOSIS — E66.3 OVERWEIGHT (BMI 25.0-29.9): ICD-10-CM

## 2024-12-17 DIAGNOSIS — E78.5 HYPERLIPIDEMIA LDL GOAL <100: ICD-10-CM

## 2024-12-17 DIAGNOSIS — B35.1 ONYCHOMYCOSIS: ICD-10-CM

## 2024-12-17 DIAGNOSIS — Z00.00 MEDICARE ANNUAL WELLNESS VISIT, SUBSEQUENT: Primary | ICD-10-CM

## 2024-12-17 DIAGNOSIS — M51.16 LUMBAR DISC HERNIATION WITH RADICULOPATHY: ICD-10-CM

## 2024-12-17 DIAGNOSIS — N52.9 ERECTILE DYSFUNCTION, UNSPECIFIED ERECTILE DYSFUNCTION TYPE: ICD-10-CM

## 2024-12-17 DIAGNOSIS — Z23 NEED FOR COVID-19 VACCINE: ICD-10-CM

## 2024-12-17 DIAGNOSIS — Z23 NEED FOR IMMUNIZATION AGAINST INFLUENZA: ICD-10-CM

## 2024-12-17 LAB
ERYTHROCYTE [DISTWIDTH] IN BLOOD BY AUTOMATED COUNT: 13.2 % (ref 10–15)
EST. AVERAGE GLUCOSE BLD GHB EST-MCNC: 131 MG/DL
HBA1C MFR BLD: 6.2 % (ref 0–5.6)
HCT VFR BLD AUTO: 44.6 % (ref 40–53)
HGB BLD-MCNC: 15.1 G/DL (ref 13.3–17.7)
MCH RBC QN AUTO: 29.5 PG (ref 26.5–33)
MCHC RBC AUTO-ENTMCNC: 33.9 G/DL (ref 31.5–36.5)
MCV RBC AUTO: 87 FL (ref 78–100)
PLATELET # BLD AUTO: 241 10E3/UL (ref 150–450)
RBC # BLD AUTO: 5.12 10E6/UL (ref 4.4–5.9)
WBC # BLD AUTO: 11.8 10E3/UL (ref 4–11)

## 2024-12-17 PROCEDURE — 99214 OFFICE O/P EST MOD 30 MIN: CPT | Mod: 25 | Performed by: NURSE PRACTITIONER

## 2024-12-17 PROCEDURE — G0008 ADMIN INFLUENZA VIRUS VAC: HCPCS | Performed by: NURSE PRACTITIONER

## 2024-12-17 PROCEDURE — 90480 ADMN SARSCOV2 VAC 1/ONLY CMP: CPT | Performed by: NURSE PRACTITIONER

## 2024-12-17 PROCEDURE — 85027 COMPLETE CBC AUTOMATED: CPT | Performed by: NURSE PRACTITIONER

## 2024-12-17 PROCEDURE — 90662 IIV NO PRSV INCREASED AG IM: CPT | Performed by: NURSE PRACTITIONER

## 2024-12-17 PROCEDURE — 99397 PER PM REEVAL EST PAT 65+ YR: CPT | Performed by: NURSE PRACTITIONER

## 2024-12-17 PROCEDURE — 91320 SARSCV2 VAC 30MCG TRS-SUC IM: CPT | Performed by: NURSE PRACTITIONER

## 2024-12-17 PROCEDURE — 99207 PR FOOT EXAM NO CHARGE: CPT | Performed by: NURSE PRACTITIONER

## 2024-12-17 PROCEDURE — 36415 COLL VENOUS BLD VENIPUNCTURE: CPT | Performed by: NURSE PRACTITIONER

## 2024-12-17 PROCEDURE — 83036 HEMOGLOBIN GLYCOSYLATED A1C: CPT | Performed by: NURSE PRACTITIONER

## 2024-12-17 RX ORDER — GLIMEPIRIDE 2 MG/1
2 TABLET ORAL
Qty: 90 TABLET | Refills: 3 | Status: SHIPPED | OUTPATIENT
Start: 2024-12-17

## 2024-12-17 RX ORDER — ATORVASTATIN CALCIUM 20 MG/1
20 TABLET, FILM COATED ORAL DAILY
Qty: 90 TABLET | Refills: 1 | Status: SHIPPED | OUTPATIENT
Start: 2024-12-17

## 2024-12-17 RX ORDER — SEMAGLUTIDE 0.68 MG/ML
0.5 INJECTION, SOLUTION SUBCUTANEOUS
Qty: 3 ML | Refills: 5 | Status: SHIPPED | OUTPATIENT
Start: 2024-12-17

## 2024-12-17 RX ORDER — SILDENAFIL 50 MG/1
50 TABLET, FILM COATED ORAL DAILY PRN
Qty: 18 TABLET | Refills: 1 | Status: SHIPPED | OUTPATIENT
Start: 2024-12-17

## 2024-12-17 ASSESSMENT — PAIN SCALES - GENERAL: PAINLEVEL_OUTOF10: NO PAIN (0)

## 2024-12-17 NOTE — PROGRESS NOTES
"Preventive Care Visit  [facility]  ABHISHEK Perry CNP, Family Medicine  Dec 17, 2024      Assessment & Plan     Medicare annual wellness visit, subsequent  Age appropriate preventive screenings,health maintenance issues discussed.    - REVIEW OF HEALTH MAINTENANCE PROTOCOL ORDERS    Type 2 diabetes mellitus with retinopathy, without long-term current use of insulin, macular edema presence unspecified, unspecified laterality, unspecified retinopathy severity (H)  Well controlled, continue current management  - FOOT EXAM  - CBC with platelets  - Hemoglobin A1c  - semaglutide (OZEMPIC, 0.25 OR 0.5 MG/DOSE,) 2 MG/3ML pen  Dispense: 3 mL; Refill: 5  - glimepiride (AMARYL) 2 MG tablet  Dispense: 90 tablet; Refill: 3  - empagliflozin (JARDIANCE) 25 MG TABS tablet  Dispense: 90 tablet; Refill: 3  - CBC with platelets  - Hemoglobin A1c    Hyperlipidemia LDL goal <100  Stable on Lipitor  - atorvastatin (LIPITOR) 20 MG tablet  Dispense: 90 tablet; Refill: 1    Overweight (BMI 25.0-29.9)  Benefits of weight loss reviewed in detail, encouraged him to cut back on the carbohydrates in the diet, consume more fruits and vegetables, drink plenty of water, avoid fruit juices, sodas, get 150 min moderate exercise/week.  Recheck weight in 6 months.      Lumbar disc herniation with radiculopathy  Continue with regular exercise, reviewed proper body mechanics/lifting mechanics, indications for return to clinic discussed    Erectile dysfunction, unspecified erectile dysfunction type  Stable  - sildenafil (VIAGRA) 50 MG tablet  Dispense: 18 tablet; Refill: 1    Onychomycosis  Using Penlac with good success    Need for immunization against influenza    - INFLUENZA HIGH DOSE, TRIVALENT, PF (FLUZONE)    Need for COVID-19 vaccine    - COVID-19 12+ (PFIZER)            BMI  Estimated body mass index is 28.88 kg/m  as calculated from the following:    Height as of this encounter: 1.664 m (5' 5.5\").    Weight as " of this encounter: 79.9 kg (176 lb 3.2 oz).       Counseling  Appropriate preventive services were addressed with this patient via screening, questionnaire, or discussion as appropriate for fall prevention, nutrition, physical activity, Tobacco-use cessation, social engagement, weight loss and cognition.  Checklist reviewing preventive services available has been given to the patient.  Reviewed patient's diet, addressing concerns and/or questions.       Work on weight loss  Regular exercise  See Patient Instructions    Subjective   Kathrine is a 69 year old, presenting for the following:  Wellness Visit        12/17/2024     8:39 AM   Additional Questions   Roomed by leonie         12/17/2024     8:39 AM   Patient Reported Additional Medications   Patient reports taking the following new medications none         HPI      Diabetes Follow-up    How often are you checking your blood sugar? A few times a week  What time of day are you checking your blood sugars (select all that apply)?  Before meals  Have you had any blood sugars above 200?  No  Have you had any blood sugars below 70?  No  What symptoms do you notice when your blood sugar is low?  Shaky and Dizzy  What concerns do you have today about your diabetes? None   Do you have any of these symptoms? (Select all that apply)  No numbness or tingling in feet.  No redness, sores or blisters on feet.  No complaints of excessive thirst.  No reports of blurry vision.  No significant changes to weight.          Hyperlipidemia Follow-Up    Are you regularly taking any medication or supplement to lower your cholesterol?   Yes- Lipitor 20 mg daily  Are you having muscle aches or other side effects that you think could be caused by your cholesterol lowering medication?  No    Hypertension Follow-up    Do you check your blood pressure regularly outside of the clinic? No   Are you following a low salt diet? Yes  Are your blood pressures ever more than 140 on the top number  (systolic) OR more   than 90 on the bottom number (diastolic), for example 140/90?     BP Readings from Last 2 Encounters:   12/17/24 116/77   08/08/24 100/68     Hemoglobin A1C (%)   Date Value   12/17/2024 6.2 (H)   08/08/2024 7.0 (H)   04/02/2021 7.9 (H)   09/16/2020 6.6 (H)     LDL Cholesterol Calculated (mg/dL)   Date Value   06/19/2024 39   05/03/2023 30   04/02/2021 49   12/30/2019 54         Chronic/Recurring Back Pain Follow Up    Where is your back pain located? (Select all that apply) low back bilateral  How would you describe your back pain?  dull ache and sharp  Where does your back pain spread? the right buttock  Since your last clinic visit for back pain, how has your pain changed? always present, but gets better and worse  Does your back pain interfere with your job? No  Since your last visit, have you tried any new treatment? No    ED- stable on Viagra 50 mg, requests refills    Health Care Directive  Patient does not have a Health Care Directive: Discussed advance care planning with patient; however, patient declined at this time.      12/15/2024   General Health   How would you rate your overall physical health? Good   Feel stress (tense, anxious, or unable to sleep) Not at all            12/15/2024   Nutrition   Diet: Regular (no restrictions)            12/15/2024   Exercise   Days per week of moderate/strenous exercise 7 days   Average minutes spent exercising at this level 60 min            12/15/2024   Social Factors   Frequency of gathering with friends or relatives Once a week   Worry food won't last until get money to buy more No   Food not last or not have enough money for food? No   Do you have housing? (Housing is defined as stable permanent housing and does not include staying ouside in a car, in a tent, in an abandoned building, in an overnight shelter, or couch-surfing.) Yes   Are you worried about losing your housing? No   Lack of transportation? No   Unable to get utilities  (heat,electricity)? No            12/15/2024   Fall Risk   Fallen 2 or more times in the past year? No    Trouble with walking or balance? No        Patient-reported          12/15/2024   Activities of Daily Living- Home Safety   Needs help with the following daily activites None of the above   Safety concerns in the home None of the above            12/15/2024   Dental   Dentist two times every year? Yes            12/15/2024   Hearing Screening   Hearing concerns? None of the above            12/15/2024   Driving Risk Screening   Patient/family members have concerns about driving No            12/15/2024   General Alertness/Fatigue Screening   Have you been more tired than usual lately? No            12/15/2024   Urinary Incontinence Screening   Bothered by leaking urine in past 6 months No            12/15/2024   TB Screening   Were you born outside of the [place]? No            Today's PHQ-2 Score:       12/17/2024     8:37 AM   PHQ-2 ( 1999 [employer])   Q1: Little interest or pleasure in doing things 0    Q2: Feeling down, depressed or hopeless 0    PHQ-2 Score 0    Q1: Little interest or pleasure in doing things Not at all   Q2: Feeling down, depressed or hopeless Not at all   PHQ-2 Score 0       Patient-reported           12/15/2024   Substance Use   Alcohol more than 3/day or more than 7/wk No   Do you have a current opioid prescription? No   How severe/bad is pain from 1 to 10? 6/10   Do you use any other substances recreationally? No        Social History     Tobacco Use    Smoking status: Never     Passive exposure: Never    Smokeless tobacco: Never   Vaping Use    Vaping status: Never Used   Substance Use Topics    Alcohol use: Yes     Comment: ONE DRINT EVERY THREE MONTHS    Drug use: No           12/15/2024   AAA Screening   Family history of Abdominal Aortic Aneurysm (AAA)? No      Last PSA:   Prostate Specific Antigen Screen   Date Value Ref Range Status   06/19/2024 1.44 0.00 - 4.50 ng/mL Final   05/03/2023  1.81 0.00 - 4.00 ug/L Final     ASCVD Risk   The ASCVD Risk score (Annel CHILDRESS, et al., 2019) failed to calculate for the following reasons:    The valid total cholesterol range is 130 to 320 mg/dL      Reviewed and updated as needed this visit by Provider                    Past Medical History:   Diagnosis Date    Arthritis     Diabetes (H)     Essential hypertension 10/28/2015     Past Surgical History:   Procedure Laterality Date    COLONOSCOPY N/A 1/14/2016    Procedure: COLONOSCOPY;  Surgeon: Ventura Monge MD;  Location: [facility] OR    LASIK BILATERAL      ROTATOR CUFF REPAIR RT/LT  1992    SURGICAL HISTORY OF -       uvula remove      Labs reviewed in EPIC  Current providers sharing in care for this patient include:  Patient Care Team:  Nighat Joe APRN CNP as PCP - General (Nurse Practitioner - Family)  Nighat Joe APRN CNP as Assigned PCP  Ramos Verdin OD as Assigned Surgical Provider  Primo Zuluaga MD as Physician (Ophthalmology)    The following health maintenance items are reviewed in Epic and correct as of today:  Health Maintenance   Topic Date Due    INFLUENZA VACCINE (1) 09/01/2024    COVID-19 Vaccine (6 - 2024-25 season) 09/01/2024    CBC W/DIFFERENTIAL  10/12/2024    DIABETIC FOOT EXAM  10/12/2024    ANNUAL REVIEW OF HM ORDERS  10/12/2024    MEDICARE ANNUAL WELLNESS VISIT  12/07/2024    A1C  02/08/2025    DTAP/TDAP/TD IMMUNIZATION (2 - Td or Tdap) 05/15/2025    EYE EXAM  06/11/2025    BMP  06/19/2025    LIPID  06/19/2025    MICROALBUMIN  06/19/2025    FALL RISK ASSESSMENT  12/17/2025    COLORECTAL CANCER SCREENING  01/14/2026    ADVANCE CARE PLANNING  12/07/2028    HEPATITIS C SCREENING  Completed    PHQ-2 (once per calendar year)  Completed    Pneumococcal Vaccine: 65+ Years  Completed    ZOSTER IMMUNIZATION  Completed    RSV VACCINE  Completed    HPV IMMUNIZATION  Aged Out    MENINGITIS IMMUNIZATION  Aged Out    RSV MONOCLONAL ANTIBODY  Aged Out  "        Review of Systems  Constitutional, HEENT, cardiovascular, pulmonary, gi and gu systems are negative, except as otherwise noted.     Objective    Exam  /77 (BP Location: Left arm, Patient Position: Sitting, Cuff Size: Adult Regular)   Pulse 77   Temp 97.5  F (36.4  C) (Temporal)   Resp 20   Ht 1.664 m (5' 5.5\")   Wt 79.9 kg (176 lb 3.2 oz)   SpO2 100%   BMI 28.88 kg/m     Estimated body mass index is 28.88 kg/m  as calculated from the following:    Height as of this encounter: 1.664 m (5' 5.5\").    Weight as of this encounter: 79.9 kg (176 lb 3.2 oz).    Physical Exam  GENERAL: alert and no distress  EYES: Eyes grossly normal to inspection, PERRL and conjunctivae and sclerae normal  HENT: ear canals and TM's normal, nose and mouth without ulcers or lesions  NECK: no adenopathy, no asymmetry, masses, or scars  RESP: lungs clear to auscultation - no rales, rhonchi or wheezes  CV: regular rate and rhythm, normal S1 S2, no S3 or S4, no murmur, click or rub, no peripheral edema  ABDOMEN: soft, nontender, no hepatosplenomegaly, no masses and bowel sounds normal  MS: no gross musculoskeletal defects noted, no edema  SKIN: no suspicious lesions or rashes  NEURO: Normal strength and tone, mentation intact and speech normal  BACK: no CVA tenderness, no paralumbar tenderness  PSYCH: mentation appears normal, affect normal/bright  LYMPH: normal ant/post cervical, supraclavicular nodes  inguinal: no adenopathy  Diabetic foot exam: normal DP and PT pulses, no trophic changes or ulcerative lesions, normal sensory exam, normal monofilament exam, and onychomycosis        12/17/2024   Mini Cog   Clock Draw Score 2 Normal   3 Item Recall 2 objects recalled   Mini Cog Total Score 4                 Signed Electronically by: ABHISHEK Perry CNP    "

## 2025-02-05 ENCOUNTER — TELEPHONE (OUTPATIENT)
Dept: PHARMACY | Facility: OTHER | Age: 70
End: 2025-02-05
Payer: COMMERCIAL

## 2025-02-05 NOTE — TELEPHONE ENCOUNTER
MTM Recruitment: [facility] insurance     Referral outreach attempt #1 on February 5, 2025      Outcome: left voicemail- Call back number [phone]. [email or web address] message also sent.     Sarah Villanueva CPhT  [facility]

## 2025-03-03 DIAGNOSIS — E11.319 TYPE 2 DIABETES MELLITUS WITH RETINOPATHY, WITHOUT LONG-TERM CURRENT USE OF INSULIN, MACULAR EDEMA PRESENCE UNSPECIFIED, UNSPECIFIED LATERALITY, UNSPECIFIED RETINOPATHY SEVERITY (H): ICD-10-CM

## 2025-03-03 RX ORDER — METFORMIN HYDROCHLORIDE 500 MG/1
TABLET, EXTENDED RELEASE ORAL
Qty: 360 TABLET | Refills: 0 | Status: SHIPPED | OUTPATIENT
Start: 2025-03-03

## 2025-03-04 DIAGNOSIS — E11.319 TYPE 2 DIABETES MELLITUS WITH RETINOPATHY, WITHOUT LONG-TERM CURRENT USE OF INSULIN, MACULAR EDEMA PRESENCE UNSPECIFIED, UNSPECIFIED LATERALITY, UNSPECIFIED RETINOPATHY SEVERITY (H): ICD-10-CM

## 2025-03-04 RX ORDER — LANCETS 33 GAUGE
EACH MISCELLANEOUS 2 TIMES DAILY
Qty: 200 EACH | Refills: 1 | Status: SHIPPED | OUTPATIENT
Start: 2025-03-04

## 2025-03-24 DIAGNOSIS — I10 ESSENTIAL HYPERTENSION: ICD-10-CM

## 2025-03-24 RX ORDER — HYDROCHLOROTHIAZIDE 12.5 MG/1
12.5 TABLET ORAL
Qty: 90 TABLET | Refills: 2 | Status: SHIPPED | OUTPATIENT
Start: 2025-03-24

## 2025-04-26 DIAGNOSIS — H40.1131 PRIMARY OPEN ANGLE GLAUCOMA (POAG) OF BOTH EYES, MILD STAGE: ICD-10-CM

## 2025-04-28 RX ORDER — TIMOLOL MALEATE 5 MG/ML
1 SOLUTION/ DROPS OPHTHALMIC 2 TIMES DAILY
Qty: 10 ML | Refills: 4 | Status: SHIPPED | OUTPATIENT
Start: 2025-04-28

## 2025-05-29 DIAGNOSIS — E11.319 TYPE 2 DIABETES MELLITUS WITH RETINOPATHY, WITHOUT LONG-TERM CURRENT USE OF INSULIN, MACULAR EDEMA PRESENCE UNSPECIFIED, UNSPECIFIED LATERALITY, UNSPECIFIED RETINOPATHY SEVERITY (H): ICD-10-CM

## 2025-05-29 RX ORDER — METFORMIN HYDROCHLORIDE 500 MG/1
2000 TABLET, EXTENDED RELEASE ORAL
Qty: 360 TABLET | Refills: 0 | Status: SHIPPED | OUTPATIENT
Start: 2025-05-29

## 2025-06-02 ENCOUNTER — RESULTS FOLLOW-UP (OUTPATIENT)
Dept: OPHTHALMOLOGY | Facility: CLINIC | Age: 70
End: 2025-06-02

## 2025-06-07 DIAGNOSIS — M17.12 ARTHRITIS OF LEFT KNEE: ICD-10-CM

## 2025-06-07 DIAGNOSIS — M25.562 CHRONIC PAIN OF LEFT KNEE: ICD-10-CM

## 2025-06-07 DIAGNOSIS — G89.29 CHRONIC PAIN OF LEFT KNEE: ICD-10-CM

## 2025-06-07 DIAGNOSIS — M51.16 LUMBAR DISC HERNIATION WITH RADICULOPATHY: ICD-10-CM

## 2025-06-11 RX ORDER — GABAPENTIN 300 MG/1
300 CAPSULE ORAL AT BEDTIME
Qty: 30 CAPSULE | Refills: 0 | Status: SHIPPED | OUTPATIENT
Start: 2025-06-11

## 2025-06-27 SDOH — HEALTH STABILITY: PHYSICAL HEALTH: ON AVERAGE, HOW MANY DAYS PER WEEK DO YOU ENGAGE IN MODERATE TO STRENUOUS EXERCISE (LIKE A BRISK WALK)?: 5 DAYS

## 2025-06-27 SDOH — HEALTH STABILITY: PHYSICAL HEALTH: ON AVERAGE, HOW MANY MINUTES DO YOU ENGAGE IN EXERCISE AT THIS LEVEL?: 60 MIN

## 2025-06-28 ENCOUNTER — HEALTH MAINTENANCE LETTER (OUTPATIENT)
Age: 70
End: 2025-06-28

## 2025-06-30 ENCOUNTER — OFFICE VISIT (OUTPATIENT)
Dept: ORTHOPEDICS | Facility: CLINIC | Age: 70
End: 2025-06-30
Payer: COMMERCIAL

## 2025-06-30 DIAGNOSIS — M19.012 OSTEOARTHRITIS OF LEFT GLENOHUMERAL JOINT: ICD-10-CM

## 2025-06-30 DIAGNOSIS — M17.12 PRIMARY OSTEOARTHRITIS OF LEFT KNEE: Primary | ICD-10-CM

## 2025-06-30 PROCEDURE — 20611 DRAIN/INJ JOINT/BURSA W/US: CPT | Mod: LT | Performed by: FAMILY MEDICINE

## 2025-06-30 PROCEDURE — 99213 OFFICE O/P EST LOW 20 MIN: CPT | Mod: 25 | Performed by: FAMILY MEDICINE

## 2025-06-30 PROCEDURE — 20610 DRAIN/INJ JOINT/BURSA W/O US: CPT | Mod: XS | Performed by: FAMILY MEDICINE

## 2025-06-30 PROCEDURE — 1125F AMNT PAIN NOTED PAIN PRSNT: CPT | Performed by: FAMILY MEDICINE

## 2025-06-30 RX ORDER — LIDOCAINE HYDROCHLORIDE 10 MG/ML
4 INJECTION, SOLUTION INFILTRATION; PERINEURAL
Status: COMPLETED | OUTPATIENT
Start: 2025-06-30 | End: 2025-06-30

## 2025-06-30 RX ORDER — TRIAMCINOLONE ACETONIDE 40 MG/ML
40 INJECTION, SUSPENSION INTRA-ARTICULAR; INTRAMUSCULAR
Status: COMPLETED | OUTPATIENT
Start: 2025-06-30 | End: 2025-06-30

## 2025-06-30 RX ADMIN — TRIAMCINOLONE ACETONIDE 40 MG: 40 INJECTION, SUSPENSION INTRA-ARTICULAR; INTRAMUSCULAR at 09:27

## 2025-06-30 RX ADMIN — TRIAMCINOLONE ACETONIDE 40 MG: 40 INJECTION, SUSPENSION INTRA-ARTICULAR; INTRAMUSCULAR at 09:26

## 2025-06-30 RX ADMIN — LIDOCAINE HYDROCHLORIDE 4 ML: 10 INJECTION, SOLUTION INFILTRATION; PERINEURAL at 09:26

## 2025-06-30 RX ADMIN — LIDOCAINE HYDROCHLORIDE 4 ML: 10 INJECTION, SOLUTION INFILTRATION; PERINEURAL at 09:27

## 2025-06-30 ASSESSMENT — PAIN SCALES - GENERAL: PAINLEVEL_OUTOF10: SEVERE PAIN (8)

## 2025-06-30 NOTE — LETTER
6/30/2025      Kathrine Isaac  [address] [address]      Dear Colleague,    Thank you for referring your patient, Kathrine Isaac, to the [facility] [place]. Please see a copy of my visit note below.    HISTORY OF PRESENT ILLNESS  Mr. Isaac is a pleasant 70 year old male following up with bilateral knee pain.  Kathrine has a history of bilateral knee osteoarthritis, he last saw me in June of last year.  At that visit we injected his knees with hyaluronic acid.  This did help for many months.  Unfortunately his knees are bothering him again, he is interested in repeat injections today.  His left is worse than his right.  He also has pain in his left shoulder, he did have a glenohumeral injection in the past that greatly helped him.     PHYSICAL EXAM  General  - normal appearance, in no obvious distress  Musculoskeletal - left shoulder  - inspection: normal bone and joint alignment  - palpation: no bony or soft tissue tenderness, normal clavicle, non-tender AC  - ROM: painful, limited flexion at 90 deg  - strength: 5/5  strength, 5/5 in all shoulder planes  - special tests:  (-) Speed's  (-) Neer  (+) Hawkin's  (-) Eber's  Neuro  - no sensory or motor deficit, grossly normal coordination, normal muscle tone  Skin  - no ecchymosis, erythema, warmth, or induration, no obvious rash          ASSESSMENT & PLAN  Mr. Isaac is a 70 year old male following up with bilateral knee osteoarthritis.  He also has left shoulder pain.    Through shared decision making we did decide to inject the two symptomatic joints today, his left glenohumeral joint and the left knee.    Kathrine is going to monitor his relief over the coming weeks.  We could also follow-up in 1 to 2 weeks for his right knee injection.    It was a pleasure seeing Kathrine.    Greater than 20 minutes were spent on the day of visit reviewing records, in direct face-to-face consultation and exam, and  documentation independent of the procedure.       Herman El DO, CAQSM      This note was constructed using Dragon dictation software, please excuse any minor errors in spelling, grammar, or syntax.    Large Joint Injection/Arthocentesis: L knee joint    Date/Time: 6/30/2025 9:26 AM    Performed by: Herman El DO  Authorized by: Herman El DO    Indications:  Pain  Needle Size:  22 G  Guidance: landmark guided    Approach:  Lateral  Location:  Knee      Medications:  40 mg triamcinolone 40 MG/ML; 4 mL lidocaine 1 %  Outcome:  Tolerated well, no immediate complications  Procedure discussed: discussed risks, benefits, and alternatives    Consent Given by:  Patient  Timeout: timeout called immediately prior to procedure    Prep: patient was prepped and draped in usual sterile fashion       PROCEDURE    Knee Injection - Intraarticular  The patient was informed of the risks and the benefits of the procedure and a written consent was signed.  The patient's left knee was prepped with chlorhexidine in sterile fashion.   40 mg of triamcinolone suspension was drawn up into a 5 mL syringe with 4 mL of 1% lidocaine.  Injection was performed using substerile technique.  A 1.5-inch 22-gauge needle was used to enter the lateral aspect of the knee.  Injection performed successfully without difficulty.  There were no complications. The patient tolerated the procedure well. There was negligible bleeding.           Glenohumeral Injection - Ultrasound Guided    The patient was informed of the risks and the benefits of the procedure and a written consent was signed.  The patient's left shoulder was prepped with chlorhexidine in sterile fashion.   40 mg of kenalog suspension was drawn up into a 5 mL syringe with 3 mL of 1% lidocaine w/o Epi.  Injection was performed using sterile technique.  Under ultrasound guidance a 3.5-inch 22-gauge needle was used to enter the glenohumeral joint.  Posterior approach was used with the  patient in lateral recumbent position, arm in neutral position at the side.  Needle placement was visualized and documented with ultrasound.  Ultrasound visualization was necessary due to the small joint space entered.  Injection performed long axis to the probe.  Injection solution visualized within the joint space.  Images were permanently stored for the patient's record.  There were no complications. The patient tolerated the procedure well. There was negligible bleeding.             Large Joint Injection/Arthocentesis: L glenohumeral joint    Date/Time: 6/30/2025 9:27 AM    Performed by: Herman El DO  Authorized by: Herman El DO    Indications:  Pain  Needle Size:  22 G  Guidance: ultrasound    Location:  Shoulder      Site:  L glenohumeral joint  Medications:  40 mg triamcinolone 40 MG/ML; 4 mL lidocaine 1 %  Outcome:  Tolerated well, no immediate complications  Procedure discussed: discussed risks, benefits, and alternatives    Consent Given by:  Patient  Timeout: timeout called immediately prior to procedure    Prep: patient was prepped and draped in usual sterile fashion          Again, thank you for allowing me to participate in the care of your patient.        Sincerely,        Herman El DO    Electronically signed

## 2025-06-30 NOTE — PROGRESS NOTES
HISTORY OF PRESENT ILLNESS  Mr. Isaac is a pleasant 70 year old male following up with bilateral knee pain.  Kathrine has a history of bilateral knee osteoarthritis, he last saw me in June of last year.  At that visit we injected his knees with hyaluronic acid.  This did help for many months.  Unfortunately his knees are bothering him again, he is interested in repeat injections today.  His left is worse than his right.  He also has pain in his left shoulder, he did have a glenohumeral injection in the past that greatly helped him.     PHYSICAL EXAM  General  - normal appearance, in no obvious distress  Musculoskeletal - left shoulder  - inspection: normal bone and joint alignment  - palpation: no bony or soft tissue tenderness, normal clavicle, non-tender AC  - ROM: painful, limited flexion at 90 deg  - strength: 5/5  strength, 5/5 in all shoulder planes  - special tests:  (-) Speed's  (-) Neer  (+) Hawkin's  (-) Eber's  Neuro  - no sensory or motor deficit, grossly normal coordination, normal muscle tone  Skin  - no ecchymosis, erythema, warmth, or induration, no obvious rash          ASSESSMENT & PLAN  Mr. Isaac is a 70 year old male following up with bilateral knee osteoarthritis.  He also has left shoulder pain.    Through shared decision making we did decide to inject the two symptomatic joints today, his left glenohumeral joint and the left knee.    Kathrine is going to monitor his relief over the coming weeks.  We could also follow-up in 1 to 2 weeks for his right knee injection.    It was a pleasure seeing Kathrine.    Greater than 20 minutes were spent on the day of visit reviewing records, in direct face-to-face consultation and exam, and documentation independent of the procedure.       Herman El DO, CAQSM      This note was constructed using Dragon dictation software, please excuse any minor errors in spelling, grammar, or syntax.    Large Joint Injection/Arthocentesis: L knee joint    Date/Time:  6/30/2025 9:26 AM    Performed by: Herman El DO  Authorized by: Herman El DO    Indications:  Pain  Needle Size:  22 G  Guidance: landmark guided    Approach:  Lateral  Location:  Knee      Medications:  40 mg triamcinolone 40 MG/ML; 4 mL lidocaine 1 %  Outcome:  Tolerated well, no immediate complications  Procedure discussed: discussed risks, benefits, and alternatives    Consent Given by:  Patient  Timeout: timeout called immediately prior to procedure    Prep: patient was prepped and draped in usual sterile fashion       PROCEDURE    Knee Injection - Intraarticular  The patient was informed of the risks and the benefits of the procedure and a written consent was signed.  The patient's left knee was prepped with chlorhexidine in sterile fashion.   40 mg of triamcinolone suspension was drawn up into a 5 mL syringe with 4 mL of 1% lidocaine.  Injection was performed using substerile technique.  A 1.5-inch 22-gauge needle was used to enter the lateral aspect of the knee.  Injection performed successfully without difficulty.  There were no complications. The patient tolerated the procedure well. There was negligible bleeding.           Glenohumeral Injection - Ultrasound Guided    The patient was informed of the risks and the benefits of the procedure and a written consent was signed.  The patient's left shoulder was prepped with chlorhexidine in sterile fashion.   40 mg of kenalog suspension was drawn up into a 5 mL syringe with 3 mL of 1% lidocaine w/o Epi.  Injection was performed using sterile technique.  Under ultrasound guidance a 3.5-inch 22-gauge needle was used to enter the glenohumeral joint.  Posterior approach was used with the patient in lateral recumbent position, arm in neutral position at the side.  Needle placement was visualized and documented with ultrasound.  Ultrasound visualization was necessary due to the small joint space entered.  Injection performed long axis to the probe.   Injection solution visualized within the joint space.  Images were permanently stored for the patient's record.  There were no complications. The patient tolerated the procedure well. There was negligible bleeding.             Large Joint Injection/Arthocentesis: L glenohumeral joint    Date/Time: 6/30/2025 9:27 AM    Performed by: Herman El DO  Authorized by: Herman El DO    Indications:  Pain  Needle Size:  22 G  Guidance: ultrasound    Location:  Shoulder      Site:  L glenohumeral joint  Medications:  40 mg triamcinolone 40 MG/ML; 4 mL lidocaine 1 %  Outcome:  Tolerated well, no immediate complications  Procedure discussed: discussed risks, benefits, and alternatives    Consent Given by:  Patient  Timeout: timeout called immediately prior to procedure    Prep: patient was prepped and draped in usual sterile fashion

## 2025-06-30 NOTE — NURSING NOTE
Chief Complaint   Patient presents with    Left Shoulder - Pain, Follow Up     injection    Left Knee - Pain, Follow Up     injection       There were no vitals filed for this visit.    There is no height or weight on file to calculate BMI.      KEVIN Ramos NREMT

## 2025-06-30 NOTE — NURSING NOTE
[facility] ORTHOPEDICS, SPORTS MEDICINE and PODIATRY  [address] [place] [address]  Dept: [phone]  ______________________________________________________________________________    Patient: Kathrine Isaac   : 1955   MRN: [id number]   2025    INVASIVE PROCEDURE SAFETY CHECKLIST    Date: 2025   Procedure: Left shoulder joint and left knee injection  Patient Name: Kathrine Isaac  MRN: [id number]  YOB: 1955    Action: Complete sections as appropriate. Any discrepancy results in a HARD COPY until resolved.     PRE PROCEDURE:  Patient ID verified with 2 identifiers (name and  or MRN): Yes  Procedure and site verified with patient/designee (when able): Yes  Accurate consent documentation in medical record: Yes  H&P (or appropriate assessment) documented in medical record: Yes  H&P must be up to 20 days prior to procedure and updates within 24 hours of procedure as applicable: NA  Relevant diagnostic and radiology test results appropriately labeled and displayed as applicable: NA  Procedure site(s) marked with provider initials: NA    TIMEOUT:  Time-Out performed immediately prior to starting procedure, including verbal and active participation of all team members addressing the following:Yes  * Correct patient identify  * Confirmed that the correct side and site are marked  * An accurate procedure consent form  * Agreement on the procedure to be done  * Correct patient position  * Relevant images and results are properly labeled and appropriately displayed  * The need to administer antibiotics or fluids for irrigation purposes during the procedure as applicable   * Safety precautions based on patient history or medication use    DURING PROCEDURE: Verification of correct person, site, and procedures any time the responsibility for care of the patient is transferred to another member of the care team.       Prior to injection, verified patient identity using  patient's name and date of birth.  Due to injection administration, patient instructed to remain in clinic for 15 minutes  afterwards, and to report any adverse reaction to me immediately.    Joint injection was performed.      Drug Amount Wasted:  None.  Vial/Syringe: Single dose vial  Expiration Date:  12/31/2026      Richard Jackson, EMT  June 30, 2025

## 2025-07-02 ENCOUNTER — OFFICE VISIT (OUTPATIENT)
Dept: FAMILY MEDICINE | Facility: CLINIC | Age: 70
End: 2025-07-02
Payer: COMMERCIAL

## 2025-07-02 ENCOUNTER — RESULTS FOLLOW-UP (OUTPATIENT)
Dept: FAMILY MEDICINE | Facility: CLINIC | Age: 70
End: 2025-07-02

## 2025-07-02 VITALS
DIASTOLIC BLOOD PRESSURE: 83 MMHG | OXYGEN SATURATION: 98 % | WEIGHT: 177 LBS | HEART RATE: 73 BPM | SYSTOLIC BLOOD PRESSURE: 130 MMHG | BODY MASS INDEX: 28.45 KG/M2 | RESPIRATION RATE: 20 BRPM | TEMPERATURE: 97.1 F | HEIGHT: 66 IN

## 2025-07-02 DIAGNOSIS — E11.319 TYPE 2 DIABETES MELLITUS WITH RETINOPATHY, WITHOUT LONG-TERM CURRENT USE OF INSULIN, MACULAR EDEMA PRESENCE UNSPECIFIED, UNSPECIFIED LATERALITY, UNSPECIFIED RETINOPATHY SEVERITY (H): Primary | ICD-10-CM

## 2025-07-02 DIAGNOSIS — M51.16 LUMBAR DISC HERNIATION WITH RADICULOPATHY: ICD-10-CM

## 2025-07-02 DIAGNOSIS — I10 ESSENTIAL HYPERTENSION: ICD-10-CM

## 2025-07-02 DIAGNOSIS — E78.5 HYPERLIPIDEMIA LDL GOAL <100: ICD-10-CM

## 2025-07-02 DIAGNOSIS — D72.829 LEUKOCYTOSIS, UNSPECIFIED TYPE: Primary | ICD-10-CM

## 2025-07-02 DIAGNOSIS — M25.562 CHRONIC PAIN OF LEFT KNEE: ICD-10-CM

## 2025-07-02 DIAGNOSIS — M17.12 ARTHRITIS OF LEFT KNEE: ICD-10-CM

## 2025-07-02 DIAGNOSIS — G89.29 CHRONIC PAIN OF LEFT KNEE: ICD-10-CM

## 2025-07-02 LAB
ANION GAP SERPL CALCULATED.3IONS-SCNC: 15 MMOL/L (ref 7–15)
BUN SERPL-MCNC: 20.5 MG/DL (ref 8–23)
CALCIUM SERPL-MCNC: 10.1 MG/DL (ref 8.8–10.4)
CHLORIDE SERPL-SCNC: 102 MMOL/L (ref 98–107)
CHOLEST SERPL-MCNC: 129 MG/DL
CREAT SERPL-MCNC: 0.84 MG/DL (ref 0.67–1.17)
CREAT UR-MCNC: 92.6 MG/DL
EGFRCR SERPLBLD CKD-EPI 2021: >90 ML/MIN/1.73M2
ERYTHROCYTE [DISTWIDTH] IN BLOOD BY AUTOMATED COUNT: 13.7 % (ref 10–15)
EST. AVERAGE GLUCOSE BLD GHB EST-MCNC: 160 MG/DL
FASTING STATUS PATIENT QL REPORTED: YES
FASTING STATUS PATIENT QL REPORTED: YES
GLUCOSE SERPL-MCNC: 138 MG/DL (ref 70–99)
HBA1C MFR BLD: 7.2 % (ref 0–5.6)
HCO3 SERPL-SCNC: 20 MMOL/L (ref 22–29)
HCT VFR BLD AUTO: 44.9 % (ref 40–53)
HDLC SERPL-MCNC: 52 MG/DL
HGB BLD-MCNC: 15.3 G/DL (ref 13.3–17.7)
LDLC SERPL CALC-MCNC: 53 MG/DL
MCH RBC QN AUTO: 29.4 PG (ref 26.5–33)
MCHC RBC AUTO-ENTMCNC: 34.1 G/DL (ref 31.5–36.5)
MCV RBC AUTO: 86 FL (ref 78–100)
MICROALBUMIN UR-MCNC: <12 MG/L
MICROALBUMIN/CREAT UR: NORMAL MG/G{CREAT}
NONHDLC SERPL-MCNC: 77 MG/DL
PLATELET # BLD AUTO: 242 10E3/UL (ref 150–450)
POTASSIUM SERPL-SCNC: 4.6 MMOL/L (ref 3.4–5.3)
RBC # BLD AUTO: 5.21 10E6/UL (ref 4.4–5.9)
SODIUM SERPL-SCNC: 137 MMOL/L (ref 135–145)
TRIGL SERPL-MCNC: 122 MG/DL
WBC # BLD AUTO: 17.4 10E3/UL (ref 4–11)

## 2025-07-02 PROCEDURE — 83036 HEMOGLOBIN GLYCOSYLATED A1C: CPT | Performed by: PREVENTIVE MEDICINE

## 2025-07-02 PROCEDURE — 99214 OFFICE O/P EST MOD 30 MIN: CPT | Performed by: PREVENTIVE MEDICINE

## 2025-07-02 PROCEDURE — 80061 LIPID PANEL: CPT | Performed by: PREVENTIVE MEDICINE

## 2025-07-02 PROCEDURE — 3075F SYST BP GE 130 - 139MM HG: CPT | Performed by: PREVENTIVE MEDICINE

## 2025-07-02 PROCEDURE — 82570 ASSAY OF URINE CREATININE: CPT | Performed by: PREVENTIVE MEDICINE

## 2025-07-02 PROCEDURE — 36415 COLL VENOUS BLD VENIPUNCTURE: CPT | Performed by: PREVENTIVE MEDICINE

## 2025-07-02 PROCEDURE — 85027 COMPLETE CBC AUTOMATED: CPT | Performed by: PREVENTIVE MEDICINE

## 2025-07-02 PROCEDURE — 80048 BASIC METABOLIC PNL TOTAL CA: CPT | Performed by: PREVENTIVE MEDICINE

## 2025-07-02 PROCEDURE — G2211 COMPLEX E/M VISIT ADD ON: HCPCS | Performed by: PREVENTIVE MEDICINE

## 2025-07-02 PROCEDURE — 82043 UR ALBUMIN QUANTITATIVE: CPT | Performed by: PREVENTIVE MEDICINE

## 2025-07-02 PROCEDURE — 3079F DIAST BP 80-89 MM HG: CPT | Performed by: PREVENTIVE MEDICINE

## 2025-07-02 RX ORDER — ATORVASTATIN CALCIUM 20 MG/1
20 TABLET, FILM COATED ORAL DAILY
Qty: 90 TABLET | Refills: 1 | Status: SHIPPED | OUTPATIENT
Start: 2025-07-02

## 2025-07-02 RX ORDER — GABAPENTIN 300 MG/1
300 CAPSULE ORAL AT BEDTIME
Qty: 90 CAPSULE | Refills: 1 | Status: SHIPPED | OUTPATIENT
Start: 2025-07-02

## 2025-07-02 RX ORDER — LISINOPRIL 40 MG/1
40 TABLET ORAL DAILY
Qty: 90 TABLET | Refills: 2 | Status: SHIPPED | OUTPATIENT
Start: 2025-07-02

## 2025-07-02 NOTE — PATIENT INSTRUCTIONS
At [facility], we strive to deliver an exceptional experience to you, every time we see you. If you receive a survey, please let us know what we are doing well and/or what we could improve upon, as we do value your feedback.  If you have MyChart, you can expect to receive results automatically within 24 hours of their completion.  Your provider will send a note interpreting your results as well.   If you do not have MyChart, you should receive your results in about a week by mail.    Your care team:                            Family Medicine Internal Medicine   MD Tobias Lu, MD Amanda Villegas, MD Pierce Suazo, MD Hue Russell, PA-C ABHISHEK Pérez, KIA Ghosh, MD Pediatrics   MD Renée Laird, MD Gaby Samayoa, PAJANEL Carbajal APRN CNP   MD Casi Alcantara, MD Lydia Starr, CNP      Same-Day Provider (No follow-up visits)    LAN Nixon PA-C     Clinic hours: Monday - Thursday 7 am-6 pm; Fridays 7 am-5 pm.   Urgent care: Monday - Friday 10 am- 8 pm; Saturday and Sunday 9 am-5 pm.    Clinic: [phone]       [facility]: Monday - Thursday 8 am - 7 pm; Friday 8 am - 6 pm  [facility]: [phone]     [facility] Scheduling: Monday - Friday 7 am - 7 pm; Saturday 7 am - 3:30 pm  [phone] [place] : [phone]

## 2025-07-02 NOTE — PROGRESS NOTES
Assessment & Plan     Type 2 diabetes mellitus with retinopathy, without long-term current use of insulin, macular edema presence unspecified, unspecified laterality, unspecified retinopathy severity (H)  -well controlled  -increased dose of Ozempic to 1 mg to assist with weight loss   - HEMOGLOBIN A1C  - Lipid panel reflex to direct LDL Non-fasting  - Albumin Random Urine Quantitative with Creat Ratio  - CBC with platelets  - Semaglutide, 1 MG/DOSE, (OZEMPIC) 4 MG/3ML pen  Dispense: 9 mL; Refill: 0  -STOPPED Amaryl       Lab Results   Component Value Date    A1C 6.2 12/17/2024    A1C 7.0 08/08/2024    A1C 7.6 06/19/2024    A1C 7.0 10/12/2023    A1C 7.1 05/03/2023    A1C 7.9 04/02/2021    A1C 6.6 09/16/2020    A1C 6.7 12/30/2019    A1C 9.2 06/14/2019    A1C 7.4 02/13/2019     Continue medications the same.    Periodic blood sugar testing.  Regular foot checks  Limit carbohydrates and sweets in diet  Update eye exam annually   Regular exercise, 150 minutes per week  Hypoglycemia precautions     Metformin XR 1000 mg twice daily  Glimepiride 2 mg each morning STOPPED   Jardiance 25 mg daily   Ozempic 1 mg weekly  Aspirin 81 mg daily     Essential hypertension  -at goal  -continue current medication Hydrochlorothiazide 12.5 mg daily and Lisinopril 40 mg daily  - BASIC METABOLIC PANEL  - lisinopril (ZESTRIL) 40 MG tablet  Dispense: 90 tablet; Refill: 2    Hyperlipidemia LDL goal <100  -on statin   - atorvastatin (LIPITOR) 20 MG tablet  Dispense: 90 tablet; Refill: 1    Chronic pain of left knee  - gabapentin (NEURONTIN) 300 MG capsule  Dispense: 90 capsule; Refill: 1    Arthritis of left knee  - gabapentin (NEURONTIN) 300 MG capsule  Dispense: 90 capsule; Refill: 1  -follows with Sports medicine     Lumbar disc herniation with radiculopathy  - gabapentin (NEURONTIN) 300 MG capsule  Dispense: 90 capsule; Refill: 1      I ended our visit today by discussing the patient's diagnoses and recommended treatment. Please refer  "to today's diagnoses and orders for further details. I briefly discussed the pathophysiology of these conditions and outlined their expected course. I discussed the warning symptoms and signs that indicate an atypical course that would need urgent or emergent care. I also discussed self care strategies for symptom relief.  Common side effects of medications prescribed at this visit were discussed with the patient. Severe side effects, including current applicable black box warnings, were discussed.         BMI  Estimated body mass index is 28.93 kg/m  as calculated from the following:    Height as of this encounter: 1.666 m (5' 5.59\").    Weight as of this encounter: 80.3 kg (177 lb).   Weight management plan: Discussed healthy diet and exercise guidelines      Follow-up    Follow-up Visit   Expected date:  Jan 02, 2026 (Approximate)      Follow Up Appointment Details:     Follow-up with whom?: My Department    Follow-Up for what?: Chronic Disease f/u    Chronic Disease f/u:  Diabetes  Hypertension       How?: In Person                 German Chisholm is a 70 year old, presenting for the following health issues:  Diabetes        7/2/2025     8:10 AM   Additional Questions   Roomed by Medardo   Accompanied by self       Wt Readings from Last 2 Encounters:   07/02/25 80.3 kg (177 lb)   12/17/24 79.9 kg (176 lb 3.2 oz)      History of Present Illness       Diabetes:   He presents for follow up of diabetes.  He is checking home blood glucose a few times a month.   He checks blood glucose before meals and after meals.  Blood glucose is never over 200 and never under 70.  When his blood glucose is low, the patient is asymptomatic for confusion, blurred vision, lethargy and reports not feeling dizzy, shaky, or weak.   He has no concerns regarding his diabetes at this time.  He is having numbness in feet.        He consumes 1 sweetened beverage(s) daily.He exercises with enough effort to increase his heart rate 30 to 60 " minutes per day.  He exercises with enough effort to increase his heart rate 5 days per week. He is missing 7 dose(s) of medications per week.  He is not taking prescribed medications regularly due to other.        Diabetes Follow-up    How often are you checking your blood sugar? A few times a week  What time of day are you checking your blood sugars (select all that apply)?  Before and after meals  Have you had any blood sugars above 200?  No  Have you had any blood sugars below 70?  No  What symptoms do you notice when your blood sugar is low?  Shaky and Dizzy  What concerns do you have today about your diabetes? None   Do you have any of these symptoms? (Select all that apply)  Numbness in feet      BP Readings from Last 2 Encounters:   07/02/25 130/83   12/17/24 116/77     Hemoglobin A1C (%)   Date Value   12/17/2024 6.2 (H)   08/08/2024 7.0 (H)   04/02/2021 7.9 (H)   09/16/2020 6.6 (H)     LDL Cholesterol Calculated (mg/dL)   Date Value   06/19/2024 39   05/03/2023 30   04/02/2021 49   12/30/2019 54         How many servings of fruits and vegetables do you eat daily?  2-3    The ASCVD Risk score (Annel DK, et al., 2019) failed to calculate for the following reasons:    The valid total cholesterol range is 130 to 320 mg/dL    Hyperlipidemia Follow-Up     Are you regularly taking any medication or supplement to lower your cholesterol?   Yes- Lipitor 20 mg daily  Are you having muscle aches or other side effects that you think could be caused by your cholesterol lowering medication?  No     Hypertension Follow-up     Do you check your blood pressure regularly outside of the clinic? Yes  Are you following a low salt diet? Yes  Are your blood pressures ever more than 140 on the top number (systolic) OR more       than 90 on the bottom number (diastolic), for example 140/90?     Blood pressure at home is 107/60        Objective    /83 (BP Location: Left arm, Patient Position: Sitting, Cuff Size: Adult  "Regular)   Pulse 73   Temp 97.1  F (36.2  C) (Temporal)   Resp 20   Ht 1.666 m (5' 5.59\")   Wt 80.3 kg (177 lb)   SpO2 98%   BMI 28.93 kg/m    Body mass index is 28.93 kg/m .  Physical Exam   GENERAL APPEARANCE: healthy, alert and no distress  EYES: Eyes grossly normal to inspection and conjunctivae and sclerae normal  RESP: lungs clear to auscultation - no rales, rhonchi or wheezes  CV: regular rates and rhythm, normal S1 S2, no S3 or S4 and no murmur, click or rub  ABDOMEN: soft, non-tender and no rebound or guarding   MS: extremities normal- no gross deformities noted and peripheral pulses normal  NEURO: Normal strength and tone, mentation intact and speech normal  PSYCH: mentation appears normal      No results found for this or any previous visit (from the past 24 hours).        Signed Electronically by: Jud Fajardo MD    "

## 2025-07-07 ENCOUNTER — DOCUMENTATION ONLY (OUTPATIENT)
Dept: FAMILY MEDICINE | Facility: CLINIC | Age: 70
End: 2025-07-07
Payer: COMMERCIAL

## 2025-07-07 NOTE — PROGRESS NOTES
The slide review ordered is incorrect. Anytime there is an order with HCL, that will be incorrect.  Do you want this instead, Blood Morphology Pathologist Review  [id number]?

## 2025-07-08 ENCOUNTER — LAB (OUTPATIENT)
Dept: LAB | Facility: CLINIC | Age: 70
End: 2025-07-08
Payer: COMMERCIAL

## 2025-07-08 DIAGNOSIS — D72.829 LEUKOCYTOSIS, UNSPECIFIED TYPE: ICD-10-CM

## 2025-07-08 LAB
BASOPHILS # BLD AUTO: 0.1 10E3/UL (ref 0–0.2)
BASOPHILS NFR BLD AUTO: 1 %
EOSINOPHIL # BLD AUTO: 0.2 10E3/UL (ref 0–0.7)
EOSINOPHIL NFR BLD AUTO: 2 %
ERYTHROCYTE [DISTWIDTH] IN BLOOD BY AUTOMATED COUNT: 13.2 % (ref 10–15)
HCT VFR BLD AUTO: 47.1 % (ref 40–53)
HGB BLD-MCNC: 16.1 G/DL (ref 13.3–17.7)
IMM GRANULOCYTES # BLD: 0 10E3/UL
IMM GRANULOCYTES NFR BLD: 0 %
LYMPHOCYTES # BLD AUTO: 3.8 10E3/UL (ref 0.8–5.3)
LYMPHOCYTES NFR BLD AUTO: 29 %
MCH RBC QN AUTO: 29.2 PG (ref 26.5–33)
MCHC RBC AUTO-ENTMCNC: 34.2 G/DL (ref 31.5–36.5)
MCV RBC AUTO: 86 FL (ref 78–100)
MONOCYTES # BLD AUTO: 0.8 10E3/UL (ref 0–1.3)
MONOCYTES NFR BLD AUTO: 6 %
NEUTROPHILS # BLD AUTO: 8 10E3/UL (ref 1.6–8.3)
NEUTROPHILS NFR BLD AUTO: 62 %
PLATELET # BLD AUTO: 262 10E3/UL (ref 150–450)
RBC # BLD AUTO: 5.51 10E6/UL (ref 4.4–5.9)
WBC # BLD AUTO: 12.9 10E3/UL (ref 4–11)

## 2025-07-08 PROCEDURE — 85025 COMPLETE CBC W/AUTO DIFF WBC: CPT

## 2025-07-08 PROCEDURE — 36415 COLL VENOUS BLD VENIPUNCTURE: CPT

## 2025-07-11 SDOH — HEALTH STABILITY: PHYSICAL HEALTH: ON AVERAGE, HOW MANY DAYS PER WEEK DO YOU ENGAGE IN MODERATE TO STRENUOUS EXERCISE (LIKE A BRISK WALK)?: 5 DAYS

## 2025-07-11 SDOH — HEALTH STABILITY: PHYSICAL HEALTH: ON AVERAGE, HOW MANY MINUTES DO YOU ENGAGE IN EXERCISE AT THIS LEVEL?: 50 MIN

## 2025-07-12 DIAGNOSIS — H40.1131 PRIMARY OPEN ANGLE GLAUCOMA (POAG) OF BOTH EYES, MILD STAGE: ICD-10-CM

## 2025-07-14 ENCOUNTER — OFFICE VISIT (OUTPATIENT)
Dept: ORTHOPEDICS | Facility: CLINIC | Age: 70
End: 2025-07-14
Payer: COMMERCIAL

## 2025-07-14 DIAGNOSIS — M19.011 OSTEOARTHRITIS OF RIGHT GLENOHUMERAL JOINT: ICD-10-CM

## 2025-07-14 DIAGNOSIS — M17.11 PRIMARY OSTEOARTHRITIS OF RIGHT KNEE: Primary | ICD-10-CM

## 2025-07-14 PROCEDURE — 20611 DRAIN/INJ JOINT/BURSA W/US: CPT | Mod: RT | Performed by: FAMILY MEDICINE

## 2025-07-14 PROCEDURE — 1125F AMNT PAIN NOTED PAIN PRSNT: CPT | Performed by: FAMILY MEDICINE

## 2025-07-14 PROCEDURE — 20610 DRAIN/INJ JOINT/BURSA W/O US: CPT | Mod: 59 | Performed by: FAMILY MEDICINE

## 2025-07-14 RX ORDER — LIDOCAINE HYDROCHLORIDE 10 MG/ML
4 INJECTION, SOLUTION INFILTRATION; PERINEURAL
Status: COMPLETED | OUTPATIENT
Start: 2025-07-14 | End: 2025-07-14

## 2025-07-14 RX ORDER — TRIAMCINOLONE ACETONIDE 40 MG/ML
40 INJECTION, SUSPENSION INTRA-ARTICULAR; INTRAMUSCULAR
Status: COMPLETED | OUTPATIENT
Start: 2025-07-14 | End: 2025-07-14

## 2025-07-14 RX ADMIN — TRIAMCINOLONE ACETONIDE 40 MG: 40 INJECTION, SUSPENSION INTRA-ARTICULAR; INTRAMUSCULAR at 09:42

## 2025-07-14 RX ADMIN — LIDOCAINE HYDROCHLORIDE 4 ML: 10 INJECTION, SOLUTION INFILTRATION; PERINEURAL at 09:42

## 2025-07-14 ASSESSMENT — PAIN SCALES - GENERAL: PAINLEVEL_OUTOF10: SEVERE PAIN (7)

## 2025-07-14 NOTE — LETTER
7/14/2025      Kathrine Isaac  [address] [address] [address]      Dear Colleague,    Thank you for referring your patient, Kathrine Isaac, to the [facility] [place]. Please see a copy of my visit note below.    Kathrine has right knee and right shoulder osteoarthritis, he is here for a right knee and right shoulder injection.          Large Joint Injection/Arthocentesis: R knee joint    Date/Time: 7/14/2025 9:42 AM    Performed by: Herman El DO  Authorized by: Herman El DO    Indications:  Pain  Needle Size:  22 G  Guidance: landmark guided    Approach:  Lateral  Location:  Knee      Medications:  40 mg triamcinolone 40 MG/ML; 4 mL lidocaine 1 %  Outcome:  Tolerated well, no immediate complications  Procedure discussed: discussed risks, benefits, and alternatives    Consent Given by:  Patient  Timeout: timeout called immediately prior to procedure    Prep: patient was prepped and draped in usual sterile fashion       PROCEDURE    Knee Injection - Intraarticular  The patient was informed of the risks and the benefits of the procedure and a written consent was signed.  The patient's right knee was prepped with chlorhexidine in sterile fashion.   40 mg of triamcinolone suspension was drawn up into a 5 mL syringe with 4 mL of 1% lidocaine.  Injection was performed using substerile technique.  A 1.5-inch 22-gauge needle was used to enter the lateral aspect of the knee.  Injection performed successfully without difficulty.  There were no complications. The patient tolerated the procedure well. There was negligible bleeding.       Glenohumeral Injection - Ultrasound Guided    The patient was informed of the risks and the benefits of the procedure and a written consent was signed.  The patient's right shoulder was prepped with chlorhexidine in sterile fashion.   40 mg of kenalog suspension was drawn up into a 5 mL syringe with 3 mL of 1% lidocaine w/o  Epi.  Injection was performed using sterile technique.  Under ultrasound guidance a 3.5-inch 22-gauge needle was used to enter the glenohumeral joint.  Posterior approach was used with the patient in lateral recumbent position, arm in neutral position at the side.  Needle placement was visualized and documented with ultrasound.  Ultrasound visualization was necessary due to the small joint space entered.  Injection performed long axis to the probe.  Injection solution visualized within the joint space.  Images were permanently stored for the patient's record.  There were no complications. The patient tolerated the procedure well. There was negligible bleeding.                 Large Joint Injection/Arthocentesis: R glenohumeral joint    Date/Time: 7/14/2025 9:42 AM    Performed by: Herman El DO  Authorized by: Herman El DO    Indications:  Pain  Needle Size:  22 G  Guidance: ultrasound    Location:  Shoulder      Site:  R glenohumeral joint  Medications:  40 mg triamcinolone 40 MG/ML; 4 mL lidocaine 1 %  Outcome:  Tolerated well, no immediate complications  Procedure discussed: discussed risks, benefits, and alternatives    Consent Given by:  Patient  Timeout: timeout called immediately prior to procedure    Prep: patient was prepped and draped in usual sterile fashion          Again, thank you for allowing me to participate in the care of your patient.        Sincerely,        Herman El DO    Electronically signed

## 2025-07-14 NOTE — NURSING NOTE
[facility] ORTHOPEDICS, SPORTS MEDICINE and PODIATRY  [address] [place] [address]  Dept: [phone]  ______________________________________________________________________________    Patient: Kathrine Isaac   : 1955   MRN: [id number]   2025    INVASIVE PROCEDURE SAFETY CHECKLIST    Date: 2025   Procedure: Right knee and right shoulder injection  Patient Name: Kathrine Isaac  MRN: [id number]  YOB: 1955    Action: Complete sections as appropriate. Any discrepancy results in a HARD COPY until resolved.     PRE PROCEDURE:  Patient ID verified with 2 identifiers (name and  or MRN): Yes  Procedure and site verified with patient/designee (when able): Yes  Accurate consent documentation in medical record: Yes  H&P (or appropriate assessment) documented in medical record: Yes  H&P must be up to 20 days prior to procedure and updates within 24 hours of procedure as applicable: NA  Relevant diagnostic and radiology test results appropriately labeled and displayed as applicable: NA  Procedure site(s) marked with provider initials: NA    TIMEOUT:  Time-Out performed immediately prior to starting procedure, including verbal and active participation of all team members addressing the following:Yes  * Correct patient identify  * Confirmed that the correct side and site are marked  * An accurate procedure consent form  * Agreement on the procedure to be done  * Correct patient position  * Relevant images and results are properly labeled and appropriately displayed  * The need to administer antibiotics or fluids for irrigation purposes during the procedure as applicable   * Safety precautions based on patient history or medication use    DURING PROCEDURE: Verification of correct person, site, and procedures any time the responsibility for care of the patient is transferred to another member of the care team.       Prior to injection, verified patient identity using  patient's name and date of birth.  Due to injection administration, patient instructed to remain in clinic for 15 minutes  afterwards, and to report any adverse reaction to me immediately.    Joint injection was performed.      Drug Amount Wasted:  None.  Vial/Syringe: Single dose vial  Expiration Date:  2/28/2026      Richard Jackson, EMT  July 14, 2025

## 2025-07-14 NOTE — NURSING NOTE
Chief Complaint   Patient presents with    Right Knee - Pain, Follow Up     injection    Right Shoulder - Pain, Follow Up     injection       There were no vitals filed for this visit.    There is no height or weight on file to calculate BMI.      KEVIN Ramos NREMT

## 2025-07-14 NOTE — PROGRESS NOTES
Kathrine has right knee and right shoulder osteoarthritis, he is here for a right knee and right shoulder injection.          Large Joint Injection/Arthocentesis: R knee joint    Date/Time: 7/14/2025 9:42 AM    Performed by: Herman El DO  Authorized by: Herman El DO    Indications:  Pain  Needle Size:  22 G  Guidance: landmark guided    Approach:  Lateral  Location:  Knee      Medications:  40 mg triamcinolone 40 MG/ML; 4 mL lidocaine 1 %  Outcome:  Tolerated well, no immediate complications  Procedure discussed: discussed risks, benefits, and alternatives    Consent Given by:  Patient  Timeout: timeout called immediately prior to procedure    Prep: patient was prepped and draped in usual sterile fashion       PROCEDURE    Knee Injection - Intraarticular  The patient was informed of the risks and the benefits of the procedure and a written consent was signed.  The patient's right knee was prepped with chlorhexidine in sterile fashion.   40 mg of triamcinolone suspension was drawn up into a 5 mL syringe with 4 mL of 1% lidocaine.  Injection was performed using substerile technique.  A 1.5-inch 22-gauge needle was used to enter the lateral aspect of the knee.  Injection performed successfully without difficulty.  There were no complications. The patient tolerated the procedure well. There was negligible bleeding.       Glenohumeral Injection - Ultrasound Guided    The patient was informed of the risks and the benefits of the procedure and a written consent was signed.  The patient's right shoulder was prepped with chlorhexidine in sterile fashion.   40 mg of kenalog suspension was drawn up into a 5 mL syringe with 3 mL of 1% lidocaine w/o Epi.  Injection was performed using sterile technique.  Under ultrasound guidance a 3.5-inch 22-gauge needle was used to enter the glenohumeral joint.  Posterior approach was used with the patient in lateral recumbent position, arm in neutral position at the side.   Needle placement was visualized and documented with ultrasound.  Ultrasound visualization was necessary due to the small joint space entered.  Injection performed long axis to the probe.  Injection solution visualized within the joint space.  Images were permanently stored for the patient's record.  There were no complications. The patient tolerated the procedure well. There was negligible bleeding.                 Large Joint Injection/Arthocentesis: R glenohumeral joint    Date/Time: 7/14/2025 9:42 AM    Performed by: Herman El DO  Authorized by: Herman El DO    Indications:  Pain  Needle Size:  22 G  Guidance: ultrasound    Location:  Shoulder      Site:  R glenohumeral joint  Medications:  40 mg triamcinolone 40 MG/ML; 4 mL lidocaine 1 %  Outcome:  Tolerated well, no immediate complications  Procedure discussed: discussed risks, benefits, and alternatives    Consent Given by:  Patient  Timeout: timeout called immediately prior to procedure    Prep: patient was prepped and draped in usual sterile fashion

## 2025-07-15 RX ORDER — LATANOPROST 50 UG/ML
SOLUTION/ DROPS OPHTHALMIC
Qty: 2.5 ML | Refills: 5 | Status: SHIPPED | OUTPATIENT
Start: 2025-07-15

## 2025-07-15 NOTE — TELEPHONE ENCOUNTER
Last Written Prescription:     latanoprost (XALATAN) 0.005 % ophthalmic solution 2.5 mL 5 2/7/2025 -- No   Sig: INSTILL 1 DROP IN BOTH EYES DAILY     ----------------------  Last Visit Date: 5/9/25 [facility]   Continue latanoprost nightly in both eyes and timolol twice daily in both eyes. Recommend following up after cataract surgery, which was not scheduled last year.   Future Visit Date: 7/22/25 [facility]  ----------------------      Refill decision:   [x] Medication refilled per  Medication Refill in Ambulatory Care  policy.           Request from pharmacy:  Requested Prescriptions   Pending Prescriptions Disp Refills    latanoprost (XALATAN) 0.005 % ophthalmic solution [Pharmacy Med Name: LATANOPROST 0.005% OPHTH SOLN 2.5ML] 2.5 mL 5     Sig: INSTILL 1 DROP IN BOTH EYES DAILY       There is no refill protocol information for this order

## 2025-07-16 ENCOUNTER — OFFICE VISIT (OUTPATIENT)
Dept: URGENT CARE | Facility: URGENT CARE | Age: 70
End: 2025-07-16
Payer: COMMERCIAL

## 2025-07-16 VITALS
TEMPERATURE: 97.6 F | HEART RATE: 75 BPM | BODY MASS INDEX: 28.66 KG/M2 | WEIGHT: 178.3 LBS | RESPIRATION RATE: 20 BRPM | OXYGEN SATURATION: 98 % | DIASTOLIC BLOOD PRESSURE: 75 MMHG | SYSTOLIC BLOOD PRESSURE: 125 MMHG | HEIGHT: 66 IN

## 2025-07-16 DIAGNOSIS — R06.6 HICCUPS: Primary | ICD-10-CM

## 2025-07-16 PROCEDURE — 3074F SYST BP LT 130 MM HG: CPT

## 2025-07-16 PROCEDURE — 1126F AMNT PAIN NOTED NONE PRSNT: CPT

## 2025-07-16 PROCEDURE — 99213 OFFICE O/P EST LOW 20 MIN: CPT

## 2025-07-16 PROCEDURE — 3078F DIAST BP <80 MM HG: CPT

## 2025-07-16 RX ORDER — BACLOFEN 10 MG/1
5 TABLET ORAL 3 TIMES DAILY PRN
Qty: 30 TABLET | Refills: 0 | Status: SHIPPED | OUTPATIENT
Start: 2025-07-16

## 2025-07-16 ASSESSMENT — PAIN SCALES - GENERAL: PAINLEVEL_OUTOF10: NO PAIN (0)

## 2025-07-16 NOTE — PATIENT INSTRUCTIONS
You were seen for your hiccups today. Please take your baclofen three times a day as needed if your hiccups persist beyond 3 hours.     If your symptoms do not improve within 14 days, I recommend follow-up with your PCP for further workup.

## 2025-07-16 NOTE — PROGRESS NOTES
Urgent Care Clinic Visit    Chief Complaint   Patient presents with    Hiccups     Hiccups x2 days               7/16/2025     4:06 PM   Additional Questions   Roomed by Brandi NOBLE   Accompanied by self       Assessment & Plan     Hiccups  No hiccups during visit today. Suspect eating too quickly vs GERD symptoms that are aggravating symptoms, though if intractable for 2-3 hours would recommend he try a medication. No symptoms otherwise today to suggest neurological or vascular cause of hiccups. Is already on gabapentin 300mg only once daily, recommend he discuss with his PCP if not improving. Will send for baclofen TID PRN, discussed only using this if symptoms do not resolve after a few hours AND he finds his gabapentin does not help. Also recommend OTC Tums as needed to help mitigate his reflux symptoms, defer to PCP to discuss long term PPI. Plan for follow-up with PCP as needed. Strict ED precautions discussed.  - baclofen (LIORESAL) 10 MG tablet  Dispense: 30 tablet; Refill: 0       No follow-ups on file.    Lara Amaral MD  [facility]    German Chisholm is a 70 year old male who presents to clinic today for the following health issues:  Chief Complaint   Patient presents with    Hiccups     Hiccups x2 days         7/16/2025     4:06 PM   Additional Questions   Roomed by Brandi NOBLE   Accompanied by self     HPI    Here for hiccups x 2 days  Intermittent symptoms, worsened when he eats large meals at once  Does eat lots of spicy foods, thinks this may be related  Hiccups last about 20 minutes then goes away but comes back at next meal. Somewhat improve with drinking some fluids and holding breath but these do not make things better.  Able to sleep without issues  No fever, chills, cough, shortness of breath, nausea, vomiting, abdominal pain, epigastric burning, diarrhea, focal weakness, or numbness worse than his baseline  No chest pain, no palpitations, no lung issues as  "well  Otherwise feeling fine    Review of Systems  Constitutional, HEENT, cardiovascular, pulmonary, gi and gu systems are negative, except as otherwise noted.      Objective    /75 (BP Location: Left arm, Patient Position: Sitting, Cuff Size: Adult Regular)   Pulse 75   Temp 97.6  F (36.4  C) (Oral)   Resp 20   Ht 1.676 m (5' 6\")   Wt 80.9 kg (178 lb 4.8 oz)   SpO2 98%   BMI 28.78 kg/m      Physical Exam   General: Alert, no acute distress  Skin: clean, dry, and intact  HEENT: normocephalic, atraumatic, spontaneous eye movement, no injection or icterus, ears and nose normal, no neck masses  Resp: clear to auscultation bilaterally, comfortable effort. No hiccups on exam  Cardio: RRR, S1 and S2 present  Abdomen: nondistended, no masses  Extremities: no erythema, no edema  Neuro: CN II-XII grossly intact, gait normal  Psych: normal mood, normal affect            "

## 2025-08-27 DIAGNOSIS — E11.319 TYPE 2 DIABETES MELLITUS WITH RETINOPATHY, WITHOUT LONG-TERM CURRENT USE OF INSULIN, MACULAR EDEMA PRESENCE UNSPECIFIED, UNSPECIFIED LATERALITY, UNSPECIFIED RETINOPATHY SEVERITY (H): ICD-10-CM

## 2025-08-27 RX ORDER — METFORMIN HYDROCHLORIDE 500 MG/1
2000 TABLET, EXTENDED RELEASE ORAL
Qty: 360 TABLET | Refills: 0 | Status: SHIPPED | OUTPATIENT
Start: 2025-08-27

## (undated) RX ORDER — LIDOCAINE HYDROCHLORIDE 10 MG/ML
INJECTION, SOLUTION EPIDURAL; INFILTRATION; INTRACAUDAL; PERINEURAL
Status: DISPENSED
Start: 2022-10-07

## (undated) RX ORDER — DEXAMETHASONE SODIUM PHOSPHATE 10 MG/ML
INJECTION, SOLUTION INTRAMUSCULAR; INTRAVENOUS
Status: DISPENSED
Start: 2022-10-07